# Patient Record
Sex: FEMALE | Race: BLACK OR AFRICAN AMERICAN | NOT HISPANIC OR LATINO | Employment: FULL TIME | URBAN - NONMETROPOLITAN AREA
[De-identification: names, ages, dates, MRNs, and addresses within clinical notes are randomized per-mention and may not be internally consistent; named-entity substitution may affect disease eponyms.]

---

## 2017-07-26 ENCOUNTER — LAB (OUTPATIENT)
Dept: ONCOLOGY | Facility: CLINIC | Age: 37
End: 2017-07-26

## 2017-07-26 ENCOUNTER — OFFICE VISIT (OUTPATIENT)
Dept: ONCOLOGY | Facility: CLINIC | Age: 37
End: 2017-07-26

## 2017-07-26 VITALS
RESPIRATION RATE: 16 BRPM | SYSTOLIC BLOOD PRESSURE: 110 MMHG | HEART RATE: 92 BPM | DIASTOLIC BLOOD PRESSURE: 68 MMHG | HEIGHT: 66 IN | TEMPERATURE: 99.1 F | BODY MASS INDEX: 28.21 KG/M2 | OXYGEN SATURATION: 98 % | WEIGHT: 175.5 LBS

## 2017-07-26 DIAGNOSIS — C92.10 CHRONIC MYELOID LEUKEMIA (HCC): Primary | ICD-10-CM

## 2017-07-26 DIAGNOSIS — D50.9 IRON DEFICIENCY ANEMIA, UNSPECIFIED: Primary | ICD-10-CM

## 2017-07-26 DIAGNOSIS — C92.10 CML (CHRONIC MYELOID LEUKEMIA) (HCC): Primary | ICD-10-CM

## 2017-07-26 LAB
ALBUMIN SERPL-MCNC: 4.2 G/DL (ref 3.5–5)
ALBUMIN/GLOB SERPL: 1.4 G/DL
ALP SERPL-CCNC: 71 U/L (ref 38–126)
ALT SERPL W P-5'-P-CCNC: 27 U/L (ref 9–52)
ANION GAP SERPL CALCULATED.3IONS-SCNC: 8 MMOL/L
AST SERPL-CCNC: 32 U/L (ref 5–40)
AUTO MIXED CELLS #: 0.4 10*3/MM3 (ref 0.1–1.5)
AUTO MIXED CELLS %: 7.4 % (ref 0.2–15.1)
BILIRUB SERPL-MCNC: 0.4 MG/DL (ref 0.2–1.3)
BUN BLD-MCNC: 18 MG/DL (ref 7–26)
BUN/CREAT SERPL: 20 (ref 7–25)
CALCIUM SPEC-SCNC: 9.2 MG/DL (ref 8.4–10.2)
CHLORIDE SERPL-SCNC: 109 MMOL/L (ref 98–107)
CO2 SERPL-SCNC: 22 MMOL/L (ref 22–30)
CREAT BLD-MCNC: 0.9 MG/DL (ref 0.7–1.4)
ERYTHROCYTE [DISTWIDTH] IN BLOOD BY AUTOMATED COUNT: 15.6 % (ref 11.5–14.5)
GFR SERPL CREATININE-BSD FRML MDRD: 85 ML/MIN/1.73
GLOBULIN UR ELPH-MCNC: 3 GM/DL
GLUCOSE BLD-MCNC: 123 MG/DL (ref 75–110)
HCT VFR BLD AUTO: 32.3 % (ref 37–47)
HGB BLD-MCNC: 10.5 G/DL (ref 12–16)
LYMPHOCYTES # BLD AUTO: 2.2 10*3/MM3 (ref 0.8–7)
LYMPHOCYTES NFR BLD AUTO: 41 % (ref 10–58.5)
MCH RBC QN AUTO: 32.3 PG (ref 27–31)
MCHC RBC AUTO-ENTMCNC: 32.5 G/DL (ref 33–37)
MCV RBC AUTO: 99.5 FL (ref 81–99)
NEUTROPHILS # BLD AUTO: 2.7 10*3/MM3 (ref 2–7.8)
NEUTROPHILS NFR BLD AUTO: 51.6 % (ref 37–92)
PLATELET # BLD AUTO: 305 10*3/MM3 (ref 130–400)
PMV BLD AUTO: 7.8 FL (ref 6–12)
POTASSIUM BLD-SCNC: 3.2 MMOL/L (ref 3.6–5)
PROT SERPL-MCNC: 7.2 G/DL (ref 6.3–8.2)
RBC # BLD AUTO: 3.25 10*6/MM3 (ref 4.2–5.4)
SODIUM BLD-SCNC: 139 MMOL/L (ref 137–145)
WBC NRBC COR # BLD: 5.3 10*3/MM3 (ref 4.8–10.8)

## 2017-07-26 PROCEDURE — 80053 COMPREHEN METABOLIC PANEL: CPT | Performed by: INTERNAL MEDICINE

## 2017-07-26 PROCEDURE — 36415 COLL VENOUS BLD VENIPUNCTURE: CPT | Performed by: INTERNAL MEDICINE

## 2017-07-26 PROCEDURE — 99214 OFFICE O/P EST MOD 30 MIN: CPT | Performed by: INTERNAL MEDICINE

## 2017-07-26 PROCEDURE — 85025 COMPLETE CBC W/AUTO DIFF WBC: CPT | Performed by: INTERNAL MEDICINE

## 2017-07-26 RX ORDER — AZITHROMYCIN 250 MG/1
TABLET, FILM COATED ORAL
Refills: 0 | COMMUNITY
Start: 2017-06-27 | End: 2018-06-11

## 2017-07-26 RX ORDER — BROMPHENIRAMINE MALEATE, PSEUDOEPHEDRINE HYDROCHLORIDE, AND DEXTROMETHORPHAN HYDROBROMIDE 2; 30; 10 MG/5ML; MG/5ML; MG/5ML
SYRUP ORAL
Refills: 0 | COMMUNITY
Start: 2017-06-27 | End: 2018-06-11

## 2017-07-26 NOTE — PROGRESS NOTES
[facility]  HEMATOLOGY & ONCOLOGY        Subjective     VISIT DIAGNOSIS: No diagnosis found.    REASON FOR VISIT:   No chief complaint on file.       HEMATOLOGY / ONCOLOGY HISTORY:   Oncology/Hematology History    Ms Becker is a pleasant 35 year old female with diagnosis of chronic myelogenous leukemia diagnosed over 12 years ago. She has  been on Gleevec on and off. She was started back on 02/04/10.  Ms Becker is a pleasant  female with chronic myelogenous leukemia. She initially had been placed on imatinib, and  she failed highdose  therapy. She took this infrequently, however. Patient was placed on Sprycel. Had better compliance to this, but her  bcr/abl transcript shaila. 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 This is  a mutation that is reported to confer resistance to bcr/abl 1 tyrosine kinase inhibitors. Patient was informed of the news.  INTERVAL HISTORY  Winsome is a very pleasant 35 year old female patient with chronic myelogenous leukemia who presents today in followup.  She is currently  taking Sprycel and states she been compliant with it since her last prescription. She states she has had some problems in the past with  pharmacy but overall she has been fairly compliant with it over the last month or two. She last had a BCR/ABL transcript on 04/13/2016  that found the transcript detected at 21.3649% on the international scale. This was down from 30.94 in March and 40.52 back in  September of 14. She is also following with Dr. Benigno mcclure at [place]        CML (chronic myeloid leukemia)    7/26/2017 Initial Diagnosis     CML (chronic myeloid leukemia)        [No treatment plan]  Cancer Staging Information:  No matching staging information was found for the patient.      INTERVAL HISTORY  Patient ID: Winsome Becker is a 36 y.o. year old female         Review of Systems         Medications:    Current  Outpatient Prescriptions   Medication Sig Dispense Refill   • azithromycin (ZITHROMAX) 250 MG tablet TAKE 2 TABLETS BY MOUTH TODAY, THEN TAKE 1 TABLET DAILY FOR 4 DAYS  0   • brompheniramine-pseudoephedrine-DM 30-2-10 MG/5ML syrup TAKE 2 TEASPOONSFUL BY MOUTH EVERY 6 HOURS AS NEEDED  0   • cyclobenzaprine (FLEXERIL) 10 MG tablet Take 1 tablet by mouth 3 (Three) Times a Day As Needed for muscle spasms. 20 tablet 0   • dasatinib (SPRYCEL) 100 MG chemo tablet Take 1 tablet by mouth Daily. 30 tablet 0   • hydrochlorothiazide (HYDRODIURIL) 25 MG tablet TAKE 1 TABLET BY MOUTH DAILY FOR BLOOD PRESSURE  5   • naproxen (NAPROSYN) 500 MG tablet Take 1 tablet by mouth 2 (Two) Times a Day With Meals. 20 tablet 0   • promethazine-dextromethorphan (PROMETHAZINE-DM) 6.25-15 MG/5ML syrup TAKE 1 TEASPOON BY MOUTH EVERY 6 HOURS AS NEEDED FOR COUGH  0   • sulfamethoxazole-trimethoprim (BACTRIM DS,SEPTRA DS) 800-160 MG per tablet TAKE 1 TABLET BY MOUTH TWICE A DAY  0     No current facility-administered medications for this visit.        ALLERGIES:    Allergies   Allergen Reactions   • Latex    • Penicillins        Objective      @VITALS    Current Status 7/26/2017   ECOG score 0       General Appearance: Patient is awake, alert, oriented and in no acute distress. Patient is welldeveloped, wellnourished, and appears stated age.  HEENT: Normocephalic. Sclerae clear, conjunctiva pink, extraocular movements intact, pupils, round, reactive to light and  accommodation. Mouth and throat are clear with moist oral mucosa.  NECK: Supple, no jugular venous distention, thyroid not enlarged.  LYMPH: No cervical, supraclavicular, axillary, or inguinal lymphadenopathy.  CHEST: Equal bilateral expansion, AP  diameter normal, resonant percussion note  LUNGS: Good air movement, no rales, rhonchi, rubs or wheezes with auscultation  CARDIO: Regular sinus rhythm, no murmurs, gallops or rubs.  ABDOMEN: Nondistended, soft, No tenderness, no guarding, no  rebound, No hepatosplenomegaly. No abdominal masses. Bowel sounds positive. No hernia  GENITALIA: Not examined.  BREASTS: Not examined.  MUSKEL: No joint swelling, decreased motion, or inflammation  EXTREMS: No edema, clubbing, cyanosis, No varicose veins.  NEURO: Grossly nonfocal, Gait is coordinated and smooth, Cognition is preserved.  SKIN: No rashes, no ecchymoses, no petechia.  PSYCH: Oriented to time, place and person. Memory is preserved. Mood and affect appear normal      RECENT LABS:  Orders Only on 07/26/2017   Component Date Value Ref Range Status   • WBC 07/26/2017 5.30  4.80 - 10.80 10*3/mm3 Final   • RBC 07/26/2017 3.25* 4.20 - 5.40 10*6/mm3 Final   • Hemoglobin 07/26/2017 10.5* 12.0 - 16.0 g/dL Final   • Hematocrit 07/26/2017 32.3* 37.0 - 47.0 % Final   • MCV 07/26/2017 99.5* 81.0 - 99.0 fL Final   • MCH 07/26/2017 32.3* 27.0 - 31.0 pg Final   • MCHC 07/26/2017 32.5* 33.0 - 37.0 g/dL Final   • RDW 07/26/2017 15.6* 11.5 - 14.5 % Final   • MPV 07/26/2017 7.8  6.0 - 12.0 fL Final   • Platelets 07/26/2017 305  130 - 400 10*3/mm3 Final   • Neutrophil % 07/26/2017 51.6  37.0 - 92.0 % Final   • Lymphocyte % 07/26/2017 41.0  10.0 - 58.5 % Final   • Auto Mixed Cells % 07/26/2017 7.4  0.2 - 15.1 % Final   • Neutrophils, Absolute 07/26/2017 2.70  2.00 - 7.80 10*3/mm3 Final   • Lymphocytes, Absolute 07/26/2017 2.20  0.80 - 7.00 10*3/mm3 Final   • Auto Mixed Cells # 07/26/2017 0.40  0.10 - 1.50 10*3/mm3 Final       RADIOLOGY:  No results found.         Assessment/Plan        CML last 17 years on Spyricel 100mg / day.  Last BCR able transcripts in June 2016 was 23% she has never been in cytogenetic or molecular remission.  CBC shows a normal white count except for low hemoglobin of 10.5 platelets are normal.  I will repeat her BCR able transcripts today.  No adenopathy and no splenomegaly.              Jeromy Brian MD    7/26/2017    4:06 PM

## 2017-10-23 DIAGNOSIS — C82.10 FOLLICULAR LYMPHOMA GRADE II, UNSPECIFIED BODY REGION (HCC): Primary | ICD-10-CM

## 2017-10-26 ENCOUNTER — LAB (OUTPATIENT)
Dept: LAB | Facility: HOSPITAL | Age: 37
End: 2017-10-26

## 2017-10-26 ENCOUNTER — OFFICE VISIT (OUTPATIENT)
Dept: ONCOLOGY | Facility: CLINIC | Age: 37
End: 2017-10-26

## 2017-10-26 VITALS
HEART RATE: 92 BPM | HEIGHT: 66 IN | WEIGHT: 175.1 LBS | SYSTOLIC BLOOD PRESSURE: 144 MMHG | BODY MASS INDEX: 28.14 KG/M2 | OXYGEN SATURATION: 93 % | TEMPERATURE: 98.1 F | DIASTOLIC BLOOD PRESSURE: 88 MMHG | RESPIRATION RATE: 16 BRPM

## 2017-10-26 DIAGNOSIS — C92.10 CML (CHRONIC MYELOID LEUKEMIA) (HCC): Primary | ICD-10-CM

## 2017-10-26 DIAGNOSIS — C82.10 FOLLICULAR LYMPHOMA GRADE II, UNSPECIFIED BODY REGION (HCC): ICD-10-CM

## 2017-10-26 LAB
ALBUMIN SERPL-MCNC: 4 G/DL (ref 3.5–5)
ALBUMIN/GLOB SERPL: 1.3 G/DL (ref 1.1–2.5)
ALP SERPL-CCNC: 72 U/L (ref 24–120)
ALT SERPL W P-5'-P-CCNC: 38 U/L (ref 0–54)
ANION GAP SERPL CALCULATED.3IONS-SCNC: 9 MMOL/L (ref 4–13)
AST SERPL-CCNC: 39 U/L (ref 7–45)
AUTO MIXED CELLS #: 0.4 10*3/MM3 (ref 0.1–2.6)
AUTO MIXED CELLS %: 9.9 % (ref 0.1–24)
BILIRUB SERPL-MCNC: 0.1 MG/DL (ref 0.1–1)
BUN BLD-MCNC: 19 MG/DL (ref 5–21)
BUN/CREAT SERPL: 21.1
CALCIUM SPEC-SCNC: 9.1 MG/DL (ref 8.4–10.4)
CHLORIDE SERPL-SCNC: 107 MMOL/L (ref 98–110)
CO2 SERPL-SCNC: 24 MMOL/L (ref 24–31)
CREAT BLD-MCNC: 0.9 MG/DL (ref 0.5–1.4)
ERYTHROCYTE [DISTWIDTH] IN BLOOD BY AUTOMATED COUNT: 16.7 % (ref 12–15)
GFR SERPL CREATININE-BSD FRML MDRD: 84 ML/MIN/1.73
GLOBULIN UR ELPH-MCNC: 3 GM/DL
GLUCOSE BLD-MCNC: 96 MG/DL (ref 70–100)
HCT VFR BLD AUTO: 31.9 % (ref 37–47)
HGB BLD-MCNC: 10.6 G/DL (ref 12–16)
HOLD SPECIMEN: NORMAL
LYMPHOCYTES # BLD AUTO: 1.8 10*3/MM3 (ref 0.8–7)
LYMPHOCYTES NFR BLD AUTO: 49.5 % (ref 15–45)
MCH RBC QN AUTO: 30.7 PG (ref 28–32)
MCHC RBC AUTO-ENTMCNC: 33.2 G/DL (ref 33–36)
MCV RBC AUTO: 92.5 FL (ref 82–98)
NEUTROPHILS # BLD AUTO: 1.5 10*3/MM3 (ref 1.5–8.3)
NEUTROPHILS NFR BLD AUTO: 40.6 % (ref 39–78)
PLATELET # BLD AUTO: 226 10*3/MM3 (ref 130–400)
PMV BLD AUTO: 8 FL (ref 6–12)
POTASSIUM BLD-SCNC: 3.4 MMOL/L (ref 3.5–5.3)
PROT SERPL-MCNC: 7 G/DL (ref 6.3–8.7)
RBC # BLD AUTO: 3.45 10*6/MM3 (ref 4.2–5.4)
SODIUM BLD-SCNC: 140 MMOL/L (ref 135–145)
WBC NRBC COR # BLD: 3.7 10*3/MM3 (ref 4.8–10.8)

## 2017-10-26 PROCEDURE — 85025 COMPLETE CBC W/AUTO DIFF WBC: CPT

## 2017-10-26 PROCEDURE — 36415 COLL VENOUS BLD VENIPUNCTURE: CPT

## 2017-10-26 PROCEDURE — 99214 OFFICE O/P EST MOD 30 MIN: CPT | Performed by: INTERNAL MEDICINE

## 2017-10-26 PROCEDURE — 80053 COMPREHEN METABOLIC PANEL: CPT

## 2017-10-26 NOTE — PROGRESS NOTES
[facility]  HEMATOLOGY & ONCOLOGY        Subjective     VISIT DIAGNOSIS: No diagnosis found.    REASON FOR VISIT:   No chief complaint on file.       HEMATOLOGY / ONCOLOGY HISTORY:   Oncology/Hematology History    Ms Becker is a pleasant 35 year old female with diagnosis of chronic myelogenous leukemia diagnosed over 12 years ago. She has  been on Gleevec on and off. She was started back on 02/04/10.  Ms Becker is a pleasant  female with chronic myelogenous leukemia. She initially had been placed on imatinib, and  she failed highdose  therapy. She took this infrequently, however. Patient was placed on Sprycel. Had better compliance to this, but her  bcr/abl transcript shaila. 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 This is  a mutation that is reported to confer resistance to bcr/abl 1 tyrosine kinase inhibitors. Patient was informed of the news.  INTERVAL HISTORY  Winsome is a very pleasant 35 year old female patient with chronic myelogenous leukemia who presents today in followup.  She is currently  taking Sprycel and states she been compliant with it since her last prescription. She states she has had some problems in the past with  pharmacy but overall she has been fairly compliant with it over the last month or two. She last had a BCR/ABL transcript on 04/13/2016  that found the transcript detected at 21.3649% on the international scale. This was down from 30.94 in March and 40.52 back in  September of 14. She is also following with Dr. Benigno mcclure at [place]        CML (chronic myeloid leukemia)    7/26/2017 Initial Diagnosis     CML (chronic myeloid leukemia)        [No treatment plan]  Cancer Staging Information:  No matching staging information was found for the patient.      INTERVAL HISTORY  Patient ID: Winsome Becker is a 37 y.o. year old female         Review of Systems   Constitutional: Negative.     HENT: Negative.    Eyes: Negative.    Respiratory: Negative.    Cardiovascular: Negative.    Gastrointestinal: Negative.    Endocrine: Negative.    Genitourinary: Negative.    Musculoskeletal: Negative.    Skin: Negative.    Allergic/Immunologic: Negative.    Neurological: Negative.    Hematological: Negative.    Psychiatric/Behavioral: Negative.             Medications:    Current Outpatient Prescriptions   Medication Sig Dispense Refill   • azithromycin (ZITHROMAX) 250 MG tablet TAKE 2 TABLETS BY MOUTH TODAY, THEN TAKE 1 TABLET DAILY FOR 4 DAYS  0   • brompheniramine-pseudoephedrine-DM 30-2-10 MG/5ML syrup TAKE 2 TEASPOONSFUL BY MOUTH EVERY 6 HOURS AS NEEDED  0   • cyclobenzaprine (FLEXERIL) 10 MG tablet Take 1 tablet by mouth 3 (Three) Times a Day As Needed for muscle spasms. 20 tablet 0   • dasatinib (SPRYCEL) 100 MG chemo tablet Take 1 tablet by mouth Daily. 30 tablet 2   • hydrochlorothiazide (HYDRODIURIL) 25 MG tablet TAKE 1 TABLET BY MOUTH DAILY FOR BLOOD PRESSURE  5   • naproxen (NAPROSYN) 500 MG tablet Take 1 tablet by mouth 2 (Two) Times a Day With Meals. 20 tablet 0   • promethazine-dextromethorphan (PROMETHAZINE-DM) 6.25-15 MG/5ML syrup TAKE 1 TEASPOON BY MOUTH EVERY 6 HOURS AS NEEDED FOR COUGH  0   • sulfamethoxazole-trimethoprim (BACTRIM DS,SEPTRA DS) 800-160 MG per tablet TAKE 1 TABLET BY MOUTH TWICE A DAY  0     No current facility-administered medications for this visit.        ALLERGIES:    Allergies   Allergen Reactions   • Latex    • Penicillins        Objective      @VITALS    Current Status 7/26/2017   ECOG score 0       General Appearance: Patient is awake, alert, oriented and in no acute distress. Patient is welldeveloped, wellnourished, and appears stated age.  HEENT: Normocephalic. Sclerae clear, conjunctiva pink, extraocular movements intact, pupils, round, reactive to light and  accommodation. Mouth and throat are clear with moist oral mucosa.  NECK: Supple, no jugular venous  distention, thyroid not enlarged.  LYMPH: No cervical, supraclavicular, axillary, or inguinal lymphadenopathy.  CHEST: Equal bilateral expansion, AP  diameter normal, resonant percussion note  LUNGS: Good air movement, no rales, rhonchi, rubs or wheezes with auscultation  CARDIO: Regular sinus rhythm, no murmurs, gallops or rubs.  ABDOMEN: Nondistended, soft, No tenderness, no guarding, no rebound, No hepatosplenomegaly. No abdominal masses. Bowel sounds positive. No hernia  GENITALIA: Not examined.  BREASTS: Not examined.  MUSKEL: No joint swelling, decreased motion, or inflammation  EXTREMS: No edema, clubbing, cyanosis, No varicose veins.  NEURO: Grossly nonfocal, Gait is coordinated and smooth, Cognition is preserved.  SKIN: No rashes, no ecchymoses, no petechia.  PSYCH: Oriented to time, place and person. Memory is preserved. Mood and affect appear normal      RECENT LABS:  Lab on 10/26/2017   Component Date Value Ref Range Status   • WBC 10/26/2017 3.70* 4.80 - 10.80 10*3/mm3 Final   • RBC 10/26/2017 3.45* 4.20 - 5.40 10*6/mm3 Final   • Hemoglobin 10/26/2017 10.6* 12.0 - 16.0 g/dL Final   • Hematocrit 10/26/2017 31.9* 37.0 - 47.0 % Final   • MCV 10/26/2017 92.5  82.0 - 98.0 fL Final   • MCH 10/26/2017 30.7  28.0 - 32.0 pg Final   • MCHC 10/26/2017 33.2  33.0 - 36.0 g/dL Final   • RDW 10/26/2017 16.7* 12.0 - 15.0 % Final   • MPV 10/26/2017 8.0  6.0 - 12.0 fL Final   • Platelets 10/26/2017 226  130 - 400 10*3/mm3 Final   • Neutrophil % 10/26/2017 40.6  39.0 - 78.0 % Final   • Lymphocyte % 10/26/2017 49.5* 15.0 - 45.0 % Final   • Auto Mixed Cells % 10/26/2017 9.9  0.1 - 24.0 % Final   • Neutrophils, Absolute 10/26/2017 1.50  1.50 - 8.30 10*3/mm3 Final   • Lymphocytes, Absolute 10/26/2017 1.80  0.80 - 7.00 10*3/mm3 Final   • Auto Mixed Cells # 10/26/2017 0.40  0.10 - 2.60 10*3/mm3 Final       RADIOLOGY:  No results found.         Assessment/Plan        CML last 17 years on Spyricel 100mg / day.  Last BCR able  transcripts in July 2017 was 9.5% she has never been in cytogenetic or molecular remission.  CBC shows a normal white count except for low hemoglobin of 10.5 platelets are normal.  I will repeat her BCR able transcripts today.  No adenopathy and no splenomegaly.  Cont Sprycel.              Jeromy Brian MD    10/26/2017    1:07 PM

## 2018-01-19 DIAGNOSIS — C92.10 CHRONIC MYELOID LEUKEMIA (HCC): Primary | ICD-10-CM

## 2018-01-25 ENCOUNTER — APPOINTMENT (OUTPATIENT)
Dept: LAB | Facility: HOSPITAL | Age: 38
End: 2018-01-25

## 2018-06-05 DIAGNOSIS — C92.10 CHRONIC MYELOID LEUKEMIA (HCC): Primary | ICD-10-CM

## 2018-06-11 ENCOUNTER — OFFICE VISIT (OUTPATIENT)
Dept: ONCOLOGY | Facility: CLINIC | Age: 38
End: 2018-06-11

## 2018-06-11 ENCOUNTER — APPOINTMENT (OUTPATIENT)
Dept: LAB | Facility: HOSPITAL | Age: 38
End: 2018-06-11

## 2018-06-11 VITALS
BODY MASS INDEX: 25.91 KG/M2 | DIASTOLIC BLOOD PRESSURE: 84 MMHG | RESPIRATION RATE: 16 BRPM | OXYGEN SATURATION: 98 % | TEMPERATURE: 98.1 F | WEIGHT: 161.2 LBS | HEIGHT: 66 IN | SYSTOLIC BLOOD PRESSURE: 142 MMHG | HEART RATE: 88 BPM

## 2018-06-11 DIAGNOSIS — C92.10 CML (CHRONIC MYELOID LEUKEMIA) (HCC): Primary | ICD-10-CM

## 2018-06-11 DIAGNOSIS — C92.10 CHRONIC MYELOID LEUKEMIA (HCC): Primary | ICD-10-CM

## 2018-06-11 LAB
ALBUMIN SERPL-MCNC: 4.2 G/DL (ref 3.5–5)
ALBUMIN/GLOB SERPL: 1.3 G/DL (ref 1.1–2.5)
ALP SERPL-CCNC: 59 U/L (ref 24–120)
ALT SERPL W P-5'-P-CCNC: 30 U/L (ref 0–54)
ANION GAP SERPL CALCULATED.3IONS-SCNC: 9 MMOL/L (ref 4–13)
AST SERPL-CCNC: 46 U/L (ref 7–45)
BASOPHILS # BLD AUTO: 0.04 10*3/MM3 (ref 0–0.2)
BASOPHILS NFR BLD AUTO: 0.9 % (ref 0–2)
BILIRUB SERPL-MCNC: 0.3 MG/DL (ref 0.1–1)
BUN BLD-MCNC: 15 MG/DL (ref 5–21)
BUN/CREAT SERPL: 17 (ref 7–25)
CALCIUM SPEC-SCNC: 9.3 MG/DL (ref 8.4–10.4)
CHLORIDE SERPL-SCNC: 104 MMOL/L (ref 98–110)
CO2 SERPL-SCNC: 27 MMOL/L (ref 24–31)
CREAT BLD-MCNC: 0.88 MG/DL (ref 0.5–1.4)
DEPRECATED RDW RBC AUTO: 60.3 FL (ref 40–54)
EOSINOPHIL # BLD AUTO: 0.08 10*3/MM3 (ref 0–0.7)
EOSINOPHIL NFR BLD AUTO: 1.8 % (ref 0–4)
ERYTHROCYTE [DISTWIDTH] IN BLOOD BY AUTOMATED COUNT: 19.3 % (ref 12–15)
GFR SERPL CREATININE-BSD FRML MDRD: 87 ML/MIN/1.73
GLOBULIN UR ELPH-MCNC: 3.2 GM/DL
GLUCOSE BLD-MCNC: 83 MG/DL (ref 70–100)
HCT VFR BLD AUTO: 34.1 % (ref 37–47)
HGB BLD-MCNC: 11.3 G/DL (ref 12–16)
HOLD SPECIMEN: NORMAL
HOLD SPECIMEN: NORMAL
IMM GRANULOCYTES # BLD: 0.01 10*3/MM3 (ref 0–0.03)
IMM GRANULOCYTES NFR BLD: 0.2 % (ref 0–5)
LYMPHOCYTES # BLD AUTO: 2.39 10*3/MM3 (ref 0.72–4.86)
LYMPHOCYTES NFR BLD AUTO: 52.3 % (ref 15–45)
MCH RBC QN AUTO: 28.5 PG (ref 28–32)
MCHC RBC AUTO-ENTMCNC: 33.1 G/DL (ref 33–36)
MCV RBC AUTO: 85.9 FL (ref 82–98)
MONOCYTES # BLD AUTO: 0.44 10*3/MM3 (ref 0.19–1.3)
MONOCYTES NFR BLD AUTO: 9.6 % (ref 4–12)
NEUTROPHILS # BLD AUTO: 1.61 10*3/MM3 (ref 1.87–8.4)
NEUTROPHILS NFR BLD AUTO: 35.2 % (ref 39–78)
NRBC BLD MANUAL-RTO: 0 /100 WBC (ref 0–0)
PLATELET # BLD AUTO: 307 10*3/MM3 (ref 130–400)
PMV BLD AUTO: 9.2 FL (ref 6–12)
POTASSIUM BLD-SCNC: 3.2 MMOL/L (ref 3.5–5.3)
PROT SERPL-MCNC: 7.4 G/DL (ref 6.3–8.7)
RBC # BLD AUTO: 3.97 10*6/MM3 (ref 4.2–5.4)
SODIUM BLD-SCNC: 140 MMOL/L (ref 135–145)
WBC NRBC COR # BLD: 4.57 10*3/MM3 (ref 4.8–10.8)

## 2018-06-11 PROCEDURE — 36415 COLL VENOUS BLD VENIPUNCTURE: CPT

## 2018-06-11 PROCEDURE — 80053 COMPREHEN METABOLIC PANEL: CPT | Performed by: INTERNAL MEDICINE

## 2018-06-11 PROCEDURE — 99214 OFFICE O/P EST MOD 30 MIN: CPT | Performed by: INTERNAL MEDICINE

## 2018-06-11 PROCEDURE — 85025 COMPLETE CBC W/AUTO DIFF WBC: CPT | Performed by: INTERNAL MEDICINE

## 2018-06-11 RX ORDER — HYDROCHLOROTHIAZIDE 12.5 MG/1
12.5 TABLET ORAL DAILY
Refills: 5 | COMMUNITY
Start: 2018-05-05 | End: 2019-01-17

## 2018-06-11 NOTE — PROGRESS NOTES
[facility]  HEMATOLOGY & ONCOLOGY        Subjective     VISIT DIAGNOSIS: No diagnosis found.    REASON FOR VISIT:   No chief complaint on file.       HEMATOLOGY / ONCOLOGY HISTORY:   Oncology/Hematology History    Ms Becker is a pleasant 35 year old female with diagnosis of chronic myelogenous leukemia diagnosed over 12 years ago. She has  been on Gleevec on and off. She was started back on 02/04/10.  Ms Becker is a pleasant  female with chronic myelogenous leukemia. She initially had been placed on imatinib, and  she failed highdose  therapy. She took this infrequently, however. Patient was placed on Sprycel. Had better compliance to this, but her  bcr/abl transcript shaila. 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 This is  a mutation that is reported to confer resistance to bcr/abl 1 tyrosine kinase inhibitors. Patient was informed of the news.  INTERVAL HISTORY  Winsome is a very pleasant 35 year old female patient with chronic myelogenous leukemia who presents today in followup.  She is currently  taking Sprycel and states she been compliant with it since her last prescription. She states she has had some problems in the past with  pharmacy but overall she has been fairly compliant with it over the last month or two. She last had a BCR/ABL transcript on 04/13/2016  that found the transcript detected at 21.3649% on the international scale. This was down from 30.94 in March and 40.52 back in  September of 14. She is also following with Dr. Benigno mcclure at [place]        CML (chronic myeloid leukemia)    7/26/2017 Initial Diagnosis     CML (chronic myeloid leukemia)        [No treatment plan]  Cancer Staging Information:  No matching staging information was found for the patient.      INTERVAL HISTORY  Patient ID: Winsome Becker is a 37 y.o. year old female         Review of Systems   Constitutional: Negative.     HENT: Negative.    Eyes: Negative.    Respiratory: Negative.    Cardiovascular: Negative.    Gastrointestinal: Negative.    Endocrine: Negative.    Genitourinary: Negative.    Musculoskeletal: Negative.    Skin: Negative.    Allergic/Immunologic: Negative.    Neurological: Negative.    Hematological: Negative.    Psychiatric/Behavioral: Negative.             Medications:    Current Outpatient Prescriptions   Medication Sig Dispense Refill   • cyclobenzaprine (FLEXERIL) 10 MG tablet Take 1 tablet by mouth 3 (Three) Times a Day As Needed for muscle spasms. 20 tablet 0   • dasatinib (SPRYCEL) 100 MG chemo tablet Take 1 tablet by mouth Daily. 30 tablet 5   • hydrochlorothiazide (HYDRODIURIL) 12.5 MG tablet Take 12.5 mg by mouth Daily. for blood pressure.  5   • naproxen (NAPROSYN) 500 MG tablet Take 1 tablet by mouth 2 (Two) Times a Day With Meals. 20 tablet 0   • sulfamethoxazole-trimethoprim (BACTRIM DS,SEPTRA DS) 800-160 MG per tablet TAKE 1 TABLET BY MOUTH TWICE A DAY  0     No current facility-administered medications for this visit.        ALLERGIES:    Allergies   Allergen Reactions   • Latex    • Penicillins        Objective      @VITALS    Current Status 6/11/2018   ECOG score 0       General Appearance: Patient is awake, alert, oriented and in no acute distress. Patient is welldeveloped, wellnourished, and appears stated age.  HEENT: Normocephalic. Sclerae clear, conjunctiva pink, extraocular movements intact, pupils, round, reactive to light and  accommodation. Mouth and throat are clear with moist oral mucosa.  NECK: Supple, no jugular venous distention, thyroid not enlarged.  LYMPH: No cervical, supraclavicular, axillary, or inguinal lymphadenopathy.  CHEST: Equal bilateral expansion, AP  diameter normal, resonant percussion note  LUNGS: Good air movement, no rales, rhonchi, rubs or wheezes with auscultation  CARDIO: Regular sinus rhythm, no murmurs, gallops or rubs.  ABDOMEN: Nondistended, soft, No  tenderness, no guarding, no rebound, No hepatosplenomegaly. No abdominal masses. Bowel sounds positive. No hernia  GENITALIA: Not examined.  BREASTS: Not examined.  MUSKEL: No joint swelling, decreased motion, or inflammation  EXTREMS: No edema, clubbing, cyanosis, No varicose veins.  NEURO: Grossly nonfocal, Gait is coordinated and smooth, Cognition is preserved.  SKIN: No rashes, no ecchymoses, no petechia.  PSYCH: Oriented to time, place and person. Memory is preserved. Mood and affect appear normal      RECENT LABS:  Orders Only on 06/05/2018   Component Date Value Ref Range Status   • WBC 06/11/2018 4.57* 4.80 - 10.80 10*3/mm3 Final   • RBC 06/11/2018 3.97* 4.20 - 5.40 10*6/mm3 Final   • Hemoglobin 06/11/2018 11.3* 12.0 - 16.0 g/dL Final   • Hematocrit 06/11/2018 34.1* 37.0 - 47.0 % Final   • MCV 06/11/2018 85.9  82.0 - 98.0 fL Final   • MCH 06/11/2018 28.5  28.0 - 32.0 pg Final   • MCHC 06/11/2018 33.1  33.0 - 36.0 g/dL Final   • RDW 06/11/2018 19.3* 12.0 - 15.0 % Final   • RDW-SD 06/11/2018 60.3* 40.0 - 54.0 fl Final   • MPV 06/11/2018 9.2  6.0 - 12.0 fL Final   • Platelets 06/11/2018 307  130 - 400 10*3/mm3 Final   • Neutrophil % 06/11/2018 35.2* 39.0 - 78.0 % Final   • Lymphocyte % 06/11/2018 52.3* 15.0 - 45.0 % Final   • Monocyte % 06/11/2018 9.6  4.0 - 12.0 % Final   • Eosinophil % 06/11/2018 1.8  0.0 - 4.0 % Final   • Basophil % 06/11/2018 0.9  0.0 - 2.0 % Final   • Immature Grans % 06/11/2018 0.2  0.0 - 5.0 % Final   • Neutrophils, Absolute 06/11/2018 1.61* 1.87 - 8.40 10*3/mm3 Final   • Lymphocytes, Absolute 06/11/2018 2.39  0.72 - 4.86 10*3/mm3 Final   • Monocytes, Absolute 06/11/2018 0.44  0.19 - 1.30 10*3/mm3 Final   • Eosinophils, Absolute 06/11/2018 0.08  0.00 - 0.70 10*3/mm3 Final   • Basophils, Absolute 06/11/2018 0.04  0.00 - 0.20 10*3/mm3 Final   • Immature Grans, Absolute 06/11/2018 0.01  0.00 - 0.03 10*3/mm3 Final   • nRBC 06/11/2018 0.0  0.0 - 0.0 /100 WBC Final       RADIOLOGY:  No results  found.         Assessment/Plan        CML last 17 years on Spyricel 100mg / day.  Last BCR able transcripts in July 2017 was 9.5% she has never been in cytogenetic or molecular remission.  CBC shows a normal white count except for low hemoglobin of 11.2 platelets are normal.  I will repeat her BCR able transcripts today.  No adenopathy and no splenomegaly.  Cont Sprycel 100mg daily.              Jeromy Brian MD    6/11/2018    11:13 AM

## 2018-07-02 ENCOUNTER — TELEPHONE (OUTPATIENT)
Dept: ONCOLOGY | Facility: CLINIC | Age: 38
End: 2018-07-02

## 2018-07-02 DIAGNOSIS — C92.10 CHRONIC MYELOID LEUKEMIA (HCC): Primary | ICD-10-CM

## 2018-07-02 NOTE — TELEPHONE ENCOUNTER
Received call from patient, she called with question, is there something that she can take OTC for nausea if she forgets to take her Sprycel in the am. She says that she becomes very nauseated if she forgets to take the pill in the am.   She was informed that Dr Brian was out of the office until tomorrow but she could try sipping Ginger Ale or weak tea to settle her stomach and when she comes in for her deven apt tomorrow 7/3/18 she have Dr Brian write for something like Zofran or Compazine for her nausea. She v/u of our conversation.

## 2018-07-03 ENCOUNTER — LAB (OUTPATIENT)
Dept: LAB | Facility: HOSPITAL | Age: 38
End: 2018-07-03

## 2018-07-03 ENCOUNTER — OFFICE VISIT (OUTPATIENT)
Dept: ONCOLOGY | Facility: CLINIC | Age: 38
End: 2018-07-03

## 2018-07-03 VITALS
BODY MASS INDEX: 25.94 KG/M2 | OXYGEN SATURATION: 97 % | HEART RATE: 86 BPM | SYSTOLIC BLOOD PRESSURE: 120 MMHG | TEMPERATURE: 98.5 F | RESPIRATION RATE: 18 BRPM | WEIGHT: 161.4 LBS | HEIGHT: 66 IN | DIASTOLIC BLOOD PRESSURE: 68 MMHG

## 2018-07-03 DIAGNOSIS — C92.10 CHRONIC MYELOID LEUKEMIA (HCC): ICD-10-CM

## 2018-07-03 DIAGNOSIS — C92.10 CML (CHRONIC MYELOID LEUKEMIA) (HCC): Primary | ICD-10-CM

## 2018-07-03 LAB
ALBUMIN SERPL-MCNC: 4.2 G/DL (ref 3.5–5)
ALBUMIN/GLOB SERPL: 1.4 G/DL (ref 1.1–2.5)
ALP SERPL-CCNC: 57 U/L (ref 24–120)
ALT SERPL W P-5'-P-CCNC: 27 U/L (ref 0–54)
ANION GAP SERPL CALCULATED.3IONS-SCNC: 13 MMOL/L (ref 4–13)
AST SERPL-CCNC: 34 U/L (ref 7–45)
BASOPHILS # BLD AUTO: 0.04 10*3/MM3 (ref 0–0.2)
BASOPHILS NFR BLD AUTO: 0.8 % (ref 0–2)
BILIRUB SERPL-MCNC: 0.4 MG/DL (ref 0.1–1)
BUN BLD-MCNC: 15 MG/DL (ref 5–21)
BUN/CREAT SERPL: 17.4 (ref 7–25)
CALCIUM SPEC-SCNC: 9.7 MG/DL (ref 8.4–10.4)
CHLORIDE SERPL-SCNC: 102 MMOL/L (ref 98–110)
CO2 SERPL-SCNC: 24 MMOL/L (ref 24–31)
CREAT BLD-MCNC: 0.86 MG/DL (ref 0.5–1.4)
DEPRECATED RDW RBC AUTO: 55.3 FL (ref 40–54)
EOSINOPHIL # BLD AUTO: 0.15 10*3/MM3 (ref 0–0.7)
EOSINOPHIL NFR BLD AUTO: 3 % (ref 0–4)
ERYTHROCYTE [DISTWIDTH] IN BLOOD BY AUTOMATED COUNT: 18 % (ref 12–15)
GFR SERPL CREATININE-BSD FRML MDRD: 89 ML/MIN/1.73
GLOBULIN UR ELPH-MCNC: 3.1 GM/DL
GLUCOSE BLD-MCNC: 93 MG/DL (ref 70–100)
HCT VFR BLD AUTO: 33.2 % (ref 37–47)
HGB BLD-MCNC: 11 G/DL (ref 12–16)
HOLD SPECIMEN: NORMAL
HOLD SPECIMEN: NORMAL
IMM GRANULOCYTES # BLD: 0.01 10*3/MM3 (ref 0–0.03)
IMM GRANULOCYTES NFR BLD: 0.2 % (ref 0–5)
LYMPHOCYTES # BLD AUTO: 2.24 10*3/MM3 (ref 0.72–4.86)
LYMPHOCYTES NFR BLD AUTO: 44.4 % (ref 15–45)
MCH RBC QN AUTO: 27.9 PG (ref 28–32)
MCHC RBC AUTO-ENTMCNC: 33.1 G/DL (ref 33–36)
MCV RBC AUTO: 84.3 FL (ref 82–98)
MONOCYTES # BLD AUTO: 0.43 10*3/MM3 (ref 0.19–1.3)
MONOCYTES NFR BLD AUTO: 8.5 % (ref 4–12)
NEUTROPHILS # BLD AUTO: 2.17 10*3/MM3 (ref 1.87–8.4)
NEUTROPHILS NFR BLD AUTO: 43.1 % (ref 39–78)
NRBC BLD MANUAL-RTO: 0 /100 WBC (ref 0–0)
PLATELET # BLD AUTO: 346 10*3/MM3 (ref 130–400)
PMV BLD AUTO: 8.8 FL (ref 6–12)
POTASSIUM BLD-SCNC: 3.2 MMOL/L (ref 3.5–5.3)
PROT SERPL-MCNC: 7.3 G/DL (ref 6.3–8.7)
RBC # BLD AUTO: 3.94 10*6/MM3 (ref 4.2–5.4)
SODIUM BLD-SCNC: 139 MMOL/L (ref 135–145)
WBC NRBC COR # BLD: 5.04 10*3/MM3 (ref 4.8–10.8)

## 2018-07-03 PROCEDURE — 85025 COMPLETE CBC W/AUTO DIFF WBC: CPT

## 2018-07-03 PROCEDURE — 36415 COLL VENOUS BLD VENIPUNCTURE: CPT

## 2018-07-03 PROCEDURE — 80053 COMPREHEN METABOLIC PANEL: CPT

## 2018-07-03 PROCEDURE — 99214 OFFICE O/P EST MOD 30 MIN: CPT | Performed by: INTERNAL MEDICINE

## 2018-07-03 RX ORDER — PROMETHAZINE HYDROCHLORIDE 12.5 MG/1
25 TABLET ORAL EVERY 6 HOURS PRN
Qty: 60 TABLET | Refills: 2 | Status: SHIPPED | OUTPATIENT
Start: 2018-07-03 | End: 2019-01-17

## 2018-07-03 NOTE — PROGRESS NOTES
[facility]  HEMATOLOGY & ONCOLOGY        Subjective     VISIT DIAGNOSIS:   Encounter Diagnosis   Name Primary?   • CML (chronic myeloid leukemia) (CMS/HCC) Yes       REASON FOR VISIT:     Chief Complaint   Patient presents with   • Follow-up     CML: She is here for f/u visit today. She requesting something for nausea today        HEMATOLOGY / ONCOLOGY HISTORY:   Oncology/Hematology History    Ms Becker is a pleasant 35 year old female with diagnosis of chronic myelogenous leukemia diagnosed over 12 years ago. She has  been on Gleevec on and off. She was started back on 02/04/10.  Ms Becker is a pleasant  female with chronic myelogenous leukemia. She initially had been placed on imatinib, and  she failed highdose  therapy. She took this infrequently, however. Patient was placed on Sprycel. Had better compliance to this, but her  bcr/abl transcript shaila. 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 This is  a mutation that is reported to confer resistance to bcr/abl 1 tyrosine kinase inhibitors. Patient was informed of the news.  INTERVAL HISTORY  Winsome is a very pleasant 35 year old female patient with chronic myelogenous leukemia who presents today in followup.  She is currently  taking Sprycel and states she been compliant with it since her last prescription. She states she has had some problems in the past with  pharmacy but overall she has been fairly compliant with it over the last month or two. She last had a BCR/ABL transcript on 04/13/2016  that found the transcript detected at 21.3649% on the international scale. This was down from 30.94 in March and 40.52 back in  September of 14. She is also following with Dr. Benigno mcclure at [place]        CML (chronic myeloid leukemia) (CMS/HCC)    7/26/2017 Initial Diagnosis     CML (chronic myeloid leukemia)        [No treatment plan]  Cancer Staging Information:  No  matching staging information was found for the patient.      INTERVAL HISTORY  Patient ID: Winsome Becker is a 37 y.o. year old female         Review of Systems   Constitutional: Negative.    HENT: Negative.    Eyes: Negative.    Respiratory: Negative.    Cardiovascular: Negative.    Gastrointestinal: Negative.    Endocrine: Negative.    Genitourinary: Negative.    Musculoskeletal: Negative.    Skin: Negative.    Allergic/Immunologic: Negative.    Neurological: Negative.    Hematological: Negative.    Psychiatric/Behavioral: Negative.             Medications:    Current Outpatient Prescriptions   Medication Sig Dispense Refill   • dasatinib (SPRYCEL) 100 MG chemo tablet Take 1 tablet by mouth Daily. 30 tablet 5   • hydrochlorothiazide (HYDRODIURIL) 12.5 MG tablet Take 12.5 mg by mouth Daily. for blood pressure.  5   • cyclobenzaprine (FLEXERIL) 10 MG tablet Take 1 tablet by mouth 3 (Three) Times a Day As Needed for muscle spasms. 20 tablet 0   • naproxen (NAPROSYN) 500 MG tablet Take 1 tablet by mouth 2 (Two) Times a Day With Meals. 20 tablet 0   • sulfamethoxazole-trimethoprim (BACTRIM DS,SEPTRA DS) 800-160 MG per tablet TAKE 1 TABLET BY MOUTH TWICE A DAY  0     No current facility-administered medications for this visit.        ALLERGIES:    Allergies   Allergen Reactions   • Latex    • Penicillins        Objective      @VITALS    Current Status 7/3/2018   ECOG score 0       General Appearance: Patient is awake, alert, oriented and in no acute distress. Patient is welldeveloped, wellnourished, and appears stated age.  HEENT: Normocephalic. Sclerae clear, conjunctiva pink, extraocular movements intact, pupils, round, reactive to light and  accommodation. Mouth and throat are clear with moist oral mucosa.  NECK: Supple, no jugular venous distention, thyroid not enlarged.  LYMPH: No cervical, supraclavicular, axillary, or inguinal lymphadenopathy.  CHEST: Equal bilateral expansion, AP  diameter normal, resonant  percussion note  LUNGS: Good air movement, no rales, rhonchi, rubs or wheezes with auscultation  CARDIO: Regular sinus rhythm, no murmurs, gallops or rubs.  ABDOMEN: Nondistended, soft, No tenderness, no guarding, no rebound, No hepatosplenomegaly. No abdominal masses. Bowel sounds positive. No hernia  GENITALIA: Not examined.  BREASTS: Not examined.  MUSKEL: No joint swelling, decreased motion, or inflammation  EXTREMS: No edema, clubbing, cyanosis, No varicose veins.  NEURO: Grossly nonfocal, Gait is coordinated and smooth, Cognition is preserved.  SKIN: No rashes, no ecchymoses, no petechia.  PSYCH: Oriented to time, place and person. Memory is preserved. Mood and affect appear normal      RECENT LABS:  Lab on 07/03/2018   Component Date Value Ref Range Status   • Glucose 07/03/2018 93  70 - 100 mg/dL Final   • BUN 07/03/2018 15  5 - 21 mg/dL Final   • Creatinine 07/03/2018 0.86  0.50 - 1.40 mg/dL Final   • Sodium 07/03/2018 139  135 - 145 mmol/L Final   • Potassium 07/03/2018 3.2* 3.5 - 5.3 mmol/L Final   • Chloride 07/03/2018 102  98 - 110 mmol/L Final   • CO2 07/03/2018 24.0  24.0 - 31.0 mmol/L Final   • Calcium 07/03/2018 9.7  8.4 - 10.4 mg/dL Final   • Total Protein 07/03/2018 7.3  6.3 - 8.7 g/dL Final   • Albumin 07/03/2018 4.20  3.50 - 5.00 g/dL Final   • ALT (SGPT) 07/03/2018 27  0 - 54 U/L Final   • AST (SGOT) 07/03/2018 34  7 - 45 U/L Final   • Alkaline Phosphatase 07/03/2018 57  24 - 120 U/L Final   • Total Bilirubin 07/03/2018 0.4  0.1 - 1.0 mg/dL Final   • eGFR   Amer 07/03/2018 89  >60 mL/min/1.73 Final   • Globulin 07/03/2018 3.1  gm/dL Final   • A/G Ratio 07/03/2018 1.4  1.1 - 2.5 g/dL Final   • BUN/Creatinine Ratio 07/03/2018 17.4  7.0 - 25.0 Final   • Anion Gap 07/03/2018 13.0  4.0 - 13.0 mmol/L Final   • WBC 07/03/2018 5.04  4.80 - 10.80 10*3/mm3 Final   • RBC 07/03/2018 3.94* 4.20 - 5.40 10*6/mm3 Final   • Hemoglobin 07/03/2018 11.0* 12.0 - 16.0 g/dL Final   • Hematocrit 07/03/2018  33.2* 37.0 - 47.0 % Final   • MCV 07/03/2018 84.3  82.0 - 98.0 fL Final   • MCH 07/03/2018 27.9* 28.0 - 32.0 pg Final   • MCHC 07/03/2018 33.1  33.0 - 36.0 g/dL Final   • RDW 07/03/2018 18.0* 12.0 - 15.0 % Final   • RDW-SD 07/03/2018 55.3* 40.0 - 54.0 fl Final   • MPV 07/03/2018 8.8  6.0 - 12.0 fL Final   • Platelets 07/03/2018 346  130 - 400 10*3/mm3 Final   • Neutrophil % 07/03/2018 43.1  39.0 - 78.0 % Final   • Lymphocyte % 07/03/2018 44.4  15.0 - 45.0 % Final   • Monocyte % 07/03/2018 8.5  4.0 - 12.0 % Final   • Eosinophil % 07/03/2018 3.0  0.0 - 4.0 % Final   • Basophil % 07/03/2018 0.8  0.0 - 2.0 % Final   • Immature Grans % 07/03/2018 0.2  0.0 - 5.0 % Final   • Neutrophils, Absolute 07/03/2018 2.17  1.87 - 8.40 10*3/mm3 Final   • Lymphocytes, Absolute 07/03/2018 2.24  0.72 - 4.86 10*3/mm3 Final   • Monocytes, Absolute 07/03/2018 0.43  0.19 - 1.30 10*3/mm3 Final   • Eosinophils, Absolute 07/03/2018 0.15  0.00 - 0.70 10*3/mm3 Final   • Basophils, Absolute 07/03/2018 0.04  0.00 - 0.20 10*3/mm3 Final   • Immature Grans, Absolute 07/03/2018 0.01  0.00 - 0.03 10*3/mm3 Final   • nRBC 07/03/2018 0.0  0.0 - 0.0 /100 WBC Final       RADIOLOGY:  No results found.         Assessment/Plan        CML last 17 years on Spyricel 100mg / day.  Last BCR able transcripts in July 2017 was 9.5% she has never been in cytogenetic or molecular remission.  CBC shows a normal white count except for low hemoglobin of 11.2 platelets are normal.  I will repeat her BCR able transcripts today.  No adenopathy and no splenomegaly.  Cont Sprycel 100mg daily with Phenergan, since at times she feels nauseated.   Most recent BCR-ABL Genotrace curved down but still not normalized. CBC stable. Cont Spyricel 100mg daily with meals + Phenergan.              Jeromy Brian MD    7/3/2018    10:20 AM

## 2018-08-28 ENCOUNTER — TRANSCRIBE ORDERS (OUTPATIENT)
Dept: ADMINISTRATIVE | Facility: HOSPITAL | Age: 38
End: 2018-08-28

## 2018-08-28 ENCOUNTER — HOSPITAL ENCOUNTER (OUTPATIENT)
Dept: GENERAL RADIOLOGY | Facility: HOSPITAL | Age: 38
Discharge: HOME | End: 2018-08-28

## 2018-08-28 DIAGNOSIS — Z00.00 ANNUAL PHYSICAL EXAM: Primary | ICD-10-CM

## 2018-08-28 PROCEDURE — 71046 X-RAY EXAM CHEST 2 VIEWS: CPT

## 2018-10-15 ENCOUNTER — APPOINTMENT (OUTPATIENT)
Dept: LAB | Facility: HOSPITAL | Age: 38
End: 2018-10-15

## 2019-01-07 RX ORDER — DASATINIB 100 MG/1
TABLET ORAL
Qty: 30 TABLET | Refills: 1 | Status: SHIPPED | OUTPATIENT
Start: 2019-01-07 | End: 2019-01-17 | Stop reason: SDUPTHER

## 2019-01-17 ENCOUNTER — APPOINTMENT (OUTPATIENT)
Dept: LAB | Facility: HOSPITAL | Age: 39
End: 2019-01-17

## 2019-01-17 ENCOUNTER — OFFICE VISIT (OUTPATIENT)
Dept: ONCOLOGY | Facility: CLINIC | Age: 39
End: 2019-01-17

## 2019-01-17 VITALS
RESPIRATION RATE: 18 BRPM | BODY MASS INDEX: 27.19 KG/M2 | SYSTOLIC BLOOD PRESSURE: 124 MMHG | TEMPERATURE: 97.7 F | WEIGHT: 169.2 LBS | HEIGHT: 66 IN | HEART RATE: 88 BPM | DIASTOLIC BLOOD PRESSURE: 80 MMHG | OXYGEN SATURATION: 92 %

## 2019-01-17 DIAGNOSIS — C92.10 CML (CHRONIC MYELOID LEUKEMIA) (HCC): Primary | ICD-10-CM

## 2019-01-17 DIAGNOSIS — C92.10 CHRONIC MYELOID LEUKEMIA (HCC): Primary | ICD-10-CM

## 2019-01-17 LAB
ALBUMIN SERPL-MCNC: 4.3 G/DL (ref 3.5–5)
ALBUMIN/GLOB SERPL: 1.4 G/DL (ref 1.1–2.5)
ALP SERPL-CCNC: 66 U/L (ref 24–120)
ALT SERPL W P-5'-P-CCNC: 25 U/L (ref 0–54)
ANION GAP SERPL CALCULATED.3IONS-SCNC: 10 MMOL/L (ref 4–13)
AST SERPL-CCNC: 34 U/L (ref 7–45)
BASOPHILS # BLD AUTO: 0.14 10*3/MM3 (ref 0–0.2)
BASOPHILS NFR BLD AUTO: 1.9 % (ref 0–2)
BILIRUB SERPL-MCNC: 0.3 MG/DL (ref 0.1–1)
BUN BLD-MCNC: 18 MG/DL (ref 5–21)
BUN/CREAT SERPL: 20.5 (ref 7–25)
CALCIUM SPEC-SCNC: 9.5 MG/DL (ref 8.4–10.4)
CHLORIDE SERPL-SCNC: 106 MMOL/L (ref 98–110)
CO2 SERPL-SCNC: 24 MMOL/L (ref 24–31)
CREAT BLD-MCNC: 0.88 MG/DL (ref 0.5–1.4)
DEPRECATED RDW RBC AUTO: 54.4 FL (ref 40–54)
EOSINOPHIL # BLD AUTO: 0.05 10*3/MM3 (ref 0–0.7)
EOSINOPHIL NFR BLD AUTO: 0.7 % (ref 0–4)
ERYTHROCYTE [DISTWIDTH] IN BLOOD BY AUTOMATED COUNT: 16.9 % (ref 12–15)
GFR SERPL CREATININE-BSD FRML MDRD: 86 ML/MIN/1.73
GLOBULIN UR ELPH-MCNC: 3 GM/DL
GLUCOSE BLD-MCNC: 119 MG/DL (ref 70–100)
HCT VFR BLD AUTO: 36.1 % (ref 37–47)
HGB BLD-MCNC: 11.6 G/DL (ref 12–16)
HOLD SPECIMEN: NORMAL
HOLD SPECIMEN: NORMAL
IMM GRANULOCYTES # BLD AUTO: 0.04 10*3/MM3 (ref 0–0.03)
IMM GRANULOCYTES NFR BLD AUTO: 0.5 % (ref 0–5)
LYMPHOCYTES # BLD AUTO: 3.34 10*3/MM3 (ref 0.72–4.86)
LYMPHOCYTES NFR BLD AUTO: 45.2 % (ref 15–45)
MCH RBC QN AUTO: 28.5 PG (ref 28–32)
MCHC RBC AUTO-ENTMCNC: 32.1 G/DL (ref 33–36)
MCV RBC AUTO: 88.7 FL (ref 82–98)
MONOCYTES # BLD AUTO: 0.44 10*3/MM3 (ref 0.19–1.3)
MONOCYTES NFR BLD AUTO: 6 % (ref 4–12)
NEUTROPHILS # BLD AUTO: 3.38 10*3/MM3 (ref 1.87–8.4)
NEUTROPHILS NFR BLD AUTO: 45.7 % (ref 39–78)
NRBC BLD AUTO-RTO: 0 /100 WBC (ref 0–0)
PLATELET # BLD AUTO: 461 10*3/MM3 (ref 130–400)
PMV BLD AUTO: 9.9 FL (ref 6–12)
POTASSIUM BLD-SCNC: 3.7 MMOL/L (ref 3.5–5.3)
PROT SERPL-MCNC: 7.3 G/DL (ref 6.3–8.7)
RBC # BLD AUTO: 4.07 10*6/MM3 (ref 4.2–5.4)
SODIUM BLD-SCNC: 140 MMOL/L (ref 135–145)
WBC NRBC COR # BLD: 7.39 10*3/MM3 (ref 4.8–10.8)

## 2019-01-17 PROCEDURE — 80053 COMPREHEN METABOLIC PANEL: CPT | Performed by: INTERNAL MEDICINE

## 2019-01-17 PROCEDURE — 36415 COLL VENOUS BLD VENIPUNCTURE: CPT

## 2019-01-17 PROCEDURE — 85025 COMPLETE CBC W/AUTO DIFF WBC: CPT | Performed by: INTERNAL MEDICINE

## 2019-01-17 PROCEDURE — 99214 OFFICE O/P EST MOD 30 MIN: CPT | Performed by: INTERNAL MEDICINE

## 2019-01-17 NOTE — PROGRESS NOTES
"      PROBLEM LIST:  1.  CML, diagnosed February 2010.  A. Treated initially with Gleevec, stopped for intolerance  B.  Now on Sprycel with stable disease.    Diagnosis of chronic myelogenous leukemia diagnosed over 12 years ago. She has  been on Gleevec on and off. She was started back on 02/04/10.  Ms Becker is a pleasant  female with chronic myelogenous leukemia. She initially had been placed on imatinib, and  she failed highdose  therapy. She took this infrequently, however. Patient was placed on Sprycel. Had better compliance to this, but her  bcr/abl transcript shaila. 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 This is  a mutation that is reported to confer resistance to bcr/abl 1 tyrosine kinase inhibitors. Patient was informed of the news.   She has also seen Dr. Pelaez at [place]          HISTORY OF PRESENT ILLNESS:   Chief complaint: Here about leukemia management  Ms. Becker hasn't noted any new side effects from the Sprycel and she hasn't noted any new symptoms such as bruising, bleeding, thrombotic events, fevers, sweats, or weight loss.  She does note that she was off her Sprycel for several days because of difficulty obtaining the drug while she was working out of town.    Past Medical History, Past Surgical History, Social History, Family History have been reviewed and are without significant changes except as mentioned.    Review of Systems   A comprehensive 14 point review of systems was performed and was negative except as mentioned.    Medications:  The current medication list was reviewed in the EMR    ALLERGIES:  Allergies not on file           /80   Pulse 88   Temp 97.7 °F (36.5 °C) (Tympanic)   Resp 18   Ht 167.4 cm (65.9\")   Wt 76.7 kg (169 lb 3.2 oz)   SpO2 92%   BMI 27.39 kg/m²      Performance Status: ECOG 0    General: well appearing, in no acute distress  HEENT: sclera anicteric, oropharynx " clear  Lymphatics: no cervical, supraclavicular, or axillary adenopathy  Cardiovascular: regular rate and rhythm, no murmurs  Lungs: clear to auscultation bilaterally  Abdomen: soft, nontender, nondistended.  No palpable organomegaly, in particular no splenomegaly   Extremeties: no lower extremity edema  Skin: no rashes, lesions, bruising, or petechiae    RECENT LABS:   WBC   Date Value Ref Range Status   01/17/2019 7.39 4.80 - 10.80 10*3/mm3 Final     Hemoglobin   Date Value Ref Range Status   01/17/2019 11.6 (L) 12.0 - 16.0 g/dL Final     Hematocrit   Date Value Ref Range Status   01/17/2019 36.1 (L) 37.0 - 47.0 % Final     MCV   Date Value Ref Range Status   01/17/2019 88.7 82.0 - 98.0 fL Final     RDW   Date Value Ref Range Status   01/17/2019 16.9 (H) 12.0 - 15.0 % Final     MPV   Date Value Ref Range Status   01/17/2019 9.9 6.0 - 12.0 fL Final     Platelets   Date Value Ref Range Status   01/17/2019 461 (H) 130 - 400 10*3/mm3 Final     Immature Grans %   Date Value Ref Range Status   01/17/2019 0.5 0.0 - 5.0 % Final     Neutrophils, Absolute   Date Value Ref Range Status   01/17/2019 3.38 1.87 - 8.40 10*3/mm3 Final     Lymphocytes, Absolute   Date Value Ref Range Status   01/17/2019 3.34 0.72 - 4.86 10*3/mm3 Final     Monocytes, Absolute   Date Value Ref Range Status   01/17/2019 0.44 0.19 - 1.30 10*3/mm3 Final     Eosinophils, Absolute   Date Value Ref Range Status   01/17/2019 0.05 0.00 - 0.70 10*3/mm3 Final     Basophils, Absolute   Date Value Ref Range Status   01/17/2019 0.14 0.00 - 0.20 10*3/mm3 Final     Immature Grans, Absolute   Date Value Ref Range Status   01/17/2019 0.04 (H) 0.00 - 0.03 10*3/mm3 Final     nRBC   Date Value Ref Range Status   01/17/2019 0.0 0.0 - 0.0 /100 WBC Final       Glucose   Date Value Ref Range Status   01/17/2019 119 (H) 70 - 100 mg/dL Final     Sodium   Date Value Ref Range Status   01/17/2019 140 135 - 145 mmol/L Final     Potassium   Date Value Ref Range Status    01/17/2019 3.7 3.5 - 5.3 mmol/L Final     CO2   Date Value Ref Range Status   01/17/2019 24.0 24.0 - 31.0 mmol/L Final     Chloride   Date Value Ref Range Status   01/17/2019 106 98 - 110 mmol/L Final     Anion Gap   Date Value Ref Range Status   01/17/2019 10.0 4.0 - 13.0 mmol/L Final     Creatinine   Date Value Ref Range Status   01/17/2019 0.88 0.50 - 1.40 mg/dL Final     BUN   Date Value Ref Range Status   01/17/2019 18 5 - 21 mg/dL Final     BUN/Creatinine Ratio   Date Value Ref Range Status   01/17/2019 20.5 7.0 - 25.0 Final     Calcium   Date Value Ref Range Status   01/17/2019 9.5 8.4 - 10.4 mg/dL Final     Alkaline Phosphatase   Date Value Ref Range Status   01/17/2019 66 24 - 120 U/L Final     Total Protein   Date Value Ref Range Status   01/17/2019 7.3 6.3 - 8.7 g/dL Final     ALT (SGPT)   Date Value Ref Range Status   01/17/2019 25 0 - 54 U/L Final     AST (SGOT)   Date Value Ref Range Status   01/17/2019 34 7 - 45 U/L Final     Total Bilirubin   Date Value Ref Range Status   01/17/2019 0.3 0.1 - 1.0 mg/dL Final     Albumin   Date Value Ref Range Status   01/17/2019 4.30 3.50 - 5.00 g/dL Final     Globulin   Date Value Ref Range Status   01/17/2019 3.0 gm/dL Final       No results found for: LDH, URICACID    No results found for: MSPIKE, KAPPALAMB, IGLFLC, FREEKAPPAL              Impression: 1.  CML.  I did note that her platelet count had increased on labs today.  During discussion of this was when she reported that she had in fact missed her medicine for several days.    Plan: 1.  I suggested repeating her labs in about 6 weeks.  If her platelets continue to rise, she'll need to be investigated thoroughly for development of resistance.  If her platelets return to normal then I think she can continue on the Sprycel 100 mg daily and return in 3 months with BCR-ABL testing.  I did not order it today since she didn't offer medicines for several days and I don't think results would be  interpretable.                   Visit time was 28 minutes, 16 minutes spent in counseling including review of her labs and the difficulties with compliance and plans for evaluation in light of the missed doses.      Monroe Montes De Oca MD  [facility] Hematology and Oncology    01/17/19           CC:

## 2019-04-11 NOTE — TELEPHONE ENCOUNTER
Call from Winsome stating that she can not get her medicine because of it being prescribed under Dr. Montes De Oca.  Notified Dr. Zheng and new script sent to [facility].

## 2019-04-15 DIAGNOSIS — C92.10 CML (CHRONIC MYELOID LEUKEMIA) (HCC): Primary | ICD-10-CM

## 2019-05-13 ENCOUNTER — OFFICE VISIT (OUTPATIENT)
Dept: ONCOLOGY | Facility: CLINIC | Age: 39
End: 2019-05-13

## 2019-05-13 ENCOUNTER — LAB (OUTPATIENT)
Dept: LAB | Facility: HOSPITAL | Age: 39
End: 2019-05-13

## 2019-05-13 VITALS
BODY MASS INDEX: 27.5 KG/M2 | RESPIRATION RATE: 16 BRPM | OXYGEN SATURATION: 95 % | WEIGHT: 171.1 LBS | SYSTOLIC BLOOD PRESSURE: 168 MMHG | HEIGHT: 66 IN | DIASTOLIC BLOOD PRESSURE: 100 MMHG | TEMPERATURE: 98.7 F | HEART RATE: 92 BPM

## 2019-05-13 DIAGNOSIS — N63.10 LARGE MASS OF RIGHT BREAST: Primary | ICD-10-CM

## 2019-05-13 DIAGNOSIS — C92.10 CML (CHRONIC MYELOID LEUKEMIA) (HCC): Primary | ICD-10-CM

## 2019-05-13 LAB
ALBUMIN SERPL-MCNC: 4.3 G/DL (ref 3.5–5)
ALBUMIN/GLOB SERPL: 1.4 G/DL (ref 1.1–2.5)
ALP SERPL-CCNC: 83 U/L (ref 24–120)
ALT SERPL W P-5'-P-CCNC: 19 U/L (ref 0–54)
ANION GAP SERPL CALCULATED.3IONS-SCNC: 9 MMOL/L (ref 4–13)
ANISOCYTOSIS BLD QL: ABNORMAL
AST SERPL-CCNC: 68 U/L (ref 7–45)
BASOPHILS # BLD MANUAL: 1.25 10*3/MM3 (ref 0–0.2)
BASOPHILS NFR BLD AUTO: 6.1 % (ref 0–2)
BILIRUB SERPL-MCNC: 0.2 MG/DL (ref 0.1–1)
BUN BLD-MCNC: 22 MG/DL (ref 5–21)
BUN/CREAT SERPL: 20.6 (ref 7–25)
CALCIUM SPEC-SCNC: 9.1 MG/DL (ref 8.4–10.4)
CHLORIDE SERPL-SCNC: 105 MMOL/L (ref 98–110)
CO2 SERPL-SCNC: 25 MMOL/L (ref 24–31)
CREAT BLD-MCNC: 1.07 MG/DL (ref 0.5–1.4)
DEPRECATED RDW RBC AUTO: 51.3 FL (ref 40–54)
EOSINOPHIL # BLD MANUAL: 0.2 10*3/MM3 (ref 0–0.7)
EOSINOPHIL NFR BLD MANUAL: 1 % (ref 0–4)
ERYTHROCYTE [DISTWIDTH] IN BLOOD BY AUTOMATED COUNT: 16.7 % (ref 12–15)
GFR SERPL CREATININE-BSD FRML MDRD: 69 ML/MIN/1.73
GLOBULIN UR ELPH-MCNC: 3.1 GM/DL
GLUCOSE BLD-MCNC: 85 MG/DL (ref 70–100)
HCT VFR BLD AUTO: 35.5 % (ref 37–47)
HGB BLD-MCNC: 11.7 G/DL (ref 12–16)
HOLD SPECIMEN: NORMAL
LYMPHOCYTES # BLD MANUAL: 6.2 10*3/MM3 (ref 0.72–4.86)
LYMPHOCYTES NFR BLD MANUAL: 3 % (ref 4–12)
LYMPHOCYTES NFR BLD MANUAL: 30.3 % (ref 15–45)
MCH RBC QN AUTO: 28.3 PG (ref 28–32)
MCHC RBC AUTO-ENTMCNC: 33 G/DL (ref 33–36)
MCV RBC AUTO: 85.7 FL (ref 82–98)
METAMYELOCYTES NFR BLD MANUAL: 4 % (ref 0–0)
MONOCYTES # BLD AUTO: 0.61 10*3/MM3 (ref 0.19–1.3)
MYELOCYTES NFR BLD MANUAL: 4 % (ref 0–0)
NEUTROPHILS # BLD AUTO: 8.88 10*3/MM3 (ref 1.87–8.4)
NEUTROPHILS NFR BLD MANUAL: 42.4 % (ref 39–78)
NEUTS BAND NFR BLD MANUAL: 1 % (ref 0–10)
NRBC SPEC MANUAL: 1 /100 WBC (ref 0–0.2)
PLATELET # BLD AUTO: 654 10*3/MM3 (ref 130–400)
PMV BLD AUTO: 9.5 FL (ref 6–12)
POIKILOCYTOSIS BLD QL SMEAR: ABNORMAL
POTASSIUM BLD-SCNC: 4 MMOL/L (ref 3.5–5.3)
PROT SERPL-MCNC: 7.4 G/DL (ref 6.3–8.7)
RBC # BLD AUTO: 4.14 10*6/MM3 (ref 4.2–5.4)
SMALL PLATELETS BLD QL SMEAR: ABNORMAL
SODIUM BLD-SCNC: 139 MMOL/L (ref 135–145)
VARIANT LYMPHS NFR BLD MANUAL: 8.1 % (ref 0–5)
WBC MORPH BLD: NORMAL
WBC NRBC COR # BLD: 20.45 10*3/MM3 (ref 4.8–10.8)

## 2019-05-13 PROCEDURE — 36415 COLL VENOUS BLD VENIPUNCTURE: CPT

## 2019-05-13 PROCEDURE — 85025 COMPLETE CBC W/AUTO DIFF WBC: CPT

## 2019-05-13 PROCEDURE — 99215 OFFICE O/P EST HI 40 MIN: CPT | Performed by: INTERNAL MEDICINE

## 2019-05-13 PROCEDURE — 80053 COMPREHEN METABOLIC PANEL: CPT

## 2019-05-13 PROCEDURE — 85007 BL SMEAR W/DIFF WBC COUNT: CPT

## 2019-05-13 NOTE — PROGRESS NOTES
[facility]  HEMATOLOGY & ONCOLOGY    Cancer Staging Information:  Cancer Staging  No matching staging information was found for the patient.      Subjective     VISIT DIAGNOSIS:   Encounter Diagnosis   Name Primary?   • Large mass of right breast Yes       REASON FOR VISIT:     Chief Complaint   Patient presents with   • CML     Here for followup   • Breast Mass     right breast lump would like a mammogram ordered.         HEMATOLOGY / ONCOLOGY HISTORY:   Oncology/Hematology History    Ms Becker is a pleasant 35 year old female with diagnosis of chronic myelogenous leukemia diagnosed over 12 years ago. She has  been on Gleevec on and off. She was started back on 02/04/10.  Ms Becker is a pleasant  female with chronic myelogenous leukemia. She initially had been placed on imatinib, and  she failed highdose  therapy. She took this infrequently, however. Patient was placed on Sprycel. Had better compliance to this, but her  bcr/abl transcript shaila. 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 This is  a mutation that is reported to confer resistance to bcr/abl 1 tyrosine kinase inhibitors. Patient was informed of the news.  INTERVAL HISTORY  Winsome is a very pleasant 35 year old female patient with chronic myelogenous leukemia who presents today in followup.  She is currently  taking Sprycel and states she been compliant with it since her last prescription. She states she has had some problems in the past with  pharmacy but overall she has been fairly compliant with it over the last month or two. She last had a BCR/ABL transcript on 04/13/2016  that found the transcript detected at 21.3649% on the international scale. This was down from 30.94 in March and 40.52 back in  September of 14. She is also following with Dr. Benigno mcclure at [place]        CML (chronic myeloid leukemia) (CMS/Formerly [facility])    7/26/2017 Initial Diagnosis      CML (chronic myeloid leukemia)                INTERVAL HISTORY  Patient ID: Winsome Becker is a 38 y.o. year old female  With cml presenting today with concern about a palpable right breast mass.    -nipple lesion since 2 weeks. Painful. Breast lump noted this morning. Denies trauma to her breast. No redness or hotness. Denies fever or chills. Denies LAD.  -denies night sweats, sob, cp, n/v, unintentional weight loss, focal weakness. Rest of ros unremarkable.    Past Medical History:   Past Medical History:   Diagnosis Date   • CML (chronic myelocytic leukemia) (CMS/HCC)      Past Surgical History:   Past Surgical History:   Procedure Laterality Date   • TUBAL ABDOMINAL LIGATION       Social History:   Social History     Socioeconomic History   • Marital status:      Spouse name: Not on file   • Number of children: Not on file   • Years of education: Not on file   • Highest education level: Not on file   Tobacco Use   • Smoking status: Heavy Tobacco Smoker   Substance and Sexual Activity   • Alcohol use: Yes   • Drug use: No     Family History:   Family History   Problem Relation Age of Onset   • Breast cancer Neg Hx        Review of Systems   Constitutional: Negative.    HENT: Negative.    Eyes: Negative.    Respiratory: Negative.    Cardiovascular: Negative.    Gastrointestinal: Negative.    Endocrine: Negative.    Genitourinary: Negative.    Musculoskeletal: Negative.    Skin:        Right nipple rash   Neurological: Negative.    Hematological: Negative.    Psychiatric/Behavioral: Negative.         Performance Status:  Asymptomatic    Medications:    Current Outpatient Medications   Medication Sig Dispense Refill   • dasatinib (SPRYCEL) 100 MG chemo tablet Take 1 tablet by mouth Daily. 30 tablet 6     No current facility-administered medications for this visit.        ALLERGIES:    Allergies   Allergen Reactions   • Latex    • Penicillins        Objective      Vitals:    05/13/19 1339   BP: 168/100  "  Pulse: 92   Resp: 16   Temp: 98.7 °F (37.1 °C)   TempSrc: Tympanic   SpO2: 95%   Weight: 77.6 kg (171 lb 1.6 oz)   Height: 167.6 cm (66\")   PainSc: 0-No pain         Current Status 5/13/2019   ECOG score 0         Physical Exam  General Appearance: Patient is awake, alert, oriented and in no acute distress. Patient is welldeveloped, wellnourished, and appears stated age.  HEENT: Normocephalic. Sclerae clear, conjunctiva pink, extraocular movements intact, pupils, round, reactive to light and  accommodation. Mouth and throat are clear with moist oral mucosa.  NECK: Supple, no jugular venous distention, thyroid not enlarged.  LYMPH: No cervical, supraclavicular, axillary, or inguinal lymphadenopathy.  CHEST: Equal bilateral expansion, AP  diameter normal, resonant percussion note  LUNGS: Good air movement, no rales, rhonchi, rubs or wheezes with auscultation  CARDIO: Regular sinus rhythm, no murmurs, gallops or rubs.  ABDOMEN: Nondistended, soft, No tenderness, no guarding, no rebound, No hepatosplenomegaly. No abdominal masses. Bowel sounds positive. No hernia  GENITALIA: Not examined.  BREASTS: palpable mass medial right breast. Pimple like lesion right breast nipple. Bilateral dense breast.  MUSKEL: No joint swelling, decreased motion, or inflammation  EXTREMS: No edema, clubbing, cyanosis, No varicose veins.  NEURO: Grossly nonfocal, Gait is coordinated and smooth, Cognition is preserved.  SKIN: No rashes, no ecchymoses, no petechia.  PSYCH: Oriented to time, place and person. Memory is preserved. Mood and affect appear normal  RECENT LABS:  Lab on 05/13/2019   Component Date Value Ref Range Status   • Glucose 05/13/2019 85  70 - 100 mg/dL Final   • BUN 05/13/2019 22* 5 - 21 mg/dL Final   • Creatinine 05/13/2019 1.07  0.50 - 1.40 mg/dL Final   • Sodium 05/13/2019 139  135 - 145 mmol/L Final   • Potassium 05/13/2019 4.0  3.5 - 5.3 mmol/L Final   • Chloride 05/13/2019 105  98 - 110 mmol/L Final   • CO2 05/13/2019 " 25.0  24.0 - 31.0 mmol/L Final   • Calcium 05/13/2019 9.1  8.4 - 10.4 mg/dL Final   • Total Protein 05/13/2019 7.4  6.3 - 8.7 g/dL Final   • Albumin 05/13/2019 4.30  3.50 - 5.00 g/dL Final   • ALT (SGPT) 05/13/2019 19  0 - 54 U/L Final   • AST (SGOT) 05/13/2019 68* 7 - 45 U/L Final   • Alkaline Phosphatase 05/13/2019 83  24 - 120 U/L Final   • Total Bilirubin 05/13/2019 0.2  0.1 - 1.0 mg/dL Final   • eGFR   Amer 05/13/2019 69  >60 mL/min/1.73 Final   • Globulin 05/13/2019 3.1  gm/dL Final   • A/G Ratio 05/13/2019 1.4  1.1 - 2.5 g/dL Final   • BUN/Creatinine Ratio 05/13/2019 20.6  7.0 - 25.0 Final   • Anion Gap 05/13/2019 9.0  4.0 - 13.0 mmol/L Final   • WBC 05/13/2019 20.45* 4.80 - 10.80 10*3/mm3 Final   • RBC 05/13/2019 4.14* 4.20 - 5.40 10*6/mm3 Final   • Hemoglobin 05/13/2019 11.7* 12.0 - 16.0 g/dL Final   • Hematocrit 05/13/2019 35.5* 37.0 - 47.0 % Final   • MCV 05/13/2019 85.7  82.0 - 98.0 fL Final   • MCH 05/13/2019 28.3  28.0 - 32.0 pg Final   • MCHC 05/13/2019 33.0  33.0 - 36.0 g/dL Final   • RDW 05/13/2019 16.7* 12.0 - 15.0 % Final   • RDW-SD 05/13/2019 51.3  40.0 - 54.0 fl Final   • MPV 05/13/2019 9.5  6.0 - 12.0 fL Final   • Platelets 05/13/2019 654* 130 - 400 10*3/mm3 Final   • Extra Tube 05/13/2019 Hold for add-ons.   Final    Auto resulted.   • Neutrophil % 05/13/2019 42.4  39.0 - 78.0 % Final   • Lymphocyte % 05/13/2019 30.3  15.0 - 45.0 % Final   • Monocyte % 05/13/2019 3.0* 4.0 - 12.0 % Final   • Eosinophil % 05/13/2019 1.0  0.0 - 4.0 % Final   • Basophil % 05/13/2019 6.1* 0.0 - 2.0 % Final   • Bands %  05/13/2019 1.0  0.0 - 10.0 % Final   • Metamyelocyte % 05/13/2019 4.0* 0.0 - 0.0 % Final   • Myelocyte % 05/13/2019 4.0* 0.0 - 0.0 % Final   • Atypical Lymphocyte % 05/13/2019 8.1* 0.0 - 5.0 % Final   • Neutrophils Absolute 05/13/2019 8.88* 1.87 - 8.40 10*3/mm3 Final   • Lymphocytes Absolute 05/13/2019 6.20* 0.72 - 4.86 10*3/mm3 Final   • Monocytes Absolute 05/13/2019 0.61  0.19 - 1.30  10*3/mm3 Final   • Eosinophils Absolute 05/13/2019 0.20  0.00 - 0.70 10*3/mm3 Final   • Basophils Absolute 05/13/2019 1.25* 0.00 - 0.20 10*3/mm3 Final   • nRBC 05/13/2019 1.0* 0.0 - 0.2 /100 WBC Final   • Anisocytosis 05/13/2019 Slight/1+  None Seen Final   • Poikilocytes 05/13/2019 Slight/1+  None Seen Final   • WBC Morphology 05/13/2019 Normal  Normal Final   • Platelet Estimate 05/13/2019 Increased  Normal Final       RADIOLOGY:  Us Breast Right Limited    Result Date: 5/14/2019  Narrative: EXAMINATION: Limited right breast ultrasound 05/14/2019  HISTORY: Palpable right breast mass medial breast  FINDINGS: Diagnostic mammography demonstrates a dominant mass lesion involving the medial right breast as well as a slightly smaller lesion in the lateral right breast. Ultrasound was recommended for further characterization.  The palpable abnormalities in the medial right breast represents 2 adjacent large cysts including a 4.0 x 2.1 x 3.5 cm cyst and a 2.2 x 2.6 x 1.4 cm cyst. Both of these fit all the criteria for simple cyst and would not warrant any additional evaluation.  Within the lateral right breast there is also a cyst measuring 3.1 x 1.4 x 2.8 cm in size which is bilobed in appearance. No solid nodules are present.      Impression: 1.. Fibrocystic changes within the medial and lateral right breast including 2 adjacent large cyst corresponding to the palpable abnormalities and mammographic abnormality. These lesions fit all the criteria for simple cyst by ultrasound criteria and would not warrant any additional follow-up. The patient may return to the screening pool with normal interval screening mammography in one year's time unless otherwise earlier clinically indicated. 2. ACR BI-RADS 2 benign findings negative. This report was finalized on 05/14/2019 12:54 by Dr. Leonel Pacheco MD.    Mammo Diagnostic Digital Tomosynthesis Bilateral With Cad    Result Date: 5/16/2019  Narrative: EXAMINATION: Diagnostic  bilateral mammogram 05/14/2019  HISTORY: Palpable right breast mass.  FINDINGS: The patient complains of a palpable breast mass within the right breast at the 3:00 position. Digital mammography is performed of both breasts in the CC and MLO projections. A true lateral view of the right breast with Tomosynthesis as well as a spot compression cc view over the palpable abnormality are also obtained. The breast tissue is heterogeneously dense somewhat lowering the sensitivity of mammography consistent with a type C parenchymal pattern. Computer-aided detection was also utilized for assessment of the mammogram.  In the area of palpable abnormality in the right breast there is an approximately 4 cm partially obscured mass. I suspect this represents 2 adjacent lesions given their morphology in the CC projection. Also noted is an approximately 3 cm mass within the upper outer quadrant of the right breast. Subsequent ultrasound confirms these all to represent cysts. These fit all the criteria for simple cyst and would not warrant any additional follow-up. The left breast is unremarkable.      Impression: 1.. Mass lesions within both the medial and lateral right breast including a mass which corresponds to the palpable abnormality. These were subsequently confirmed to represent benign cysts by ultrasound. No additional follow-up is necessary. 2. ACR BI-RADS 2 benign findings negative. This report was finalized on 05/16/2019 07:50 by Dr. Leonel Pacheco MD.           Assessment/Plan  Winsome Becker is a 38 y.o. year old female with CML on dasatinib presenting today concerned about a palpable right breast mass.    Patient Active Problem List   Diagnosis   • CML (chronic myeloid leukemia) (CMS/HCC)          1.palpable medial right breast mass: ordered diagnostic mammo and US    2.CML: on dasatinib. Pt reports being compliant on medication. Wbc 20.45 increased from 7.39. Will address this next visit given this breast mass  that need attension.  -Hg 11.1, will check iron profile, ferritin, b12, folate and go from there  -Plt 654, reactive vs CML. Workup in progress          Diallo Zheng MD    5/13/2019    3:52 PM

## 2019-05-14 ENCOUNTER — HOSPITAL ENCOUNTER (OUTPATIENT)
Dept: MAMMOGRAPHY | Facility: HOSPITAL | Age: 39
Discharge: HOME | End: 2019-05-14
Admitting: INTERNAL MEDICINE

## 2019-05-14 ENCOUNTER — HOSPITAL ENCOUNTER (OUTPATIENT)
Dept: ULTRASOUND IMAGING | Facility: HOSPITAL | Age: 39
Discharge: HOME | End: 2019-05-14

## 2019-05-14 DIAGNOSIS — N63.10 LARGE MASS OF RIGHT BREAST: ICD-10-CM

## 2019-05-14 PROCEDURE — 76642 ULTRASOUND BREAST LIMITED: CPT

## 2019-05-14 PROCEDURE — G0279 TOMOSYNTHESIS, MAMMO: HCPCS

## 2019-05-14 PROCEDURE — 77066 DX MAMMO INCL CAD BI: CPT

## 2019-05-23 ENCOUNTER — APPOINTMENT (OUTPATIENT)
Dept: MAMMOGRAPHY | Facility: HOSPITAL | Age: 39
End: 2019-05-23

## 2019-05-23 ENCOUNTER — APPOINTMENT (OUTPATIENT)
Dept: LAB | Facility: HOSPITAL | Age: 39
End: 2019-05-23

## 2019-05-24 ENCOUNTER — APPOINTMENT (OUTPATIENT)
Dept: LAB | Facility: HOSPITAL | Age: 39
End: 2019-05-24

## 2019-06-09 ENCOUNTER — APPOINTMENT (OUTPATIENT)
Dept: GENERAL RADIOLOGY | Facility: HOSPITAL | Age: 39
End: 2019-06-09

## 2019-06-09 ENCOUNTER — HOSPITAL ENCOUNTER (EMERGENCY)
Facility: HOSPITAL | Age: 39
Discharge: HOME | End: 2019-06-09
Admitting: EMERGENCY MEDICINE

## 2019-06-09 VITALS
RESPIRATION RATE: 16 BRPM | DIASTOLIC BLOOD PRESSURE: 99 MMHG | SYSTOLIC BLOOD PRESSURE: 152 MMHG | BODY MASS INDEX: 27 KG/M2 | TEMPERATURE: 97.6 F | HEART RATE: 76 BPM | WEIGHT: 168 LBS | HEIGHT: 66 IN | OXYGEN SATURATION: 100 %

## 2019-06-09 DIAGNOSIS — J30.2 SEASONAL ALLERGIC RHINITIS, UNSPECIFIED TRIGGER: Primary | ICD-10-CM

## 2019-06-09 DIAGNOSIS — M54.50 LUMBAR BACK PAIN: ICD-10-CM

## 2019-06-09 DIAGNOSIS — J20.9 ACUTE BRONCHITIS, UNSPECIFIED ORGANISM: ICD-10-CM

## 2019-06-09 PROCEDURE — 72110 X-RAY EXAM L-2 SPINE 4/>VWS: CPT

## 2019-06-09 PROCEDURE — 99283 EMERGENCY DEPT VISIT LOW MDM: CPT

## 2019-06-09 PROCEDURE — 71046 X-RAY EXAM CHEST 2 VIEWS: CPT

## 2019-06-09 RX ORDER — METHYLPREDNISOLONE 4 MG/1
TABLET ORAL
Qty: 1 EACH | Refills: 0 | Status: SHIPPED | OUTPATIENT
Start: 2019-06-09 | End: 2019-11-20

## 2019-06-09 RX ORDER — KETOROLAC TROMETHAMINE 30 MG/ML
60 INJECTION, SOLUTION INTRAMUSCULAR; INTRAVENOUS ONCE
Status: DISCONTINUED | OUTPATIENT
Start: 2019-06-09 | End: 2019-06-09 | Stop reason: HOSPADM

## 2019-06-09 RX ORDER — DEXAMETHASONE SODIUM PHOSPHATE 4 MG/ML
4 INJECTION, SOLUTION INTRA-ARTICULAR; INTRALESIONAL; INTRAMUSCULAR; INTRAVENOUS; SOFT TISSUE ONCE
Status: DISCONTINUED | OUTPATIENT
Start: 2019-06-09 | End: 2019-06-09 | Stop reason: HOSPADM

## 2019-06-09 NOTE — DISCHARGE INSTRUCTIONS
Rest, take medication as directed.  OTC zyrtec/flonase for sinus symptoms.  Follow up with PCP for recheck.  Return with new/worsening symptoms.         Acute Back Pain, Adult  Acute back pain is sudden and usually short-lived. It is often caused by an injury to the muscles and tissues in the back. The injury may result from:  · A muscle or ligament getting overstretched or torn (strained). Ligaments are tissues that connect bones to each other. Lifting something improperly can cause a back strain.  · Wear and tear (degeneration) of the spinal disks. Spinal disks are circular tissue that provides cushioning between the bones of the spine (vertebrae).  · Twisting motions, such as while playing sports or doing yard work.  · A hit to the back.  · Arthritis.    You may have a physical exam, lab tests, and imaging tests to find the cause of your pain. Acute back pain usually goes away with rest and home care.  Follow these instructions at home:  Managing pain, stiffness, and swelling  · Take over-the-counter and prescription medicines only as told by your health care provider.  · Your health care provider may recommend applying ice during the first 24-48 hours after your pain starts. To do this:  ? Put ice in a plastic bag.  ? Place a towel between your skin and the bag.  ? Leave the ice on for 20 minutes, 2-3 times a day.  · If directed, apply heat to the affected area as often as told by your health care provider. Use the heat source that your health care provider recommends, such as a moist heat pack or a heating pad.  ? Place a towel between your skin and the heat source.  ? Leave the heat on for 20-30 minutes.  ? Remove the heat if your skin turns bright red. This is especially important if you are unable to feel pain, heat, or cold. You have a greater risk of getting burned.  Activity  · Do not stay in bed. Staying in bed for more than 1-2 days can delay your recovery.  · Sit up and stand up straight. Avoid leaning  "forward when you sit, or hunching over when you stand.  ? If you work at a desk, sit close to it so you do not need to lean over. Keep your chin tucked in. Keep your neck drawn back, and keep your elbows bent at a right angle. Your arms should look like the letter \"L.\"  ? Sit high and close to the steering wheel when you drive. Add lower back (lumbar) support to your car seat, if needed.  · Take short walks on even surfaces as soon as you are able. Try to increase the length of time you walk each day.  · Do not sit, drive, or  one place for more than 30 minutes at a time. Sitting or standing for long periods of time can put stress on your back.  · Do not drive or use heavy machinery while taking prescription pain medicine.  · Use proper lifting techniques. When you bend and lift, use positions that put less stress on your back:  ? Bend your knees.  ? Keep the load close to your body.  ? Avoid twisting.  · Exercise regularly as told by your health care provider. Exercising helps your back heal faster and helps prevent back injuries by keeping muscles strong and flexible.  · Work with a physical therapist to make a safe exercise program, as recommended by your health care provider. Do any exercises as told by your physical therapist.  Lifestyle  · Maintain a healthy weight. Extra weight puts stress on your back and makes it difficult to have good posture.  · Avoid activities or situations that make you feel anxious or stressed. Stress and anxiety increase muscle tension and can make back pain worse. Learn ways to manage anxiety and stress, such as through exercise.  General instructions  · Sleep on a firm mattress in a comfortable position. Try lying on your side with your knees slightly bent. If you lie on your back, put a pillow under your knees.  · Follow your treatment plan as told by your health care provider. This may include:  ? Cognitive or behavioral therapy.  ? Acupuncture or massage " therapy.  ? Meditation or yoga.  Contact a health care provider if:  · You have pain that is not relieved with rest or medicine.  · You have increasing pain going down into your legs or buttocks.  · Your pain does not improve after 2 weeks.  · You have pain at night.  · You lose weight without trying.  · You have a fever or chills.  Get help right away if:  · You develop new bowel or bladder control problems.  · You have unusual weakness or numbness in your arms or legs.  · You develop nausea or vomiting.  · You develop abdominal pain.  · You feel faint.  Summary  · Acute back pain is sudden and usually short-lived.  · Use proper lifting techniques. When you bend and lift, use positions that put less stress on your back.  · Take over-the-counter and prescription medicines and apply heat or ice as directed by your health care provider.  This information is not intended to replace advice given to you by your health care provider. Make sure you discuss any questions you have with your health care provider.  Document Released: 12/18/2006 Document Revised: 08/01/2018 Document Reviewed: 08/01/2018  [employer] Interactive Patient Education © 2019 [employer]      Acute Bronchitis, Adult  Acute bronchitis is sudden (acute) swelling of the air tubes (bronchi) in the lungs. Acute bronchitis causes these tubes to fill with mucus, which can make it hard to breathe. It can also cause coughing or wheezing.  In adults, acute bronchitis usually goes away within 2 weeks. A cough caused by bronchitis may last up to 3 weeks. Smoking, allergies, and asthma can make the condition worse. Repeated episodes of bronchitis may cause further lung problems, such as chronic obstructive pulmonary disease (COPD).  What are the causes?  This condition can be caused by germs and by substances that irritate the lungs, including:  · Cold and flu viruses. This condition is most often caused by the same virus that causes a  cold.  · Bacteria.  · Exposure to tobacco smoke, dust, fumes, and air pollution.    What increases the risk?  This condition is more likely to develop in people who:  · Have close contact with someone with acute bronchitis.  · Are exposed to lung irritants, such as tobacco smoke, dust, fumes, and vapors.  · Have a weak immune system.  · Have a respiratory condition such as asthma.    What are the signs or symptoms?  Symptoms of this condition include:  · A cough.  · Coughing up clear, yellow, or green mucus.  · Wheezing.  · Chest congestion.  · Shortness of breath.  · A fever.  · Body aches.  · Chills.  · A sore throat.    How is this diagnosed?  This condition is usually diagnosed with a physical exam. During the exam, your health care provider may order tests, such as chest X-rays, to rule out other conditions. He or she may also:  · Test a sample of your mucus for bacterial infection.  · Check the level of oxygen in your blood. This is done to check for pneumonia.  · Do a chest X-ray or lung function testing to rule out pneumonia and other conditions.  · Perform blood tests.    Your health care provider will also ask about your symptoms and medical history.  How is this treated?  Most cases of acute bronchitis clear up over time without treatment. Your health care provider may recommend:  · Drinking more fluids. Drinking more makes your mucus thinner, which may make it easier to breathe.  · Taking a medicine for a fever or cough.  · Taking an antibiotic medicine.  · Using an inhaler to help improve shortness of breath and to control a cough.  · Using a cool mist vaporizer or humidifier to make it easier to breathe.    Follow these instructions at home:  Medicines  · Take over-the-counter and prescription medicines only as told by your health care provider.  · If you were prescribed an antibiotic, take it as told by your health care provider. Do not stop taking the antibiotic even if you start to feel  better.  General instructions  · Get plenty of rest.  · Drink enough fluids to keep your urine pale yellow.  · Avoid smoking and secondhand smoke. Exposure to cigarette smoke or irritating chemicals will make bronchitis worse. If you smoke and you need help quitting, ask your health care provider. Quitting smoking will help your lungs heal faster.  · Use an inhaler, cool mist vaporizer, or humidifier as told by your health care provider.  · Keep all follow-up visits as told by your health care provider. This is important.  How is this prevented?  To lower your risk of getting this condition again:  · Wash your hands often with soap and water. If soap and water are not available, use hand .  · Avoid contact with people who have cold symptoms.  · Try not to touch your hands to your mouth, nose, or eyes.  · Make sure to get the flu shot every year.    Contact a health care provider if:  · Your symptoms do not improve in 2 weeks of treatment.  Get help right away if:  · You cough up blood.  · You have chest pain.  · You have severe shortness of breath.  · You become dehydrated.  · You faint or keep feeling like you are going to faint.  · You keep vomiting.  · You have a severe headache.  · Your fever or chills gets worse.  This information is not intended to replace advice given to you by your health care provider. Make sure you discuss any questions you have with your health care provider.  Document Released: 01/25/2006 Document Revised: 08/01/2018 Document Reviewed: 06/07/2017  [employer] Interactive Patient Education © 2019 [employer]

## 2019-06-09 NOTE — ED PROVIDER NOTES
Subjective   Pt is a pleasant 38 y.o. Female who presents with low back pain and cough/congestion.  She says her back has been bothering her for about 1 week, along with congestion.  She says it was worse at first, improved but is starting to return.  She feels like when she coughs or takes a deep breath it makes her back hurt worse.  She has associated sinus congestion and postnasal drainage.  She denies fever, chills, n/v/d, CP, palpitations or SOA with exertion.  She denies s/s associated with her low back pain including bowel/bladder dysfunction or saddle paresthesias. Denies  symptoms.  Denies back injury.   PMH significant for CML for which she is on daily PO medication- last oncology appt ~3 weeks ago went well.             Review of Systems   Constitutional: Negative for chills, fatigue, fever and unexpected weight change.   HENT: Positive for congestion, postnasal drip, rhinorrhea and sinus pressure. Negative for ear discharge, ear pain, sinus pain, sore throat and trouble swallowing.    Eyes: Negative for discharge, redness and itching.   Respiratory: Positive for cough. Negative for choking, chest tightness, shortness of breath, wheezing and stridor.    Cardiovascular: Negative for chest pain, palpitations and leg swelling.   Gastrointestinal: Negative for abdominal pain, diarrhea, nausea and vomiting.   Genitourinary: Negative for decreased urine volume, difficulty urinating, dysuria and flank pain.   Musculoskeletal: Positive for back pain. Negative for arthralgias, gait problem, joint swelling, myalgias, neck pain and neck stiffness.   Skin: Negative for color change, pallor and rash.   Neurological: Negative for dizziness, syncope, weakness, light-headedness and headaches.   Hematological: Negative for adenopathy. Does not bruise/bleed easily.        + for CML per HPI       Past Medical History:   Diagnosis Date   • CML (chronic myelocytic leukemia) (CMS/HCC)        Allergies   Allergen Reactions  "  • Latex    • Penicillins        Past Surgical History:   Procedure Laterality Date   • TUBAL ABDOMINAL LIGATION         Family History   Problem Relation Age of Onset   • Breast cancer Neg Hx        Social History     Socioeconomic History   • Marital status:      Spouse name: Not on file   • Number of children: Not on file   • Years of education: Not on file   • Highest education level: Not on file   Tobacco Use   • Smoking status: Heavy Tobacco Smoker   Substance and Sexual Activity   • Alcohol use: Yes   • Drug use: No       /99 (BP Location: Right arm, Patient Position: Lying)   Pulse 76   Temp 97.6 °F (36.4 °C) (Oral)   Resp 16   Ht 167.6 cm (66\")   Wt 76.2 kg (168 lb)   LMP 06/01/2019 (Approximate)   SpO2 100%   BMI 27.12 kg/m²       Objective   Physical Exam   Constitutional: She is oriented to person, place, and time. She appears well-developed and well-nourished. No distress.   HENT:   Head: Normocephalic and atraumatic.   Mouth/Throat: Oropharynx is clear and moist.   Serous middle ear effusion, R>L, wo significant erythema or bulging. Sinus congestion with thick rhinorrhea.  Clear postnasal drainage   Eyes: Conjunctivae and EOM are normal. Pupils are equal, round, and reactive to light. Right eye exhibits no discharge. Left eye exhibits no discharge.   Neck: Normal range of motion. Neck supple. No tracheal deviation present.   Cardiovascular: Normal rate, regular rhythm, normal heart sounds and intact distal pulses.   No murmur heard.  Pulmonary/Chest: Effort normal. No stridor. No respiratory distress. She has no wheezes. She has rhonchi in the left upper field. She has no rales. She exhibits no tenderness.   Abdominal: Soft. Bowel sounds are normal.   Musculoskeletal: Normal range of motion. She exhibits no edema or deformity.        Thoracic back: She exhibits spasm (right lower thoracic). She exhibits normal range of motion.        Lumbar back: She exhibits tenderness and spasm. " She exhibits normal range of motion, no swelling, no edema, no deformity and normal pulse.   Lymphadenopathy:     She has no cervical adenopathy.   Neurological: She is alert and oriented to person, place, and time. No sensory deficit.   CN normal as tested   Skin: Skin is warm and dry. Capillary refill takes less than 2 seconds. No rash noted. She is not diaphoretic. No pallor.   Psychiatric: She has a normal mood and affect. Her behavior is normal.   Nursing note and vitals reviewed.      Procedures           ED Course  ED Course as of Jun 09 1037   Sun Jun 09, 2019   0844 Pt stable.  Reviewed xrays with Dr. Hamilton- compared to previous in 2018.  No acute findings.  Vital signs stable- afebrile, HR/O2 wnl.  BP elevated this morning- will f/u with PCP for recheck of this. DC with f/u instructions.  Discussed s/s of worsening condition- will return as needed with any acute changes.  [DC]      ED Course User Index  [DC] Castleman, Danna D, PA                  MDM  Number of Diagnoses or Management Options     Amount and/or Complexity of Data Reviewed  Tests in the radiology section of CPT®: reviewed and ordered  Review and summarize past medical records: yes  Independent visualization of images, tracings, or specimens: yes          Final diagnoses:   Seasonal allergic rhinitis, unspecified trigger   Acute bronchitis, unspecified organism   Lumbar back pain            Castleman, Danna D, PA  06/09/19 1037

## 2019-08-28 ENCOUNTER — OFFICE VISIT (OUTPATIENT)
Dept: URGENT CARE | Age: 39
End: 2019-08-28
Payer: MEDICAID

## 2019-08-28 VITALS
BODY MASS INDEX: 26.36 KG/M2 | SYSTOLIC BLOOD PRESSURE: 147 MMHG | WEIGHT: 164 LBS | OXYGEN SATURATION: 97 % | HEART RATE: 96 BPM | RESPIRATION RATE: 18 BRPM | HEIGHT: 66 IN | DIASTOLIC BLOOD PRESSURE: 94 MMHG | TEMPERATURE: 99.7 F

## 2019-08-28 DIAGNOSIS — J06.9 VIRAL URI WITH COUGH: Primary | ICD-10-CM

## 2019-08-28 PROCEDURE — G8427 DOCREV CUR MEDS BY ELIG CLIN: HCPCS | Performed by: NURSE PRACTITIONER

## 2019-08-28 PROCEDURE — G8419 CALC BMI OUT NRM PARAM NOF/U: HCPCS | Performed by: NURSE PRACTITIONER

## 2019-08-28 PROCEDURE — 4004F PT TOBACCO SCREEN RCVD TLK: CPT | Performed by: NURSE PRACTITIONER

## 2019-08-28 PROCEDURE — 99202 OFFICE O/P NEW SF 15 MIN: CPT | Performed by: NURSE PRACTITIONER

## 2019-08-28 RX ORDER — BENZONATATE 100 MG/1
100 CAPSULE ORAL 3 TIMES DAILY PRN
Qty: 21 CAPSULE | Refills: 0 | Status: SHIPPED | OUTPATIENT
Start: 2019-08-28 | End: 2019-09-04

## 2019-08-28 RX ORDER — DEXTROMETHORPHAN HYDROBROMIDE AND PROMETHAZINE HYDROCHLORIDE 15; 6.25 MG/5ML; MG/5ML
5 SYRUP ORAL NIGHTLY PRN
Qty: 120 ML | Refills: 0 | Status: SHIPPED | OUTPATIENT
Start: 2019-08-28 | End: 2019-09-03

## 2019-08-28 ASSESSMENT — ENCOUNTER SYMPTOMS
SORE THROAT: 0
COUGH: 1
RHINORRHEA: 0

## 2019-08-28 NOTE — PROGRESS NOTES
[facility]  [address]  [address] [place] [address]  Dept: [phone]  Loc: [phone]    Yo Mackenzie is a 34 y.o. female who presents today for her medical conditions/complaintsas noted below. Yo Mackenzie is c/o of Congestion; Cough; Wheezing; and Chills        HPI:     Cough   This is a new problem. The current episode started yesterday. The problem has been unchanged. The problem occurs constantly. The cough is productive of sputum. Associated symptoms include chills, a fever (99,7 here. Unchecked at home), myalgias and nasal congestion. Pertinent negatives include no rhinorrhea or sore throat. Exacerbated by: movement. She has tried OTC cough suppressant for the symptoms. The treatment provided no relief. Has CML     Dr. Jayden Britton is her oncologist at [facility].   Past Medical History:   Diagnosis Date    CML (chronic myelocytic leukemia) ([facility] [address]      Past Surgical History:   Procedure Laterality Date    TUBAL LIGATION         No family history on file. Social History     Tobacco Use    Smoking status: Current Every Day Smoker    Smokeless tobacco: Never Used   Substance Use Topics    Alcohol use: Not on file      Current Outpatient Medications   Medication Sig Dispense Refill    dasatinib (SPRYCEL) 100 MG chemo tablet Take 100 mg by mouth      promethazine-dextromethorphan (PROMETHAZINE-DM) 6.25-15 MG/5ML syrup Take 5 mLs by mouth nightly as needed for Cough 120 mL 0    benzonatate (TESSALON) 100 MG capsule Take 1 capsule by mouth 3 times daily as needed for Cough 21 capsule 0     No current facility-administered medications for this visit.       Allergies   Allergen Reactions    Latex     Penicillins        Health Maintenance   Topic Date Due    HIV screen  09/14/1993    DTaP/Tdap/Td vaccine (1 - Tdap) 09/14/1997    Cervical cancer screen  09/14/1999    Lipid screen  09/14/2018    Diabetes screen  09/14/2018    Flu vaccine (1) 09/01/2019    Pneumococcal 0-64 years Vaccine  Aged Out       Subjective:     Review of Systems   Constitutional: Positive for chills and fever (99,7 here. Unchecked at home). HENT: Negative for congestion, rhinorrhea and sore throat. Respiratory: Positive for cough. Musculoskeletal: Positive for myalgias. All other systems reviewed and are negative.      :Objective      Physical Exam   Constitutional: She is oriented to person, place, and time. She appears well-developed and well-nourished. No distress. HENT:   Head: Normocephalic and atraumatic. Right Ear: Hearing, tympanic membrane, external ear and ear canal normal.   Left Ear: Hearing, tympanic membrane, external ear and ear canal normal.   Nose: Nose normal.   Mouth/Throat: Uvula is midline, oropharynx is clear and moist and mucous membranes are normal.   Eyes: Pupils are equal, round, and reactive to light. Neck: Normal range of motion. Cardiovascular: Normal rate, regular rhythm and normal heart sounds. No murmur heard. Pulmonary/Chest: Effort normal and breath sounds normal. No respiratory distress. She has no decreased breath sounds. She has no wheezes. She has no rhonchi. She has no rales. Neurological: She is alert and oriented to person, place, and time. Skin: Skin is warm and dry. No rash noted. She is not diaphoretic. Psychiatric: She has a normal mood and affect. Her behavior is normal.   Nursing note and vitals reviewed. BP (!) 147/94   Pulse 96   Temp 99.7 °F (37.6 °C)   Resp 18   Ht 5' 6\" (1.676 m)   Wt 164 lb (74.4 kg)   SpO2 97%   BMI 26.47 kg/m²     :Assessment       Diagnosis Orders   1. Viral URI with cough  promethazine-dextromethorphan (PROMETHAZINE-DM) 6.25-15 MG/5ML syrup    benzonatate (TESSALON) 100 MG capsule       :Plan   1. Rest and increase fluid intake. 2. Take tessalon as needed for coughing. Take Promethazine DM as needed at night for cough-this medication can make you drowsy.   3. Monitor

## 2019-08-28 NOTE — LETTER
[facility]  [address] [place] [id number]  Phone: [address] APRN        August 28, 2019     Patient: Lebron Callas   YOB: 1980   Date of Visit: 8/28/2019       To Whom it May Concern:    Lakeisha Ortega was seen in my clinic on 8/28/2019. She may return to work on 08/29/2019. If you have any questions or concerns, please don't hesitate to call.     Sincerely,         Grover Lopez, APRN

## 2019-09-03 ENCOUNTER — OFFICE VISIT (OUTPATIENT)
Dept: URGENT CARE | Age: 39
End: 2019-09-03
Payer: MEDICAID

## 2019-09-03 VITALS
TEMPERATURE: 98.4 F | HEART RATE: 89 BPM | SYSTOLIC BLOOD PRESSURE: 147 MMHG | OXYGEN SATURATION: 97 % | RESPIRATION RATE: 18 BRPM | HEIGHT: 66 IN | BODY MASS INDEX: 26.26 KG/M2 | WEIGHT: 163.4 LBS | DIASTOLIC BLOOD PRESSURE: 98 MMHG

## 2019-09-03 DIAGNOSIS — R05.9 COUGH: Primary | ICD-10-CM

## 2019-09-03 DIAGNOSIS — R06.2 WHEEZING: ICD-10-CM

## 2019-09-03 PROCEDURE — 99213 OFFICE O/P EST LOW 20 MIN: CPT | Performed by: NURSE PRACTITIONER

## 2019-09-03 RX ORDER — METHYLPREDNISOLONE 4 MG/1
TABLET ORAL
Qty: 1 KIT | Refills: 0 | Status: SHIPPED | OUTPATIENT
Start: 2019-09-03 | End: 2019-09-09

## 2019-09-03 RX ORDER — DEXTROMETHORPHAN HYDROBROMIDE AND PROMETHAZINE HYDROCHLORIDE 15; 6.25 MG/5ML; MG/5ML
SYRUP ORAL
Qty: 120 ML | Refills: 0 | Status: SHIPPED | OUTPATIENT
Start: 2019-09-03 | End: 2019-09-10

## 2019-09-03 SDOH — HEALTH STABILITY: MENTAL HEALTH: HOW OFTEN DO YOU HAVE A DRINK CONTAINING ALCOHOL?: NEVER

## 2019-09-03 ASSESSMENT — ENCOUNTER SYMPTOMS
SINUS PAIN: 0
SHORTNESS OF BREATH: 0
EYES NEGATIVE: 1
SORE THROAT: 0
COUGH: 1
SINUS PRESSURE: 0
ABDOMINAL PAIN: 0
WHEEZING: 1
ALLERGIC/IMMUNOLOGIC NEGATIVE: 1

## 2019-09-03 NOTE — PROGRESS NOTES
Dispense:  120 mL     Refill:  0        Patient Instructions     Plenty of fluids  Rest  OTC Tylenol or Motrin as needed  May use Phenergan DM at night, may use during the day but this can cause drowsiness  Follow-up with PCP or return to  as needed   Patient Education        Cough: Care Instructions  Your Care Instructions    A cough is your body's response to something that bothers your throat or airways. Many things can cause a cough. You might cough because of a cold or the flu, bronchitis, or asthma. Smoking, postnasal drip, allergies, and stomach acid that backs up into your throat also can cause coughs. A cough is a symptom, not a disease. Most coughs stop when the cause, such as a cold, goes away. You can take a few steps at home to cough less and feel better. Follow-up care is a key part of your treatment and safety. Be sure to make and go to all appointments, and call your doctor if you are having problems. It's also a good idea to know your test results and keep a list of the medicines you take. How can you care for yourself at home? · Drink lots of water and other fluids. This helps thin the mucus and soothes a dry or sore throat. Honey or lemon juice in hot water or tea may ease a dry cough. · Take cough medicine as directed by your doctor. · Prop up your head on pillows to help you breathe and ease a dry cough. · Try cough drops to soothe a dry or sore throat. Cough drops don't stop a cough. Medicine-flavored cough drops are no better than candy-flavored drops or hard candy. · Do not smoke. Avoid secondhand smoke. If you need help quitting, talk to your doctor about stop-smoking programs and medicines. These can increase your chances of quitting for good. When should you call for help? Call 911 anytime you think you may need emergency care.  For example, call if:    · You have severe trouble breathing.    Call your doctor now or seek immediate medical care if:    · You cough up blood.     · You have new or worse trouble breathing.     · You have a new or higher fever.     · You have a new rash.    Watch closely for changes in your health, and be sure to contact your doctor if:    · You cough more deeply or more often, especially if you notice more mucus or a change in the color of your mucus.     · You have new symptoms, such as a sore throat, an earache, or sinus pain.     · You do not get better as expected. Where can you learn more? Go to https://Kay[email or web address] org and sign in to your [employer] account. Enter D279 in the [employer] box to learn more about \"Cough: Care Instructions. \"     If you do not have an account, please click on the \"Sign Up Now\" link. Current as of: September 5, 2018  Content Version: 12.1  © 8542-6165 [employer]. Care instructions adapted under license by [facility] ([facility]). If you have questions about a medical condition or this instruction, always ask your healthcare professional. [place] [address] any warranty or liability for your use of this information. Patient Education        Wheezing or Bronchoconstriction: Care Instructions  Your Care Instructions  Wheezing is a whistling noise made during breathing. It occurs when the small airways, or bronchial tubes, that lead to your lungs swell or contract (spasm) and become narrow. This narrowing is called bronchoconstriction. When your airways constrict, it is hard for air to pass through and this makes it hard for you to breathe. Wheezing and bronchoconstriction can be caused by many problems, including:  · An infection such as the flu or a cold. · Allergies such as hay fever. · Diseases such as asthma or chronic obstructive pulmonary disease. · Smoking. Treatment for your wheezing depends on what is causing the problem. Your wheezing may get better without treatment. But you may need to pay attention to things that cause your wheezing and avoid them.  Or

## 2019-09-03 NOTE — PATIENT INSTRUCTIONS
higher fever.     · You have a new rash.    Watch closely for changes in your health, and be sure to contact your doctor if:    · You cough more deeply or more often, especially if you notice more mucus or a change in the color of your mucus.     · You have new symptoms, such as a sore throat, an earache, or sinus pain.     · You do not get better as expected. Where can you learn more? Go to https://TravelTipz.rupeDOCUSYS.Med ePad. org and sign in to your [employer] account. Enter D279 in the [employer] box to learn more about \"Cough: Care Instructions. \"     If you do not have an account, please click on the \"Sign Up Now\" link. Current as of: September 5, 2018  Content Version: 12.1  © 7131-1601 [employer]OutMap. Care instructions adapted under license by [facility] ([facility]). If you have questions about a medical condition or this instruction, always ask your healthcare professional. [place] [address] any warranty or liability for your use of this information. Patient Education        Wheezing or Bronchoconstriction: Care Instructions  Your Care Instructions  Wheezing is a whistling noise made during breathing. It occurs when the small airways, or bronchial tubes, that lead to your lungs swell or contract (spasm) and become narrow. This narrowing is called bronchoconstriction. When your airways constrict, it is hard for air to pass through and this makes it hard for you to breathe. Wheezing and bronchoconstriction can be caused by many problems, including:  · An infection such as the flu or a cold. · Allergies such as hay fever. · Diseases such as asthma or chronic obstructive pulmonary disease. · Smoking. Treatment for your wheezing depends on what is causing the problem. Your wheezing may get better without treatment. But you may need to pay attention to things that cause your wheezing and avoid them.  Or you may need medicine to help treat the wheezing and to reduce the

## 2019-11-05 ENCOUNTER — OFFICE VISIT (OUTPATIENT)
Dept: URGENT CARE | Age: 39
End: 2019-11-05
Payer: MEDICAID

## 2019-11-05 VITALS
HEIGHT: 66 IN | SYSTOLIC BLOOD PRESSURE: 180 MMHG | DIASTOLIC BLOOD PRESSURE: 100 MMHG | OXYGEN SATURATION: 99 % | BODY MASS INDEX: 26.03 KG/M2 | WEIGHT: 162 LBS | RESPIRATION RATE: 18 BRPM | HEART RATE: 98 BPM | TEMPERATURE: 98.5 F

## 2019-11-05 DIAGNOSIS — R05.8 NOCTURNAL COUGH: ICD-10-CM

## 2019-11-05 DIAGNOSIS — R09.82 POST-NASAL DRIP: ICD-10-CM

## 2019-11-05 DIAGNOSIS — J40 BRONCHITIS: ICD-10-CM

## 2019-11-05 DIAGNOSIS — J02.9 SORE THROAT: Primary | ICD-10-CM

## 2019-11-05 DIAGNOSIS — I10 HYPERTENSION, UNSPECIFIED TYPE: ICD-10-CM

## 2019-11-05 LAB — S PYO AG THROAT QL: NORMAL

## 2019-11-05 PROCEDURE — 99213 OFFICE O/P EST LOW 20 MIN: CPT | Performed by: NURSE PRACTITIONER

## 2019-11-05 PROCEDURE — 87880 STREP A ASSAY W/OPTIC: CPT | Performed by: NURSE PRACTITIONER

## 2019-11-05 RX ORDER — CETIRIZINE HYDROCHLORIDE 10 MG/1
10 TABLET ORAL DAILY
Qty: 30 TABLET | Refills: 0 | Status: SHIPPED | OUTPATIENT
Start: 2019-11-05 | End: 2019-12-05

## 2019-11-05 RX ORDER — DEXTROMETHORPHAN HYDROBROMIDE AND PROMETHAZINE HYDROCHLORIDE 15; 6.25 MG/5ML; MG/5ML
SYRUP ORAL
Qty: 120 ML | Refills: 0 | Status: SHIPPED | OUTPATIENT
Start: 2019-11-05 | End: 2019-11-12

## 2019-11-05 RX ORDER — AZITHROMYCIN 250 MG/1
250 TABLET, FILM COATED ORAL SEE ADMIN INSTRUCTIONS
Qty: 6 TABLET | Refills: 0 | Status: SHIPPED | OUTPATIENT
Start: 2019-11-05 | End: 2019-11-10

## 2019-11-05 ASSESSMENT — ENCOUNTER SYMPTOMS
WHEEZING: 0
NAUSEA: 0
SINUS PAIN: 0
ABDOMINAL PAIN: 0
SORE THROAT: 1
COUGH: 1
HEARTBURN: 0
RHINORRHEA: 0
VOMITING: 1
ALLERGIC/IMMUNOLOGIC NEGATIVE: 1
SWOLLEN GLANDS: 0
EYES NEGATIVE: 1
SHORTNESS OF BREATH: 0
SINUS PRESSURE: 0
VISUAL CHANGE: 0
HEMOPTYSIS: 0

## 2019-11-15 ENCOUNTER — OFFICE VISIT (OUTPATIENT)
Dept: URGENT CARE | Age: 39
End: 2019-11-15
Payer: MEDICAID

## 2019-11-15 ENCOUNTER — HOSPITAL ENCOUNTER (EMERGENCY)
Facility: HOSPITAL | Age: 39
Discharge: HOME | End: 2019-11-15
Admitting: EMERGENCY MEDICINE

## 2019-11-15 VITALS
OXYGEN SATURATION: 100 % | DIASTOLIC BLOOD PRESSURE: 98 MMHG | WEIGHT: 168 LBS | RESPIRATION RATE: 16 BRPM | SYSTOLIC BLOOD PRESSURE: 176 MMHG | BODY MASS INDEX: 27 KG/M2 | TEMPERATURE: 98.3 F | HEIGHT: 66 IN | HEART RATE: 94 BPM

## 2019-11-15 VITALS
TEMPERATURE: 99.3 F | OXYGEN SATURATION: 98 % | DIASTOLIC BLOOD PRESSURE: 104 MMHG | HEIGHT: 66 IN | HEART RATE: 96 BPM | WEIGHT: 167 LBS | RESPIRATION RATE: 18 BRPM | BODY MASS INDEX: 26.84 KG/M2 | SYSTOLIC BLOOD PRESSURE: 172 MMHG

## 2019-11-15 DIAGNOSIS — R05.9 COUGH: ICD-10-CM

## 2019-11-15 DIAGNOSIS — B96.89 ACUTE BACTERIAL SINUSITIS: Primary | ICD-10-CM

## 2019-11-15 DIAGNOSIS — I10 HYPERTENSION, UNSPECIFIED TYPE: Primary | ICD-10-CM

## 2019-11-15 DIAGNOSIS — R03.0 ELEVATED BLOOD PRESSURE READING: ICD-10-CM

## 2019-11-15 DIAGNOSIS — J01.90 ACUTE BACTERIAL SINUSITIS: Primary | ICD-10-CM

## 2019-11-15 PROCEDURE — 99213 OFFICE O/P EST LOW 20 MIN: CPT | Performed by: NURSE PRACTITIONER

## 2019-11-15 PROCEDURE — G8484 FLU IMMUNIZE NO ADMIN: HCPCS | Performed by: NURSE PRACTITIONER

## 2019-11-15 PROCEDURE — 4004F PT TOBACCO SCREEN RCVD TLK: CPT | Performed by: NURSE PRACTITIONER

## 2019-11-15 PROCEDURE — G8427 DOCREV CUR MEDS BY ELIG CLIN: HCPCS | Performed by: NURSE PRACTITIONER

## 2019-11-15 PROCEDURE — 99283 EMERGENCY DEPT VISIT LOW MDM: CPT

## 2019-11-15 PROCEDURE — G8419 CALC BMI OUT NRM PARAM NOF/U: HCPCS | Performed by: NURSE PRACTITIONER

## 2019-11-15 RX ORDER — DEXTROMETHORPHAN HYDROBROMIDE AND PROMETHAZINE HYDROCHLORIDE 15; 6.25 MG/5ML; MG/5ML
5 SOLUTION ORAL 4 TIMES DAILY PRN
COMMUNITY
End: 2019-12-02

## 2019-11-15 RX ORDER — AZITHROMYCIN 250 MG/1
250 TABLET, FILM COATED ORAL SEE ADMIN INSTRUCTIONS
Qty: 6 TABLET | Refills: 0 | Status: SHIPPED | OUTPATIENT
Start: 2019-11-15 | End: 2019-11-20

## 2019-11-15 RX ORDER — AZITHROMYCIN 250 MG/1
250 TABLET, FILM COATED ORAL
COMMUNITY
Start: 2019-11-15 | End: 2019-11-20

## 2019-11-15 RX ORDER — CETIRIZINE HYDROCHLORIDE 10 MG/1
10 TABLET ORAL
COMMUNITY
Start: 2019-11-05 | End: 2019-12-02

## 2019-11-15 ASSESSMENT — ENCOUNTER SYMPTOMS
WHEEZING: 0
EYE DISCHARGE: 0
VOMITING: 0
RHINORRHEA: 1
COUGH: 1
EYE REDNESS: 0
DIARRHEA: 0
SINUS PRESSURE: 1

## 2019-11-16 RX ORDER — DEXTROMETHORPHAN HYDROBROMIDE AND PROMETHAZINE HYDROCHLORIDE 15; 6.25 MG/5ML; MG/5ML
5 SYRUP ORAL EVERY 12 HOURS PRN
Qty: 100 ML | Refills: 0 | Status: SHIPPED | OUTPATIENT
Start: 2019-11-16

## 2019-11-19 DIAGNOSIS — C92.10 CHRONIC MYELOID LEUKEMIA (HCC): Primary | ICD-10-CM

## 2019-11-19 DIAGNOSIS — N63.10 LARGE MASS OF RIGHT BREAST: ICD-10-CM

## 2019-11-20 ENCOUNTER — OFFICE VISIT (OUTPATIENT)
Dept: ONCOLOGY | Facility: CLINIC | Age: 39
End: 2019-11-20

## 2019-11-20 ENCOUNTER — LAB (OUTPATIENT)
Dept: LAB | Facility: HOSPITAL | Age: 39
End: 2019-11-20

## 2019-11-20 VITALS
OXYGEN SATURATION: 99 % | WEIGHT: 167 LBS | BODY MASS INDEX: 26.84 KG/M2 | TEMPERATURE: 99.4 F | HEIGHT: 66 IN | RESPIRATION RATE: 16 BRPM | SYSTOLIC BLOOD PRESSURE: 124 MMHG | HEART RATE: 88 BPM | DIASTOLIC BLOOD PRESSURE: 88 MMHG

## 2019-11-20 DIAGNOSIS — C92.10 CML (CHRONIC MYELOCYTIC LEUKEMIA) (HCC): Primary | ICD-10-CM

## 2019-11-20 DIAGNOSIS — D50.9 IRON DEFICIENCY ANEMIA, UNSPECIFIED IRON DEFICIENCY ANEMIA TYPE: Primary | ICD-10-CM

## 2019-11-20 DIAGNOSIS — C92.10 CHRONIC MYELOID LEUKEMIA (HCC): Primary | ICD-10-CM

## 2019-11-20 DIAGNOSIS — D64.9 ANEMIA, UNSPECIFIED TYPE: ICD-10-CM

## 2019-11-20 DIAGNOSIS — C92.10 CML (CHRONIC MYELOCYTIC LEUKEMIA) (HCC): ICD-10-CM

## 2019-11-20 LAB
ALBUMIN SERPL-MCNC: 4.2 G/DL (ref 3.5–5.2)
ALBUMIN/GLOB SERPL: 1.6 G/DL
ALP SERPL-CCNC: 79 U/L (ref 39–117)
ALT SERPL W P-5'-P-CCNC: 14 U/L (ref 1–33)
ANION GAP SERPL CALCULATED.3IONS-SCNC: 11 MMOL/L (ref 5–15)
AST SERPL-CCNC: 21 U/L (ref 1–32)
BASOPHILS # BLD AUTO: 0.06 10*3/MM3 (ref 0–0.2)
BASOPHILS NFR BLD AUTO: 1.4 % (ref 0–1.5)
BILIRUB SERPL-MCNC: 0.2 MG/DL (ref 0.2–1.2)
BUN BLD-MCNC: 16 MG/DL (ref 6–20)
BUN/CREAT SERPL: 18.6 (ref 7–25)
CALCIUM SPEC-SCNC: 8.9 MG/DL (ref 8.6–10.5)
CHLORIDE SERPL-SCNC: 107 MMOL/L (ref 98–107)
CO2 SERPL-SCNC: 24 MMOL/L (ref 22–29)
CREAT BLD-MCNC: 0.86 MG/DL (ref 0.57–1)
DEPRECATED RDW RBC AUTO: 68.4 FL (ref 37–54)
EOSINOPHIL # BLD AUTO: 0.06 10*3/MM3 (ref 0–0.4)
EOSINOPHIL NFR BLD AUTO: 1.4 % (ref 0.3–6.2)
ERYTHROCYTE [DISTWIDTH] IN BLOOD BY AUTOMATED COUNT: 22.8 % (ref 12.3–15.4)
FERRITIN SERPL-MCNC: 9.95 NG/ML (ref 13–150)
GFR SERPL CREATININE-BSD FRML MDRD: 88 ML/MIN/1.73
GLOBULIN UR ELPH-MCNC: 2.6 GM/DL
GLUCOSE BLD-MCNC: 78 MG/DL (ref 65–99)
HCT VFR BLD AUTO: 29.5 % (ref 34–46.6)
HGB BLD-MCNC: 9.3 G/DL (ref 12–15.9)
HOLD SPECIMEN: NORMAL
IMM GRANULOCYTES # BLD AUTO: 0.01 10*3/MM3 (ref 0–0.05)
IMM GRANULOCYTES NFR BLD AUTO: 0.2 % (ref 0–0.5)
IRON 24H UR-MRATE: 36 MCG/DL (ref 37–145)
IRON SATN MFR SERPL: 7 % (ref 20–50)
LYMPHOCYTES # BLD AUTO: 2.21 10*3/MM3 (ref 0.7–3.1)
LYMPHOCYTES NFR BLD AUTO: 52.9 % (ref 19.6–45.3)
MCH RBC QN AUTO: 27 PG (ref 26.6–33)
MCHC RBC AUTO-ENTMCNC: 31.5 G/DL (ref 31.5–35.7)
MCV RBC AUTO: 85.5 FL (ref 79–97)
MONOCYTES # BLD AUTO: 0.27 10*3/MM3 (ref 0.1–0.9)
MONOCYTES NFR BLD AUTO: 6.5 % (ref 5–12)
NEUTROPHILS # BLD AUTO: 1.57 10*3/MM3 (ref 1.7–7)
NEUTROPHILS NFR BLD AUTO: 37.6 % (ref 42.7–76)
NRBC BLD AUTO-RTO: 0 /100 WBC (ref 0–0.2)
PLATELET # BLD AUTO: 332 10*3/MM3 (ref 140–450)
PMV BLD AUTO: 8.9 FL (ref 6–12)
POTASSIUM BLD-SCNC: 4 MMOL/L (ref 3.5–5.2)
PROT SERPL-MCNC: 6.8 G/DL (ref 6–8.5)
RBC # BLD AUTO: 3.45 10*6/MM3 (ref 3.77–5.28)
SODIUM BLD-SCNC: 142 MMOL/L (ref 136–145)
TIBC SERPL-MCNC: 504 MCG/DL (ref 298–536)
TRANSFERRIN SERPL-MCNC: 338 MG/DL (ref 200–360)
WBC NRBC COR # BLD: 4.18 10*3/MM3 (ref 3.4–10.8)

## 2019-11-20 PROCEDURE — 82607 VITAMIN B-12: CPT

## 2019-11-20 PROCEDURE — 82746 ASSAY OF FOLIC ACID SERUM: CPT

## 2019-11-20 PROCEDURE — 80053 COMPREHEN METABOLIC PANEL: CPT | Performed by: INTERNAL MEDICINE

## 2019-11-20 PROCEDURE — 99215 OFFICE O/P EST HI 40 MIN: CPT | Performed by: INTERNAL MEDICINE

## 2019-11-20 PROCEDURE — 83540 ASSAY OF IRON: CPT | Performed by: INTERNAL MEDICINE

## 2019-11-20 PROCEDURE — 85025 COMPLETE CBC W/AUTO DIFF WBC: CPT | Performed by: INTERNAL MEDICINE

## 2019-11-20 PROCEDURE — 82728 ASSAY OF FERRITIN: CPT | Performed by: INTERNAL MEDICINE

## 2019-11-20 PROCEDURE — 84466 ASSAY OF TRANSFERRIN: CPT | Performed by: INTERNAL MEDICINE

## 2019-11-20 PROCEDURE — 36415 COLL VENOUS BLD VENIPUNCTURE: CPT | Performed by: INTERNAL MEDICINE

## 2019-11-20 NOTE — PROGRESS NOTES
[facility]  HEMATOLOGY & ONCOLOGY    Cancer Staging Information:  Cancer Staging  No matching staging information was found for the patient.      Subjective     VISIT DIAGNOSIS:   No diagnosis found.    REASON FOR VISIT:     Chief Complaint   Patient presents with   • CML     Here for followup   • Bronchitis        HEMATOLOGY / ONCOLOGY HISTORY:   Oncology/Hematology History    Ms Becker is a pleasant 35 year old female with diagnosis of chronic myelogenous leukemia diagnosed over 12 years ago. She has  been on Gleevec on and off. She was started back on 02/04/10.  Ms Becker is a pleasant  female with chronic myelogenous leukemia. She initially had been placed on imatinib, and  she failed highdose  therapy. She took this infrequently, however. Patient was placed on Sprycel. Had better compliance to this, but her  bcr/abl transcript shaila. 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 This is  a mutation that is reported to confer resistance to bcr/abl 1 tyrosine kinase inhibitors. Patient was informed of the news.  INTERVAL HISTORY  Winsome is a very pleasant 35 year old female patient with chronic myelogenous leukemia who presents today in followup.  She is currently  taking Sprycel and states she been compliant with it since her last prescription. She states she has had some problems in the past with  pharmacy but overall she has been fairly compliant with it over the last month or two. She last had a BCR/ABL transcript on 04/13/2016  that found the transcript detected at 21.3649% on the international scale. This was down from 30.94 in March and 40.52 back in  September of 14. She is also following with Dr. Benigno mcclure at [facility]        CML (chronic myeloid leukemia) (CMS/[facility])    7/26/2017 Initial Diagnosis     CML (chronic myeloid leukemia)                INTERVAL HISTORY  Patient ID: Winsome Becker is a 39 y.o.  year old female  With cml presenting today with concern about a palpable right breast mass.  -11/20/19: she has been having cough and noticed increase in blood pr with taking cough medication. She will f/u with pcp.  -denies night sweats, fever, sob, cp, n/v, unintentional weight loss, focal weakness. Rest of ros unremarkable. PE non focal.    Past Medical History:   Past Medical History:   Diagnosis Date   • CML (chronic myelocytic leukemia) (CMS/HCC)      Past Surgical History:   Past Surgical History:   Procedure Laterality Date   • TUBAL ABDOMINAL LIGATION       Social History:   Social History     Socioeconomic History   • Marital status:      Spouse name: Not on file   • Number of children: Not on file   • Years of education: Not on file   • Highest education level: Not on file   Tobacco Use   • Smoking status: Heavy Tobacco Smoker   Substance and Sexual Activity   • Alcohol use: Yes   • Drug use: No     Family History:   Family History   Problem Relation Age of Onset   • Breast cancer Neg Hx        Review of Systems   Constitutional: Negative.    HENT: Negative.    Eyes: Negative.    Respiratory: Negative.    Cardiovascular: Negative.    Gastrointestinal: Negative.    Endocrine: Negative.    Genitourinary: Negative.    Musculoskeletal: Negative.    Skin:        Right nipple rash   Neurological: Negative.    Hematological: Negative.    Psychiatric/Behavioral: Negative.         Performance Status:  Asymptomatic    Medications:    Current Outpatient Medications   Medication Sig Dispense Refill   • cetirizine (zyrTEC) 10 MG tablet Take 10 mg by mouth.     • dasatinib (SPRYCEL) 100 MG chemo tablet Take 1 tablet by mouth Daily. 30 tablet 6   • promethazine-dextromethorphan (PROMETHAZINE-DM) 6.25-15 MG/5ML solution Take 5 mL by mouth 4 (Four) Times a Day As Needed for Cough.       No current facility-administered medications for this visit.        ALLERGIES:    Allergies   Allergen Reactions   • Latex Rash  "  • Penicillins Rash       Objective      Vitals:    11/20/19 1341   BP: 124/88   Pulse: 88   Resp: 16   Temp: 99.4 °F (37.4 °C)   TempSrc: Temporal   SpO2: 99%   Weight: 75.8 kg (167 lb)   Height: 167.6 cm (66\")   PainSc: 0-No pain         Current Status 11/20/2019   ECOG score 0         Physical Exam  General Appearance: Patient is awake, alert, oriented and in no acute distress. Patient is welldeveloped, wellnourished, and appears stated age.  HEENT: Normocephalic. Sclerae clear, conjunctiva pink, extraocular movements intact, pupils, round, reactive to light and  accommodation. Mouth and throat are clear with moist oral mucosa.  NECK: Supple, no jugular venous distention, thyroid not enlarged.  LYMPH: No cervical, supraclavicular, axillary, or inguinal lymphadenopathy.  CHEST: Equal bilateral expansion, AP  diameter normal, resonant percussion note  LUNGS: Good air movement, no rales, rhonchi, rubs or wheezes with auscultation  CARDIO: Regular sinus rhythm, no murmurs, gallops or rubs.  ABDOMEN: Nondistended, soft, No tenderness, no guarding, no rebound, No hepatosplenomegaly. No abdominal masses. Bowel sounds positive. No hernia  GENITALIA: Not examined.  BREASTS: palpable mass medial right breast. Pimple like lesion right breast nipple. Bilateral dense breast.  MUSKEL: No joint swelling, decreased motion, or inflammation  EXTREMS: No edema, clubbing, cyanosis, No varicose veins.  NEURO: Grossly nonfocal, Gait is coordinated and smooth, Cognition is preserved.  SKIN: No rashes, no ecchymoses, no petechia.  PSYCH: Oriented to time, place and person. Memory is preserved. Mood and affect appear normal  RECENT LABS:  Orders Only on 11/19/2019   Component Date Value Ref Range Status   • WBC 11/20/2019 4.18  3.40 - 10.80 10*3/mm3 Final   • RBC 11/20/2019 3.45* 3.77 - 5.28 10*6/mm3 Final   • Hemoglobin 11/20/2019 9.3* 12.0 - 15.9 g/dL Final   • Hematocrit 11/20/2019 29.5* 34.0 - 46.6 % Final   • MCV 11/20/2019 85.5  " 79.0 - 97.0 fL Final   • MCH 11/20/2019 27.0  26.6 - 33.0 pg Final   • MCHC 11/20/2019 31.5  31.5 - 35.7 g/dL Final   • RDW 11/20/2019 22.8* 12.3 - 15.4 % Final   • RDW-SD 11/20/2019 68.4* 37.0 - 54.0 fl Final   • MPV 11/20/2019 8.9  6.0 - 12.0 fL Final   • Platelets 11/20/2019 332  140 - 450 10*3/mm3 Final   • Neutrophil % 11/20/2019 37.6* 42.7 - 76.0 % Final   • Lymphocyte % 11/20/2019 52.9* 19.6 - 45.3 % Final   • Monocyte % 11/20/2019 6.5  5.0 - 12.0 % Final   • Eosinophil % 11/20/2019 1.4  0.3 - 6.2 % Final   • Basophil % 11/20/2019 1.4  0.0 - 1.5 % Final   • Immature Grans % 11/20/2019 0.2  0.0 - 0.5 % Final   • Neutrophils, Absolute 11/20/2019 1.57* 1.70 - 7.00 10*3/mm3 Final   • Lymphocytes, Absolute 11/20/2019 2.21  0.70 - 3.10 10*3/mm3 Final   • Monocytes, Absolute 11/20/2019 0.27  0.10 - 0.90 10*3/mm3 Final   • Eosinophils, Absolute 11/20/2019 0.06  0.00 - 0.40 10*3/mm3 Final   • Basophils, Absolute 11/20/2019 0.06  0.00 - 0.20 10*3/mm3 Final   • Immature Grans, Absolute 11/20/2019 0.01  0.00 - 0.05 10*3/mm3 Final   • nRBC 11/20/2019 0.0  0.0 - 0.2 /100 WBC Final       RADIOLOGY:  No results found.         Assessment/Plan  Winsome Becker is a 39 y.o. year old female with CML on dasatinib presenting today concerned about a palpable right breast mass.    Patient Active Problem List   Diagnosis   • CML (chronic myeloid leukemia) (CMS/HCC)          1.palpable medial right breast mass: ordered diagnostic mammo and US 5/14/19 which shows Fibrocystic changes within the medial and lateral right breast  including 2 adjacent large cyst corresponding to the palpable  abnormalities and mammographic abnormality. These lesions fit all the  criteria for simple cyst by ultrasound criteria and would not warrant  any additional follow-up. The patient may return to the screening pool  with normal interval screening mammography in one year's time unless  otherwise earlier clinically indicated.    2.CML: on dasatinib. Pt  reports being compliant on medication.  -labs reviewed with pt wbc 4.18, Hg 9.3, plt 332, cr 0.86, bili 0.2  3. Anemia: Hg 9.3 down from 11.1, will check iron profile, ferritin, b12, folate and go from there.              Diallo Zheng MD    11/20/2019    2:06 PM

## 2019-11-21 ENCOUNTER — TELEPHONE (OUTPATIENT)
Dept: ONCOLOGY | Facility: CLINIC | Age: 39
End: 2019-11-21

## 2019-11-21 LAB
FOLATE SERPL-MCNC: 4.11 NG/ML (ref 4.78–24.2)
VIT B12 BLD-MCNC: 578 PG/ML (ref 211–946)

## 2019-11-21 RX ORDER — FOLIC ACID 1 MG/1
1 TABLET ORAL DAILY
Qty: 30 TABLET | Refills: 5 | Status: SHIPPED | OUTPATIENT
Start: 2019-11-21 | End: 2020-09-01 | Stop reason: ALTCHOICE

## 2019-11-21 RX ORDER — FERROUS SULFATE 325(65) MG
325 TABLET ORAL 2 TIMES DAILY WITH MEALS
Qty: 60 TABLET | Refills: 5 | Status: SHIPPED | OUTPATIENT
Start: 2019-11-21 | End: 2020-09-01 | Stop reason: ALTCHOICE

## 2019-11-21 NOTE — TELEPHONE ENCOUNTER
----- Message from Diallo Zheng MD sent at 11/20/2019  3:30 PM CST -----  Please call the patient regarding her abnormal result.adviced oral iron if not taking, otherwise schedule for injectafer. Also GI for scope. tnx.

## 2019-11-21 NOTE — TELEPHONE ENCOUNTER
Notified Winsome that her iron level is low.  Winsome stated that she is not taking an iron supplement but she has is the past.  Verbal order from Dr. Zheng to have Winsome take ferrous sulfate 325 mg bid #60 and 5 refills.

## 2019-11-25 ENCOUNTER — TELEPHONE (OUTPATIENT)
Dept: ONCOLOGY | Facility: CLINIC | Age: 39
End: 2019-11-25

## 2019-12-02 ENCOUNTER — OFFICE VISIT (OUTPATIENT)
Dept: GASTROENTEROLOGY | Facility: CLINIC | Age: 39
End: 2019-12-02

## 2019-12-02 VITALS
WEIGHT: 166 LBS | HEART RATE: 94 BPM | SYSTOLIC BLOOD PRESSURE: 140 MMHG | OXYGEN SATURATION: 100 % | HEIGHT: 66 IN | BODY MASS INDEX: 26.68 KG/M2 | DIASTOLIC BLOOD PRESSURE: 80 MMHG

## 2019-12-02 DIAGNOSIS — F17.200 TOBACCO DEPENDENCE: ICD-10-CM

## 2019-12-02 DIAGNOSIS — D50.9 IRON DEFICIENCY ANEMIA, UNSPECIFIED IRON DEFICIENCY ANEMIA TYPE: Primary | ICD-10-CM

## 2019-12-02 DIAGNOSIS — C92.10 CML (CHRONIC MYELOCYTIC LEUKEMIA) (HCC): ICD-10-CM

## 2019-12-02 PROCEDURE — 99204 OFFICE O/P NEW MOD 45 MIN: CPT | Performed by: NURSE PRACTITIONER

## 2019-12-02 RX ORDER — SODIUM, POTASSIUM,MAG SULFATES 17.5-3.13G
SOLUTION, RECONSTITUTED, ORAL ORAL
Qty: 2 BOTTLE | Refills: 0 | Status: ON HOLD | OUTPATIENT
Start: 2019-12-02 | End: 2019-12-04

## 2019-12-02 NOTE — PROGRESS NOTES
Winsome Becker  1980 12/2/2019  Chief Complaint   Patient presents with   • GI Problem     New patient ref by Dr. Zheng for anemia     Subjective   HPI  Winsome Becker is a 39 y.o. female who presents with a complaint of anemia.   Most recent labs on 11/20/2019 reveal a Hgb 9.3, Hct 29.5 (normocytic in nature).  Ferritin was low at 9.95, Serum iron 36 & Iron Sat was 7%.  She denies any obvious blood per rectum.  No BRRB or Melena.  Her bowels are moving normally.  No abdominal pain.  No n/v.  She has a history of CML and this is followed by Dr Zheng.    No family history of GI Malignancies reported.    Past Medical History:   Diagnosis Date   • CML (chronic myelocytic leukemia) (CMS/HCC)      Past Surgical History:   Procedure Laterality Date   • TUBAL ABDOMINAL LIGATION         Outpatient Medications Marked as Taking for the 12/2/19 encounter (Office Visit) with Amira Anaya APRN   Medication Sig Dispense Refill   • dasatinib (SPRYCEL) 100 MG chemo tablet Take 1 tablet by mouth Daily. 30 tablet 6   • ferrous sulfate 325 (65 FE) MG tablet Take 1 tablet by mouth 2 (Two) Times a Day With Meals. 60 tablet 5   • folic acid (FOLVITE) 1 MG tablet Take 1 tablet by mouth Daily. 30 tablet 5     Allergies   Allergen Reactions   • Latex Rash   • Penicillins Rash     Social History     Socioeconomic History   • Marital status:      Spouse name: Not on file   • Number of children: Not on file   • Years of education: Not on file   • Highest education level: Not on file   Tobacco Use   • Smoking status: Heavy Tobacco Smoker     Packs/day: 0.75     Types: Cigarettes   • Smokeless tobacco: Never Used   Substance and Sexual Activity   • Alcohol use: Yes     Comment: Occasional   • Drug use: No     Family History   Problem Relation Age of Onset   • Breast cancer Neg Hx    • Colon cancer Neg Hx    • Colon polyps Neg Hx      Health Maintenance   Topic Date Due   • ANNUAL PHYSICAL  09/14/1981   •  "PNEUMOCOCCAL VACCINE (19-64 MEDIUM RISK) (1 of 1 - PPSV23) 09/14/1997   • TDAP/TD VACCINES (1 - Tdap) 09/14/1997   • PAP SMEAR  06/21/2017   • INFLUENZA VACCINE  08/01/2019     Review of Systems   Constitutional: Negative for activity change, appetite change, chills, diaphoresis, fatigue, fever and unexpected weight change.   HENT: Negative for ear pain, hearing loss, mouth sores, sore throat, trouble swallowing and voice change.    Eyes: Negative.    Respiratory: Negative for cough, choking, shortness of breath and wheezing.    Cardiovascular: Negative for chest pain and palpitations.   Gastrointestinal: Negative for abdominal pain, blood in stool, constipation, diarrhea, nausea and vomiting.   Endocrine: Negative for cold intolerance and heat intolerance.   Genitourinary: Negative for decreased urine volume, dysuria, frequency, hematuria and urgency.   Musculoskeletal: Negative for back pain, gait problem and myalgias.   Skin: Negative for color change, pallor and rash.   Allergic/Immunologic: Negative for food allergies and immunocompromised state.   Neurological: Negative for dizziness, tremors, seizures, syncope, weakness, light-headedness, numbness and headaches.   Hematological: Negative for adenopathy. Does not bruise/bleed easily.   Psychiatric/Behavioral: Negative for agitation and confusion. The patient is not nervous/anxious.    All other systems reviewed and are negative.    Objective   Vitals:    12/02/19 1409   BP: 140/80   Pulse: 94   SpO2: 100%   Weight: 75.3 kg (166 lb)   Height: 167.6 cm (66\")     Body mass index is 26.79 kg/m².  Physical Exam   Constitutional: She is oriented to person, place, and time. She appears well-developed and well-nourished.   HENT:   Head: Normocephalic and atraumatic.   Eyes: Pupils are equal, round, and reactive to light.   Neck: Normal range of motion. Neck supple. No tracheal deviation present.   Cardiovascular: Normal rate, regular rhythm and normal heart sounds. " Exam reveals no gallop and no friction rub.   No murmur heard.  Pulmonary/Chest: Effort normal and breath sounds normal. No respiratory distress. She has no wheezes. She has no rales. She exhibits no tenderness.   Abdominal: Soft. Bowel sounds are normal. She exhibits no distension. There is no hepatosplenomegaly. There is no tenderness. There is no rigidity, no rebound and no guarding.   Musculoskeletal: Normal range of motion. She exhibits no edema, tenderness or deformity.   Neurological: She is alert and oriented to person, place, and time. She has normal reflexes.   Skin: Skin is warm and dry. No rash noted. No pallor.   Psychiatric: She has a normal mood and affect. Her behavior is normal. Judgment and thought content normal.     Assessment/Plan   Winsome was seen today for gi problem.    Diagnoses and all orders for this visit:    Iron deficiency anemia, unspecified iron deficiency anemia type  -     Occult Blood X 3, Stool - Stool, Per Rectum  -     Case Request; Standing  -     Implement Anesthesia Orders Day of Procedure; Standing  -     Obtain Informed Consent; Standing  -     Verify Bowel Prep Was Successful; Standing  -     Case Request  -     sodium-potassium-magnesium sulfates (SUPREP BOWEL PREP KIT) 17.5-3.13-1.6 GM/177ML solution oral solution; Take as directed per office    Tobacco dependence    CML (chronic myelocytic leukemia) (CMS/[facility])      ESOPHAGOGASTRODUODENOSCOPY WITH ANESTHESIA (N/A), COLONOSCOPY WITH ANESTHESIA (N/A)  EMR Dragon/transcription disclaimer: Much of this encounter note is electronic transcription/translation of spoken language to printed text. The electronic translation of spoken language may be erroneous, or at times, nonsensical words or phrases may be inadvertently transcribed. Although I have reviewed the note for such errors, some may still exist.  Body mass index is 26.79 kg/m².  No Follow-up on file.    Patient's Body mass index is 26.79 kg/m². BMI is within normal  parameters. No follow-up required..    Await results of OB stools as well as endoscopy/colonoscopy evaluation.      All risks, benefits, alternatives, and indications of colonoscopy and/or Endoscopy procedure have been discussed with the patient. Risks to include perforation of the colon requiring possible surgery or colostomy, risk of bleeding from biopsies or removal of colon tissue, possibility of missing a colon polyp or cancer, or adverse drug reaction.  Benefits to include the diagnosis and management of disease of the colon and rectum. Alternatives to include barium enema, radiographic evaluation, lab testing or no intervention. Pt verbalizes understanding and agrees.     Amira Anaya, APRN  12/2/2019  2:27 PM      Obesity, Adult  Obesity is the condition of having too much total body fat. Being overweight or obese means that your weight is greater than what is considered healthy for your body size. Obesity is determined by a measurement called BMI. BMI is an estimate of body fat and is calculated from height and weight. For adults, a BMI of 30 or higher is considered obese.  Obesity can eventually lead to other health concerns and major illnesses, including:  · Stroke.  · Coronary artery disease (CAD).  · Type 2 diabetes.  · Some types of cancer, including cancers of the colon, breast, uterus, and gallbladder.  · Osteoarthritis.  · High blood pressure (hypertension).  · High cholesterol.  · Sleep apnea.  · Gallbladder stones.  · Infertility problems.  What are the causes?  The main cause of obesity is taking in (consuming) more calories than your body uses for energy. Other factors that contribute to this condition may include:  · Being born with genes that make you more likely to become obese.  · Having a medical condition that causes obesity. These conditions include:  ¨ Hypothyroidism.  ¨ Polycystic ovarian syndrome (PCOS).  ¨ Binge-eating disorder.  ¨ Cushing syndrome.  · Taking certain medicines,  such as steroids, antidepressants, and seizure medicines.  · Not being physically active (sedentary lifestyle).  · Living where there are limited places to exercise safely or buy healthy foods.  · Not getting enough sleep.  What increases the risk?  The following factors may increase your risk of this condition:  · Having a family history of obesity.  · Being a woman of -American descent.  · Being a man of  descent.  What are the signs or symptoms?  Having excessive body fat is the main symptom of this condition.  How is this diagnosed?  This condition may be diagnosed based on:  · Your symptoms.  · Your medical history.  · A physical exam. Your health care provider may measure:  ¨ Your BMI. If you are an adult with a BMI between 25 and less than 30, you are considered overweight. If you are an adult with a BMI of 30 or higher, you are considered obese.  ¨ The distances around your hips and your waist (circumferences). These may be compared to each other to help diagnose your condition.  ¨ Your skinfold thickness. Your health care provider may gently pinch a fold of your skin and measure it.  How is this treated?  Treatment for this condition often includes changing your lifestyle. Treatment may include some or all of the following:  · Dietary changes. Work with your health care provider and a dietitian to set a weight-loss goal that is healthy and reasonable for you. Dietary changes may include eating:  ¨ Smaller portions. A portion size is the amount of a particular food that is healthy for you to eat at one time. This varies from person to person.  ¨ Low-calorie or low-fat options.  ¨ More whole grains, fruits, and vegetables.  · Regular physical activity. This may include aerobic activity (cardio) and strength training.  · Medicine to help you lose weight. Your health care provider may prescribe medicine if you are unable to lose 1 pound a week after 6 weeks of eating more healthily and doing more  physical activity.  · Surgery. Surgical options may include gastric banding and gastric bypass. Surgery may be done if:  ¨ Other treatments have not helped to improve your condition.  ¨ You have a BMI of 40 or higher.  ¨ You have life-threatening health problems related to obesity.  Follow these instructions at home:     Eating and drinking     · Follow recommendations from your health care provider about what you eat and drink. Your health care provider may advise you to:  ¨ Limit fast foods, sweets, and processed snack foods.  ¨ Choose low-fat options, such as low-fat milk instead of whole milk.  ¨ Eat 5 or more servings of fruits or vegetables every day.  ¨ Eat at home more often. This gives you more control over what you eat.  ¨ Choose healthy foods when you eat out.  ¨ Learn what a healthy portion size is.  ¨ Keep low-fat snacks on hand.  ¨ Avoid sugary drinks, such as soda, fruit juice, iced tea sweetened with sugar, and flavored milk.  ¨ Eat a healthy breakfast.  · Drink enough water to keep your urine clear or pale yellow.  · Do not go without eating for long periods of time (do not fast) or follow a fad diet. Fasting and fad diets can be unhealthy and even dangerous.  Physical Activity   · Exercise regularly, as told by your health care provider. Ask your health care provider what types of exercise are safe for you and how often you should exercise.  · Warm up and stretch before being active.  · Cool down and stretch after being active.  · Rest between periods of activity.  Lifestyle   · Limit the time that you spend in front of your TV, computer, or video game system.  · Find ways to reward yourself that do not involve food.  · Limit alcohol intake to no more than 1 drink a day for nonpregnant women and 2 drinks a day for men. One drink equals 12 oz of beer, 5 oz of wine, or 1½ oz of hard liquor.  General instructions   · Keep a weight loss journal to keep track of the food you eat and how much you  exercise you get.  · Take over-the-counter and prescription medicines only as told by your health care provider.  · Take vitamins and supplements only as told by your health care provider.  · Consider joining a support group. Your health care provider may be able to recommend a support group.  · Keep all follow-up visits as told by your health care provider. This is important.  Contact a health care provider if:  · You are unable to meet your weight loss goal after 6 weeks of dietary and lifestyle changes.  This information is not intended to replace advice given to you by your health care provider. Make sure you discuss any questions you have with your health care provider.  Document Released: 01/25/2006 Document Revised: 05/22/2017 Document Reviewed: 10/05/2016  [employer] Interactive Patient Education © 2017 [employer]      If you smoke or use tobacco, 4 minutes reading provided  Steps to Quit Smoking  Smoking tobacco can be harmful to your health and can affect almost every organ in your body. Smoking puts you, and those around you, at risk for developing many serious chronic diseases. Quitting smoking is difficult, but it is one of the best things that you can do for your health. It is never too late to quit.  What are the benefits of quitting smoking?  When you quit smoking, you lower your risk of developing serious diseases and conditions, such as:  · Lung cancer or lung disease, such as COPD.  · Heart disease.  · Stroke.  · Heart attack.  · Infertility.  · Osteoporosis and bone fractures.  Additionally, symptoms such as coughing, wheezing, and shortness of breath may get better when you quit. You may also find that you get sick less often because your body is stronger at fighting off colds and infections. If you are pregnant, quitting smoking can help to reduce your chances of having a baby of low birth weight.  How do I get ready to quit?  When you decide to quit smoking, create a plan to make sure that  you are successful. Before you quit:  · Pick a date to quit. Set a date within the next two weeks to give you time to prepare.  · Write down the reasons why you are quitting. Keep this list in places where you will see it often, such as on your bathroom mirror or in your car or wallet.  · Identify the people, places, things, and activities that make you want to smoke (triggers) and avoid them. Make sure to take these actions:  ¨ Throw away all cigarettes at home, at work, and in your car.  ¨ Throw away smoking accessories, such as ashtrays and lighters.  ¨ Clean your car and make sure to empty the ashtray.  ¨ Clean your home, including curtains and carpets.  · Tell your family, friends, and coworkers that you are quitting. Support from your loved ones can make quitting easier.  · Talk with your health care provider about your options for quitting smoking.  · Find out what treatment options are covered by your health insurance.  What strategies can I use to quit smoking?  Talk with your healthcare provider about different strategies to quit smoking. Some strategies include:  · Quitting smoking altogether instead of gradually lessening how much you smoke over a period of time. Research shows that quitting “cold turkey” is more successful than gradually quitting.  · Attending in-person counseling to help you build problem-solving skills. You are more likely to have success in quitting if you attend several counseling sessions. Even short sessions of 10 minutes can be effective.  · Finding resources and support systems that can help you to quit smoking and remain smoke-free after you quit. These resources are most helpful when you use them often. They can include:  ¨ Online chats with a counselor.  ¨ Telephone quitlines.  ¨ Printed self-help materials.  ¨ Support groups or group counseling.  ¨ Text messaging programs.  ¨ Mobile phone applications.  · Taking medicines to help you quit smoking. (If you are pregnant or  breastfeeding, talk with your health care provider first.) Some medicines contain nicotine and some do not. Both types of medicines help with cravings, but the medicines that include nicotine help to relieve withdrawal symptoms. Your health care provider may recommend:  ¨ Nicotine patches, gum, or lozenges.  ¨ Nicotine inhalers or sprays.  ¨ Non-nicotine medicine that is taken by mouth.  Talk with your health care provider about combining strategies, such as taking medicines while you are also receiving in-person counseling. Using these two strategies together makes you more likely to succeed in quitting than if you used either strategy on its own.  If you are pregnant or breastfeeding, talk with your health care provider about finding counseling or other support strategies to quit smoking. Do not take medicine to help you quit smoking unless told to do so by your health care provider.  What things can I do to make it easier to quit?  Quitting smoking might feel overwhelming at first, but there is a lot that you can do to make it easier. Take these important actions:  · Reach out to your family and friends and ask that they support and encourage you during this time. Call telephone quitlines, reach out to support groups, or work with a counselor for support.  · Ask people who smoke to avoid smoking around you.  · Avoid places that trigger you to smoke, such as bars, parties, or smoke-break areas at work.  · Spend time around people who do not smoke.  · Lessen stress in your life, because stress can be a smoking trigger for some people. To lessen stress, try:  ¨ Exercising regularly.  ¨ Deep-breathing exercises.  ¨ Yoga.  ¨ Meditating.  ¨ Performing a body scan. This involves closing your eyes, scanning your body from head to toe, and noticing which parts of your body are particularly tense. Purposefully relax the muscles in those areas.  · Download or purchase mobile phone or tablet apps (applications) that can help  you stick to your quit plan by providing reminders, tips, and encouragement. There are many free apps, such as QuitGuide from the CDC (Centers for Disease Control and Prevention). You can find other support for quitting smoking (smoking cessation) through smokefree.gov and other websites.  How will I feel when I quit smoking?  Within the first 24 hours of quitting smoking, you may start to feel some withdrawal symptoms. These symptoms are usually most noticeable 2-3 days after quitting, but they usually do not last beyond 2-3 weeks. Changes or symptoms that you might experience include:  · Mood swings.  · Restlessness, anxiety, or irritation.  · Difficulty concentrating.  · Dizziness.  · Strong cravings for sugary foods in addition to nicotine.  · Mild weight gain.  · Constipation.  · Nausea.  · Coughing or a sore throat.  · Changes in how your medicines work in your body.  · A depressed mood.  · Difficulty sleeping (insomnia).  After the first 2-3 weeks of quitting, you may start to notice more positive results, such as:  · Improved sense of smell and taste.  · Decreased coughing and sore throat.  · Slower heart rate.  · Lower blood pressure.  · Clearer skin.  · The ability to breathe more easily.  · Fewer sick days.  Quitting smoking is very challenging for most people. Do not get discouraged if you are not successful the first time. Some people need to make many attempts to quit before they achieve long-term success. Do your best to stick to your quit plan, and talk with your health care provider if you have any questions or concerns.  This information is not intended to replace advice given to you by your health care provider. Make sure you discuss any questions you have with your health care provider.  Document Released: 12/12/2002 Document Revised: 08/15/2017 Document Reviewed: 05/03/2016  [employer] Interactive Patient Education © 2017 [employer]

## 2019-12-02 NOTE — H&P (VIEW-ONLY)
Winsome Becker  1980 12/2/2019  Chief Complaint   Patient presents with   • GI Problem     New patient ref by Dr. Zheng for anemia     Subjective   HPI  Winsome Becker is a 39 y.o. female who presents with a complaint of anemia.   Most recent labs on 11/20/2019 reveal a Hgb 9.3, Hct 29.5 (normocytic in nature).  Ferritin was low at 9.95, Serum iron 36 & Iron Sat was 7%.  She denies any obvious blood per rectum.  No BRRB or Melena.  Her bowels are moving normally.  No abdominal pain.  No n/v.  She has a history of CML and this is followed by Dr Zheng.    No family history of GI Malignancies reported.    Past Medical History:   Diagnosis Date   • CML (chronic myelocytic leukemia) (CMS/HCC)      Past Surgical History:   Procedure Laterality Date   • TUBAL ABDOMINAL LIGATION         Outpatient Medications Marked as Taking for the 12/2/19 encounter (Office Visit) with Amira Anaya APRN   Medication Sig Dispense Refill   • dasatinib (SPRYCEL) 100 MG chemo tablet Take 1 tablet by mouth Daily. 30 tablet 6   • ferrous sulfate 325 (65 FE) MG tablet Take 1 tablet by mouth 2 (Two) Times a Day With Meals. 60 tablet 5   • folic acid (FOLVITE) 1 MG tablet Take 1 tablet by mouth Daily. 30 tablet 5     Allergies   Allergen Reactions   • Latex Rash   • Penicillins Rash     Social History     Socioeconomic History   • Marital status:      Spouse name: Not on file   • Number of children: Not on file   • Years of education: Not on file   • Highest education level: Not on file   Tobacco Use   • Smoking status: Heavy Tobacco Smoker     Packs/day: 0.75     Types: Cigarettes   • Smokeless tobacco: Never Used   Substance and Sexual Activity   • Alcohol use: Yes     Comment: Occasional   • Drug use: No     Family History   Problem Relation Age of Onset   • Breast cancer Neg Hx    • Colon cancer Neg Hx    • Colon polyps Neg Hx      Health Maintenance   Topic Date Due   • ANNUAL PHYSICAL  09/14/1981   •  "PNEUMOCOCCAL VACCINE (19-64 MEDIUM RISK) (1 of 1 - PPSV23) 09/14/1997   • TDAP/TD VACCINES (1 - Tdap) 09/14/1997   • PAP SMEAR  06/21/2017   • INFLUENZA VACCINE  08/01/2019     Review of Systems   Constitutional: Negative for activity change, appetite change, chills, diaphoresis, fatigue, fever and unexpected weight change.   HENT: Negative for ear pain, hearing loss, mouth sores, sore throat, trouble swallowing and voice change.    Eyes: Negative.    Respiratory: Negative for cough, choking, shortness of breath and wheezing.    Cardiovascular: Negative for chest pain and palpitations.   Gastrointestinal: Negative for abdominal pain, blood in stool, constipation, diarrhea, nausea and vomiting.   Endocrine: Negative for cold intolerance and heat intolerance.   Genitourinary: Negative for decreased urine volume, dysuria, frequency, hematuria and urgency.   Musculoskeletal: Negative for back pain, gait problem and myalgias.   Skin: Negative for color change, pallor and rash.   Allergic/Immunologic: Negative for food allergies and immunocompromised state.   Neurological: Negative for dizziness, tremors, seizures, syncope, weakness, light-headedness, numbness and headaches.   Hematological: Negative for adenopathy. Does not bruise/bleed easily.   Psychiatric/Behavioral: Negative for agitation and confusion. The patient is not nervous/anxious.    All other systems reviewed and are negative.    Objective   Vitals:    12/02/19 1409   BP: 140/80   Pulse: 94   SpO2: 100%   Weight: 75.3 kg (166 lb)   Height: 167.6 cm (66\")     Body mass index is 26.79 kg/m².  Physical Exam   Constitutional: She is oriented to person, place, and time. She appears well-developed and well-nourished.   HENT:   Head: Normocephalic and atraumatic.   Eyes: Pupils are equal, round, and reactive to light.   Neck: Normal range of motion. Neck supple. No tracheal deviation present.   Cardiovascular: Normal rate, regular rhythm and normal heart sounds. " Exam reveals no gallop and no friction rub.   No murmur heard.  Pulmonary/Chest: Effort normal and breath sounds normal. No respiratory distress. She has no wheezes. She has no rales. She exhibits no tenderness.   Abdominal: Soft. Bowel sounds are normal. She exhibits no distension. There is no hepatosplenomegaly. There is no tenderness. There is no rigidity, no rebound and no guarding.   Musculoskeletal: Normal range of motion. She exhibits no edema, tenderness or deformity.   Neurological: She is alert and oriented to person, place, and time. She has normal reflexes.   Skin: Skin is warm and dry. No rash noted. No pallor.   Psychiatric: She has a normal mood and affect. Her behavior is normal. Judgment and thought content normal.     Assessment/Plan   Winsome was seen today for gi problem.    Diagnoses and all orders for this visit:    Iron deficiency anemia, unspecified iron deficiency anemia type  -     Occult Blood X 3, Stool - Stool, Per Rectum  -     Case Request; Standing  -     Implement Anesthesia Orders Day of Procedure; Standing  -     Obtain Informed Consent; Standing  -     Verify Bowel Prep Was Successful; Standing  -     Case Request  -     sodium-potassium-magnesium sulfates (SUPREP BOWEL PREP KIT) 17.5-3.13-1.6 GM/177ML solution oral solution; Take as directed per office    Tobacco dependence    CML (chronic myelocytic leukemia) (CMS/[facility])      ESOPHAGOGASTRODUODENOSCOPY WITH ANESTHESIA (N/A), COLONOSCOPY WITH ANESTHESIA (N/A)  EMR Dragon/transcription disclaimer: Much of this encounter note is electronic transcription/translation of spoken language to printed text. The electronic translation of spoken language may be erroneous, or at times, nonsensical words or phrases may be inadvertently transcribed. Although I have reviewed the note for such errors, some may still exist.  Body mass index is 26.79 kg/m².  No Follow-up on file.    Patient's Body mass index is 26.79 kg/m². BMI is within normal  parameters. No follow-up required..    Await results of OB stools as well as endoscopy/colonoscopy evaluation.      All risks, benefits, alternatives, and indications of colonoscopy and/or Endoscopy procedure have been discussed with the patient. Risks to include perforation of the colon requiring possible surgery or colostomy, risk of bleeding from biopsies or removal of colon tissue, possibility of missing a colon polyp or cancer, or adverse drug reaction.  Benefits to include the diagnosis and management of disease of the colon and rectum. Alternatives to include barium enema, radiographic evaluation, lab testing or no intervention. Pt verbalizes understanding and agrees.     Amira Anaya, APRN  12/2/2019  2:27 PM      Obesity, Adult  Obesity is the condition of having too much total body fat. Being overweight or obese means that your weight is greater than what is considered healthy for your body size. Obesity is determined by a measurement called BMI. BMI is an estimate of body fat and is calculated from height and weight. For adults, a BMI of 30 or higher is considered obese.  Obesity can eventually lead to other health concerns and major illnesses, including:  · Stroke.  · Coronary artery disease (CAD).  · Type 2 diabetes.  · Some types of cancer, including cancers of the colon, breast, uterus, and gallbladder.  · Osteoarthritis.  · High blood pressure (hypertension).  · High cholesterol.  · Sleep apnea.  · Gallbladder stones.  · Infertility problems.  What are the causes?  The main cause of obesity is taking in (consuming) more calories than your body uses for energy. Other factors that contribute to this condition may include:  · Being born with genes that make you more likely to become obese.  · Having a medical condition that causes obesity. These conditions include:  ¨ Hypothyroidism.  ¨ Polycystic ovarian syndrome (PCOS).  ¨ Binge-eating disorder.  ¨ Cushing syndrome.  · Taking certain medicines,  such as steroids, antidepressants, and seizure medicines.  · Not being physically active (sedentary lifestyle).  · Living where there are limited places to exercise safely or buy healthy foods.  · Not getting enough sleep.  What increases the risk?  The following factors may increase your risk of this condition:  · Having a family history of obesity.  · Being a woman of -American descent.  · Being a man of  descent.  What are the signs or symptoms?  Having excessive body fat is the main symptom of this condition.  How is this diagnosed?  This condition may be diagnosed based on:  · Your symptoms.  · Your medical history.  · A physical exam. Your health care provider may measure:  ¨ Your BMI. If you are an adult with a BMI between 25 and less than 30, you are considered overweight. If you are an adult with a BMI of 30 or higher, you are considered obese.  ¨ The distances around your hips and your waist (circumferences). These may be compared to each other to help diagnose your condition.  ¨ Your skinfold thickness. Your health care provider may gently pinch a fold of your skin and measure it.  How is this treated?  Treatment for this condition often includes changing your lifestyle. Treatment may include some or all of the following:  · Dietary changes. Work with your health care provider and a dietitian to set a weight-loss goal that is healthy and reasonable for you. Dietary changes may include eating:  ¨ Smaller portions. A portion size is the amount of a particular food that is healthy for you to eat at one time. This varies from person to person.  ¨ Low-calorie or low-fat options.  ¨ More whole grains, fruits, and vegetables.  · Regular physical activity. This may include aerobic activity (cardio) and strength training.  · Medicine to help you lose weight. Your health care provider may prescribe medicine if you are unable to lose 1 pound a week after 6 weeks of eating more healthily and doing more  physical activity.  · Surgery. Surgical options may include gastric banding and gastric bypass. Surgery may be done if:  ¨ Other treatments have not helped to improve your condition.  ¨ You have a BMI of 40 or higher.  ¨ You have life-threatening health problems related to obesity.  Follow these instructions at home:     Eating and drinking     · Follow recommendations from your health care provider about what you eat and drink. Your health care provider may advise you to:  ¨ Limit fast foods, sweets, and processed snack foods.  ¨ Choose low-fat options, such as low-fat milk instead of whole milk.  ¨ Eat 5 or more servings of fruits or vegetables every day.  ¨ Eat at home more often. This gives you more control over what you eat.  ¨ Choose healthy foods when you eat out.  ¨ Learn what a healthy portion size is.  ¨ Keep low-fat snacks on hand.  ¨ Avoid sugary drinks, such as soda, fruit juice, iced tea sweetened with sugar, and flavored milk.  ¨ Eat a healthy breakfast.  · Drink enough water to keep your urine clear or pale yellow.  · Do not go without eating for long periods of time (do not fast) or follow a fad diet. Fasting and fad diets can be unhealthy and even dangerous.  Physical Activity   · Exercise regularly, as told by your health care provider. Ask your health care provider what types of exercise are safe for you and how often you should exercise.  · Warm up and stretch before being active.  · Cool down and stretch after being active.  · Rest between periods of activity.  Lifestyle   · Limit the time that you spend in front of your TV, computer, or video game system.  · Find ways to reward yourself that do not involve food.  · Limit alcohol intake to no more than 1 drink a day for nonpregnant women and 2 drinks a day for men. One drink equals 12 oz of beer, 5 oz of wine, or 1½ oz of hard liquor.  General instructions   · Keep a weight loss journal to keep track of the food you eat and how much you  exercise you get.  · Take over-the-counter and prescription medicines only as told by your health care provider.  · Take vitamins and supplements only as told by your health care provider.  · Consider joining a support group. Your health care provider may be able to recommend a support group.  · Keep all follow-up visits as told by your health care provider. This is important.  Contact a health care provider if:  · You are unable to meet your weight loss goal after 6 weeks of dietary and lifestyle changes.  This information is not intended to replace advice given to you by your health care provider. Make sure you discuss any questions you have with your health care provider.  Document Released: 01/25/2006 Document Revised: 05/22/2017 Document Reviewed: 10/05/2016  [employer] Interactive Patient Education © 2017 [employer]      If you smoke or use tobacco, 4 minutes reading provided  Steps to Quit Smoking  Smoking tobacco can be harmful to your health and can affect almost every organ in your body. Smoking puts you, and those around you, at risk for developing many serious chronic diseases. Quitting smoking is difficult, but it is one of the best things that you can do for your health. It is never too late to quit.  What are the benefits of quitting smoking?  When you quit smoking, you lower your risk of developing serious diseases and conditions, such as:  · Lung cancer or lung disease, such as COPD.  · Heart disease.  · Stroke.  · Heart attack.  · Infertility.  · Osteoporosis and bone fractures.  Additionally, symptoms such as coughing, wheezing, and shortness of breath may get better when you quit. You may also find that you get sick less often because your body is stronger at fighting off colds and infections. If you are pregnant, quitting smoking can help to reduce your chances of having a baby of low birth weight.  How do I get ready to quit?  When you decide to quit smoking, create a plan to make sure that  you are successful. Before you quit:  · Pick a date to quit. Set a date within the next two weeks to give you time to prepare.  · Write down the reasons why you are quitting. Keep this list in places where you will see it often, such as on your bathroom mirror or in your car or wallet.  · Identify the people, places, things, and activities that make you want to smoke (triggers) and avoid them. Make sure to take these actions:  ¨ Throw away all cigarettes at home, at work, and in your car.  ¨ Throw away smoking accessories, such as ashtrays and lighters.  ¨ Clean your car and make sure to empty the ashtray.  ¨ Clean your home, including curtains and carpets.  · Tell your family, friends, and coworkers that you are quitting. Support from your loved ones can make quitting easier.  · Talk with your health care provider about your options for quitting smoking.  · Find out what treatment options are covered by your health insurance.  What strategies can I use to quit smoking?  Talk with your healthcare provider about different strategies to quit smoking. Some strategies include:  · Quitting smoking altogether instead of gradually lessening how much you smoke over a period of time. Research shows that quitting “cold turkey” is more successful than gradually quitting.  · Attending in-person counseling to help you build problem-solving skills. You are more likely to have success in quitting if you attend several counseling sessions. Even short sessions of 10 minutes can be effective.  · Finding resources and support systems that can help you to quit smoking and remain smoke-free after you quit. These resources are most helpful when you use them often. They can include:  ¨ Online chats with a counselor.  ¨ Telephone quitlines.  ¨ Printed self-help materials.  ¨ Support groups or group counseling.  ¨ Text messaging programs.  ¨ Mobile phone applications.  · Taking medicines to help you quit smoking. (If you are pregnant or  breastfeeding, talk with your health care provider first.) Some medicines contain nicotine and some do not. Both types of medicines help with cravings, but the medicines that include nicotine help to relieve withdrawal symptoms. Your health care provider may recommend:  ¨ Nicotine patches, gum, or lozenges.  ¨ Nicotine inhalers or sprays.  ¨ Non-nicotine medicine that is taken by mouth.  Talk with your health care provider about combining strategies, such as taking medicines while you are also receiving in-person counseling. Using these two strategies together makes you more likely to succeed in quitting than if you used either strategy on its own.  If you are pregnant or breastfeeding, talk with your health care provider about finding counseling or other support strategies to quit smoking. Do not take medicine to help you quit smoking unless told to do so by your health care provider.  What things can I do to make it easier to quit?  Quitting smoking might feel overwhelming at first, but there is a lot that you can do to make it easier. Take these important actions:  · Reach out to your family and friends and ask that they support and encourage you during this time. Call telephone quitlines, reach out to support groups, or work with a counselor for support.  · Ask people who smoke to avoid smoking around you.  · Avoid places that trigger you to smoke, such as bars, parties, or smoke-break areas at work.  · Spend time around people who do not smoke.  · Lessen stress in your life, because stress can be a smoking trigger for some people. To lessen stress, try:  ¨ Exercising regularly.  ¨ Deep-breathing exercises.  ¨ Yoga.  ¨ Meditating.  ¨ Performing a body scan. This involves closing your eyes, scanning your body from head to toe, and noticing which parts of your body are particularly tense. Purposefully relax the muscles in those areas.  · Download or purchase mobile phone or tablet apps (applications) that can help  you stick to your quit plan by providing reminders, tips, and encouragement. There are many free apps, such as QuitGuide from the CDC (Centers for Disease Control and Prevention). You can find other support for quitting smoking (smoking cessation) through smokefree.gov and other websites.  How will I feel when I quit smoking?  Within the first 24 hours of quitting smoking, you may start to feel some withdrawal symptoms. These symptoms are usually most noticeable 2–3 days after quitting, but they usually do not last beyond 2–3 weeks. Changes or symptoms that you might experience include:  · Mood swings.  · Restlessness, anxiety, or irritation.  · Difficulty concentrating.  · Dizziness.  · Strong cravings for sugary foods in addition to nicotine.  · Mild weight gain.  · Constipation.  · Nausea.  · Coughing or a sore throat.  · Changes in how your medicines work in your body.  · A depressed mood.  · Difficulty sleeping (insomnia).  After the first 2–3 weeks of quitting, you may start to notice more positive results, such as:  · Improved sense of smell and taste.  · Decreased coughing and sore throat.  · Slower heart rate.  · Lower blood pressure.  · Clearer skin.  · The ability to breathe more easily.  · Fewer sick days.  Quitting smoking is very challenging for most people. Do not get discouraged if you are not successful the first time. Some people need to make many attempts to quit before they achieve long-term success. Do your best to stick to your quit plan, and talk with your health care provider if you have any questions or concerns.  This information is not intended to replace advice given to you by your health care provider. Make sure you discuss any questions you have with your health care provider.  Document Released: 12/12/2002 Document Revised: 08/15/2017 Document Reviewed: 05/03/2016  [employer] Interactive Patient Education © 2017 [employer]

## 2019-12-04 ENCOUNTER — ANESTHESIA EVENT (OUTPATIENT)
Dept: GASTROENTEROLOGY | Facility: HOSPITAL | Age: 39
End: 2019-12-04

## 2019-12-04 ENCOUNTER — ANESTHESIA (OUTPATIENT)
Dept: GASTROENTEROLOGY | Facility: HOSPITAL | Age: 39
End: 2019-12-04

## 2019-12-04 ENCOUNTER — HOSPITAL ENCOUNTER (OUTPATIENT)
Facility: HOSPITAL | Age: 39
Setting detail: HOSPITAL OUTPATIENT SURGERY
Discharge: HOME | End: 2019-12-04
Attending: INTERNAL MEDICINE | Admitting: INTERNAL MEDICINE

## 2019-12-04 VITALS
RESPIRATION RATE: 15 BRPM | WEIGHT: 167 LBS | HEIGHT: 66 IN | TEMPERATURE: 97.9 F | HEART RATE: 86 BPM | OXYGEN SATURATION: 100 % | DIASTOLIC BLOOD PRESSURE: 76 MMHG | SYSTOLIC BLOOD PRESSURE: 136 MMHG | BODY MASS INDEX: 26.84 KG/M2

## 2019-12-04 DIAGNOSIS — D50.9 IRON DEFICIENCY ANEMIA, UNSPECIFIED IRON DEFICIENCY ANEMIA TYPE: ICD-10-CM

## 2019-12-04 LAB — B-HCG UR QL: NEGATIVE

## 2019-12-04 PROCEDURE — 45385 COLONOSCOPY W/LESION REMOVAL: CPT | Performed by: INTERNAL MEDICINE

## 2019-12-04 PROCEDURE — 25010000002 PROPOFOL 10 MG/ML EMULSION: Performed by: NURSE ANESTHETIST, CERTIFIED REGISTERED

## 2019-12-04 PROCEDURE — 43239 EGD BIOPSY SINGLE/MULTIPLE: CPT | Performed by: INTERNAL MEDICINE

## 2019-12-04 PROCEDURE — 81025 URINE PREGNANCY TEST: CPT | Performed by: ANESTHESIOLOGY

## 2019-12-04 PROCEDURE — 88305 TISSUE EXAM BY PATHOLOGIST: CPT | Performed by: INTERNAL MEDICINE

## 2019-12-04 PROCEDURE — 88342 IMHCHEM/IMCYTCHM 1ST ANTB: CPT | Performed by: INTERNAL MEDICINE

## 2019-12-04 RX ORDER — PROPOFOL 10 MG/ML
VIAL (ML) INTRAVENOUS AS NEEDED
Status: DISCONTINUED | OUTPATIENT
Start: 2019-12-04 | End: 2019-12-04 | Stop reason: SURG

## 2019-12-04 RX ORDER — SODIUM CHLORIDE 9 MG/ML
500 INJECTION, SOLUTION INTRAVENOUS CONTINUOUS PRN
Status: DISCONTINUED | OUTPATIENT
Start: 2019-12-04 | End: 2019-12-04 | Stop reason: HOSPADM

## 2019-12-04 RX ORDER — SODIUM CHLORIDE 0.9 % (FLUSH) 0.9 %
10 SYRINGE (ML) INJECTION AS NEEDED
Status: DISCONTINUED | OUTPATIENT
Start: 2019-12-04 | End: 2019-12-04 | Stop reason: HOSPADM

## 2019-12-04 RX ADMIN — LIDOCAINE HYDROCHLORIDE 100 MG: 20 INJECTION, SOLUTION INTRAVENOUS at 08:10

## 2019-12-04 RX ADMIN — PROPOFOL 600 MG: 10 INJECTION, EMULSION INTRAVENOUS at 08:10

## 2019-12-04 RX ADMIN — LIDOCAINE HYDROCHLORIDE 100 MG: 20 INJECTION, SOLUTION INTRAVENOUS at 08:23

## 2019-12-04 RX ADMIN — SODIUM CHLORIDE 500 ML: 9 INJECTION, SOLUTION INTRAVENOUS at 07:15

## 2019-12-04 NOTE — ANESTHESIA POSTPROCEDURE EVALUATION
Patient: Winsome Becker    Procedure Summary     Date:  12/04/19 Room / Location:  [facility] ENDOSCOPY 5 / [facility] END[facility]    Anesthesia Start:  0808 Anesthesia Stop:  0847    Procedures:       ESOPHAGOGASTRODUODENOSCOPY WITH ANESTHESIA (N/A )      COLONOSCOPY WITH ANESTHESIA (N/A ) Diagnosis:       Iron deficiency anemia, unspecified iron deficiency anemia type      (Iron deficiency anemia, unspecified iron deficiency anemia type [D50.9])    Surgeon:  Issa De Jesus MD Provider:  Aura Cabrera CRNA    Anesthesia Type:  MAC ASA Status:  2          Anesthesia Type: MAC  Last vitals  BP   136/76 (12/04/19 0900)   Temp   97.9 °F (36.6 °C) (12/04/19 0654)   Pulse   86 (12/04/19 0900)   Resp   15 (12/04/19 0900)     SpO2   100 % (12/04/19 0900)     Post Anesthesia Care and Evaluation    Patient location during evaluation: PHASE II  Patient participation: complete - patient participated  Level of consciousness: awake and alert  Pain score: 0  Pain management: adequate  Airway patency: patent  Anesthetic complications: No anesthetic complications  PONV Status: none  Cardiovascular status: acceptable  Respiratory status: acceptable  Hydration status: acceptable    Comments: Pt states she has had bronchitis and has been taking cough medicine. Patient's VSS. No coughing and states she feels fine. Pt was educated on smoking cessation.  No anesthesia care post op

## 2019-12-04 NOTE — ANESTHESIA PREPROCEDURE EVALUATION
Anesthesia Evaluation     Patient summary reviewed   no history of anesthetic complications:  NPO Solid Status: > 8 hours  NPO Liquid Status: > 8 hours           Airway   Mallampati: I  TM distance: <3 FB  Neck ROM: full  No difficulty expected  Dental - normal exam     Pulmonary - normal exam   (+) a smoker Current Abstained day of surgery, asthma,  Cardiovascular - normal exam  Exercise tolerance: excellent (>7 METS)    (+) dysrhythmias PVC,       Neuro/Psych- negative ROS  GI/Hepatic/Renal/Endo - negative ROS     Musculoskeletal (-) negative ROS    Abdominal  - normal exam   Substance History - negative use     OB/GYN negative ob/gyn ROS         Other   blood dyscrasia,   history of cancer active                    Anesthesia Plan    ASA 2     MAC     intravenous induction     Anesthetic plan, all risks, benefits, and alternatives have been provided, discussed and informed consent has been obtained with: patient.

## 2019-12-06 LAB
CYTO UR: NORMAL
LAB AP CASE REPORT: NORMAL
PATH REPORT.FINAL DX SPEC: NORMAL
PATH REPORT.GROSS SPEC: NORMAL

## 2019-12-10 ENCOUNTER — TELEPHONE (OUTPATIENT)
Dept: GASTROENTEROLOGY | Facility: CLINIC | Age: 39
End: 2019-12-10

## 2019-12-10 RX ORDER — ESOMEPRAZOLE MAGNESIUM 40 MG/1
40 CAPSULE, DELAYED RELEASE ORAL
Qty: 14 CAPSULE | Refills: 0 | Status: SHIPPED | OUTPATIENT
Start: 2019-12-10 | End: 2020-09-01 | Stop reason: ALTCHOICE

## 2019-12-10 RX ORDER — METRONIDAZOLE 250 MG/1
250 TABLET ORAL 4 TIMES DAILY
Qty: 56 TABLET | Refills: 0 | Status: SHIPPED | OUTPATIENT
Start: 2019-12-10 | End: 2020-09-01 | Stop reason: ALTCHOICE

## 2019-12-10 RX ORDER — TETRACYCLINE HYDROCHLORIDE 500 MG/1
500 CAPSULE ORAL 4 TIMES DAILY
Qty: 56 CAPSULE | Refills: 0 | Status: SHIPPED | OUTPATIENT
Start: 2019-12-10 | End: 2020-09-01 | Stop reason: ALTCHOICE

## 2019-12-10 NOTE — TELEPHONE ENCOUNTER
Tell her she was positive for H. pylori.  Have her stop taking her iron during that time she is taking the H. pylori medications.  She will need a stool antigen to follow.

## 2019-12-11 NOTE — TELEPHONE ENCOUNTER
Patient notified to stop Iron while on antibiotics and that I will notify her in 2 months when time for recheck.

## 2019-12-26 LAB — REF LAB TEST METHOD: NORMAL

## 2019-12-31 ENCOUNTER — TELEPHONE (OUTPATIENT)
Dept: ONCOLOGY | Facility: CLINIC | Age: 39
End: 2019-12-31

## 2019-12-31 NOTE — TELEPHONE ENCOUNTER
CALLED PATIENT TO LET HER KNOW THAT WE RECEIVED HER TEST RESULTS FROM HER FLOW AND DR SANDOVAL WAS WANTING HER TO COME IN TO DISCUSS HER RESULTS. SHE VOICED UNDERSTANDING I MADE HER AN APPOINTMENT FOR 1/9/20 TO SEE HER.       ----- Message from Diallo Zheng MD sent at 12/30/2019  1:32 PM CST -----  Please call the patient regarding her abnormal result.please schedule to see me next week to discuss. tnx

## 2020-01-08 DIAGNOSIS — C92.10 CML (CHRONIC MYELOCYTIC LEUKEMIA) (HCC): ICD-10-CM

## 2020-01-08 DIAGNOSIS — D50.9 IRON DEFICIENCY ANEMIA, UNSPECIFIED IRON DEFICIENCY ANEMIA TYPE: Primary | ICD-10-CM

## 2020-01-09 ENCOUNTER — OFFICE VISIT (OUTPATIENT)
Dept: ONCOLOGY | Facility: CLINIC | Age: 40
End: 2020-01-09

## 2020-01-09 ENCOUNTER — APPOINTMENT (OUTPATIENT)
Dept: LAB | Facility: HOSPITAL | Age: 40
End: 2020-01-09

## 2020-01-09 VITALS
DIASTOLIC BLOOD PRESSURE: 85 MMHG | HEART RATE: 85 BPM | BODY MASS INDEX: 25.99 KG/M2 | WEIGHT: 161.7 LBS | TEMPERATURE: 98.7 F | SYSTOLIC BLOOD PRESSURE: 150 MMHG | RESPIRATION RATE: 16 BRPM | OXYGEN SATURATION: 99 % | HEIGHT: 66 IN

## 2020-01-09 DIAGNOSIS — C92.10 CML (CHRONIC MYELOCYTIC LEUKEMIA) (HCC): ICD-10-CM

## 2020-01-09 DIAGNOSIS — D50.9 IRON DEFICIENCY ANEMIA, UNSPECIFIED IRON DEFICIENCY ANEMIA TYPE: Primary | ICD-10-CM

## 2020-01-09 LAB
ALBUMIN SERPL-MCNC: 4.2 G/DL (ref 3.5–5.2)
ALBUMIN/GLOB SERPL: 1.4 G/DL
ALP SERPL-CCNC: 64 U/L (ref 39–117)
ALT SERPL W P-5'-P-CCNC: 16 U/L (ref 1–33)
ANION GAP SERPL CALCULATED.3IONS-SCNC: 12 MMOL/L (ref 5–15)
AST SERPL-CCNC: 23 U/L (ref 1–32)
BASOPHILS # BLD AUTO: 0.06 10*3/MM3 (ref 0–0.2)
BASOPHILS NFR BLD AUTO: 1 % (ref 0–1.5)
BILIRUB SERPL-MCNC: 0.3 MG/DL (ref 0.2–1.2)
BUN BLD-MCNC: 22 MG/DL (ref 6–20)
BUN/CREAT SERPL: 21.8 (ref 7–25)
CALCIUM SPEC-SCNC: 9.4 MG/DL (ref 8.6–10.5)
CHLORIDE SERPL-SCNC: 104 MMOL/L (ref 98–107)
CO2 SERPL-SCNC: 24 MMOL/L (ref 22–29)
CREAT BLD-MCNC: 1.01 MG/DL (ref 0.57–1)
DEPRECATED RDW RBC AUTO: 65.5 FL (ref 37–54)
EOSINOPHIL # BLD AUTO: 0.05 10*3/MM3 (ref 0–0.4)
EOSINOPHIL NFR BLD AUTO: 0.9 % (ref 0.3–6.2)
ERYTHROCYTE [DISTWIDTH] IN BLOOD BY AUTOMATED COUNT: 19.9 % (ref 12.3–15.4)
FERRITIN SERPL-MCNC: 20.3 NG/ML (ref 13–150)
GFR SERPL CREATININE-BSD FRML MDRD: 73 ML/MIN/1.73
GLOBULIN UR ELPH-MCNC: 2.9 GM/DL
GLUCOSE BLD-MCNC: 127 MG/DL (ref 65–99)
HCT VFR BLD AUTO: 36.9 % (ref 34–46.6)
HGB BLD-MCNC: 12.2 G/DL (ref 12–15.9)
HOLD SPECIMEN: NORMAL
HOLD SPECIMEN: NORMAL
IMM GRANULOCYTES # BLD AUTO: 0.01 10*3/MM3 (ref 0–0.05)
IMM GRANULOCYTES NFR BLD AUTO: 0.2 % (ref 0–0.5)
IRON 24H UR-MRATE: 175 MCG/DL (ref 37–145)
IRON SATN MFR SERPL: 35 % (ref 20–50)
LYMPHOCYTES # BLD AUTO: 2.73 10*3/MM3 (ref 0.7–3.1)
LYMPHOCYTES NFR BLD AUTO: 47.5 % (ref 19.6–45.3)
MCH RBC QN AUTO: 29.8 PG (ref 26.6–33)
MCHC RBC AUTO-ENTMCNC: 33.1 G/DL (ref 31.5–35.7)
MCV RBC AUTO: 90.2 FL (ref 79–97)
MONOCYTES # BLD AUTO: 0.73 10*3/MM3 (ref 0.1–0.9)
MONOCYTES NFR BLD AUTO: 12.7 % (ref 5–12)
NEUTROPHILS # BLD AUTO: 2.17 10*3/MM3 (ref 1.7–7)
NEUTROPHILS NFR BLD AUTO: 37.7 % (ref 42.7–76)
NRBC BLD AUTO-RTO: 0 /100 WBC (ref 0–0.2)
PLATELET # BLD AUTO: 259 10*3/MM3 (ref 140–450)
PMV BLD AUTO: 9.1 FL (ref 6–12)
POTASSIUM BLD-SCNC: 2.9 MMOL/L (ref 3.5–5.2)
PROT SERPL-MCNC: 7.1 G/DL (ref 6–8.5)
RBC # BLD AUTO: 4.09 10*6/MM3 (ref 3.77–5.28)
SODIUM BLD-SCNC: 140 MMOL/L (ref 136–145)
TIBC SERPL-MCNC: 495 MCG/DL (ref 298–536)
TRANSFERRIN SERPL-MCNC: 332 MG/DL (ref 200–360)
WBC NRBC COR # BLD: 5.75 10*3/MM3 (ref 3.4–10.8)

## 2020-01-09 PROCEDURE — 84466 ASSAY OF TRANSFERRIN: CPT | Performed by: INTERNAL MEDICINE

## 2020-01-09 PROCEDURE — 83540 ASSAY OF IRON: CPT | Performed by: INTERNAL MEDICINE

## 2020-01-09 PROCEDURE — 82728 ASSAY OF FERRITIN: CPT | Performed by: INTERNAL MEDICINE

## 2020-01-09 PROCEDURE — 36415 COLL VENOUS BLD VENIPUNCTURE: CPT

## 2020-01-09 PROCEDURE — 80053 COMPREHEN METABOLIC PANEL: CPT | Performed by: INTERNAL MEDICINE

## 2020-01-09 PROCEDURE — 85025 COMPLETE CBC W/AUTO DIFF WBC: CPT | Performed by: INTERNAL MEDICINE

## 2020-01-09 PROCEDURE — 99215 OFFICE O/P EST HI 40 MIN: CPT | Performed by: INTERNAL MEDICINE

## 2020-01-09 NOTE — PROGRESS NOTES
[facility]  HEMATOLOGY & ONCOLOGY    Cancer Staging Information:  Cancer Staging  No matching staging information was found for the patient.      Subjective     VISIT DIAGNOSIS:   Encounter Diagnoses   Name Primary?   • Iron deficiency anemia, unspecified iron deficiency anemia type Yes   • CML (chronic myelocytic leukemia) (CMS/HCC)        REASON FOR VISIT:     Chief Complaint   Patient presents with   • CML     HERE FOR FOLLOW UP, NO COMPLAINTS        HEMATOLOGY / ONCOLOGY HISTORY:   Oncology/Hematology History    Ms Becker is a pleasant 35 year old female with diagnosis of chronic myelogenous leukemia diagnosed over 12 years ago. She has  been on Gleevec on and off. She was started back on 02/04/10.  Ms Becker is a pleasant  female with chronic myelogenous leukemia. She initially had been placed on imatinib, and  she failed highdose  therapy. She took this infrequently, however. Patient was placed on Sprycel. Had better compliance to this, but her  bcr/abl transcript shaila. 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 This is  a mutation that is reported to confer resistance to bcr/abl 1 tyrosine kinase inhibitors. Patient was informed of the news.  INTERVAL HISTORY  Winsome is a very pleasant 35 year old female patient with chronic myelogenous leukemia who presents today in followup.  She is currently  taking Sprycel and states she been compliant with it since her last prescription. She states she has had some problems in the past with  pharmacy but overall she has been fairly compliant with it over the last month or two. She last had a BCR/ABL transcript on 04/13/2016  that found the transcript detected at 21.3649% on the international scale. This was down from 30.94 in March and 40.52 back in  September of 14. She is also following with Dr. Benigno mcclure at [place]        CML (chronic myelocytic leukemia) (CMS/HCC)     7/26/2017 Initial Diagnosis     CML (chronic myeloid leukemia)             INTERVAL HISTORY  Patient ID: Winsome Becker is a 39 y.o. year old female  With cml presenting today with concern about a palpable right breast mass.  -11/20/19: she has been having cough and noticed increase in blood pr with taking cough medication. She will f/u with pcp.    1/0/2020: presents for f/u today. I reviewed her QT PCR which is concerning for TCR. Has scope and was dz with Lencho. Start taking antibiotics today. She was told by GI nurse to stop folate and iron, reason unknown however I d not see any drug interaction or CI.  -denies night sweats, fever, sob, cp, n/v, unintentional weight loss, focal weakness. Rest of ros unremarkable. PE non focal.    Past Medical History:   Past Medical History:   Diagnosis Date   • Bronchitis, chronic (CMS/HCC)    • CML (chronic myelocytic leukemia) (CMS/HCC)      Past Surgical History:   Past Surgical History:   Procedure Laterality Date   • COLONOSCOPY N/A 12/4/2019    Procedure: COLONOSCOPY WITH ANESTHESIA;  Surgeon: Issa De Jesus MD;  Location: [facility] ENDOSCOPY;  Service: Gastroenterology   • ENDOSCOPY N/A 12/4/2019    Procedure: ESOPHAGOGASTRODUODENOSCOPY WITH ANESTHESIA;  Surgeon: Issa De Jesus MD;  Location: [facility] ENDOSCOPY;  Service: Gastroenterology   • TUBAL ABDOMINAL LIGATION       Social History:   Social History     Socioeconomic History   • Marital status:      Spouse name: Not on file   • Number of children: Not on file   • Years of education: Not on file   • Highest education level: Not on file   Tobacco Use   • Smoking status: Heavy Tobacco Smoker     Packs/day: 0.75     Types: Cigarettes   • Smokeless tobacco: Never Used   Substance and Sexual Activity   • Alcohol use: Yes     Comment: Occasional   • Drug use: No   • Sexual activity: Defer     Family History:   Family History   Problem Relation Age of Onset   • Breast cancer Neg Hx    • Colon cancer Neg Hx   "  • Colon polyps Neg Hx        Review of Systems   Constitutional: Negative.    HENT: Negative.    Eyes: Negative.    Respiratory: Negative.    Cardiovascular: Negative.    Gastrointestinal: Negative.    Endocrine: Negative.    Genitourinary: Negative.    Musculoskeletal: Negative.    Skin:        Right nipple rash   Neurological: Negative.    Hematological: Negative.    Psychiatric/Behavioral: Negative.         Performance Status:  Asymptomatic    Medications:    Current Outpatient Medications   Medication Sig Dispense Refill   • dasatinib (SPRYCEL) 100 MG chemo tablet Take 1 tablet by mouth Daily. 30 tablet 6   • metroNIDAZOLE (FLAGYL) 250 MG tablet Take 1 tablet by mouth 4 (Four) Times a Day. 56 tablet 0   • tetracycline (ACHROMYCIN,SUMYCIN) 500 MG capsule Take 1 capsule by mouth 4 (Four) Times a Day. 56 capsule 0   • Bismuth Subsalicylate 525 MG/15ML suspension Take 15 mL by mouth 4 (Four) Times a Day. 840 mL 0   • esomeprazole (nexIUM) 40 MG capsule Take 1 capsule by mouth Every Morning Before Breakfast. 14 capsule 0   • ferrous sulfate 325 (65 FE) MG tablet Take 1 tablet by mouth 2 (Two) Times a Day With Meals. 60 tablet 5   • folic acid (FOLVITE) 1 MG tablet Take 1 tablet by mouth Daily. 30 tablet 5     No current facility-administered medications for this visit.        ALLERGIES:    Allergies   Allergen Reactions   • Latex Rash   • Penicillins Rash       Objective      Vitals:    01/09/20 0841   BP: 150/85   Pulse: 85   Resp: 16   Temp: 98.7 °F (37.1 °C)   SpO2: 99%   Weight: 73.3 kg (161 lb 11.2 oz)   Height: 167.6 cm (66\")   PainSc: 0-No pain         Current Status 1/9/2020   ECOG score 0         Physical Exam  General Appearance: Patient is awake, alert, oriented and in no acute distress. Patient is welldeveloped, wellnourished, and appears stated age.  HEENT: Normocephalic. Sclerae clear, conjunctiva pink, extraocular movements intact, pupils, round, reactive to light and  accommodation. Mouth and throat " are clear with moist oral mucosa.  NECK: Supple, no jugular venous distention, thyroid not enlarged.  LYMPH: No cervical, supraclavicular, axillary, or inguinal lymphadenopathy.  CHEST: Equal bilateral expansion, AP  diameter normal, resonant percussion note  LUNGS: Good air movement, no rales, rhonchi, rubs or wheezes with auscultation  CARDIO: Regular sinus rhythm, no murmurs, gallops or rubs.  ABDOMEN: Nondistended, soft, No tenderness, no guarding, no rebound, No hepatosplenomegaly. No abdominal masses. Bowel sounds positive. No hernia  GENITALIA: Not examined.  BREASTS: palpable mass medial right breast. Pimple like lesion right breast nipple. Bilateral dense breast.  MUSKEL: No joint swelling, decreased motion, or inflammation  EXTREMS: No edema, clubbing, cyanosis, No varicose veins.  NEURO: Grossly nonfocal, Gait is coordinated and smooth, Cognition is preserved.  SKIN: No rashes, no ecchymoses, no petechia.  PSYCH: Oriented to time, place and person. Memory is preserved. Mood and affect appear normal  RECENT LABS:  Office Visit on 01/09/2020   Component Date Value Ref Range Status   • Iron 01/09/2020 175* 37 - 145 mcg/dL Final   • Iron Saturation 01/09/2020 35  20 - 50 % Final   • Transferrin 01/09/2020 332  200 - 360 mg/dL Final   • TIBC 01/09/2020 495  298 - 536 mcg/dL Final   • Ferritin 01/09/2020 20.30  13.00 - 150.00 ng/mL Final   Appointment on 01/09/2020   Component Date Value Ref Range Status   • Extra Tube 01/09/2020 Hold for add-ons.   Final    Auto resulted.   • Extra Tube 01/09/2020 Hold for add-ons.   Final    Auto resulted.   Orders Only on 01/08/2020   Component Date Value Ref Range Status   • Glucose 01/09/2020 127* 65 - 99 mg/dL Final   • BUN 01/09/2020 22* 6 - 20 mg/dL Final   • Creatinine 01/09/2020 1.01* 0.57 - 1.00 mg/dL Final   • Sodium 01/09/2020 140  136 - 145 mmol/L Final   • Potassium 01/09/2020 2.9* 3.5 - 5.2 mmol/L Final   • Chloride 01/09/2020 104  98 - 107 mmol/L Final   • CO2  01/09/2020 24.0  22.0 - 29.0 mmol/L Final   • Calcium 01/09/2020 9.4  8.6 - 10.5 mg/dL Final   • Total Protein 01/09/2020 7.1  6.0 - 8.5 g/dL Final   • Albumin 01/09/2020 4.20  3.50 - 5.20 g/dL Final   • ALT (SGPT) 01/09/2020 16  1 - 33 U/L Final   • AST (SGOT) 01/09/2020 23  1 - 32 U/L Final   • Alkaline Phosphatase 01/09/2020 64  39 - 117 U/L Final   • Total Bilirubin 01/09/2020 0.3  0.2 - 1.2 mg/dL Final   • eGFR   Amer 01/09/2020 73  >60 mL/min/1.73 Final   • Globulin 01/09/2020 2.9  gm/dL Final   • A/G Ratio 01/09/2020 1.4  g/dL Final   • BUN/Creatinine Ratio 01/09/2020 21.8  7.0 - 25.0 Final   • Anion Gap 01/09/2020 12.0  5.0 - 15.0 mmol/L Final   • WBC 01/09/2020 5.75  3.40 - 10.80 10*3/mm3 Final   • RBC 01/09/2020 4.09  3.77 - 5.28 10*6/mm3 Final   • Hemoglobin 01/09/2020 12.2  12.0 - 15.9 g/dL Final   • Hematocrit 01/09/2020 36.9  34.0 - 46.6 % Final   • MCV 01/09/2020 90.2  79.0 - 97.0 fL Final   • MCH 01/09/2020 29.8  26.6 - 33.0 pg Final   • MCHC 01/09/2020 33.1  31.5 - 35.7 g/dL Final   • RDW 01/09/2020 19.9* 12.3 - 15.4 % Final   • RDW-SD 01/09/2020 65.5* 37.0 - 54.0 fl Final   • MPV 01/09/2020 9.1  6.0 - 12.0 fL Final   • Platelets 01/09/2020 259  140 - 450 10*3/mm3 Final   • Neutrophil % 01/09/2020 37.7* 42.7 - 76.0 % Final   • Lymphocyte % 01/09/2020 47.5* 19.6 - 45.3 % Final   • Monocyte % 01/09/2020 12.7* 5.0 - 12.0 % Final   • Eosinophil % 01/09/2020 0.9  0.3 - 6.2 % Final   • Basophil % 01/09/2020 1.0  0.0 - 1.5 % Final   • Immature Grans % 01/09/2020 0.2  0.0 - 0.5 % Final   • Neutrophils, Absolute 01/09/2020 2.17  1.70 - 7.00 10*3/mm3 Final   • Lymphocytes, Absolute 01/09/2020 2.73  0.70 - 3.10 10*3/mm3 Final   • Monocytes, Absolute 01/09/2020 0.73  0.10 - 0.90 10*3/mm3 Final   • Eosinophils, Absolute 01/09/2020 0.05  0.00 - 0.40 10*3/mm3 Final   • Basophils, Absolute 01/09/2020 0.06  0.00 - 0.20 10*3/mm3 Final   • Immature Grans, Absolute 01/09/2020 0.01  0.00 - 0.05 10*3/mm3 Final   •  nRBC 01/09/2020 0.0  0.0 - 0.2 /100 WBC Final       RADIOLOGY:  No results found.         Assessment/Plan  Winsome Becker is a 39 y.o. year old female with CML on dasatinib presenting today concerned about a palpable right breast mass.    Patient Active Problem List   Diagnosis   • CML (chronic myelocytic leukemia) (CMS/HCC)   • Iron deficiency anemia   • Tobacco dependence          1.palpable medial right breast mass: ordered diagnostic mammo and US 5/14/19 which shows Fibrocystic changes within the medial and lateral right breast  including 2 adjacent large cyst corresponding to the palpable  abnormalities and mammographic abnormality. These lesions fit all the criteria for simple cyst by ultrasound criteria and would not warrant any additional follow-up. The patient may return to the screening pool with normal interval screening mammography in one year's time unless otherwise earlier clinically indicated.    2.CML: on dasatinib. Pt reports being compliant on medication.  -labs reviewed with pt wbc 4.18, Hg 9.3, plt 332, cr 0.86, bili 0.2  3. Anemia: Hg 9.3 down from 11.1, will check iron profile, ferritin, b12, folate and go from there.  bcl-abl flow showed positive dz with T cell gene rearrangement  -will obtain bone marrow bx to further eval  -maria esther dasatinib    3. ANemia: improved with oral iron. Complaining of upset stomach. I advised to take with food.  S/p scope with dz of H-pylori. Will be starting antibiotics today  -advised to get the biopsy after her tx  -ok to resume iron and folate. I don't see any CI with her antibiotics            Diallo Zheng MD    1/9/2020    4:10 PM

## 2020-01-13 ENCOUNTER — APPOINTMENT (OUTPATIENT)
Dept: LAB | Facility: HOSPITAL | Age: 40
End: 2020-01-13

## 2020-01-14 ENCOUNTER — APPOINTMENT (OUTPATIENT)
Dept: LAB | Facility: HOSPITAL | Age: 40
End: 2020-01-14

## 2020-01-14 LAB
COLLECT DATE SP2 STL: NORMAL
COLLECT DATE SP3 STL: NORMAL
COLLECT DATE STL: NORMAL
HEMOCCULT STL QL: NEGATIVE
Lab: 406
Lab: 515
Lab: 645

## 2020-01-14 PROCEDURE — 82270 OCCULT BLOOD FECES: CPT | Performed by: NURSE PRACTITIONER

## 2020-01-21 ENCOUNTER — HOSPITAL ENCOUNTER (OUTPATIENT)
Dept: CT IMAGING | Facility: HOSPITAL | Age: 40
End: 2020-01-21

## 2020-01-31 ENCOUNTER — HOSPITAL ENCOUNTER (OUTPATIENT)
Dept: CT IMAGING | Facility: HOSPITAL | Age: 40
Discharge: HOME | End: 2020-01-31
Admitting: RADIOLOGY

## 2020-01-31 VITALS
WEIGHT: 163 LBS | BODY MASS INDEX: 26.2 KG/M2 | HEIGHT: 66 IN | TEMPERATURE: 97.6 F | HEART RATE: 73 BPM | DIASTOLIC BLOOD PRESSURE: 91 MMHG | SYSTOLIC BLOOD PRESSURE: 151 MMHG | OXYGEN SATURATION: 100 % | RESPIRATION RATE: 18 BRPM

## 2020-01-31 DIAGNOSIS — C92.10 CML (CHRONIC MYELOCYTIC LEUKEMIA) (HCC): ICD-10-CM

## 2020-01-31 LAB
BASOPHILS # BLD AUTO: 0.03 10*3/MM3 (ref 0–0.2)
BASOPHILS NFR BLD AUTO: 0.7 % (ref 0–1.5)
DEPRECATED RDW RBC AUTO: 65.5 FL (ref 37–54)
EOSINOPHIL # BLD AUTO: 0.06 10*3/MM3 (ref 0–0.4)
EOSINOPHIL NFR BLD AUTO: 1.4 % (ref 0.3–6.2)
ERYTHROCYTE [DISTWIDTH] IN BLOOD BY AUTOMATED COUNT: 19.8 % (ref 12.3–15.4)
HCT VFR BLD AUTO: 37.5 % (ref 34–46.6)
HGB BLD-MCNC: 12.4 G/DL (ref 12–15.9)
IMM GRANULOCYTES # BLD AUTO: 0 10*3/MM3 (ref 0–0.05)
IMM GRANULOCYTES NFR BLD AUTO: 0 % (ref 0–0.5)
LYMPHOCYTES # BLD AUTO: 1.45 10*3/MM3 (ref 0.7–3.1)
LYMPHOCYTES NFR BLD AUTO: 34 % (ref 19.6–45.3)
MCH RBC QN AUTO: 30 PG (ref 26.6–33)
MCHC RBC AUTO-ENTMCNC: 33.1 G/DL (ref 31.5–35.7)
MCV RBC AUTO: 90.6 FL (ref 79–97)
MONOCYTES # BLD AUTO: 0.51 10*3/MM3 (ref 0.1–0.9)
MONOCYTES NFR BLD AUTO: 11.9 % (ref 5–12)
NEUTROPHILS # BLD AUTO: 2.22 10*3/MM3 (ref 1.7–7)
NEUTROPHILS NFR BLD AUTO: 52 % (ref 42.7–76)
NRBC BLD AUTO-RTO: 0 /100 WBC (ref 0–0.2)
PLATELET # BLD AUTO: 352 10*3/MM3 (ref 140–450)
PMV BLD AUTO: 8.8 FL (ref 6–12)
RBC # BLD AUTO: 4.14 10*6/MM3 (ref 3.77–5.28)
WBC NRBC COR # BLD: 4.27 10*3/MM3 (ref 3.4–10.8)

## 2020-01-31 PROCEDURE — 77012 CT SCAN FOR NEEDLE BIOPSY: CPT

## 2020-01-31 PROCEDURE — 25010000003 LIDOCAINE 1 % SOLUTION: Performed by: RADIOLOGY

## 2020-01-31 PROCEDURE — 85025 COMPLETE CBC W/AUTO DIFF WBC: CPT | Performed by: RADIOLOGY

## 2020-01-31 PROCEDURE — 36415 COLL VENOUS BLD VENIPUNCTURE: CPT

## 2020-01-31 PROCEDURE — 88311 DECALCIFY TISSUE: CPT | Performed by: INTERNAL MEDICINE

## 2020-01-31 PROCEDURE — 25010000002 FENTANYL CITRATE (PF) 100 MCG/2ML SOLUTION: Performed by: RADIOLOGY

## 2020-01-31 PROCEDURE — 88305 TISSUE EXAM BY PATHOLOGIST: CPT | Performed by: INTERNAL MEDICINE

## 2020-01-31 PROCEDURE — 25010000002 MIDAZOLAM PER 1 MG: Performed by: RADIOLOGY

## 2020-01-31 PROCEDURE — 88313 SPECIAL STAINS GROUP 2: CPT | Performed by: INTERNAL MEDICINE

## 2020-01-31 RX ORDER — FENTANYL CITRATE 50 UG/ML
INJECTION, SOLUTION INTRAMUSCULAR; INTRAVENOUS
Status: COMPLETED | OUTPATIENT
Start: 2020-01-31 | End: 2020-01-31

## 2020-01-31 RX ORDER — LIDOCAINE HYDROCHLORIDE 10 MG/ML
INJECTION, SOLUTION INFILTRATION; PERINEURAL
Status: COMPLETED | OUTPATIENT
Start: 2020-01-31 | End: 2020-01-31

## 2020-01-31 RX ORDER — SODIUM CHLORIDE 0.9 % (FLUSH) 0.9 %
10 SYRINGE (ML) INJECTION AS NEEDED
Status: DISCONTINUED | OUTPATIENT
Start: 2020-01-31 | End: 2020-02-01 | Stop reason: HOSPADM

## 2020-01-31 RX ORDER — MIDAZOLAM HYDROCHLORIDE 1 MG/ML
INJECTION INTRAMUSCULAR; INTRAVENOUS
Status: COMPLETED | OUTPATIENT
Start: 2020-01-31 | End: 2020-01-31

## 2020-01-31 RX ORDER — SODIUM CHLORIDE 0.9 % (FLUSH) 0.9 %
3 SYRINGE (ML) INJECTION EVERY 12 HOURS SCHEDULED
Status: DISCONTINUED | OUTPATIENT
Start: 2020-01-31 | End: 2020-02-01 | Stop reason: HOSPADM

## 2020-01-31 RX ADMIN — FENTANYL CITRATE 25 MCG: 50 INJECTION, SOLUTION INTRAMUSCULAR; INTRAVENOUS at 11:13

## 2020-01-31 RX ADMIN — LIDOCAINE HYDROCHLORIDE 10 ML: 10 INJECTION, SOLUTION INFILTRATION; PERINEURAL at 11:12

## 2020-01-31 RX ADMIN — MIDAZOLAM 1 MG: 1 INJECTION INTRAMUSCULAR; INTRAVENOUS at 11:11

## 2020-01-31 RX ADMIN — FENTANYL CITRATE 25 MCG: 50 INJECTION, SOLUTION INTRAMUSCULAR; INTRAVENOUS at 11:11

## 2020-02-04 LAB — REF LAB TEST METHOD: NORMAL

## 2020-03-16 DIAGNOSIS — K29.70 HELICOBACTER PYLORI GASTRITIS: Primary | ICD-10-CM

## 2020-03-16 DIAGNOSIS — B96.81 HELICOBACTER PYLORI GASTRITIS: Primary | ICD-10-CM

## 2020-04-02 ENCOUNTER — TELEPHONE (OUTPATIENT)
Dept: ONCOLOGY | Facility: CLINIC | Age: 40
End: 2020-04-02

## 2020-04-02 DIAGNOSIS — D50.9 IRON DEFICIENCY ANEMIA, UNSPECIFIED IRON DEFICIENCY ANEMIA TYPE: Primary | ICD-10-CM

## 2020-04-02 NOTE — TELEPHONE ENCOUNTER
Patient says she received a letter saying that she needed to repeat some kind of stool sample test.  Does she need to pick a kit up at the office for this?    [phone]

## 2020-04-14 ENCOUNTER — APPOINTMENT (OUTPATIENT)
Dept: LAB | Facility: HOSPITAL | Age: 40
End: 2020-04-14

## 2020-04-14 LAB
COLLECT DATE SP2 STL: NORMAL
COLLECT DATE SP3 STL: NORMAL
COLLECT DATE STL: NORMAL
HEMOCCULT STL QL: NEGATIVE
Lab: 1922
Lab: 600
Lab: 916

## 2020-04-14 PROCEDURE — 82270 OCCULT BLOOD FECES: CPT | Performed by: INTERNAL MEDICINE

## 2020-07-12 NOTE — NURSING NOTE
Patient Education   Patient Education     Strep Throat  Strep throat is a throat infection caused by a bacteria called group A Streptococcus bacteria (group A strep). The bacteria live in the nose and throat. Strep throat is contagious and spreads easily from person to person through airborne droplets when an infected person coughs, sneezes, or talks. Good hand washing is important to help prevent the spread of this illness.  Children diagnosed with strep throat should not attend school or  until they have been taking antibiotics and had no fever for 24 hours.  Strep throat mainly affects school-aged children between 5 and 15 years of age, but can affect adults too. When it isn't treated, it can lead to serious problems including rheumatic fever (an inflammation of the joints and heart) and kidney damage.    How is strep throat spread?  Strep throat can be easily spread from an infected person's saliva by:  · Drinking and eating after them  · Sharing a straw, cup, toothbrushes, and eating utensils  When to go to the emergency room (ER)  Call 911 if your child has trouble breathing or swallowing. Call your healthcare provider about other symptoms of strep throat, such as:  · Throat pain, especially when swallowing  · Red, swollen tonsils  · Swollen lymph glands  · Stomachache; sometimes, vomiting in younger children  · Pus in the back of the throat  What to expect in the ER  · Your child will be examined and the healthcare provider will ask about his or her health history.  · The child's tonsils will be examined. A sample of fluid may be taken from the back of the throat using a soft swab. The sample can be checked right away for the bacteria that cause strep throat. Another sample may also be sent to a lab for testing.  · An antibiotic is usually prescribed to kill the bacteria. Be sure your child takes all the medicine, even if he or she starts to feel better. Antibiotics will not help a viral throat  Pt states as had bronchitis for the past 2 weeks   infection.  · If swallowing is very painful, painkilling medicine may also be prescribed.  When to call your healthcare provider  Call your healthcare provider if your otherwise healthy child has finished the treatment for strep throat and has:  · Joint pain or swelling  · Shortness of breath  · Signs of dehydration (no tears when crying and not urinating for more than 8 hours)  · Ear pain or pressure  · Headaches  · Rash  · Fever (see Fever and children, below)  Fever and children  Always use a digital thermometer to check your child’s temperature. Never use a mercury thermometer.  For infants and toddlers, be sure to use a rectal thermometer correctly. A rectal thermometer may accidentally poke a hole in (perforate) the rectum. It may also pass on germs from the stool. Always follow the product maker’s directions for proper use. If you don’t feel comfortable taking a rectal temperature, use another method. When you talk to your child’s healthcare provider, tell him or her which method you used to take your child’s temperature.  Here are guidelines for fever temperature. Ear temperatures aren’t accurate before 6 months of age. Don’t take an oral temperature until your child is at least 4 years old.  Infant under 3 months old:  · Ask your child’s healthcare provider how you should take the temperature.  · Rectal or forehead (temporal artery) temperature of 100.4°F (38°C) or higher, or as directed by the provider  · Armpit temperature of 99°F (37.2°C) or higher, or as directed by the provider  Child age 3 to 36 months:  · Rectal, forehead (temporal artery), or ear temperature of 102°F (38.9°C) or higher, or as directed by the provider  · Armpit temperature of 101°F (38.3°C) or higher, or as directed by the provider  Child of any age:  · Repeated temperature of 104°F (40°C) or higher, or as directed by the provider  · Fever that lasts more than 24 hours in a child under 2 years old. Or a fever that lasts for 3 days in  a child 2 years or older.   Easing strep throat symptoms  These tips can help ease your child's symptoms:  · Offer easy-to-swallow foods, such as soup, applesauce, popsicles, cold drinks, milk shakes, and yogurt.  · Provide a soft diet and avoid spicy or acidic foods.  · Use a cool-mist humidifier in the child's bedroom.  · Gargle with saltwater (for older children and adults only). Mix 1/4 teaspoon salt in 1 cup (8 oz) of warm water.   Date Last Reviewed: 1/1/2017  © 1075-7000 [employer]. [address] [place] [address], [place] [address]. All rights reserved. This information is not intended as a substitute for professional medical care. Always follow your healthcare professional's instructions.         Coronavirus Disease 2019 (COVID-19): Caring for Yourself or Others  If you or a household member have symptoms of COVID-19, follow the guidelines below for preventing spread of the virus, and managing symptoms.  If you think you have COVID-19 symptoms  · Stay home. Call your healthcare provider and tell them you have symptoms of COVID-19. Do this before going to any hospital or clinic. Follow your provider's instructions. You may be advised to isolate yourself at home. This is called self-isolation.  · Don’t panic. Keep in mind that other illnesses can cause similar symptoms.  · Stay away from work, school, and public places. Limit physical contact with family members. Limit visitors. Don't kiss anyone or share eating or drinking utensils. Clean surfaces you touch with disinfectant. This is to help prevent the virus from spreading.  · If you need to cough or sneeze, do it into a tissue. Then throw the tissue into the trash. If you don't have tissues, cough or sneeze into the bend of your elbow.  · Don’t share food or personal items with people in your household. This includes items like eating and drinking utensils, towels, and bedding.  · Wear a cloth face mask around other people. During a public health  emergency, medical face masks may be reserved for healthcare workers. You may need to make a cloth face mask of your own. You can do this using a bandana, T-shirt, or other cloth. The [facility] has instructions on how to make a mask. Wear the mask so that it covers both your nose and mouth.  · If you need to go to a hospital or clinic, expect that the healthcare staff will wear protective equipment such as masks, gowns, gloves, and eye protection. You may be put in a separate room. This is to prevent the possible virus from spreading.  · Tell the healthcare staff about recent travel. This includes local travel on public transport. Staff may need to find other people you have been in contact with.  · Follow all instructions the healthcare staff give you.    If you have been diagnosed with COVID-19  · Stay home and start self-isolation. Don’t leave your home unless you need to get medical care. Don't go to work, school, or public areas. Don't use public transportation or taxis.  · Follow all instructions from your healthcare provider. Call your healthcare provider’s office before going. They can prepare and give you instructions. This will help prevent the virus from spreading.  · If you need to go to a hospital or clinic, expect that the healthcare staff will wear protective equipment such as masks, gowns, gloves, and eye protection. You may be put in a separate room. This is to prevent the possible virus from spreading.  · Wear a face mask. This is to protect other people from your germs. If you are not able to wear a mask, your caregivers should. During a public health emergency, medical face masks may be reserved for healthcare workers. You may need to make a cloth face mask of your own. You can do this using a bandana, T-shirt, or other cloth. The [facility] has instructions on how to make a mask. Wear the mask so that it covers both your nose and mouth.  · Stay away from other people in your home.  · Have no contact with  pets and animals.  · Don’t share food or personal items with people in your household. This includes items like eating and drinking utensils, towels, and bedding.  · If you need to cough or sneeze, do it into a tissue. Then throw the tissue into the trash. If you don't have tissues, cough or sneeze into the bend of your elbow.  · Wash your hands often.    Self-care at home   There is currently no medicine approved to prevent or treat the virus. Some experimental and other medicines are being tested against COVID-19. Other medicines used to treat other conditions are being looked at for COVID-19, but they are not currently approved to treat it.  Current treatment is mainly aimed at helping your body while it fights the virus. This is known as supportive care. Take care of yourself at home by:  · Getting rest. This helps your body fight the illness.  · Staying hydrated. Drinking liquids is the best way to prevent dehydration.. Try to drink 6 to 8 glasses of liquids every day, or as advised by your provider. Also check with your provider about which fluids are best for you. Don't drink fluids that contain caffeine or alcohol.  · Taking over-the-counter (OTC) pain medicine. These are used to help ease pain and reduce fever. Follow your healthcare provider's instructions for which OTC medicine to use.  If you've been in the hospital for suspected or confirmed COVID-19 and now are home, follow all of your healthcare team's instructions. This will include when it's OK to stop self-isolation. You may also get instructions on position changes to help your breathing, such as lying on your belly (prone positioning).  If you've had confirmed COVID-19, your healthcare team may ask you to consider donating your plasma. This is called COVID-19 convalescent plasma donation. Plasma from people fully recovered from COVID-19 may contain antibodies to help fight COVID-19 in people who are currently seriously ill with the disease. It's  not fully known if the donated plasma will work well as a treatment, but the FDA is looking at it and has asked the [employer] [employer] to help with plasma donation and collection.  Caring for a sick person   · Follow all instructions from healthcare staff.  · Wash your hands often.  · Wear protective clothing as advised.  · Make sure the sick person wears a mask. If they can't wear a mask, don't stay in the same room with the person. If you must be in the same room, wear a face mask. When wearing a mask, make sure that it covers both the nose and mouth.  · Keep track of the sick person’s symptoms.  · Clean home surfaces often with disinfectant. This includes phones, kitchen counters, fridge door handle, bathroom surfaces, and others.  · Don’t let anyone share household items with the sick person. This includes eating and drinking tools, towels, sheets, or blankets.  · Clean fabrics and laundry thoroughly.  · Keep other people and pets away from the sick person.    When you can stop self-isolation  When you are sick with COVID-19, you should stay away from other people. This is called self-isolation.  If you are normally healthy, you can stop self-isolation when all 3 of these are true:  1. You have had no fever for at least 72 hours. This means no fever without medicine that reduces fever, such as acetaminophen, for at least 72 hours.  2. Your symptoms are better, such as cough or trouble breathing.  3. It has been at least 10 days since your first symptoms started.  Talk with your healthcare provider before you leave home. Tell him or her if the 3 things above are true for you. He or she may tell you it’s OK to leave home. In some cases, your state or local area may have specific advice. Your healthcare provider will tell you more.   If you have a weak immune system and COVID-19, your instructions on when to stop isolation will be somewhat different. Some conditions and treatments can cause a weak immune system.  These include cancer treatment, bone marrow or organ transplants, and conditions such as HIV or other immune system disorders. Follow your healthcare provider's instructions on how to isolate and when it's OK to stop. You likely will be told to stay in home isolation until all 3 of these are true:  1. You have no fever without fever-reducing medicines.  2. Your breathing symptoms such as cough and shortness of breath have improved.  3. You have 2 negative COVID-19 nose-throat swabs that were collected at least 24 hours apart. If no tests are available, your healthcare provider will likely tell you to follow the isolation instructions for normally healthy people. Follow your provider's instructions on isolation and when it's OK to stop.  When to call your healthcare provider  Call your healthcare provider right away if a sick person has any of these:  · Trouble breathing  · Pain or pressure in chest  If a sick person has any of these, call 911:  · Trouble breathing that gets worse  · Pain or pressure in chest that gets worse  · Blue tint to lips or face  · Fast or irregular heartbeat  · Confusion or trouble waking  · Fainting or loss of consciousness  · Coughing up blood  Going home from the hospital  If you were diagnosed with COVID-19 and were recently discharged from the hospital:  · Follow the instructions above for self-care and isolation.  · Follow the hospital healthcare team’s specific instructions.  · Ask questions if anything is unclear to you. Write down answers so you remember them.  Date last modified: 5/8/2020  Di last reviewed this educational content on 4/1/2020    © 1756-3725 [employer], [address] [place] [address], [place] [address]. All rights reserved. This information is not intended as a substitute for professional medical care. Always follow your healthcare professional's instructions. This information has been modified by your health care provider with permission from the publisher.

## 2020-07-24 ENCOUNTER — TELEPHONE (OUTPATIENT)
Dept: GASTROENTEROLOGY | Facility: CLINIC | Age: 40
End: 2020-07-24

## 2020-07-24 NOTE — TELEPHONE ENCOUNTER
Pt called in stating she was not sure where she was supposed to go for her labs and stood in the parking lot waiting to find out. She was upset that I was unable to answer by phone quicker to assist her and she left because she did not want to come up to our office for assistance. I explained that I was away at lunch and she never left a voicemail therefore I was unable to assist her any quicker. She has decided she does not want to do the lab work today and did not know when she would be willing to come back. I encouraged her to call before she walked here that way we could assist her before she left her home. I also explained that there are hospital workers at all entrances that are more than happy to help her find the lab next time.

## 2020-09-01 ENCOUNTER — LAB (OUTPATIENT)
Dept: LAB | Facility: HOSPITAL | Age: 40
End: 2020-09-01

## 2020-09-01 ENCOUNTER — OFFICE VISIT (OUTPATIENT)
Dept: ONCOLOGY | Facility: CLINIC | Age: 40
End: 2020-09-01

## 2020-09-01 VITALS
DIASTOLIC BLOOD PRESSURE: 98 MMHG | HEART RATE: 86 BPM | OXYGEN SATURATION: 99 % | SYSTOLIC BLOOD PRESSURE: 158 MMHG | HEIGHT: 66 IN | RESPIRATION RATE: 16 BRPM | TEMPERATURE: 98.9 F | WEIGHT: 164.7 LBS | BODY MASS INDEX: 26.47 KG/M2

## 2020-09-01 DIAGNOSIS — E53.8 VITAMIN B 12 DEFICIENCY: ICD-10-CM

## 2020-09-01 DIAGNOSIS — C92.10 CML (CHRONIC MYELOCYTIC LEUKEMIA) (HCC): Primary | ICD-10-CM

## 2020-09-01 LAB
ALBUMIN SERPL-MCNC: 4 G/DL (ref 3.5–5.2)
ALBUMIN/GLOB SERPL: 1.5 G/DL
ALP SERPL-CCNC: 85 U/L (ref 39–117)
ALT SERPL W P-5'-P-CCNC: 16 U/L (ref 1–33)
ANION GAP SERPL CALCULATED.3IONS-SCNC: 10 MMOL/L (ref 5–15)
AST SERPL-CCNC: 26 U/L (ref 1–32)
BILIRUB SERPL-MCNC: 0.2 MG/DL (ref 0–1.2)
BUN SERPL-MCNC: 16 MG/DL (ref 6–20)
BUN/CREAT SERPL: 18.2 (ref 7–25)
CALCIUM SPEC-SCNC: 8.7 MG/DL (ref 8.6–10.5)
CHLORIDE SERPL-SCNC: 107 MMOL/L (ref 98–107)
CO2 SERPL-SCNC: 25 MMOL/L (ref 22–29)
CREAT SERPL-MCNC: 0.88 MG/DL (ref 0.57–1)
DEPRECATED RDW RBC AUTO: 61.8 FL (ref 37–54)
EOSINOPHIL # BLD MANUAL: 0.13 10*3/MM3 (ref 0–0.4)
EOSINOPHIL NFR BLD MANUAL: 2.1 % (ref 0.3–6.2)
ERYTHROCYTE [DISTWIDTH] IN BLOOD BY AUTOMATED COUNT: 18 % (ref 12.3–15.4)
GFR SERPL CREATININE-BSD FRML MDRD: 86 ML/MIN/1.73
GIANT PLATELETS: ABNORMAL
GLOBULIN UR ELPH-MCNC: 2.7 GM/DL
GLUCOSE SERPL-MCNC: 96 MG/DL (ref 65–99)
HCT VFR BLD AUTO: 32.1 % (ref 34–46.6)
HGB BLD-MCNC: 10.8 G/DL (ref 12–15.9)
HOLD SPECIMEN: NORMAL
LYMPHOCYTES # BLD MANUAL: 3.55 10*3/MM3 (ref 0.7–3.1)
LYMPHOCYTES NFR BLD MANUAL: 5.3 % (ref 5–12)
LYMPHOCYTES NFR BLD MANUAL: 57.4 % (ref 19.6–45.3)
MCH RBC QN AUTO: 31.6 PG (ref 26.6–33)
MCHC RBC AUTO-ENTMCNC: 33.6 G/DL (ref 31.5–35.7)
MCV RBC AUTO: 93.9 FL (ref 79–97)
MONOCYTES # BLD AUTO: 0.33 10*3/MM3 (ref 0.1–0.9)
NEUTROPHILS # BLD AUTO: 2.17 10*3/MM3 (ref 1.7–7)
NEUTROPHILS NFR BLD MANUAL: 35.1 % (ref 42.7–76)
PLATELET # BLD AUTO: 208 10*3/MM3 (ref 140–450)
PMV BLD AUTO: 9.2 FL (ref 6–12)
POLYCHROMASIA BLD QL SMEAR: ABNORMAL
POTASSIUM SERPL-SCNC: 3.4 MMOL/L (ref 3.5–5.2)
PROT SERPL-MCNC: 6.7 G/DL (ref 6–8.5)
RBC # BLD AUTO: 3.42 10*6/MM3 (ref 3.77–5.28)
SODIUM SERPL-SCNC: 142 MMOL/L (ref 136–145)
WBC # BLD AUTO: 6.19 10*3/MM3 (ref 3.4–10.8)
WBC MORPH BLD: NORMAL

## 2020-09-01 PROCEDURE — 87338 HPYLORI STOOL AG IA: CPT | Performed by: INTERNAL MEDICINE

## 2020-09-01 PROCEDURE — 36415 COLL VENOUS BLD VENIPUNCTURE: CPT

## 2020-09-01 PROCEDURE — 84466 ASSAY OF TRANSFERRIN: CPT

## 2020-09-01 PROCEDURE — 82728 ASSAY OF FERRITIN: CPT

## 2020-09-01 PROCEDURE — 83540 ASSAY OF IRON: CPT

## 2020-09-01 PROCEDURE — 85025 COMPLETE CBC W/AUTO DIFF WBC: CPT

## 2020-09-01 PROCEDURE — 80053 COMPREHEN METABOLIC PANEL: CPT

## 2020-09-01 PROCEDURE — 85007 BL SMEAR W/DIFF WBC COUNT: CPT

## 2020-09-01 PROCEDURE — 82746 ASSAY OF FOLIC ACID SERUM: CPT

## 2020-09-01 PROCEDURE — 99215 OFFICE O/P EST HI 40 MIN: CPT | Performed by: INTERNAL MEDICINE

## 2020-09-01 PROCEDURE — 82607 VITAMIN B-12: CPT

## 2020-09-02 ENCOUNTER — TELEPHONE (OUTPATIENT)
Dept: ONCOLOGY | Facility: CLINIC | Age: 40
End: 2020-09-02

## 2020-09-02 DIAGNOSIS — E87.6 HYPOKALEMIA: ICD-10-CM

## 2020-09-02 DIAGNOSIS — D50.9 IRON DEFICIENCY ANEMIA, UNSPECIFIED IRON DEFICIENCY ANEMIA TYPE: Primary | ICD-10-CM

## 2020-09-02 LAB
FERRITIN SERPL-MCNC: 15.78 NG/ML (ref 13–150)
FOLATE SERPL-MCNC: 7.21 NG/ML (ref 4.78–24.2)
IRON 24H UR-MRATE: 24 MCG/DL (ref 37–145)
IRON SATN MFR SERPL: 5 % (ref 20–50)
TIBC SERPL-MCNC: 487 MCG/DL (ref 298–536)
TRANSFERRIN SERPL-MCNC: 327 MG/DL (ref 200–360)
VIT B12 BLD-MCNC: 421 PG/ML (ref 211–946)

## 2020-09-02 RX ORDER — POTASSIUM CHLORIDE 20 MEQ/1
20 TABLET, EXTENDED RELEASE ORAL 3 TIMES DAILY
Qty: 9 TABLET | Refills: 0 | Status: SHIPPED | OUTPATIENT
Start: 2020-09-02 | End: 2020-09-05

## 2020-09-02 RX ORDER — FERROUS SULFATE 325(65) MG
325 TABLET ORAL
Qty: 60 TABLET | Refills: 2 | Status: SHIPPED | OUTPATIENT
Start: 2020-09-02 | End: 2020-09-30

## 2020-09-02 NOTE — TELEPHONE ENCOUNTER
----- Message from Ethan Dow MD sent at 9/1/2020  4:14 PM CDT -----  Labs, 9/1/2020– hemoglobin 10.8, MCV 93.9, potassium 3.4.  Please: 1.add iron, iron saturation, ferritin, B12, folate to today's lab work; 2.call in K-Dur 20 mEq p.o. 3 times daily x3 days; 3.recheck CBC with differential, and potassium in 1 week.  Thank you

## 2020-09-02 NOTE — TELEPHONE ENCOUNTER
Called and spoke with patient regarding her Critical Low Iron Sat, Dr Dow recommends oral iron tablets if she can tolerate or 2 txt's of Injectafer.   Patient prefers to take the oral iron tablets Ferrous Sulfate, states she has taken these in the past and is aware of possible side effects such as black/dark stools, GI upset, abdominal cramps, nausea. She was encouraged to call if she develops any problems or has questions or concerns.

## 2020-09-02 NOTE — TELEPHONE ENCOUNTER
----- Message from Ethan Dow MD sent at 9/2/2020  8:45 AM CDT -----  Labs 9/1/2020– ferritin 15.7, iron 24, iron saturation 5% (each markedly depressed).  Please asked patient if she tolerates oral iron.  If she does: Ferrous sulfate 325 p.o. 3 times daily dispense 90×2 refills.  If she does not: Schedule Injectafer 750 mg IV weekly x2 at [facility].  At the pre-office labs in 8 weeks: CBC with differential, iron, iron saturation and ferritin.  Thank you

## 2020-09-02 NOTE — TELEPHONE ENCOUNTER
Called patient and reviewed labs.  Instructed patient that her K+ is 3.4 and that Dr Dow wants her to take K+ 20meq TID for 3 days and have her labs rechecked in 1 week.  Also instructed that her Hgb was low 10.8 and Dr Dow added an iron profile to her lab work from yesterday.  Lab appointment set up for 09/09 at 0830.  Patient v/u and agreeable to plan.

## 2020-09-03 LAB — H PYLORI AG STL QL IA: POSITIVE

## 2020-09-09 ENCOUNTER — HOSPITAL ENCOUNTER (OUTPATIENT)
Dept: MAMMOGRAPHY | Facility: HOSPITAL | Age: 40
Discharge: HOME | End: 2020-09-09
Admitting: INTERNAL MEDICINE

## 2020-09-09 ENCOUNTER — LAB (OUTPATIENT)
Dept: LAB | Facility: HOSPITAL | Age: 40
End: 2020-09-09

## 2020-09-09 ENCOUNTER — TELEPHONE (OUTPATIENT)
Dept: ONCOLOGY | Facility: CLINIC | Age: 40
End: 2020-09-09

## 2020-09-09 DIAGNOSIS — E87.6 HYPOKALEMIA: ICD-10-CM

## 2020-09-09 DIAGNOSIS — C92.10 CML (CHRONIC MYELOCYTIC LEUKEMIA) (HCC): ICD-10-CM

## 2020-09-09 DIAGNOSIS — Z12.31 ENCOUNTER FOR SCREENING MAMMOGRAM FOR BREAST CANCER: ICD-10-CM

## 2020-09-09 DIAGNOSIS — D50.9 IRON DEFICIENCY ANEMIA, UNSPECIFIED IRON DEFICIENCY ANEMIA TYPE: ICD-10-CM

## 2020-09-09 DIAGNOSIS — C92.10 CML (CHRONIC MYELOCYTIC LEUKEMIA) (HCC): Primary | ICD-10-CM

## 2020-09-09 LAB
ALBUMIN SERPL-MCNC: 4 G/DL (ref 3.5–5.2)
ALBUMIN/GLOB SERPL: 1.6 G/DL
ALP SERPL-CCNC: 68 U/L (ref 39–117)
ALT SERPL W P-5'-P-CCNC: 17 U/L (ref 1–33)
ANION GAP SERPL CALCULATED.3IONS-SCNC: 9 MMOL/L (ref 5–15)
AST SERPL-CCNC: 27 U/L (ref 1–32)
BASOPHILS # BLD AUTO: 0.02 10*3/MM3 (ref 0–0.2)
BASOPHILS NFR BLD AUTO: 0.4 % (ref 0–1.5)
BILIRUB SERPL-MCNC: 0.2 MG/DL (ref 0–1.2)
BUN SERPL-MCNC: 12 MG/DL (ref 6–20)
BUN/CREAT SERPL: 13.6 (ref 7–25)
CALCIUM SPEC-SCNC: 9 MG/DL (ref 8.6–10.5)
CHLORIDE SERPL-SCNC: 108 MMOL/L (ref 98–107)
CO2 SERPL-SCNC: 23 MMOL/L (ref 22–29)
CREAT SERPL-MCNC: 0.88 MG/DL (ref 0.57–1)
DEPRECATED RDW RBC AUTO: 61.1 FL (ref 37–54)
EOSINOPHIL # BLD AUTO: 0.1 10*3/MM3 (ref 0–0.4)
EOSINOPHIL NFR BLD AUTO: 2.1 % (ref 0.3–6.2)
ERYTHROCYTE [DISTWIDTH] IN BLOOD BY AUTOMATED COUNT: 17.9 % (ref 12.3–15.4)
GFR SERPL CREATININE-BSD FRML MDRD: 86 ML/MIN/1.73
GLOBULIN UR ELPH-MCNC: 2.5 GM/DL
GLUCOSE SERPL-MCNC: 92 MG/DL (ref 65–99)
HCT VFR BLD AUTO: 29.1 % (ref 34–46.6)
HGB BLD-MCNC: 9.5 G/DL (ref 12–15.9)
IMM GRANULOCYTES # BLD AUTO: 0.01 10*3/MM3 (ref 0–0.05)
IMM GRANULOCYTES NFR BLD AUTO: 0.2 % (ref 0–0.5)
LYMPHOCYTES # BLD AUTO: 2.38 10*3/MM3 (ref 0.7–3.1)
LYMPHOCYTES NFR BLD AUTO: 51 % (ref 19.6–45.3)
MCH RBC QN AUTO: 30.6 PG (ref 26.6–33)
MCHC RBC AUTO-ENTMCNC: 32.6 G/DL (ref 31.5–35.7)
MCV RBC AUTO: 93.9 FL (ref 79–97)
MONOCYTES # BLD AUTO: 0.47 10*3/MM3 (ref 0.1–0.9)
MONOCYTES NFR BLD AUTO: 10.1 % (ref 5–12)
NEUTROPHILS NFR BLD AUTO: 1.69 10*3/MM3 (ref 1.7–7)
NEUTROPHILS NFR BLD AUTO: 36.2 % (ref 42.7–76)
NRBC BLD AUTO-RTO: 0 /100 WBC (ref 0–0.2)
PLATELET # BLD AUTO: 231 10*3/MM3 (ref 140–450)
PMV BLD AUTO: 9.3 FL (ref 6–12)
POTASSIUM SERPL-SCNC: 4 MMOL/L (ref 3.5–5.2)
PROT SERPL-MCNC: 6.5 G/DL (ref 6–8.5)
RBC # BLD AUTO: 3.1 10*6/MM3 (ref 3.77–5.28)
REF LAB TEST METHOD: NORMAL
SODIUM SERPL-SCNC: 140 MMOL/L (ref 136–145)
WBC # BLD AUTO: 4.67 10*3/MM3 (ref 3.4–10.8)

## 2020-09-09 PROCEDURE — 36415 COLL VENOUS BLD VENIPUNCTURE: CPT

## 2020-09-09 PROCEDURE — 77067 SCR MAMMO BI INCL CAD: CPT

## 2020-09-09 PROCEDURE — 77063 BREAST TOMOSYNTHESIS BI: CPT

## 2020-09-09 PROCEDURE — 85025 COMPLETE CBC W/AUTO DIFF WBC: CPT

## 2020-09-09 PROCEDURE — 80053 COMPREHEN METABOLIC PANEL: CPT

## 2020-09-09 RX ORDER — FAMOTIDINE 10 MG/ML
20 INJECTION, SOLUTION INTRAVENOUS AS NEEDED
Status: CANCELLED | OUTPATIENT
Start: 2020-09-23

## 2020-09-09 RX ORDER — FAMOTIDINE 10 MG/ML
20 INJECTION, SOLUTION INTRAVENOUS AS NEEDED
Status: CANCELLED | OUTPATIENT
Start: 2020-09-16

## 2020-09-09 RX ORDER — DIPHENHYDRAMINE HYDROCHLORIDE 50 MG/ML
50 INJECTION INTRAMUSCULAR; INTRAVENOUS AS NEEDED
Status: CANCELLED | OUTPATIENT
Start: 2020-09-16

## 2020-09-09 RX ORDER — DIPHENHYDRAMINE HYDROCHLORIDE 50 MG/ML
50 INJECTION INTRAMUSCULAR; INTRAVENOUS AS NEEDED
Status: CANCELLED | OUTPATIENT
Start: 2020-09-23

## 2020-09-09 RX ORDER — SODIUM CHLORIDE 9 MG/ML
250 INJECTION, SOLUTION INTRAVENOUS ONCE
Status: CANCELLED | OUTPATIENT
Start: 2020-09-23

## 2020-09-09 RX ORDER — SODIUM CHLORIDE 9 MG/ML
250 INJECTION, SOLUTION INTRAVENOUS ONCE
Status: CANCELLED | OUTPATIENT
Start: 2020-09-16

## 2020-09-09 NOTE — TELEPHONE ENCOUNTER
----- Message from Ethan Dow MD sent at 9/9/2020 12:44 PM CDT -----  Abnormal mammogram, 9/9/2020- calcification posterior superior left breast.  Please: Schedule at [facility] ASAP-spot compression magnification views as well as exaggerated cc view with possible magnification and compare to the study.    Labs, 9/9/2020-hemoglobin 9.5, MCV 93.9 (gradually worsening).  Please: 1.schedule Injectafer 750 mg IV x2 at [facility]; 2.appoint to GI Re: Recurrent iron deficiency anemia.  Will need endoscopic reassessment.  Thank you

## 2020-09-09 NOTE — TELEPHONE ENCOUNTER
Spoke with patient and let her know the results of the mammogram and the labs. She is agreeable to a referral to GI,  iron infusions, and spot compressions. Iron infusion scheduled for 09/16/2020 at 12 09/23/2020 at 12.

## 2020-09-10 NOTE — TELEPHONE ENCOUNTER
Please do not forget to make her an appointment to see Dr. Chand ([place]) Re: Persistent CML.  Thank you

## 2020-09-14 ENCOUNTER — APPOINTMENT (OUTPATIENT)
Dept: MAMMOGRAPHY | Facility: HOSPITAL | Age: 40
End: 2020-09-14

## 2020-09-14 ENCOUNTER — TELEPHONE (OUTPATIENT)
Dept: ONCOLOGY | Facility: CLINIC | Age: 40
End: 2020-09-14

## 2020-09-14 NOTE — TELEPHONE ENCOUNTER
Attempted to return pt call. No answer and she does not have vm on her phone. Her iron infusion appt has been moved to 10am on 9/16/20.

## 2020-09-14 NOTE — TELEPHONE ENCOUNTER
Caller: Winsome     Relationship to patient: Self    Best call back number: [phone]    Type of visit: Infusion    Requested date: 09/16 @ 10am    If rescheduling, when is the original appointment: 09/16 @ 12:15pm

## 2020-09-15 ENCOUNTER — APPOINTMENT (OUTPATIENT)
Dept: MAMMOGRAPHY | Facility: HOSPITAL | Age: 40
End: 2020-09-15

## 2020-09-16 ENCOUNTER — TELEPHONE (OUTPATIENT)
Dept: ONCOLOGY | Facility: CLINIC | Age: 40
End: 2020-09-16

## 2020-09-16 ENCOUNTER — INFUSION (OUTPATIENT)
Dept: ONCOLOGY | Facility: HOSPITAL | Age: 40
End: 2020-09-16

## 2020-09-16 VITALS
HEIGHT: 66 IN | BODY MASS INDEX: 25.23 KG/M2 | RESPIRATION RATE: 16 BRPM | DIASTOLIC BLOOD PRESSURE: 108 MMHG | TEMPERATURE: 97.7 F | HEART RATE: 75 BPM | WEIGHT: 157 LBS | SYSTOLIC BLOOD PRESSURE: 168 MMHG | OXYGEN SATURATION: 100 %

## 2020-09-16 DIAGNOSIS — D50.9 IRON DEFICIENCY ANEMIA, UNSPECIFIED IRON DEFICIENCY ANEMIA TYPE: Primary | ICD-10-CM

## 2020-09-16 PROCEDURE — 25010000002 FERRIC CARBOXYMALTOSE 750 MG/15ML SOLUTION 15 ML VIAL: Performed by: INTERNAL MEDICINE

## 2020-09-16 PROCEDURE — 96365 THER/PROPH/DIAG IV INF INIT: CPT

## 2020-09-16 RX ORDER — FAMOTIDINE 10 MG/ML
20 INJECTION, SOLUTION INTRAVENOUS AS NEEDED
Status: DISCONTINUED | OUTPATIENT
Start: 2020-09-16 | End: 2020-09-16 | Stop reason: HOSPADM

## 2020-09-16 RX ORDER — SODIUM CHLORIDE 9 MG/ML
250 INJECTION, SOLUTION INTRAVENOUS ONCE
Status: COMPLETED | OUTPATIENT
Start: 2020-09-16 | End: 2020-09-16

## 2020-09-16 RX ORDER — DIPHENHYDRAMINE HYDROCHLORIDE 50 MG/ML
50 INJECTION INTRAMUSCULAR; INTRAVENOUS AS NEEDED
Status: DISCONTINUED | OUTPATIENT
Start: 2020-09-16 | End: 2020-09-16 | Stop reason: HOSPADM

## 2020-09-16 RX ADMIN — SODIUM CHLORIDE 250 ML: 9 INJECTION, SOLUTION INTRAVENOUS at 13:35

## 2020-09-16 RX ADMIN — FERRIC CARBOXYMALTOSE INJECTION 750 MG: 50 INJECTION, SOLUTION INTRAVENOUS at 13:35

## 2020-09-16 NOTE — TELEPHONE ENCOUNTER
HYPERTENSION:  Received call from Olivia, [facility]. She calls to report patient Winsome Becker has completed her txt of Injectafer her post txt vitals   B/P: 172/120  Patient has no c/o, and does not take any b/p medications.     Discussed with Dr Dow, he was very concerned, this is a critical result. He requested patient be monitored for another 30 minutes recheck her b/p reading and if not a significant drop she will need to go to the ER Dept for evaluation of her hypertension episode to be addressed now and then f/u with her PCP.   Relayed this information to Olivia, she v/u and had retaken patient b/p again before I could return her call back with instructions.   B/P: 174/108

## 2020-09-16 NOTE — TELEPHONE ENCOUNTER
Received call back from Olivia, [facility]. She calls to report patient Winsome Becker is ready for d/c from the Center.   She explained to the patient Dr Dow instruction to have patient go to the ER Dept for evaluation of her b/p but she refuses states she has gone to the ER dept in the past and nothing was ever done to help her and she was d/c home. She reports to Olivia she has had a cough and is currently taking cough medication which has caused her b/p to be elevated in the past and she is sure this is what the problem is. Olivia discussed with her the possible problems that can occur with elevated b/p such as a stroke and patient informs her she is well aware of this. Olivia was able deven her an apt with a PCP Dr Keira BARROSO for Monday 9/21/20 @ 9 am. Patient was encouraged to stop the cough medication until this matter can be addressed.   Most recent b/ps  172/100  168/108  Patient to be d/c home

## 2020-09-16 NOTE — PROGRESS NOTES
Notified Kia WILKINS with Dr Dow's office of patient's elevated blood pressure post iron infusion.   Received phone call from Kia WILKINS that Dr Dow wants patient to sit here 30 minutes longer to see if blood pressure comes down and if it does not then he would like for her to go to the ER. Per Kia he does not want to give any hypertensive medications. Spoke with patient who said she does not want to go to the ER but would stay 30 minutes. Patient did say she has been taking cough medicine. Discussed with her about a primary care physician which she does not have and if she checks her blood pressure at home and she said no. Asked if she would like for me to set her up an appointment and she said yes, agreed to see Dr Crockett. Appointment set up for Monday 9/21/20 at 9:00 am with his nurse practitioner to address hypertension. Patient aware of appointment date and time.   Notified Kia WILKINS of patient's last blood pressure and patient taking cough medicine and apt set up with Dr Crockett's office to address blood pressure and patient refusing to go to ER.

## 2020-09-17 ENCOUNTER — HOSPITAL ENCOUNTER (OUTPATIENT)
Dept: MAMMOGRAPHY | Facility: HOSPITAL | Age: 40
Discharge: HOME | End: 2020-09-17
Admitting: INTERNAL MEDICINE

## 2020-09-17 DIAGNOSIS — R92.8 ABNORMAL MAMMOGRAM: ICD-10-CM

## 2020-09-17 PROCEDURE — 77065 DX MAMMO INCL CAD UNI: CPT

## 2020-09-17 PROCEDURE — G0279 TOMOSYNTHESIS, MAMMO: HCPCS

## 2020-09-18 ENCOUNTER — TELEPHONE (OUTPATIENT)
Dept: ONCOLOGY | Facility: CLINIC | Age: 40
End: 2020-09-18

## 2020-09-18 DIAGNOSIS — C92.10 CML (CHRONIC MYELOCYTIC LEUKEMIA) (HCC): Primary | ICD-10-CM

## 2020-09-18 NOTE — TELEPHONE ENCOUNTER
Spoke with patient and she is agreeable to referral. She may want to change surgeons, but she will call if she decides to make a change.

## 2020-09-18 NOTE — TELEPHONE ENCOUNTER
----- Message from Ethan Dow MD sent at 9/17/2020  9:28 AM CDT -----  Abnormal mammogram. pls refer to dr wells et all re:  Possible biopsy? tx u

## 2020-09-21 ENCOUNTER — OFFICE VISIT (OUTPATIENT)
Dept: INTERNAL MEDICINE | Facility: CLINIC | Age: 40
End: 2020-09-21

## 2020-09-21 VITALS
HEIGHT: 66 IN | DIASTOLIC BLOOD PRESSURE: 110 MMHG | SYSTOLIC BLOOD PRESSURE: 170 MMHG | BODY MASS INDEX: 25.39 KG/M2 | RESPIRATION RATE: 16 BRPM | HEART RATE: 93 BPM | OXYGEN SATURATION: 99 % | TEMPERATURE: 98.3 F | WEIGHT: 158 LBS

## 2020-09-21 DIAGNOSIS — D50.9 IRON DEFICIENCY ANEMIA, UNSPECIFIED IRON DEFICIENCY ANEMIA TYPE: ICD-10-CM

## 2020-09-21 DIAGNOSIS — C92.10 CML (CHRONIC MYELOCYTIC LEUKEMIA) (HCC): ICD-10-CM

## 2020-09-21 DIAGNOSIS — Z00.00 PREVENTATIVE HEALTH CARE: ICD-10-CM

## 2020-09-21 DIAGNOSIS — F17.210 CIGARETTE SMOKER: ICD-10-CM

## 2020-09-21 DIAGNOSIS — I10 ESSENTIAL HYPERTENSION: Primary | ICD-10-CM

## 2020-09-21 PROCEDURE — 99214 OFFICE O/P EST MOD 30 MIN: CPT | Performed by: NURSE PRACTITIONER

## 2020-09-21 RX ORDER — AMLODIPINE BESYLATE 5 MG/1
5 TABLET ORAL DAILY
Qty: 30 TABLET | Refills: 3 | Status: SHIPPED | OUTPATIENT
Start: 2020-09-21 | End: 2020-12-15

## 2020-09-21 NOTE — PATIENT INSTRUCTIONS
Steps to Quit Smoking  Smoking tobacco is the leading cause of preventable death. It can affect almost every organ in the body. Smoking puts you and people around you at risk for many serious, long-lasting (chronic) diseases. Quitting smoking can be hard, but it is one of the best things that you can do for your health. It is never too late to quit.  How do I get ready to quit?  When you decide to quit smoking, make a plan to help you succeed. Before you quit:  · Pick a date to quit. Set a date within the next 2 weeks to give you time to prepare.  · Write down the reasons why you are quitting. Keep this list in places where you will see it often.  · Tell your family, friends, and co-workers that you are quitting. Their support is important.  · Talk with your doctor about the choices that may help you quit.  · Find out if your health insurance will pay for these treatments.  · Know the people, places, things, and activities that make you want to smoke (triggers). Avoid them.  What first steps can I take to quit smoking?  · Throw away all cigarettes at home, at work, and in your car.  · Throw away the things that you use when you smoke, such as ashtrays and lighters.  · Clean your car. Make sure to empty the ashtray.  · Clean your home, including curtains and carpets.  What can I do to help me quit smoking?  Talk with your doctor about taking medicines and seeing a counselor at the same time. You are more likely to succeed when you do both.  · If you are pregnant or breastfeeding, talk with your doctor about counseling or other ways to quit smoking. Do not take medicine to help you quit smoking unless your doctor tells you to do so.  To quit smoking:  Quit right away  · Quit smoking totally, instead of slowly cutting back on how much you smoke over a period of time.  · Go to counseling. You are more likely to quit if you go to counseling sessions regularly.  Take medicine  You may take medicines to help you quit. Some  medicines need a prescription, and some you can buy over-the-counter. Some medicines may contain a drug called nicotine to replace the nicotine in cigarettes. Medicines may:  · Help you to stop having the desire to smoke (cravings).  · Help to stop the problems that come when you stop smoking (withdrawal symptoms).  Your doctor may ask you to use:  · Nicotine patches, gum, or lozenges.  · Nicotine inhalers or sprays.  · Non-nicotine medicine that is taken by mouth.  Find resources  Find resources and other ways to help you quit smoking and remain smoke-free after you quit. These resources are most helpful when you use them often. They include:  · Online chats with a counselor.  · Phone quitlines.  · Printed self-help materials.  · Support groups or group counseling.  · Text messaging programs.  · Mobile phone apps. Use apps on your mobile phone or tablet that can help you stick to your quit plan. There are many free apps for mobile phones and tablets as well as websites. Examples include Quit Guide from the CDC and smokefree.gov    What things can I do to make it easier to quit?    · Talk to your family and friends. Ask them to support and encourage you.  · Call a phone quitline (8-085-QUITNOW), reach out to support groups, or work with a counselor.  · Ask people who smoke to not smoke around you.  · Avoid places that make you want to smoke, such as:  ? Bars.  ? Parties.  ? Smoke-break areas at work.  · Spend time with people who do not smoke.  · Lower the stress in your life. Stress can make you want to smoke. Try these things to help your stress:  ? Getting regular exercise.  ? Doing deep-breathing exercises.  ? Doing yoga.  ? Meditating.  ? Doing a body scan. To do this, close your eyes, focus on one area of your body at a time from head to toe. Notice which parts of your body are tense. Try to relax the muscles in those areas.  How will I feel when I quit smoking?  Day 1 to 3 weeks  Within the first 24 hours,  you may start to have some problems that come from quitting tobacco. These problems are very bad 2-3 days after you quit, but they do not often last for more than 2-3 weeks. You may get these symptoms:  · Mood swings.  · Feeling restless, nervous, angry, or annoyed.  · Trouble concentrating.  · Dizziness.  · Strong desire for high-sugar foods and nicotine.  · Weight gain.  · Trouble pooping (constipation).  · Feeling like you may vomit (nausea).  · Coughing or a sore throat.  · Changes in how the medicines that you take for other issues work in your body.  · Depression.  · Trouble sleeping (insomnia).  Week 3 and afterward  After the first 2-3 weeks of quitting, you may start to notice more positive results, such as:  · Better sense of smell and taste.  · Less coughing and sore throat.  · Slower heart rate.  · Lower blood pressure.  · Clearer skin.  · Better breathing.  · Fewer sick days.  Quitting smoking can be hard. Do not give up if you fail the first time. Some people need to try a few times before they succeed. Do your best to stick to your quit plan, and talk with your doctor if you have any questions or concerns.  Summary  · Smoking tobacco is the leading cause of preventable death. Quitting smoking can be hard, but it is one of the best things that you can do for your health.  · When you decide to quit smoking, make a plan to help you succeed.  · Quit smoking right away, not slowly over a period of time.  · When you start quitting, seek help from your doctor, family, or friends.  This information is not intended to replace advice given to you by your health care provider. Make sure you discuss any questions you have with your health care provider.  Document Released: 10/14/2010 Document Revised: 03/06/2020 Document Reviewed: 03/07/2020  [employer] Patient Education © 2020 [employer]    Hypertension, Adult  Hypertension is another name for high blood pressure. High blood pressure forces your heart to work  harder to pump blood. This can cause problems over time.  There are two numbers in a blood pressure reading. There is a top number (systolic) over a bottom number (diastolic). It is best to have a blood pressure that is below 120/80. Healthy choices can help lower your blood pressure, or you may need medicine to help lower it.  What are the causes?  The cause of this condition is not known. Some conditions may be related to high blood pressure.  What increases the risk?  · Smoking.  · Having type 2 diabetes mellitus, high cholesterol, or both.  · Not getting enough exercise or physical activity.  · Being overweight.  · Having too much fat, sugar, calories, or salt (sodium) in your diet.  · Drinking too much alcohol.  · Having long-term (chronic) kidney disease.  · Having a family history of high blood pressure.  · Age. Risk increases with age.  · Race. You may be at higher risk if you are .  · Gender. Men are at higher risk than women before age 45. After age 65, women are at higher risk than men.  · Having obstructive sleep apnea.  · Stress.  What are the signs or symptoms?  · High blood pressure may not cause symptoms. Very high blood pressure (hypertensive crisis) may cause:  ? Headache.  ? Feelings of worry or nervousness (anxiety).  ? Shortness of breath.  ? Nosebleed.  ? A feeling of being sick to your stomach (nausea).  ? Throwing up (vomiting).  ? Changes in how you see.  ? Very bad chest pain.  ? Seizures.  How is this treated?  · This condition is treated by making healthy lifestyle changes, such as:  ? Eating healthy foods.  ? Exercising more.  ? Drinking less alcohol.  · Your health care provider may prescribe medicine if lifestyle changes are not enough to get your blood pressure under control, and if:  ? Your top number is above 130.  ? Your bottom number is above 80.  · Your personal target blood pressure may vary.  Follow these instructions at home:  Eating and drinking    · If told,  follow the DASH eating plan. To follow this plan:  ? Fill one half of your plate at each meal with fruits and vegetables.  ? Fill one fourth of your plate at each meal with whole grains. Whole grains include whole-wheat pasta, brown rice, and whole-grain bread.  ? Eat or drink low-fat dairy products, such as skim milk or low-fat yogurt.  ? Fill one fourth of your plate at each meal with low-fat (lean) proteins. Low-fat proteins include fish, chicken without skin, eggs, beans, and tofu.  ? Avoid fatty meat, cured and processed meat, or chicken with skin.  ? Avoid pre-made or processed food.  · Eat less than 1,500 mg of salt each day.  · Do not drink alcohol if:  ? Your doctor tells you not to drink.  ? You are pregnant, may be pregnant, or are planning to become pregnant.  · If you drink alcohol:  ? Limit how much you use to:  § 0-1 drink a day for women.  § 0-2 drinks a day for men.  ? Be aware of how much alcohol is in your drink. In the [place] one drink equals one 12 oz bottle of beer (355 mL), one 5 oz glass of wine (148 mL), or one 1½ oz glass of hard liquor (44 mL).  Lifestyle    · Work with your doctor to stay at a healthy weight or to lose weight. Ask your doctor what the best weight is for you.  · Get at least 30 minutes of exercise most days of the week. This may include walking, swimming, or biking.  · Get at least 30 minutes of exercise that strengthens your muscles (resistance exercise) at least 3 days a week. This may include lifting weights or doing Pilates.  · Do not use any products that contain nicotine or tobacco, such as cigarettes, e-cigarettes, and chewing tobacco. If you need help quitting, ask your doctor.  · Check your blood pressure at home as told by your doctor.  · Keep all follow-up visits as told by your doctor. This is important.  Medicines  · Take over-the-counter and prescription medicines only as told by your doctor. Follow directions carefully.  · Do not skip doses of blood pressure  medicine. The medicine does not work as well if you skip doses. Skipping doses also puts you at risk for problems.  · Ask your doctor about side effects or reactions to medicines that you should watch for.  Contact a doctor if you:  · Think you are having a reaction to the medicine you are taking.  · Have headaches that keep coming back (recurring).  · Feel dizzy.  · Have swelling in your ankles.  · Have trouble with your vision.  Get help right away if you:  · Get a very bad headache.  · Start to feel mixed up (confused).  · Feel weak or numb.  · Feel faint.  · Have very bad pain in your:  ? Chest.  ? Belly (abdomen).  · Throw up more than once.  · Have trouble breathing.  Summary  · Hypertension is another name for high blood pressure.  · High blood pressure forces your heart to work harder to pump blood.  · For most people, a normal blood pressure is less than 120/80.  · Making healthy choices can help lower blood pressure. If your blood pressure does not get lower with healthy choices, you may need to take medicine.  This information is not intended to replace advice given to you by your health care provider. Make sure you discuss any questions you have with your health care provider.  Document Released: 06/05/2009 Document Revised: 08/28/2019 Document Reviewed: 08/28/2019  [employer] Patient Education © 2020 [employer]

## 2020-09-21 NOTE — PROGRESS NOTES
"CC: establish care, essential hypertension, smoking cessation    History:  Winsome Becker is a 40 y.o. female who presents today for follow-up for evaluation of the above:  Patient presents today to establish care for hypertension.   She reports she has not taken medication for blood pressure in the past.   She is currently seeing Oncology for CML.  She is a current smoker with plans to quit and is cutting back.  BMI is 25      ROS:  Review of Systems   Constitutional: Negative for fatigue and unexpected weight change.   HENT: Negative.    Eyes: Negative.    Respiratory: Negative.    Cardiovascular: Negative.    Gastrointestinal: Negative for abdominal pain, constipation and diarrhea.   Endocrine: Negative.    Genitourinary: Negative for difficulty urinating, dyspareunia, genital sores, menstrual problem, pelvic pain, vaginal bleeding, vaginal discharge and vaginal pain.   Musculoskeletal: Negative.    Skin: Negative.    Neurological: Negative.    Psychiatric/Behavioral: Negative.        Ms. Becker  reports that she has been smoking cigarettes. She has a 5.00 pack-year smoking history. She has never used smokeless tobacco. She reports current alcohol use. She reports that she does not use drugs.      Current Outpatient Medications:   •  dasatinib (SPRYCEL) 100 MG chemo tablet, Take 1 tablet by mouth Daily., Disp: 30 tablet, Rfl: 6  •  amLODIPine (NORVASC) 5 MG tablet, Take 1 tablet by mouth Daily., Disp: 30 tablet, Rfl: 3  •  ferrous sulfate 325 (65 FE) MG tablet, Take 1 tablet by mouth Daily With Breakfast., Disp: 60 tablet, Rfl: 2      OBJECTIVE:  BP (!) 170/110 (BP Location: Left arm, Patient Position: Sitting, Cuff Size: Adult)   Pulse 93   Temp 98.3 °F (36.8 °C) (Temporal)   Resp 16   Ht 167.6 cm (65.98\")   Wt 71.7 kg (158 lb)   LMP 08/23/2020   SpO2 99%   BMI 25.52 kg/m²    Physical Exam  Constitutional:       General: She is not in acute distress.  Pulmonary:      Effort: No respiratory distress. "   Abdominal:      General: There is no distension.   Skin:     General: Skin is warm and dry.   Neurological:      Mental Status: She is oriented to person, place, and time.   Psychiatric:         Mood and Affect: Mood normal.         Assessment/Plan    Winsome was seen today for establish care and hypertension.    Diagnoses and all orders for this visit:    Essential hypertension  -     amLODIPine (NORVASC) 5 MG tablet; Take 1 tablet by mouth Daily.  Not well controlled, BP goal for age is <140/90 per JNC 8 guidelines and initiate therapy with amlodipine and monitor.     Preventative health care  -     Hepatitis C antibody; Future    CML (chronic myelocytic leukemia) (CMS/HCC)  Continues to follow with Dr. Dow.     Iron deficiency anemia, unspecified iron deficiency anemia type  Stable. Monitoring though hematology    Cigarette smoker  Patient was counseled on and understood the many dangers of continuing to use tobacco. She is attempting to quit without the assistance of medications at this time. I reminded the patient that she may contact our office anytime for help with quitting including pharmacologic & nonpharmacologic options or any additional resources.         An After Visit Summary was printed and given to the patient at discharge.  Return in about 3 months (around 12/21/2020) for Annual physical. Sooner if problems arise.          Aida Coffey APRN. 9/21/2020   Electronically Signed

## 2020-09-23 ENCOUNTER — INFUSION (OUTPATIENT)
Dept: ONCOLOGY | Facility: HOSPITAL | Age: 40
End: 2020-09-23

## 2020-09-23 VITALS
SYSTOLIC BLOOD PRESSURE: 164 MMHG | HEART RATE: 88 BPM | HEIGHT: 66 IN | BODY MASS INDEX: 25.39 KG/M2 | WEIGHT: 158 LBS | DIASTOLIC BLOOD PRESSURE: 96 MMHG | TEMPERATURE: 99.4 F | OXYGEN SATURATION: 100 %

## 2020-09-23 DIAGNOSIS — D50.9 IRON DEFICIENCY ANEMIA, UNSPECIFIED IRON DEFICIENCY ANEMIA TYPE: Primary | ICD-10-CM

## 2020-09-23 PROCEDURE — 96365 THER/PROPH/DIAG IV INF INIT: CPT

## 2020-09-23 PROCEDURE — 25010000002 FERRIC CARBOXYMALTOSE 750 MG/15ML SOLUTION 15 ML VIAL: Performed by: INTERNAL MEDICINE

## 2020-09-23 RX ORDER — SODIUM CHLORIDE 9 MG/ML
250 INJECTION, SOLUTION INTRAVENOUS ONCE
Status: COMPLETED | OUTPATIENT
Start: 2020-09-23 | End: 2020-09-23

## 2020-09-23 RX ORDER — FAMOTIDINE 10 MG/ML
20 INJECTION, SOLUTION INTRAVENOUS AS NEEDED
Status: DISCONTINUED | OUTPATIENT
Start: 2020-09-23 | End: 2020-09-23 | Stop reason: HOSPADM

## 2020-09-23 RX ORDER — DIPHENHYDRAMINE HYDROCHLORIDE 50 MG/ML
50 INJECTION INTRAMUSCULAR; INTRAVENOUS AS NEEDED
Status: DISCONTINUED | OUTPATIENT
Start: 2020-09-23 | End: 2020-09-23 | Stop reason: HOSPADM

## 2020-09-23 RX ADMIN — SODIUM CHLORIDE 250 ML: 9 INJECTION, SOLUTION INTRAVENOUS at 14:07

## 2020-09-23 RX ADMIN — FERRIC CARBOXYMALTOSE INJECTION 750 MG: 50 INJECTION, SOLUTION INTRAVENOUS at 14:07

## 2020-09-24 ENCOUNTER — TELEPHONE (OUTPATIENT)
Dept: ONCOLOGY | Facility: CLINIC | Age: 40
End: 2020-09-24

## 2020-09-24 NOTE — TELEPHONE ENCOUNTER
Caller: VIRGINIA ANTUNEZ    Relationship to patient: SELF    Best call back number: [phone]    PT CALLED STATING DR. ADASM'S OFFICE HAS SET UP AN APPT FOR A BIOPSY SOMETIME IN OCTOBER, BUT SHE DID NOT CHAN DOWN THE DATE. CALLED TO SEE WHEN APPT IS. DO NOT SEE OCT BIOPSY APPT IN CHARTS

## 2020-09-30 ENCOUNTER — OFFICE VISIT (OUTPATIENT)
Dept: GASTROENTEROLOGY | Facility: CLINIC | Age: 40
End: 2020-09-30

## 2020-09-30 VITALS
TEMPERATURE: 98.6 F | WEIGHT: 154 LBS | HEART RATE: 91 BPM | BODY MASS INDEX: 24.75 KG/M2 | OXYGEN SATURATION: 99 % | HEIGHT: 66 IN

## 2020-09-30 DIAGNOSIS — D50.9 IRON DEFICIENCY ANEMIA, UNSPECIFIED IRON DEFICIENCY ANEMIA TYPE: Primary | ICD-10-CM

## 2020-09-30 DIAGNOSIS — F17.200 TOBACCO DEPENDENCE: ICD-10-CM

## 2020-09-30 DIAGNOSIS — C92.10 CML (CHRONIC MYELOCYTIC LEUKEMIA) (HCC): ICD-10-CM

## 2020-09-30 PROCEDURE — 99214 OFFICE O/P EST MOD 30 MIN: CPT | Performed by: CLINICAL NURSE SPECIALIST

## 2020-09-30 NOTE — PROGRESS NOTES
Winsome Becker  1980 9/30/2020  Chief Complaint   Patient presents with   • GI Problem     Anemia     Subjective   HPI  Winsome Becker is a 40 y.o. female who presents with a complaint of iron def anemia ongoing persistent for years. She has had labs (9/9/20) showing her H/H 9.5/29.1 9/1/20 it was 10.8/32.1. iron profile 9/2/20 iron 24, Iron sat 5% ferritin 15.78. She has no associated symptoms. No visible bleeding. No melena or BRBPR specifically. She has no abdominal pain. No wt loss. No fever. No GI related issues. She has had workup for this to include Endo/colon 12/2019 as noted below.   Stools for occult blood negative x3 on 4/14/20. She is on iron infusions.    Past Medical History:   Diagnosis Date   • Bronchitis, chronic (CMS/HCC)    • CML (chronic myelocytic leukemia) (CMS/HCC)      Past Surgical History:   Procedure Laterality Date   • COLONOSCOPY N/A 12/4/2019    Tubular adenoma at 40 cm, Diverticulosis repeat exam in 5 years   • DENTAL PROCEDURE      emergency surgery for tooth extraction   • ENDOSCOPY N/A 12/4/2019    Enlarged gastric folds showing marked chronic active gastritis + for H Pylori treated   • TUBAL ABDOMINAL LIGATION  2000       Outpatient Medications Marked as Taking for the 9/30/20 encounter (Office Visit) with Claudia Bernard APRN   Medication Sig Dispense Refill   • amLODIPine (NORVASC) 5 MG tablet Take 1 tablet by mouth Daily. 30 tablet 3   • dasatinib (SPRYCEL) 100 MG chemo tablet Take 1 tablet by mouth Daily. 30 tablet 6     Allergies   Allergen Reactions   • Latex Rash   • Penicillins Rash     Social History     Socioeconomic History   • Marital status: Single     Spouse name: Not on file   • Number of children: Not on file   • Years of education: Not on file   • Highest education level: Not on file   Tobacco Use   • Smoking status: Heavy Tobacco Smoker     Packs/day: 0.50     Years: 10.00     Pack years: 5.00     Types: Cigarettes   • Smokeless tobacco: Never  Used   Substance and Sexual Activity   • Alcohol use: Yes     Comment: Occasional   • Drug use: No   • Sexual activity: Yes     Partners: Male     Birth control/protection: Condom     Family History   Problem Relation Age of Onset   • Breast cancer Neg Hx    • Colon cancer Neg Hx    • Colon polyps Neg Hx      Health Maintenance   Topic Date Due   • ANNUAL PHYSICAL  09/14/1981   • Pneumococcal Vaccine 0-64 (1 of 1 - PPSV23) 09/14/1984   • TDAP/TD VACCINES (1 - Tdap) 09/14/1997   • HEPATITIS C SCREENING  06/21/2017   • INFLUENZA VACCINE  09/21/2021 (Originally 8/1/2020)   • PAP SMEAR  09/01/2022   • COLONOSCOPY  12/04/2024     Review of Systems   Constitutional: Negative for activity change, appetite change, chills, diaphoresis, fatigue, fever and unexpected weight change.   HENT: Negative for ear pain, hearing loss, mouth sores, sore throat, trouble swallowing and voice change.    Eyes: Negative.    Respiratory: Negative for cough, choking, shortness of breath and wheezing.    Cardiovascular: Negative for chest pain and palpitations.   Gastrointestinal: Negative for abdominal pain, blood in stool, constipation, diarrhea, nausea and vomiting.   Endocrine: Negative for cold intolerance and heat intolerance.   Genitourinary: Negative for decreased urine volume, dysuria, frequency, hematuria and urgency.   Musculoskeletal: Negative for back pain, gait problem and myalgias.   Skin: Negative for color change, pallor and rash.   Allergic/Immunologic: Negative for food allergies and immunocompromised state.   Neurological: Negative for dizziness, tremors, seizures, syncope, weakness, light-headedness, numbness and headaches.   Hematological: Negative for adenopathy. Does not bruise/bleed easily.   Psychiatric/Behavioral: Negative for agitation and confusion. The patient is not nervous/anxious.    All other systems reviewed and are negative.    Objective   Vitals:    09/30/20 1025   Pulse: 91   Temp: 98.6 °F (37 °C)   SpO2:  "99%   Weight: 69.9 kg (154 lb)   Height: 167.6 cm (66\")     Body mass index is 24.86 kg/m².  Physical Exam  Constitutional:       Appearance: She is well-developed.   HENT:      Head: Normocephalic and atraumatic.   Eyes:      Pupils: Pupils are equal, round, and reactive to light.   Neck:      Musculoskeletal: Normal range of motion and neck supple.      Trachea: No tracheal deviation.   Cardiovascular:      Rate and Rhythm: Normal rate and regular rhythm.      Heart sounds: Normal heart sounds. No murmur. No friction rub. No gallop.    Pulmonary:      Effort: Pulmonary effort is normal. No respiratory distress.      Breath sounds: Normal breath sounds. No wheezing or rales.   Chest:      Chest wall: No tenderness.   Abdominal:      General: Bowel sounds are normal. There is no distension.      Palpations: Abdomen is soft. Abdomen is not rigid.      Tenderness: There is no abdominal tenderness. There is no guarding or rebound.   Musculoskeletal: Normal range of motion.         General: No tenderness or deformity.   Skin:     General: Skin is warm and dry.      Coloration: Skin is not pale.      Findings: No rash.   Neurological:      Mental Status: She is alert and oriented to person, place, and time.      Deep Tendon Reflexes: Reflexes are normal and symmetric.   Psychiatric:         Behavior: Behavior normal.         Thought Content: Thought content normal.         Judgment: Judgment normal.       Assessment/Plan   Winsome was seen today for gi problem.    Diagnoses and all orders for this visit:    Iron deficiency anemia, unspecified iron deficiency anemia type  -     CT Abdomen Pelvis With Contrast Enterography    Tobacco dependence    CML (chronic myelocytic leukemia) (CMS/HCC)    She has had recent Endoscopy and colonoscopy for her iron def anemia will get CT enterography to complete workup. Continue to follow labs with Dr Dow.     Part of this note may be an electronic transcription/translation of " spoken language to printed text using the Dragon Dictation System.  Body mass index is 24.86 kg/m².  Return in about 5 weeks (around 11/4/2020).    Patient's Body mass index is 24.86 kg/m². BMI is within normal parameters. No follow-up required..      All risks, benefits, alternatives, and indications of colonoscopy and/or Endoscopy procedure have been discussed with the patient. Risks to include perforation of the colon requiring possible surgery or colostomy, risk of bleeding from biopsies or removal of colon tissue, possibility of missing a colon polyp or cancer, or adverse drug reaction.  Benefits to include the diagnosis and management of disease of the colon and rectum. Alternatives to include barium enema, radiographic evaluation, lab testing or no intervention. Pt verbalizes understanding and agrees.     Claudia Bernard, APRN  9/30/2020  10:42 CDT      Obesity, Adult  Obesity is the condition of having too much total body fat. Being overweight or obese means that your weight is greater than what is considered healthy for your body size. Obesity is determined by a measurement called BMI. BMI is an estimate of body fat and is calculated from height and weight. For adults, a BMI of 30 or higher is considered obese.  Obesity can eventually lead to other health concerns and major illnesses, including:  · Stroke.  · Coronary artery disease (CAD).  · Type 2 diabetes.  · Some types of cancer, including cancers of the colon, breast, uterus, and gallbladder.  · Osteoarthritis.  · High blood pressure (hypertension).  · High cholesterol.  · Sleep apnea.  · Gallbladder stones.  · Infertility problems.  What are the causes?  The main cause of obesity is taking in (consuming) more calories than your body uses for energy. Other factors that contribute to this condition may include:  · Being born with genes that make you more likely to become obese.  · Having a medical condition that causes obesity. These conditions  include:  ¨ Hypothyroidism.  ¨ Polycystic ovarian syndrome (PCOS).  ¨ Binge-eating disorder.  ¨ Cushing syndrome.  · Taking certain medicines, such as steroids, antidepressants, and seizure medicines.  · Not being physically active (sedentary lifestyle).  · Living where there are limited places to exercise safely or buy healthy foods.  · Not getting enough sleep.  What increases the risk?  The following factors may increase your risk of this condition:  · Having a family history of obesity.  · Being a woman of -American descent.  · Being a man of  descent.  What are the signs or symptoms?  Having excessive body fat is the main symptom of this condition.  How is this diagnosed?  This condition may be diagnosed based on:  · Your symptoms.  · Your medical history.  · A physical exam. Your health care provider may measure:  ¨ Your BMI. If you are an adult with a BMI between 25 and less than 30, you are considered overweight. If you are an adult with a BMI of 30 or higher, you are considered obese.  ¨ The distances around your hips and your waist (circumferences). These may be compared to each other to help diagnose your condition.  ¨ Your skinfold thickness. Your health care provider may gently pinch a fold of your skin and measure it.  How is this treated?  Treatment for this condition often includes changing your lifestyle. Treatment may include some or all of the following:  · Dietary changes. Work with your health care provider and a dietitian to set a weight-loss goal that is healthy and reasonable for you. Dietary changes may include eating:  ¨ Smaller portions. A portion size is the amount of a particular food that is healthy for you to eat at one time. This varies from person to person.  ¨ Low-calorie or low-fat options.  ¨ More whole grains, fruits, and vegetables.  · Regular physical activity. This may include aerobic activity (cardio) and strength training.  · Medicine to help you lose weight.  Your health care provider may prescribe medicine if you are unable to lose 1 pound a week after 6 weeks of eating more healthily and doing more physical activity.  · Surgery. Surgical options may include gastric banding and gastric bypass. Surgery may be done if:  ¨ Other treatments have not helped to improve your condition.  ¨ You have a BMI of 40 or higher.  ¨ You have life-threatening health problems related to obesity.  Follow these instructions at home:     Eating and drinking     · Follow recommendations from your health care provider about what you eat and drink. Your health care provider may advise you to:  ¨ Limit fast foods, sweets, and processed snack foods.  ¨ Choose low-fat options, such as low-fat milk instead of whole milk.  ¨ Eat 5 or more servings of fruits or vegetables every day.  ¨ Eat at home more often. This gives you more control over what you eat.  ¨ Choose healthy foods when you eat out.  ¨ Learn what a healthy portion size is.  ¨ Keep low-fat snacks on hand.  ¨ Avoid sugary drinks, such as soda, fruit juice, iced tea sweetened with sugar, and flavored milk.  ¨ Eat a healthy breakfast.  · Drink enough water to keep your urine clear or pale yellow.  · Do not go without eating for long periods of time (do not fast) or follow a fad diet. Fasting and fad diets can be unhealthy and even dangerous.  Physical Activity   · Exercise regularly, as told by your health care provider. Ask your health care provider what types of exercise are safe for you and how often you should exercise.  · Warm up and stretch before being active.  · Cool down and stretch after being active.  · Rest between periods of activity.  Lifestyle   · Limit the time that you spend in front of your TV, computer, or video game system.  · Find ways to reward yourself that do not involve food.  · Limit alcohol intake to no more than 1 drink a day for nonpregnant women and 2 drinks a day for men. One drink equals 12 oz of beer, 5 oz  of wine, or 1½ oz of hard liquor.  General instructions   · Keep a weight loss journal to keep track of the food you eat and how much you exercise you get.  · Take over-the-counter and prescription medicines only as told by your health care provider.  · Take vitamins and supplements only as told by your health care provider.  · Consider joining a support group. Your health care provider may be able to recommend a support group.  · Keep all follow-up visits as told by your health care provider. This is important.  Contact a health care provider if:  · You are unable to meet your weight loss goal after 6 weeks of dietary and lifestyle changes.  This information is not intended to replace advice given to you by your health care provider. Make sure you discuss any questions you have with your health care provider.  Document Released: 01/25/2006 Document Revised: 05/22/2017 Document Reviewed: 10/05/2016  [employer] Interactive Patient Education © 2017 [employer]      If you smoke or use tobacco, 4 minutes reading provided  Steps to Quit Smoking  Smoking tobacco can be harmful to your health and can affect almost every organ in your body. Smoking puts you, and those around you, at risk for developing many serious chronic diseases. Quitting smoking is difficult, but it is one of the best things that you can do for your health. It is never too late to quit.  What are the benefits of quitting smoking?  When you quit smoking, you lower your risk of developing serious diseases and conditions, such as:  · Lung cancer or lung disease, such as COPD.  · Heart disease.  · Stroke.  · Heart attack.  · Infertility.  · Osteoporosis and bone fractures.  Additionally, symptoms such as coughing, wheezing, and shortness of breath may get better when you quit. You may also find that you get sick less often because your body is stronger at fighting off colds and infections. If you are pregnant, quitting smoking can help to reduce your  chances of having a baby of low birth weight.  How do I get ready to quit?  When you decide to quit smoking, create a plan to make sure that you are successful. Before you quit:  · Pick a date to quit. Set a date within the next two weeks to give you time to prepare.  · Write down the reasons why you are quitting. Keep this list in places where you will see it often, such as on your bathroom mirror or in your car or wallet.  · Identify the people, places, things, and activities that make you want to smoke (triggers) and avoid them. Make sure to take these actions:  ¨ Throw away all cigarettes at home, at work, and in your car.  ¨ Throw away smoking accessories, such as ashtrays and lighters.  ¨ Clean your car and make sure to empty the ashtray.  ¨ Clean your home, including curtains and carpets.  · Tell your family, friends, and coworkers that you are quitting. Support from your loved ones can make quitting easier.  · Talk with your health care provider about your options for quitting smoking.  · Find out what treatment options are covered by your health insurance.  What strategies can I use to quit smoking?  Talk with your healthcare provider about different strategies to quit smoking. Some strategies include:  · Quitting smoking altogether instead of gradually lessening how much you smoke over a period of time. Research shows that quitting “cold turkey” is more successful than gradually quitting.  · Attending in-person counseling to help you build problem-solving skills. You are more likely to have success in quitting if you attend several counseling sessions. Even short sessions of 10 minutes can be effective.  · Finding resources and support systems that can help you to quit smoking and remain smoke-free after you quit. These resources are most helpful when you use them often. They can include:  ¨ Online chats with a counselor.  ¨ Telephone quitlines.  ¨ Printed self-help materials.  ¨ Support groups or group  counseling.  ¨ Text messaging programs.  ¨ Mobile phone applications.  · Taking medicines to help you quit smoking. (If you are pregnant or breastfeeding, talk with your health care provider first.) Some medicines contain nicotine and some do not. Both types of medicines help with cravings, but the medicines that include nicotine help to relieve withdrawal symptoms. Your health care provider may recommend:  ¨ Nicotine patches, gum, or lozenges.  ¨ Nicotine inhalers or sprays.  ¨ Non-nicotine medicine that is taken by mouth.  Talk with your health care provider about combining strategies, such as taking medicines while you are also receiving in-person counseling. Using these two strategies together makes you more likely to succeed in quitting than if you used either strategy on its own.  If you are pregnant or breastfeeding, talk with your health care provider about finding counseling or other support strategies to quit smoking. Do not take medicine to help you quit smoking unless told to do so by your health care provider.  What things can I do to make it easier to quit?  Quitting smoking might feel overwhelming at first, but there is a lot that you can do to make it easier. Take these important actions:  · Reach out to your family and friends and ask that they support and encourage you during this time. Call telephone quitlines, reach out to support groups, or work with a counselor for support.  · Ask people who smoke to avoid smoking around you.  · Avoid places that trigger you to smoke, such as bars, parties, or smoke-break areas at work.  · Spend time around people who do not smoke.  · Lessen stress in your life, because stress can be a smoking trigger for some people. To lessen stress, try:  ¨ Exercising regularly.  ¨ Deep-breathing exercises.  ¨ Yoga.  ¨ Meditating.  ¨ Performing a body scan. This involves closing your eyes, scanning your body from head to toe, and noticing which parts of your body are  particularly tense. Purposefully relax the muscles in those areas.  · Download or purchase mobile phone or tablet apps (applications) that can help you stick to your quit plan by providing reminders, tips, and encouragement. There are many free apps, such as QuitGuide from the CDC (Centers for Disease Control and Prevention). You can find other support for quitting smoking (smoking cessation) through smokefree.gov and other websites.  How will I feel when I quit smoking?  Within the first 24 hours of quitting smoking, you may start to feel some withdrawal symptoms. These symptoms are usually most noticeable 2-3 days after quitting, but they usually do not last beyond 2-3 weeks. Changes or symptoms that you might experience include:  · Mood swings.  · Restlessness, anxiety, or irritation.  · Difficulty concentrating.  · Dizziness.  · Strong cravings for sugary foods in addition to nicotine.  · Mild weight gain.  · Constipation.  · Nausea.  · Coughing or a sore throat.  · Changes in how your medicines work in your body.  · A depressed mood.  · Difficulty sleeping (insomnia).  After the first 2-3 weeks of quitting, you may start to notice more positive results, such as:  · Improved sense of smell and taste.  · Decreased coughing and sore throat.  · Slower heart rate.  · Lower blood pressure.  · Clearer skin.  · The ability to breathe more easily.  · Fewer sick days.  Quitting smoking is very challenging for most people. Do not get discouraged if you are not successful the first time. Some people need to make many attempts to quit before they achieve long-term success. Do your best to stick to your quit plan, and talk with your health care provider if you have any questions or concerns.  This information is not intended to replace advice given to you by your health care provider. Make sure you discuss any questions you have with your health care provider.  Document Released: 12/12/2002 Document Revised: 08/15/2017  Document Reviewed: 05/03/2016  [employer] Interactive Patient Education © 2017 [employer]

## 2020-10-01 ENCOUNTER — TRANSCRIBE ORDERS (OUTPATIENT)
Dept: ADMINISTRATIVE | Facility: HOSPITAL | Age: 40
End: 2020-10-01

## 2020-10-01 DIAGNOSIS — N63.10 BREAST MASS, RIGHT: Primary | ICD-10-CM

## 2020-10-01 DIAGNOSIS — R92.8 ABNORMAL MAMMOGRAM: ICD-10-CM

## 2020-10-05 ENCOUNTER — HOSPITAL ENCOUNTER (OUTPATIENT)
Dept: ULTRASOUND IMAGING | Facility: HOSPITAL | Age: 40
Discharge: HOME | End: 2020-10-05
Admitting: STUDENT IN AN ORGANIZED HEALTH CARE EDUCATION/TRAINING PROGRAM

## 2020-10-05 DIAGNOSIS — N63.10 BREAST MASS, RIGHT: ICD-10-CM

## 2020-10-05 PROCEDURE — 76642 ULTRASOUND BREAST LIMITED: CPT

## 2020-10-06 ENCOUNTER — HOSPITAL ENCOUNTER (OUTPATIENT)
Dept: MAMMOGRAPHY | Facility: HOSPITAL | Age: 40
Discharge: HOME | End: 2020-10-06

## 2020-10-06 ENCOUNTER — APPOINTMENT (OUTPATIENT)
Dept: ULTRASOUND IMAGING | Facility: HOSPITAL | Age: 40
End: 2020-10-06

## 2020-10-06 DIAGNOSIS — R92.8 ABNORMAL MAMMOGRAM: ICD-10-CM

## 2020-10-06 PROCEDURE — 88305 TISSUE EXAM BY PATHOLOGIST: CPT | Performed by: STUDENT IN AN ORGANIZED HEALTH CARE EDUCATION/TRAINING PROGRAM

## 2020-10-06 PROCEDURE — A4648 IMPLANTABLE TISSUE MARKER: HCPCS

## 2020-10-06 RX ORDER — LIDOCAINE HYDROCHLORIDE AND EPINEPHRINE 10; 10 MG/ML; UG/ML
10 INJECTION, SOLUTION INFILTRATION; PERINEURAL ONCE
Status: DISCONTINUED | OUTPATIENT
Start: 2020-10-06 | End: 2021-03-25 | Stop reason: HOSPADM

## 2020-10-06 RX ORDER — LIDOCAINE HYDROCHLORIDE 10 MG/ML
10 INJECTION, SOLUTION INFILTRATION; PERINEURAL ONCE
Status: DISCONTINUED | OUTPATIENT
Start: 2020-10-06 | End: 2021-03-25 | Stop reason: HOSPADM

## 2020-10-12 ENCOUNTER — APPOINTMENT (OUTPATIENT)
Dept: CT IMAGING | Facility: HOSPITAL | Age: 40
End: 2020-10-12

## 2020-10-12 LAB
CYTO UR: NORMAL
LAB AP CASE REPORT: NORMAL
LAB AP CLINICAL INFORMATION: NORMAL
PATH REPORT.FINAL DX SPEC: NORMAL
PATH REPORT.GROSS SPEC: NORMAL

## 2020-10-15 ENCOUNTER — TELEPHONE (OUTPATIENT)
Dept: ONCOLOGY | Facility: CLINIC | Age: 40
End: 2020-10-15

## 2020-10-15 NOTE — TELEPHONE ENCOUNTER
DR. CHATTERJEE'S OFFICE CALLED AND STATED THAT THEY CALLED PT AND SPOKE WITH HER ABT HER APPT. PT CONFIRMED HER APPT BUT DID NOT SHOW UP. THE OFFICE HAS TRIED TO REACH HER MULTIPLE TIMES SINCE WITH NO RESPONSE. THANK YOU.

## 2020-10-19 ENCOUNTER — TRANSCRIBE ORDERS (OUTPATIENT)
Dept: ADMINISTRATIVE | Facility: HOSPITAL | Age: 40
End: 2020-10-19

## 2020-10-19 DIAGNOSIS — N60.82 OTHER BENIGN MAMMARY DYSPLASIAS OF LEFT BREAST: Primary | ICD-10-CM

## 2020-10-27 DIAGNOSIS — C92.10 CML (CHRONIC MYELOCYTIC LEUKEMIA) (HCC): Primary | ICD-10-CM

## 2020-11-04 ENCOUNTER — TELEPHONE (OUTPATIENT)
Dept: ONCOLOGY | Facility: CLINIC | Age: 40
End: 2020-11-04

## 2020-11-04 NOTE — TELEPHONE ENCOUNTER
[facility] unable to reach the office.    Pt called to cancel today's appt with Dr Conklin.    She is working out of town. She will give the office a call when she returns.    Please cancel today's appt.    Best call back # [phone]

## 2020-11-09 ENCOUNTER — TELEPHONE (OUTPATIENT)
Dept: ONCOLOGY | Facility: CLINIC | Age: 40
End: 2020-11-09

## 2020-11-09 NOTE — TELEPHONE ENCOUNTER
I called and spoke with the patient. She is out of town for work and will not be able to come home for around 2 weeks. She will be calling just before she comes home to reschedule everything.

## 2020-11-24 LAB
LAB AP CASE REPORT: NORMAL
PATH REPORT.FINAL DX SPEC: NORMAL
PATH REPORT.GROSS SPEC: NORMAL

## 2020-12-15 DIAGNOSIS — I10 ESSENTIAL HYPERTENSION: ICD-10-CM

## 2020-12-15 RX ORDER — AMLODIPINE BESYLATE 5 MG/1
TABLET ORAL
Qty: 90 TABLET | Refills: 1 | Status: SHIPPED | OUTPATIENT
Start: 2020-12-15 | End: 2021-03-15 | Stop reason: SDUPTHER

## 2020-12-16 DIAGNOSIS — A04.8 H. PYLORI INFECTION: Primary | ICD-10-CM

## 2020-12-22 ENCOUNTER — TELEPHONE (OUTPATIENT)
Dept: ONCOLOGY | Facility: CLINIC | Age: 40
End: 2020-12-22

## 2021-01-11 DIAGNOSIS — C92.10 CML (CHRONIC MYELOCYTIC LEUKEMIA) (HCC): Primary | ICD-10-CM

## 2021-03-08 ENCOUNTER — TELEPHONE (OUTPATIENT)
Dept: ONCOLOGY | Facility: CLINIC | Age: 41
End: 2021-03-08

## 2021-03-08 NOTE — TELEPHONE ENCOUNTER
Winsome is returning a call, she's not sure to who. She says she was trying to get an earlier appt time from 9am on 03/15    # [phone]

## 2021-03-09 NOTE — PROGRESS NOTES
[facility] GROUP HEMATOLOGY AND ONCOLOGY  [address]  [place] [address] [phone]    Patient Name: Winsome Becker  Encounter Date: 09/01/2020  YOB: 1980  Patient Number: [id number]      REASON FOR VISIT:   Winsome Becker is a 39 yo female who was previously followed by Dr. Zheng for CML diagnosed sometime 2005.  Previously treated with Gleevec on and off, resumed 2/4/2010.  She is now on Sprycel since sometime 02/2016.      Problem List Items Addressed This Visit        Other    CML (chronic myelocytic leukemia) (CMS/HCC) - Primary        Oncology/Hematology History Overview Note   Ms Becker is a pleasant 35 year old female with diagnosis of chronic myelogenous leukemia diagnosed over 12 years ago. She has  been on Gleevec on and off. She was started back on 02/04/10.  Ms Becker is a pleasant  female with chronic myelogenous leukemia. She initially had been placed on imatinib, and  she failed highdose  therapy. She took this infrequently, however. Patient was placed on Sprycel. Had better compliance to this, but her  bcr/abl transcript shaila. 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 This is  a mutation that is reported to confer resistance to bcr/abl 1 tyrosine kinase inhibitors. Patient was informed of the news.  INTERVAL HISTORY  Winsome is a very pleasant 35 year old female patient with chronic myelogenous leukemia who presents today in followup.  She is currently  taking Sprycel and states she been compliant with it since her last prescription. She states she has had some problems in the past with  pharmacy but overall she has been fairly compliant with it over the last month or two. She last had a BCR/ABL transcript on 04/13/2016  that found the transcript detected at 21.3649% on the international scale. This was down from 30.94 in March  and 40.52 back in  September of 14. She is also following with Dr. Benigno mcclure at [place]     CML (chronic myelocytic leukemia) (CMS/HCC)   7/26/2017 Initial Diagnosis    CML (chronic myeloid leukemia)         PAST MEDICAL HISTORY:  ALLERGIES:  Allergies   Allergen Reactions   • Latex Rash   • Penicillins Rash     CURRENT MEDICATIONS:  Outpatient Encounter Medications as of 3/15/2021   Medication Sig Dispense Refill   • amLODIPine (NORVASC) 5 MG tablet Take 2 tablets by mouth Daily. 90 tablet 1   • dasatinib (SPRYCEL) 100 MG chemo tablet Take 1 tablet by mouth Daily. 30 tablet 6   • [DISCONTINUED] amLODIPine (NORVASC) 5 MG tablet TAKE 1 TABLET BY MOUTH EVERY DAY 90 tablet 1   • [DISCONTINUED] amLODIPine (NORVASC) 5 MG tablet Take 1 tablet by mouth Daily. 90 tablet 1     Facility-Administered Encounter Medications as of 3/15/2021   Medication Dose Route Frequency Provider Last Rate Last Admin   • lidocaine (XYLOCAINE) 1 % injection 10 mL  10 mL Subcutaneous Once Shelbie Doran MD       • lidocaine-EPINEPHrine (XYLOCAINE W/EPI) 1 %-1:530028 injection 10 mL  10 mL Injection Once Shelbie Doran MD         Adult illnesses:   Chronic myeloid leukemia (CML)  Chronic bronchitis  Iron deficiency anemia tobacco dependence    Past surgeries:  Colonoscopy, 12/4/2019. At 40 cm biopsy. Adenomatous polyp, tubular type  Endoscopy, 12/4/2019- small bowel biopsy. Negative.  Gastric antrum, biopsy: chronic gastritis. Positive h.pylori.  Tubular abdominal ligation  Marrow biopsy, 01/31/2020- persistent CML, chronic phase.  Clonal T -cell population identified by PCR.  Flow cytometry negative.      ADULT ILLNESSES:  Patient Active Problem List   Diagnosis Code   • CML (chronic myelocytic leukemia) (CMS/HCC) C92.10   • Iron deficiency anemia D50.9   • Tobacco dependence F17.200   • Essential hypertension I10     SURGERIES:  Past Surgical History:   Procedure Laterality Date   • COLONOSCOPY N/A 12/4/2019    Tubular adenoma  "at 40 cm, Diverticulosis repeat exam in 5 years   • DENTAL PROCEDURE      emergency surgery for tooth extraction   • ENDOSCOPY N/A 12/4/2019    Enlarged gastric folds showing marked chronic active gastritis + for H Pylori treated   • TUBAL ABDOMINAL LIGATION  2000     HEALTH MAINTENANCE ITEMS:  Health Maintenance Due   Topic Date Due   • ANNUAL PHYSICAL  Never done   • Pneumococcal Vaccine 0-64 (1 of 1 - PPSV23) Never done   • TDAP/TD VACCINES (1 - Tdap) Never done   • HEPATITIS C SCREENING  Never done       <no information>  Last Completed Colonoscopy       Status Date      COLONOSCOPY Done 12/4/2019 COLONOSCOPY     Patient has more history with this topic...          There is no immunization history on file for this patient.  Last Completed Mammogram    Patient has no health maintenance due at this time           FAMILY HISTORY:  Family History   Problem Relation Age of Onset   • Breast cancer Neg Hx    • Colon cancer Neg Hx    • Colon polyps Neg Hx      SOCIAL HISTORY:  Social History     Socioeconomic History   • Marital status: Single     Spouse name: Not on file   • Number of children: Not on file   • Years of education: Not on file   • Highest education level: Not on file   Tobacco Use   • Smoking status: Heavy Tobacco Smoker     Packs/day: 0.50     Years: 10.00     Pack years: 5.00     Types: Cigarettes   • Smokeless tobacco: Never Used   Substance and Sexual Activity   • Alcohol use: Yes     Comment: Occasional   • Drug use: No   • Sexual activity: Yes     Partners: Male     Birth control/protection: Condom       REVIEW OF SYSTEMS:  Sprycel tolerance:  \"No new problems.\" Taking daily  Constitutional: Manages her ADLs, chores, errands, driving.  No appetite change, \"pretty good.\"   Energy is fairly good.  She is still working full time as an insulator remover which demands physical strength - up to 12 hours/7 days.  Has gained 7 pounds (in addition to 3 pounds at her prior visit) since her last visit. No " "fever, no chills.  No drenching night sweats.  HENT: No sore throat.  Has no seasonal sinus symptoms.  No rhinorrhea.  No headaches.  Eyes: Wears glasses that help her close and distant vision.  Respiratory:  No SOB with no MARTINI.  No cough. No wheezing. Admits to tobacco use - smokes 1/2 ppd since age 29.    Cardiovascular: No chest pain.  No palpitations.  No orthopnea  Gastrointestinal: No dysphagia.  No nausea.  No vomiting.  No dyspepsia.  No constipation.  No diarrhea.  No melena. No hematochezia.  EGD/c-scope, 12/04/2020 (above)  Endocrine: No hot flashes.  Genitourinary:  No dysuria.  No incontinence.  Musculoskeletal:  No new arthralgias.  No edema  Skin: No rash  Neurological:   No dizziness.  No facial asymmetry. No headaches.  No neuropathy.  Hematological: Negative for adenopathy. Does not bruise easily.  Psychiatric/Behavioral:  No anxiety.  No depression.       VITAL SIGNS: /80   Pulse 80   Temp 98.8 °F (37.1 °C)   Resp 16   Ht 167.6 cm (66\")   Wt 77.7 kg (171 lb 4.8 oz)   SpO2 99%   Breastfeeding No   BMI 27.65 kg/m² Body surface area is 1.87 meters squared.  Pain Score    03/15/21 0939   PainSc: 0-No pain         PHYSICAL EXAMINATION:   General Appearance: Pleasant, cooperative, heavy set, modestly kept female, awake, alert, oriented and in no acute distress. Patient appears stated age.  ECOG 0  HEENT: Normocephalic. Sclerae clear, conjunctiva pink, extraocular movements intact, pupils, round, reactive to light and  accommodation. She is wearing a surgical mask today.  NECK: Supple, no jugular venous distention, thyroid not enlarged.  LYMPH: No cervical, supraclavicular, axillary, or inguinal lymphadenopathy.  LUNGS: Good air movement, no rales, rhonchi, rubs or wheezes with auscultation  CARDIO: Regular sinus rhythm, no murmurs, gallops or rubs.  ABDOMEN: Nondistended, slightly globose, soft, No tenderness, no guarding, no rebound, obvious hepatosplenomegaly. No abdominal masses. Bowel " sounds positive. No hernia  MUSCL: No joint swelling, decreased motion, or inflammation  EXTREMS: No edema, clubbing, cyanosis, No varicose veins.  NEURO: Grossly nonfocal, Gait is coordinated and smooth, Cognition is preserved.  SKIN: No rashes, no ecchymoses, no petechia.  PSYCH: Oriented to time, place and person. Memory is preserved. Mood and affect appear normal      LABS    Lab Results - Last 18 Months   Lab Units 09/09/20  1219 09/01/20  1419 01/31/20  0921 01/09/20  0835 11/20/19  1355   HEMOGLOBIN g/dL 9.5* 10.8* 12.4 12.2 9.3*   HEMATOCRIT % 29.1* 32.1* 37.5 36.9 29.5*   MCV fL 93.9 93.9 90.6 90.2 85.5   WBC 10*3/mm3 4.67 6.19 4.27 5.75 4.18   RDW % 17.9* 18.0* 19.8* 19.9* 22.8*   MPV fL 9.3 9.2 8.8 9.1 8.9   PLATELETS 10*3/mm3 231 208 352 259 332   IMM GRAN % % 0.2  --  0.0 0.2 0.2   NEUTROS ABS 10*3/mm3 1.69* 2.17 2.22 2.17 1.57*   LYMPHS ABS 10*3/mm3 2.38  --  1.45 2.73 2.21   MONOS ABS 10*3/mm3 0.47  --  0.51 0.73 0.27   EOS ABS 10*3/mm3 0.10 0.13 0.06 0.05 0.06   BASOS ABS 10*3/mm3 0.02  --  0.03 0.06 0.06   IMMATURE GRANS (ABS) 10*3/mm3 0.01  --  0.00 0.01 0.01   NRBC /100 WBC 0.0  --  0.0 0.0 0.0   NEUTROPHIL % %  --  35.1*  --   --   --    MONOCYTES % %  --  5.3  --   --   --    GIANT PLT   --  Slight/1+  --   --   --        Lab Results - Last 18 Months   Lab Units 09/09/20  1219 09/01/20  1419 01/09/20  0835 11/20/19  1355   GLUCOSE mg/dL 92 96 127* 78   SODIUM mmol/L 140 142 140 142   POTASSIUM mmol/L 4.0 3.4* 2.9* 4.0   CO2 mmol/L 23.0 25.0 24.0 24.0   CHLORIDE mmol/L 108* 107 104 107   ANION GAP mmol/L 9.0 10.0 12.0 11.0   CREATININE mg/dL 0.88 0.88 1.01* 0.86   BUN mg/dL 12 16 22* 16   BUN / CREAT RATIO  13.6 18.2 21.8 18.6   CALCIUM mg/dL 9.0 8.7 9.4 8.9   ALK PHOS U/L 68 85 64 79   TOTAL PROTEIN g/dL 6.5 6.7 7.1 6.8   ALT (SGPT) U/L 17 16 16 14   AST (SGOT) U/L 27 26 23 21   BILIRUBIN mg/dL 0.2 0.2 0.3 0.2   ALBUMIN g/dL 4.00 4.00 4.20 4.20   GLOBULIN gm/dL 2.5 2.7 2.9 2.6       Lab Results  - Last 18 Months   Lab Units 09/01/20  1419 11/20/19  1434   REFERENCE LAB REPORT  See Attached Report See Attached Report  See Attached Report       Lab Results - Last 18 Months   Lab Units 09/01/20  1419 01/09/20  0835 11/20/19  1355   IRON mcg/dL 24* 175* 36*   TIBC mcg/dL 487 495 504   IRON SATURATION % 5* 35 7*   FERRITIN ng/mL 15.78 20.30 9.95*   FOLATE ng/mL 7.21  --  4.11*     ASSESSMENT:  1.  CML:    -- Chronic phase  -- On dasatinib.  -- Genotrace - IS QRT-PCR: 57.64%, 09/17/2013; 40.52%, 09/20/2014; 30.9%, 03/05/2016; 21.36%, 0/14/2016; 8.52%, 07/28/2017; 8.47%, 06/12/2016; 13.5%, 09/01/2020  -- Marrow biopsy, 01/31/2020- persistent CML, chronic phase.  Clonal T -cell population identified by PCR.  Flow cytometry negative.  -- 09/01/2020-BCR/ABL PCR positive for BCR/ABL 1 rearrangement (13.5% of cells): Consistent with persistence of CML clone.    2.  Normocytic anemia.  Previously on oral iron.  -- Hemoglobin 13.2; .8, 03/15/2021 (prior range: Hemoglobin 9.3-12.4; MCV 85.5-99.5)  -- EGD, 12/4/2019 showed marked gastritis, (+) H-pylori (above).  Has completed antibiotics    3.  History of palpable right breast mass.    --Mammogram and breast ultrasound, 05/14/2019 showed mass lesions within both medial and lateral right breast including a mass corresponding to the palpable abnormality.  Subsequently confirmed to represent benign cyst by ultrasound.  ACR BI-RADS 2 benign findings.  Negative.  --09/17/20205230-wyftvtgiq-uftixuxgatrqu left breast calcifications, biopsy recommended, ACR BI-RADS Category 4, suspicious.  --10/06/2020-stereotactic biopsy left breast calcifications, 3 o'clock position.  Final diagnosis: Fibrocystic changes.  Focal columnar cell hyperplasia without atypia.  Foci of fibroadenomatous change.  Microcalcifications.  No histologic evidence of malignancy.    4.  Gastritis, EGD - 12/04/2020  5.  Colon polyps, c-scope - 12/04/2020  6.  Abnormal peripheral blood flow cytometry,  11/21/2019-relative increase (12.3% of total) T large granular lymphocytes (T-LGL cells) identified.  No immunophenotypic evidence of abnormal myeloid maturation, acute leukemia or clonal B-cell population.  7.   Self-directed.  [facility] office appointments and blood tests.      Plan:  1.   Apprised of (stable) CBC with WBC (5.59 with ANC 1.64, ALC 3.39, otherwise normal differential) normal hemoglobin, slight macrocytosis, normal platelets, negative hepatitis C antibody, normal CMP today.  Other labs pending.  2.   Again review available history of CML (scant inflammation from the original diagnosis) and history of prior medications (previously on Gleevec for unspecified period of time, since been on dasatinib).  Review most recent BCR/ABL PCR from 09/17/2013 through 09/01/2021 (above) indicating persistence of CML clone.  3.   Dasatinib tolerance discussed.  No problems.  4.   Draw pre-office iron, iron saturation, ferritin, BCR/ABL PCR  5.   Apprised of mammogram, 09/17/2020 (above) and stereotactic biopsy of the left breast calcifications, 10/06/2020 (above).  No evidence of malignancy.  6.   Review visit with Dr. Doran, 10/15/2020.  She was seen postop following breast biopsy.  Follow-up in 6 months with mammogram.    7.   Rx:  Dasatinib 100 mg qd # 30 x 6 RF- eRx  8.   Appoint to Dr. Alexandre Chand at [place] or Dr. Roman at [facility] Re: Persistent CML clone.  Transplant candidate?  9.   Return to office in 4 weeks with pre-office CBC and differential, CMP, BCR/ABL PCR.    I spent 45 total minutes, face-to-face, caring for Winsome today.  Greater than 50% of this time involved counseling and/or coordination of care as documented within this note regarding the patient's illness(es), pros and cons of various treatment options, instructions and/or risk reduction.

## 2021-03-15 ENCOUNTER — OFFICE VISIT (OUTPATIENT)
Dept: INTERNAL MEDICINE | Facility: CLINIC | Age: 41
End: 2021-03-15

## 2021-03-15 ENCOUNTER — TELEPHONE (OUTPATIENT)
Dept: INTERNAL MEDICINE | Facility: CLINIC | Age: 41
End: 2021-03-15

## 2021-03-15 ENCOUNTER — OFFICE VISIT (OUTPATIENT)
Dept: ONCOLOGY | Facility: CLINIC | Age: 41
End: 2021-03-15

## 2021-03-15 ENCOUNTER — LAB (OUTPATIENT)
Dept: LAB | Facility: HOSPITAL | Age: 41
End: 2021-03-15

## 2021-03-15 VITALS
HEART RATE: 80 BPM | DIASTOLIC BLOOD PRESSURE: 80 MMHG | OXYGEN SATURATION: 99 % | RESPIRATION RATE: 16 BRPM | HEIGHT: 66 IN | WEIGHT: 171.3 LBS | TEMPERATURE: 98.8 F | BODY MASS INDEX: 27.53 KG/M2 | SYSTOLIC BLOOD PRESSURE: 160 MMHG

## 2021-03-15 VITALS
DIASTOLIC BLOOD PRESSURE: 100 MMHG | SYSTOLIC BLOOD PRESSURE: 148 MMHG | OXYGEN SATURATION: 98 % | RESPIRATION RATE: 16 BRPM | WEIGHT: 170 LBS | BODY MASS INDEX: 27.32 KG/M2 | TEMPERATURE: 99.1 F | HEART RATE: 97 BPM | HEIGHT: 66 IN

## 2021-03-15 DIAGNOSIS — D50.9 IRON DEFICIENCY ANEMIA, UNSPECIFIED IRON DEFICIENCY ANEMIA TYPE: ICD-10-CM

## 2021-03-15 DIAGNOSIS — F17.210 CIGARETTE SMOKER: ICD-10-CM

## 2021-03-15 DIAGNOSIS — C92.10 CML (CHRONIC MYELOCYTIC LEUKEMIA) (HCC): ICD-10-CM

## 2021-03-15 DIAGNOSIS — C92.10 CML (CHRONIC MYELOCYTIC LEUKEMIA) (HCC): Primary | ICD-10-CM

## 2021-03-15 DIAGNOSIS — E66.3 OVERWEIGHT (BMI 25.0-29.9): ICD-10-CM

## 2021-03-15 DIAGNOSIS — Z00.01 ENCOUNTER FOR ANNUAL GENERAL MEDICAL EXAMINATION WITH ABNORMAL FINDINGS IN ADULT: ICD-10-CM

## 2021-03-15 DIAGNOSIS — Z12.4 ENCOUNTER FOR SCREENING FOR MALIGNANT NEOPLASM OF CERVIX: ICD-10-CM

## 2021-03-15 DIAGNOSIS — Z00.00 PREVENTATIVE HEALTH CARE: ICD-10-CM

## 2021-03-15 DIAGNOSIS — I10 ESSENTIAL HYPERTENSION: ICD-10-CM

## 2021-03-15 DIAGNOSIS — Z00.01 ENCOUNTER FOR ANNUAL GENERAL MEDICAL EXAMINATION WITH ABNORMAL FINDINGS IN ADULT: Primary | ICD-10-CM

## 2021-03-15 LAB
ALBUMIN SERPL-MCNC: 4.1 G/DL (ref 3.5–5.2)
ALBUMIN/GLOB SERPL: 1.4 G/DL
ALP SERPL-CCNC: 77 U/L (ref 39–117)
ALT SERPL W P-5'-P-CCNC: 18 U/L (ref 1–33)
ANION GAP SERPL CALCULATED.3IONS-SCNC: 11 MMOL/L (ref 5–15)
AST SERPL-CCNC: 25 U/L (ref 1–32)
BASOPHILS # BLD MANUAL: 0.06 10*3/MM3 (ref 0–0.2)
BASOPHILS NFR BLD AUTO: 1 % (ref 0–1.5)
BILIRUB SERPL-MCNC: 0.2 MG/DL (ref 0–1.2)
BUN SERPL-MCNC: 17 MG/DL (ref 6–20)
BUN/CREAT SERPL: 22.1 (ref 7–25)
CALCIUM SPEC-SCNC: 8.8 MG/DL (ref 8.6–10.5)
CHLORIDE SERPL-SCNC: 108 MMOL/L (ref 98–107)
CHOLEST SERPL-MCNC: 188 MG/DL (ref 0–200)
CO2 SERPL-SCNC: 23 MMOL/L (ref 22–29)
CREAT SERPL-MCNC: 0.77 MG/DL (ref 0.57–1)
DEPRECATED RDW RBC AUTO: 53.7 FL (ref 37–54)
EOSINOPHIL # BLD MANUAL: 0.06 10*3/MM3 (ref 0–0.4)
EOSINOPHIL NFR BLD MANUAL: 1 % (ref 0.3–6.2)
ERYTHROCYTE [DISTWIDTH] IN BLOOD BY AUTOMATED COUNT: 14.3 % (ref 12.3–15.4)
GFR SERPL CREATININE-BSD FRML MDRD: 100 ML/MIN/1.73
GLOBULIN UR ELPH-MCNC: 2.9 GM/DL
GLUCOSE SERPL-MCNC: 104 MG/DL (ref 65–99)
HCT VFR BLD AUTO: 39.6 % (ref 34–46.6)
HCV AB SER DONR QL: NORMAL
HDLC SERPL-MCNC: 71 MG/DL (ref 40–60)
HGB BLD-MCNC: 13.2 G/DL (ref 12–15.9)
LDLC SERPL CALC-MCNC: 97 MG/DL (ref 0–100)
LDLC/HDLC SERPL: 1.32 {RATIO}
LYMPHOCYTES # BLD MANUAL: 3.39 10*3/MM3 (ref 0.7–3.1)
LYMPHOCYTES NFR BLD MANUAL: 60.6 % (ref 19.6–45.3)
LYMPHOCYTES NFR BLD MANUAL: 7.1 % (ref 5–12)
MACROCYTES BLD QL SMEAR: ABNORMAL
MCH RBC QN AUTO: 33.9 PG (ref 26.6–33)
MCHC RBC AUTO-ENTMCNC: 33.3 G/DL (ref 31.5–35.7)
MCV RBC AUTO: 101.8 FL (ref 79–97)
MONOCYTES # BLD AUTO: 0.4 10*3/MM3 (ref 0.1–0.9)
NEUTROPHILS # BLD AUTO: 1.64 10*3/MM3 (ref 1.7–7)
NEUTROPHILS NFR BLD MANUAL: 29.3 % (ref 42.7–76)
PLAT MORPH BLD: NORMAL
PLATELET # BLD AUTO: 361 10*3/MM3 (ref 140–450)
PMV BLD AUTO: 8.6 FL (ref 6–12)
POTASSIUM SERPL-SCNC: 3.6 MMOL/L (ref 3.5–5.2)
PROT SERPL-MCNC: 7 G/DL (ref 6–8.5)
RBC # BLD AUTO: 3.89 10*6/MM3 (ref 3.77–5.28)
SODIUM SERPL-SCNC: 142 MMOL/L (ref 136–145)
TRIGL SERPL-MCNC: 116 MG/DL (ref 0–150)
VARIANT LYMPHS NFR BLD MANUAL: 1 % (ref 0–5)
VLDLC SERPL-MCNC: 20 MG/DL (ref 5–40)
WBC # BLD AUTO: 5.59 10*3/MM3 (ref 3.4–10.8)
WBC MORPH BLD: NORMAL

## 2021-03-15 PROCEDURE — 85025 COMPLETE CBC W/AUTO DIFF WBC: CPT

## 2021-03-15 PROCEDURE — 86803 HEPATITIS C AB TEST: CPT

## 2021-03-15 PROCEDURE — 87512 GARDNER VAG DNA QUANT: CPT | Performed by: NURSE PRACTITIONER

## 2021-03-15 PROCEDURE — 99215 OFFICE O/P EST HI 40 MIN: CPT | Performed by: INTERNAL MEDICINE

## 2021-03-15 PROCEDURE — 87661 TRICHOMONAS VAGINALIS AMPLIF: CPT | Performed by: NURSE PRACTITIONER

## 2021-03-15 PROCEDURE — 87591 N.GONORRHOEAE DNA AMP PROB: CPT | Performed by: NURSE PRACTITIONER

## 2021-03-15 PROCEDURE — 87624 HPV HI-RISK TYP POOLED RSLT: CPT | Performed by: NURSE PRACTITIONER

## 2021-03-15 PROCEDURE — 36415 COLL VENOUS BLD VENIPUNCTURE: CPT

## 2021-03-15 PROCEDURE — 80053 COMPREHEN METABOLIC PANEL: CPT

## 2021-03-15 PROCEDURE — G0123 SCREEN CERV/VAG THIN LAYER: HCPCS | Performed by: NURSE PRACTITIONER

## 2021-03-15 PROCEDURE — 87491 CHLMYD TRACH DNA AMP PROBE: CPT | Performed by: NURSE PRACTITIONER

## 2021-03-15 PROCEDURE — 87481 CANDIDA DNA AMP PROBE: CPT | Performed by: NURSE PRACTITIONER

## 2021-03-15 PROCEDURE — 87798 DETECT AGENT NOS DNA AMP: CPT | Performed by: NURSE PRACTITIONER

## 2021-03-15 PROCEDURE — 80061 LIPID PANEL: CPT

## 2021-03-15 PROCEDURE — 85007 BL SMEAR W/DIFF WBC COUNT: CPT

## 2021-03-15 PROCEDURE — 99214 OFFICE O/P EST MOD 30 MIN: CPT | Performed by: NURSE PRACTITIONER

## 2021-03-15 PROCEDURE — 87529 HSV DNA AMP PROBE: CPT | Performed by: NURSE PRACTITIONER

## 2021-03-15 PROCEDURE — 87563 M. GENITALIUM AMP PROBE: CPT | Performed by: NURSE PRACTITIONER

## 2021-03-15 PROCEDURE — 99396 PREV VISIT EST AGE 40-64: CPT | Performed by: NURSE PRACTITIONER

## 2021-03-15 RX ORDER — AMLODIPINE BESYLATE 5 MG/1
5 TABLET ORAL DAILY
Qty: 90 TABLET | Refills: 1 | Status: SHIPPED | OUTPATIENT
Start: 2021-03-15 | End: 2021-03-15

## 2021-03-15 RX ORDER — AMLODIPINE BESYLATE 5 MG/1
10 TABLET ORAL DAILY
Qty: 90 TABLET | Refills: 1 | Status: SHIPPED | OUTPATIENT
Start: 2021-03-15 | End: 2021-06-08

## 2021-03-15 NOTE — PROGRESS NOTES
Chief Complaint   Patient presents with   • Annual Exam   • Gynecologic Exam       History:  Winsome Becker is a 40 y.o. female who presents today for follow-up for evaluation of the above:    HPI     Patient presents today for annual exam and pap.  Her b/p is elevated today though she reports she did not take her medication today before her appointment.   She continues to see oncology hematology for CML.   No vaginal complaints today. She would like STD screening.   Can not remember if her last pap was abnormal. Possibly abnormal.   Last period was irregular. 3 weeks late. Denies change of pregnancy.   BMI today is 27        ROS:  Review of Systems   Constitutional: Negative for fatigue and unexpected weight change.   HENT: Negative.    Eyes: Negative.    Respiratory: Negative.    Cardiovascular: Negative.    Gastrointestinal: Negative for abdominal pain, constipation and diarrhea.   Endocrine: Negative.    Genitourinary: Negative for difficulty urinating, dyspareunia, genital sores, menstrual problem, pelvic pain, vaginal bleeding, vaginal discharge and vaginal pain.   Musculoskeletal: Negative.    Skin: Negative.    Neurological: Negative.    Psychiatric/Behavioral: Negative.        Ms. Becker  reports that she has been smoking cigarettes. She has a 5.00 pack-year smoking history. She has never used smokeless tobacco. She reports current alcohol use. She reports that she does not use drugs.      Current Outpatient Medications:   •  amLODIPine (NORVASC) 5 MG tablet, Take 2 tablets by mouth Daily., Disp: 90 tablet, Rfl: 1  •  dasatinib (SPRYCEL) 100 MG chemo tablet, Take 1 tablet by mouth Daily., Disp: 30 tablet, Rfl: 6    Current Facility-Administered Medications:   •  lidocaine (XYLOCAINE) 1 % injection 10 mL, 10 mL, Subcutaneous, Once, Shelbie Doran MD  •  lidocaine-EPINEPHrine (XYLOCAINE W/EPI) 1 %-1:167325 injection 10 mL, 10 mL, Injection, Once, Shelbie Doran MD      OBJECTIVE:  /100  "(BP Location: Right arm, Patient Position: Sitting, Cuff Size: Adult)   Pulse 97   Temp 99.1 °F (37.3 °C) (Temporal)   Resp 16   Ht 167.6 cm (66\")   Wt 77.1 kg (170 lb)   SpO2 98%   BMI 27.44 kg/m²    Physical Exam  Vitals reviewed.   Constitutional:       Appearance: She is well-developed.   HENT:      Head: Normocephalic and atraumatic.   Eyes:      Pupils: Pupils are equal, round, and reactive to light.   Cardiovascular:      Rate and Rhythm: Normal rate and regular rhythm.      Heart sounds: Normal heart sounds.   Pulmonary:      Effort: Pulmonary effort is normal.      Breath sounds: Normal breath sounds.   Abdominal:      General: Bowel sounds are normal.      Palpations: Abdomen is soft.   Genitourinary:     Vagina: Vaginal discharge (thick white) present.      Cervix: Discharge (thick white) present. No cervical motion tenderness or friability.      Uterus: Normal.       Adnexa: Right adnexa normal and left adnexa normal.      Rectum: Normal.   Musculoskeletal:         General: Normal range of motion.      Cervical back: Normal range of motion and neck supple.   Skin:     General: Skin is warm and dry.   Neurological:      Mental Status: She is alert and oriented to person, place, and time.         Assessment/Plan    Diagnoses and all orders for this visit:    1. Encounter for annual general medical examination with abnormal findings in adult (Primary)  -     Lipid panel; Future  Immunizations:      - Tetanus: Unknown or >10 years ago. Recommend to have at pharmacy or on injury.      - Influenza: recommended annually      - Pneumovax:once after age 65      - Prevnar: Once after age 65      - Zostavax: Once after age 60  Colonoscopy: Due at 50  Mammogram: Due October  PAP: done today  DEXA: DEXA scan at 65      2. Essential hypertension  -     amLODIPine (NORVASC) 5 MG tablet; Take 2 tablets by mouth Daily.  Dispense: 90 tablet; Refill: 1  Not well controlled and increase to 10mg    3. Encounter for " screening for malignant neoplasm of cervix  -     Liquid-based Pap Smear, Screening; Future  Request full STD screening.     4. CML (chronic myelocytic leukemia) (CMS/HCC)  Continues with oncology.     5. Iron deficiency anemia, unspecified iron deficiency anemia type  Labs for monitoring.     6. Overweight (BMI 25.0-29.9)  Recommended attention to portion control and being careful about the types and timing of meals for the purpose of weight management.    7. Cigarette smoker  Patient was counseled on and understood the many dangers of continuing to use tobacco. Despite this patient states quitting is not an immediate priority at this time. I reminded the patient that if quitting becomes an increased priority to contact us for help with quitting including pharmacologic & nonpharmacologic options or any additional resources.       An After Visit Summary was printed and given to the patient at discharge.  Return in about 6 months (around 9/15/2021) for b/p check. Sooner if problems arise.          Aida Coffey APRN. 3/15/2021   Electronically Signed

## 2021-03-15 NOTE — TELEPHONE ENCOUNTER
PATIENT CALLS       REQUESTING A DRS EXCUSE FOR TODAY SHE STATES SHE FORGOT TO ASK THE DR BEFORE SHE LEFT     GOOD CONTACT NUMBER   [phone] (ANNE)

## 2021-03-16 LAB
C TRACH RRNA CVX QL NAA+PROBE: NOT DETECTED
N GONORRHOEA RRNA SPEC QL NAA+PROBE: NOT DETECTED
TRICHOMONAS VAGINALIS PCR: NOT DETECTED

## 2021-03-17 LAB
HPV I/H RISK 4 DNA CVX QL PROBE+SIG AMP: NOT DETECTED
HSV1 DNA SPEC QL NAA+PROBE: NOT DETECTED
HSV2 DNA SPEC QL NAA+PROBE: NOT DETECTED

## 2021-03-18 DIAGNOSIS — B96.89 BV (BACTERIAL VAGINOSIS): Primary | ICD-10-CM

## 2021-03-18 DIAGNOSIS — N76.0 BV (BACTERIAL VAGINOSIS): Primary | ICD-10-CM

## 2021-03-18 LAB
GEN CATEG CVX/VAG CYTO-IMP: ABNORMAL
LAB AP CASE REPORT: ABNORMAL
LAB AP GYN ADDITIONAL INFORMATION: ABNORMAL
LAB AP GYN OTHER FINDINGS: ABNORMAL
PATH INTERP SPEC-IMP: ABNORMAL
STAT OF ADQ CVX/VAG CYTO-IMP: ABNORMAL

## 2021-03-18 RX ORDER — METRONIDAZOLE 500 MG/1
500 TABLET ORAL 3 TIMES DAILY
Qty: 21 TABLET | Refills: 0 | Status: SHIPPED | OUTPATIENT
Start: 2021-03-18 | End: 2021-03-25

## 2021-03-25 ENCOUNTER — SPECIALTY PHARMACY (OUTPATIENT)
Dept: ONCOLOGY | Facility: HOSPITAL | Age: 41
End: 2021-03-25

## 2021-03-25 DIAGNOSIS — C92.10 CML (CHRONIC MYELOCYTIC LEUKEMIA) (HCC): Primary | ICD-10-CM

## 2021-03-25 NOTE — PROGRESS NOTES
"[facility]    Oral Oncology Initial Consult + Refill      Consult Details  Patient Name (): Winsome Becker  (1980)   Consulting Provider:   Ethan Dow MD ([facility])   Medication and Regimen:  Sprycel 100 mg PO daily     Script Review and Evaluation:  Dosing & Interactions:   Reviewed, appropriate and no significant interaction noted at this time..   Oral Treatment Plan Signed?: Yes   Oral Treatment Plan Released?: Yes, released on 3/25/21.   Specialty Pharmacy Selected:  [facility]   Prior Authorization Status: Approved (previously covered at [facility])   Scheduled Counseling Date: 3/25/21   Predicted Prescription Fill Date: Next available fill date 4/10/21 based on last fill     Intervention(s):                  · Received refill request into inbox from [facility]  · Developed new prescription in treatment plan, received provider signature  · Called patient:  · I spoke with the patient for our initial encounter today. I explained to her the purpose of the OOC. We discussed the medication, Sprycel. She reports she is doing well on it, she is familiar with it and has been on this medication for quite some time, since around 2016 per the physician's note. The patient does not have any clinical questions or needs at this time as she is familiar with the medication and regimen. She did state in her most recent visit with the physician, she was told the medication \"should be doing more for her\" in terms of her blood counts, but she will discuss this with the physician.  · The patient has been filling the prescription at [facility] and [facility] sends the medication via mail/truck to a local [facility] store where a friend picks up the medication for her. Right now the patient lives out of state temporarily for work, so the prescription has been sent to her from a friend (or family member?). The patient would " like to try filling with [facility]. She would like the medication to be mailed to her location from the pharmacy if possible, if not perhaps the prescription could be picked up by an agent of hers. We will work this out 03/26/21 when I call her for more information.  · Financial status: medication fully covered at [facility]  · Order released to [facility]   · Will follow up with patient tomorrow at her request regarding how she will obtain the prescription.       Summary:    Patient has been on Sprycel for some time and is needing a refill. She would like to try [facility]. Delivery of medication to be arranged either by mail or an agent of the patient. Patient is familiar with the medication and regimen and has no other needs at this time.     First fill with [facility] in process.      Manolo Beckett PharmD  03/25/2021 15:33 CDT       Update:  It appears the medication is receiving a refill-too-soon rejection. Next available fill date on insurance is 4/10/21 based on last-fill date at [facility]. Will discuss this with patient tomorrow. No mention of PA on primary rejection note.  Manolo Beckett, [place]  03/25/21  15:50 CDT       Update: I spoke with Winsome HUBER Eugenio 3/30/21 by phone. She would like to fill at [facility] and one of her children will come  the medication for her and arrange deliver to her as she remains out of town and out of state for work reasons. Will contact [facility] to fill on/around 4/10/21 and call patient back to arrange delivery after initial fill.   Manolo Beckett PharmD  03/31/21  15:28 CDT

## 2021-04-15 ENCOUNTER — TELEPHONE (OUTPATIENT)
Dept: ONCOLOGY | Facility: CLINIC | Age: 41
End: 2021-04-15

## 2021-04-15 LAB
REF LAB TEST METHOD: NORMAL
REF LAB TEST METHOD: NORMAL

## 2021-04-15 NOTE — TELEPHONE ENCOUNTER
----- Message from Ethan Dow MD sent at 4/15/2021  1:04 PM CDT -----  CML chr FISH, 03/15/2021- 18% (+) BCR (prior: 13%).  Has she been seen by dr merchant ([place]?). At her last visit we referred her. Pls confirm. If not seen pls appoint to be seen ASAP Re:  Recurrent CML

## 2021-04-30 NOTE — TELEPHONE ENCOUNTER
Patient has no showed to her lab appt. She has cx her follow up with Dr. Dow and will not answer or return phone calls.

## 2021-05-11 ENCOUNTER — SPECIALTY PHARMACY (OUTPATIENT)
Dept: ONCOLOGY | Facility: HOSPITAL | Age: 41
End: 2021-05-11

## 2021-05-24 ENCOUNTER — TELEPHONE (OUTPATIENT)
Dept: ONCOLOGY | Facility: CLINIC | Age: 41
End: 2021-05-24

## 2021-05-24 NOTE — TELEPHONE ENCOUNTER
Karina Yan Luis A, MD  I did speak with Winsome and she has r/s her appt with you to 6/8/21. The nurse at [facility] said that they told her she would not be able to r/s if she missed the 6/3 appt.           Previous Messages       ----- Message -----   From: Ethan Dow MD   Sent: 5/24/2021   9:37 AM CDT   To: Karina Yan   Subject: RE: cancelled appointments                       Has she responded to you?  Please let her know that [facility] will not allow her to make another appointment if she misses this one.  Thank you   ----- Message -----   From: Karina Yan   Sent: 5/24/2021   9:27 AM CDT   To: Ethan Dow MD, Carolin Cordoba, *   Subject: FW: cancelled appointments                       Spoke with [facility] and they have made pt appt for 6/3/21 with Dr Morris Carreno. They stated that she had 2 previous no shows so this is the last appt they would make for her. I will cx the 6/1/21 appt with Dr MARCELO.   ----- Message -----   From: Karina Yan   Sent: 5/24/2021   9:08 AM CDT   To: Johanne Dickey MA, *   Subject: RE: cancelled appointments                       [facility] requested recs on her and I faxed them 5/19/21. I will call and see if she has had an appt.   ----- Message -----   From: Carolin Cordoba   Sent: 5/24/2021   8:22 AM CDT   To: Johanne Zavala MA, *   Subject: FW: cancelled appointments                       Has the patient called back to reschedule or do you know if we received the card back stating the certified letter was delivered?   ----- Message -----   From: Ethan Dow MD   Sent: 5/23/2021   9:36 PM CDT   To: Johanne Dickey MA, *   Subject: cancelled appointments                           Multiple no shows. Risk management issue.  On 04/15/2021 phone call notes state that she had cancelled appointments with us and will not answer calls so we sent her a certified letter.  Has she responded and confirmed that she will be  coming to this appointment on 06/01/2021?  If yes, she needs preoffice labs and was supposed to see dr baltazar at [facility] as a referral for management.  Pls obtain that encounter note before we see her. Summer colvin

## 2021-05-26 ENCOUNTER — TELEPHONE (OUTPATIENT)
Dept: ONCOLOGY | Facility: CLINIC | Age: 41
End: 2021-05-26

## 2021-06-01 ENCOUNTER — APPOINTMENT (OUTPATIENT)
Dept: LAB | Facility: HOSPITAL | Age: 41
End: 2021-06-01
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
## 2021-06-03 ENCOUNTER — TELEPHONE (OUTPATIENT)
Dept: ONCOLOGY | Facility: CLINIC | Age: 41
End: 2021-06-03

## 2021-06-03 DIAGNOSIS — C92.10 CML (CHRONIC MYELOCYTIC LEUKEMIA) (HCC): Primary | ICD-10-CM

## 2021-06-03 NOTE — TELEPHONE ENCOUNTER
Care discussed with Dr. Crareno at [facility] who saw the patient on 06/03/2021 Re: Persistent CML clone.  Recommends repeat baseline CBC with differential, CMP, PCR for BCR/ABL, and TKI mutation study prior to initiation of the bosutinib (Bosulif) 500 mg daily

## 2021-06-08 ENCOUNTER — SPECIALTY PHARMACY (OUTPATIENT)
Dept: ONCOLOGY | Facility: HOSPITAL | Age: 41
End: 2021-06-08

## 2021-06-08 ENCOUNTER — LAB (OUTPATIENT)
Dept: LAB | Facility: HOSPITAL | Age: 41
End: 2021-06-08

## 2021-06-08 ENCOUNTER — OFFICE VISIT (OUTPATIENT)
Dept: ONCOLOGY | Facility: CLINIC | Age: 41
End: 2021-06-08

## 2021-06-08 VITALS
HEART RATE: 82 BPM | RESPIRATION RATE: 16 BRPM | HEIGHT: 66 IN | DIASTOLIC BLOOD PRESSURE: 102 MMHG | WEIGHT: 173 LBS | SYSTOLIC BLOOD PRESSURE: 178 MMHG | TEMPERATURE: 97.4 F | OXYGEN SATURATION: 98 % | BODY MASS INDEX: 27.8 KG/M2

## 2021-06-08 DIAGNOSIS — C92.10 CML (CHRONIC MYELOCYTIC LEUKEMIA) (HCC): ICD-10-CM

## 2021-06-08 DIAGNOSIS — D50.9 IRON DEFICIENCY ANEMIA, UNSPECIFIED IRON DEFICIENCY ANEMIA TYPE: ICD-10-CM

## 2021-06-08 DIAGNOSIS — I10 ESSENTIAL HYPERTENSION: ICD-10-CM

## 2021-06-08 DIAGNOSIS — C92.10 CML (CHRONIC MYELOCYTIC LEUKEMIA) (HCC): Primary | ICD-10-CM

## 2021-06-08 LAB
ALBUMIN SERPL-MCNC: 4.1 G/DL (ref 3.5–5.2)
ALBUMIN/GLOB SERPL: 1.3 G/DL
ALP SERPL-CCNC: 81 U/L (ref 39–117)
ALT SERPL W P-5'-P-CCNC: 16 U/L (ref 1–33)
ANION GAP SERPL CALCULATED.3IONS-SCNC: 10 MMOL/L (ref 5–15)
AST SERPL-CCNC: 23 U/L (ref 1–32)
BASOPHILS # BLD AUTO: 0.03 10*3/MM3 (ref 0–0.2)
BASOPHILS NFR BLD AUTO: 0.7 % (ref 0–1.5)
BILIRUB SERPL-MCNC: 0.2 MG/DL (ref 0–1.2)
BUN SERPL-MCNC: 9 MG/DL (ref 6–20)
BUN/CREAT SERPL: 12.3 (ref 7–25)
CALCIUM SPEC-SCNC: 8.9 MG/DL (ref 8.6–10.5)
CHLORIDE SERPL-SCNC: 107 MMOL/L (ref 98–107)
CO2 SERPL-SCNC: 23 MMOL/L (ref 22–29)
CREAT SERPL-MCNC: 0.73 MG/DL (ref 0.57–1)
DEPRECATED RDW RBC AUTO: 49.8 FL (ref 37–54)
EOSINOPHIL # BLD AUTO: 0.08 10*3/MM3 (ref 0–0.4)
EOSINOPHIL NFR BLD AUTO: 2 % (ref 0.3–6.2)
ERYTHROCYTE [DISTWIDTH] IN BLOOD BY AUTOMATED COUNT: 13.8 % (ref 12.3–15.4)
GFR SERPL CREATININE-BSD FRML MDRD: 106 ML/MIN/1.73
GLOBULIN UR ELPH-MCNC: 3.1 GM/DL
GLUCOSE SERPL-MCNC: 102 MG/DL (ref 65–99)
HCT VFR BLD AUTO: 37.9 % (ref 34–46.6)
HGB BLD-MCNC: 12.7 G/DL (ref 12–15.9)
IMM GRANULOCYTES # BLD AUTO: 0.01 10*3/MM3 (ref 0–0.05)
IMM GRANULOCYTES NFR BLD AUTO: 0.2 % (ref 0–0.5)
LYMPHOCYTES # BLD AUTO: 2.2 10*3/MM3 (ref 0.7–3.1)
LYMPHOCYTES NFR BLD AUTO: 54.2 % (ref 19.6–45.3)
MCH RBC QN AUTO: 32.9 PG (ref 26.6–33)
MCHC RBC AUTO-ENTMCNC: 33.5 G/DL (ref 31.5–35.7)
MCV RBC AUTO: 98.2 FL (ref 79–97)
MONOCYTES # BLD AUTO: 0.37 10*3/MM3 (ref 0.1–0.9)
MONOCYTES NFR BLD AUTO: 9.1 % (ref 5–12)
NEUTROPHILS NFR BLD AUTO: 1.37 10*3/MM3 (ref 1.7–7)
NEUTROPHILS NFR BLD AUTO: 33.8 % (ref 42.7–76)
NRBC BLD AUTO-RTO: 0 /100 WBC (ref 0–0.2)
PLATELET # BLD AUTO: 671 10*3/MM3 (ref 140–450)
PMV BLD AUTO: 8.8 FL (ref 6–12)
POTASSIUM SERPL-SCNC: 3.7 MMOL/L (ref 3.5–5.2)
PROT SERPL-MCNC: 7.2 G/DL (ref 6–8.5)
RBC # BLD AUTO: 3.86 10*6/MM3 (ref 3.77–5.28)
SODIUM SERPL-SCNC: 140 MMOL/L (ref 136–145)
WBC # BLD AUTO: 4.06 10*3/MM3 (ref 3.4–10.8)

## 2021-06-08 PROCEDURE — 80053 COMPREHEN METABOLIC PANEL: CPT

## 2021-06-08 PROCEDURE — 85025 COMPLETE CBC W/AUTO DIFF WBC: CPT

## 2021-06-08 PROCEDURE — 36415 COLL VENOUS BLD VENIPUNCTURE: CPT

## 2021-06-08 PROCEDURE — 84466 ASSAY OF TRANSFERRIN: CPT

## 2021-06-08 PROCEDURE — 83540 ASSAY OF IRON: CPT

## 2021-06-08 PROCEDURE — 99215 OFFICE O/P EST HI 40 MIN: CPT | Performed by: INTERNAL MEDICINE

## 2021-06-08 PROCEDURE — 82728 ASSAY OF FERRITIN: CPT

## 2021-06-08 PROCEDURE — 81170 ABL1 GENE: CPT

## 2021-06-08 RX ORDER — AMLODIPINE BESYLATE 10 MG/1
TABLET ORAL
Qty: 90 TABLET | Refills: 1 | Status: SHIPPED | OUTPATIENT
Start: 2021-06-08 | End: 2021-12-30 | Stop reason: SDUPTHER

## 2021-06-09 ENCOUNTER — LAB (OUTPATIENT)
Dept: LAB | Facility: HOSPITAL | Age: 41
End: 2021-06-09

## 2021-06-09 ENCOUNTER — TELEPHONE (OUTPATIENT)
Dept: ONCOLOGY | Facility: CLINIC | Age: 41
End: 2021-06-09

## 2021-06-09 ENCOUNTER — SPECIALTY PHARMACY (OUTPATIENT)
Dept: ONCOLOGY | Facility: HOSPITAL | Age: 41
End: 2021-06-09

## 2021-06-09 PROCEDURE — 88237 TISSUE CULTURE BONE MARROW: CPT

## 2021-06-09 PROCEDURE — 88275 CYTOGENETICS 100-300: CPT

## 2021-06-09 PROCEDURE — 36415 COLL VENOUS BLD VENIPUNCTURE: CPT

## 2021-06-09 PROCEDURE — 88271 CYTOGENETICS DNA PROBE: CPT

## 2021-06-09 NOTE — TELEPHONE ENCOUNTER
----- Message from Ethan Dow MD sent at 6/8/2021  6:30 PM CDT -----  I asked jennifer to hold off on ordering the Bosulif pending results of TKI mutation study    Hold off 2D echocardiogram pending TKI mutation results    Pls call pt to continue Sprycel for now

## 2021-06-09 NOTE — PROGRESS NOTES
[facility]    Oral Oncology Initial Consult      Consult Details  Patient Name (): Winsome Becker  (1980)   Consulting Provider:   Ethan Dow MD ([facility])   Medication and Regimen:  Current: Sprycel 100 mg PO daily    Possible change to: Bosulif 500 mg PO daily    Hold order pending results of TKI mutation results     Script Review and Evaluation:  Dosing & Interactions:   Reviewed, appropriate and no significant interaction noted at this time..   Oral Treatment Plan Signed?: TREATMENT PLAN NOT CONVERTED - WAITING RESULT   Oral Treatment Plan Released?: No, continue Sprycel   Specialty Pharmacy Selected:  [facility]   Prior Authorization Status: Continue Sprycel pending labs   Scheduled Counseling Date: TBD - patient aware of possible Bosulif   Predicted Prescription Fill Date: TBD     Intervention(s):                  ----- Message -----   From: Althea Ruff RN   Sent: 2021  12:39 PM CDT   To: [facility] Pool   Subject: FW: New specialty med                               ----- Message -----   From: Johanne Zavala MA   Sent: 2021  12:15 PM CDT   To: Althea Ruff, RN   Subject: New specialty med                                 Rx:  Bosulif 500 mg daily # 30 x 2 RF     Plan not yet created. Contacted physician to double check. Patient is to continue Sprycel for now pending labs. Hold order for Bosulif.      Summary:    Hold order for Bosulif. Patient aware of potential change. Spoke with Elizabeth who recommended waiting on pre-cert. Patient would like to fill at [facility]. Continue Sprycel for now.      Manolo Beckett, PharmD  2021 15:59 CDT

## 2021-06-09 NOTE — TELEPHONE ENCOUNTER
I called and spoke with Winsome and made sure that she knew to continue the Spycell until we get the results of her labs.

## 2021-06-10 ENCOUNTER — TELEPHONE (OUTPATIENT)
Dept: ONCOLOGY | Facility: CLINIC | Age: 41
End: 2021-06-10

## 2021-06-10 DIAGNOSIS — C92.10 CML (CHRONIC MYELOCYTIC LEUKEMIA) (HCC): Primary | ICD-10-CM

## 2021-06-10 DIAGNOSIS — D50.9 IRON DEFICIENCY ANEMIA, UNSPECIFIED IRON DEFICIENCY ANEMIA TYPE: ICD-10-CM

## 2021-06-10 LAB
FERRITIN SERPL-MCNC: 44.59 NG/ML (ref 13–150)
IRON 24H UR-MRATE: 116 MCG/DL (ref 37–145)
IRON SATN MFR SERPL: 26 % (ref 20–50)
TIBC SERPL-MCNC: 443 MCG/DL (ref 298–536)
TRANSFERRIN SERPL-MCNC: 297 MG/DL (ref 200–360)

## 2021-06-13 LAB
INTERPRETATION: POSITIVE
LAB DIRECTOR NAME PROVIDER: NORMAL
LABORATORY COMMENT REPORT: NORMAL
REF LAB TEST METHOD: NORMAL
T(ABL1,BCR)B2A2/CONTROL BLD/T: 14.84 %
T(ABL1,BCR)B3A2/CONTROL BLD/T: NORMAL %
T(ABL1,BCR)E1A2/CONTROL BLD/T: NORMAL %

## 2021-06-18 ENCOUNTER — LAB (OUTPATIENT)
Dept: LAB | Facility: HOSPITAL | Age: 41
End: 2021-06-18

## 2021-06-18 DIAGNOSIS — C92.10 CML (CHRONIC MYELOCYTIC LEUKEMIA) (HCC): ICD-10-CM

## 2021-06-18 LAB
BASOPHILS # BLD AUTO: 0.04 10*3/MM3 (ref 0–0.2)
BASOPHILS NFR BLD AUTO: 0.8 % (ref 0–1.5)
DEPRECATED RDW RBC AUTO: 50.4 FL (ref 37–54)
EOSINOPHIL # BLD AUTO: 0.09 10*3/MM3 (ref 0–0.4)
EOSINOPHIL NFR BLD AUTO: 1.8 % (ref 0.3–6.2)
ERYTHROCYTE [DISTWIDTH] IN BLOOD BY AUTOMATED COUNT: 14 % (ref 12.3–15.4)
HCT VFR BLD AUTO: 37.9 % (ref 34–46.6)
HGB BLD-MCNC: 12.6 G/DL (ref 12–15.9)
IMM GRANULOCYTES # BLD AUTO: 0.01 10*3/MM3 (ref 0–0.05)
IMM GRANULOCYTES NFR BLD AUTO: 0.2 % (ref 0–0.5)
LYMPHOCYTES # BLD AUTO: 1.79 10*3/MM3 (ref 0.7–3.1)
LYMPHOCYTES NFR BLD AUTO: 36.2 % (ref 19.6–45.3)
MCH RBC QN AUTO: 32.6 PG (ref 26.6–33)
MCHC RBC AUTO-ENTMCNC: 33.2 G/DL (ref 31.5–35.7)
MCV RBC AUTO: 97.9 FL (ref 79–97)
MONOCYTES # BLD AUTO: 0.54 10*3/MM3 (ref 0.1–0.9)
MONOCYTES NFR BLD AUTO: 10.9 % (ref 5–12)
NEUTROPHILS NFR BLD AUTO: 2.47 10*3/MM3 (ref 1.7–7)
NEUTROPHILS NFR BLD AUTO: 50.1 % (ref 42.7–76)
NRBC BLD AUTO-RTO: 0 /100 WBC (ref 0–0.2)
PLATELET # BLD AUTO: 619 10*3/MM3 (ref 140–450)
PMV BLD AUTO: 9.3 FL (ref 6–12)
RBC # BLD AUTO: 3.87 10*6/MM3 (ref 3.77–5.28)
WBC # BLD AUTO: 4.94 10*3/MM3 (ref 3.4–10.8)

## 2021-06-18 PROCEDURE — 85025 COMPLETE CBC W/AUTO DIFF WBC: CPT

## 2021-06-18 PROCEDURE — 36415 COLL VENOUS BLD VENIPUNCTURE: CPT

## 2021-06-21 LAB
CELLS ANALYZED: 0
CELLS ANALYZED: 100
CELLS COUNTED: 0
CELLS COUNTED: 100
CELLS KARYOTYPED.TOTAL BLD/T: 0
CHROM ANALY RESULT (ISCN): NORMAL
CLINICAL CYTOGENETICIST SPEC: NORMAL
CLINICAL CYTOGENETICIST SPEC: NORMAL
DIAGNOSTIC IMP SPEC-IMP: NORMAL
ISCN BAND LEVEL QL: NORMAL
NARRATIVE DIAGNOSTIC REPORT-IMP: NORMAL
SPECIMEN SOURCE: NORMAL
SPECIMEN SOURCE: NORMAL
T(9;22)(ABL1,BCR) BLD/T FISH: NORMAL

## 2021-06-22 ENCOUNTER — TELEPHONE (OUTPATIENT)
Dept: ONCOLOGY | Facility: CLINIC | Age: 41
End: 2021-06-22

## 2021-06-22 NOTE — TELEPHONE ENCOUNTER
Caller: Winsome Becker    Relationship: Self    Best call back number: [phone]    What is the best time to reach you: ANY, PATIENT HAS PHONE ON HER NOW, SHE LEFT IT IN HER CAR EARLIER WHEN YOU CALLED.    Who are you requesting to speak with: MAN    Do you know the name of the person who called: WINSOME    What was the call regarding: RETURNING YOUR CALL RE: LABS.    *

## 2021-06-22 NOTE — TELEPHONE ENCOUNTER
Attempted to call patient to let her know that Dr. Dow would like for her to have a CBC drawn in one week. No answer and her voicemail has not been set up.

## 2021-06-22 NOTE — TELEPHONE ENCOUNTER
----- Message from Ethan Dow MD sent at 6/19/2021  2:22 PM CDT -----  Pls confirm that TKI mutation analysis/study has been drawn and in process and confirm that she is taking her sprycel (which we will switch to bosulif if the TKI analysis confirms no resistance to TKIs). Repeat CBC w diff 1 week. Tx u

## 2021-06-22 NOTE — TELEPHONE ENCOUNTER
Spoke with patient and she stated that the CBC drawn on Friday was a repeat CBC. She does not want to come in if not needed. She is out of town for work. I also let her know that the BCR abl Kinase is still pending. She should stay on Sprycel until we have the results back. Winsome verbalized understanding.

## 2021-06-24 LAB
AMINO ACID CHANGE: NORMAL
BCR/ABL1 KINASE DOMAIN MUT ANL BLD/T: NORMAL
DNA CHANGE: NORMAL
LAB DIRECTOR NAME PROVIDER: NORMAL
Lab: NORMAL
NARRATIVE DIAGNOSTIC REPORT-IMP: NORMAL
REF LAB TEST METHOD: NORMAL

## 2021-06-28 ENCOUNTER — TELEPHONE (OUTPATIENT)
Dept: ONCOLOGY | Facility: CLINIC | Age: 41
End: 2021-06-28

## 2021-06-28 NOTE — TELEPHONE ENCOUNTER
Discussed results of the BCR/ABL 1 PCR quant, kinase domain mutation, and CML FISH profile from 6/8/2021 with Dr. Carreno (BMT at [facility]) with seen the patient previously.  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  Dr. Carreno wants to review the mutation and see if bosutinib is effective before getting back to us.  If TKI options have been exhausted, will consider transplant.

## 2021-07-08 ENCOUNTER — SPECIALTY PHARMACY (OUTPATIENT)
Dept: ONCOLOGY | Facility: HOSPITAL | Age: 41
End: 2021-07-08

## 2021-07-08 DIAGNOSIS — C92.10 CML (CHRONIC MYELOCYTIC LEUKEMIA) (HCC): Primary | ICD-10-CM

## 2021-07-08 RX ORDER — ONDANSETRON HYDROCHLORIDE 8 MG/1
8 TABLET, FILM COATED ORAL 3 TIMES DAILY PRN
Qty: 30 TABLET | Refills: 5 | Status: SHIPPED | OUTPATIENT
Start: 2021-07-08 | End: 2021-11-16 | Stop reason: SDUPTHER

## 2021-07-08 NOTE — PROGRESS NOTES
[facility]    Oral Oncology Initial Consult      Consult Details  Patient Name (): Winsome Becker  (1980)   Consulting Provider:   Ethan Dow MD ([facility])   Medication and Regimen:  bosutinib [Bosulif] 500 mg po daily     Script Review and Evaluation:  Dosing & Interactions:   Reviewed, appropriate and no significant interaction noted at this time..   Oral Treatment Plan Signed?: Yes   Oral Treatment Plan Released?: Yes, released on 21.   Specialty Pharmacy Selected:  [facility]   Prior Authorization Status: Pending   Scheduled Counseling Date: TBD   Predicted Prescription Fill Date: TBD     Intervention(s):                  Notified by Dr. Dow that patient's BCR/ABL1 labs were reviewed by Dr. Carreno at [facility] and indicate resistance to dasatinib (patient's current chemotherapy regimen).  As such, she will begin bosutinib per Dr. Carreno's recommendation.  Contacted Ms. Becker to inform her of this change in her medication.  She would like to continue filling through [facility] since she had previously been utilizing their service for her dasatinib.  Informed her we will begin PA process and will contact her in next few days to update her of status, as well as to provide drug education on Bosulif.  Patient voiced understanding and has no questions at this time.     Summary:    Previously on dasatinib, labs showing resistance, now switching to bosutinib.  Released to [facility] and will f/u with patient for education and potential financial needs.     Jory Tejeda, PharmD  2021 15:31 CDT

## 2021-07-13 ENCOUNTER — TELEPHONE (OUTPATIENT)
Dept: ONCOLOGY | Facility: CLINIC | Age: 41
End: 2021-07-13

## 2021-07-13 NOTE — TELEPHONE ENCOUNTER
----- Message from Ethan Dow MD sent at 2021  9:14 AM CDT -----  Regarding: RE: no show  Tx u  ----- Message -----  From: Karmen Martinez  Sent: 2021   9:04 AM CDT  To: Ethan Dow MD, Barbara Shaw, #  Subject: RE: no show                                      Called patient and she is currently out of town she had a death in the family, she has a  to go to coming up and she said she would call back by the end of the week when she is back home to reschedule.  ----- Message -----  From: Ethan Dow MD  Sent: 2021   3:58 PM CDT  To: Karina Jameson, #  Subject: no show                                          Need to reschedule since we have started on a new drug for her CML

## 2021-07-15 NOTE — PROGRESS NOTES
[facility]     Oral Oncology Teaching and Consent      Patient Name (): Winsome Becker  (1980)   Oral Chemotherapy Regimen: bosutinib [Bosulif] 500  mg PO daily with food   Consulting Provider: Ethan Dow MD ([facility] [place] [facility])   Projected Start Date: TBD      Education and information provided to Patient.     Counseling Points  Administration Instructions:  · Dosing regimen.  · Medication should be taken with food and plenty of water.  · Proper storage and handling.  · Dosing may be changed based on side effects and lab results.  Side Effects & Management  · Side effects are often individualized and are usually able to be managed with medication or non-pharmacologic strategies.  · Importance of managing side effects preemptively and alerting provider if side effects are unable to be managed at home.    · Highlighted common side effects and management strategies  Fatigue Stay active   Diarrhea Loperamide (Immodium) - take 2 tablets after each loose stool   Nausea, Vomiting, Decreased Appetite Take previously prescribed medication.   Fluid retention Monitor and notify MD if troublesome   Liver/Kidney toxicity Keep lab appointments   Decreased Blood Levels (WBC, RBC, PLT, Hgb) Monitor for fever and signs of infection, signs and symptoms of bleeding, keep lab appointments.      · Current medications acceptable from an interaction standpoint, please alert of any changes as they can sometimes affect the medication.  Generally herbal products are not recommended.  · Protect skin from the sun.  · Barrier birth-control methods are recommended.     Contact Provider (or seek emergent care if warranted) If:  · Any of the above side effects are not able to be managed at home, increase dramatically, or become intolerable.  · Any signs or symptoms of allergic reaction including rash, trouble breathing, etc.  · Signs and symptoms of infection (fever, chills, etc.),  unusual bleeding or bruising, or confusion.  · Any unusual symptoms since starting this medication.    Questions/Comments:  Attempted to fill Bosulif at [facility], insurance required transfer to [facility].  Ms. Becker reports she has not been contacted by [facility] or received the medication.  Called [facility] [place] to confirm receipt of the prescription.  [facility] indicated they have a paid claim from 7/12/21 and have been trying to contact patient to arrange delivery, will try again today.    Reviewed dosing regimen and potential adverse effects.  She voiced understanding and had no questions or concerns at this time.  Recommended she review the literature that arrives with her medication, as well as the Bosulif website;  Call with any questions.     Consent:  Consent reviewed and signed.     Follow-up  Will follow-up with patient prior to next scheduled fill to monitor for side effects.     Jory Tejeda, PharmD  07/08/2021 11:52 CDT

## 2021-08-02 ENCOUNTER — SPECIALTY PHARMACY (OUTPATIENT)
Dept: ONCOLOGY | Facility: HOSPITAL | Age: 41
End: 2021-08-02

## 2021-08-02 ENCOUNTER — TELEPHONE (OUTPATIENT)
Dept: ONCOLOGY | Facility: CLINIC | Age: 41
End: 2021-08-02

## 2021-08-02 NOTE — TELEPHONE ENCOUNTER
Caller: Winsome Becker    Relationship to patient: Self    Best call back number: [phone]    Chief complaint: PATIENT CALLED STATING THAT SHE WOULD LIKE TO SCHEDULE AN APPT.  SHE WAS UNABLE TO KEEP July APPT AS SHE HAD NOT YET STARTED THE BOSULIF.  SHE WILL BE HOME THIS Friday AND WANTS TO KNOW IF WE COULD WORK HER IN THAT DAY.     Type of visit: LAB'/FU    Requested date: 8/6/21    If rescheduling, when is the original appointment: 7/12/21    Additional notes: PLEASE CONTACT PATIENT TO ADVISE.      THANKS

## 2021-08-02 NOTE — PROGRESS NOTES
[facility]    Oral Oncology Patient Follow-Up      Consult Details  Consulting Provider:   Ethan Dow MD ([facility] [place] [facility])   Medication and Regimen:  Bosulif 500 mg PO daily     Prescription Review  Dosing & Interactions:   Reviewed, appropriate and no significant interaction noted at this time..   Current Specialty Pharmacy [facility]     Intervention(s):                  I called and spoke with Winsome Becker today for routine follow up. The patient has not been on Bosulif for very long at this point. The medication ended up having to be transferred to [facility] due to insurance fill requirements, then it was mailed to her home. After that, as the patient works out of town for extended periods of time, the medication was mailed to her current location by family. Her first dose of the medication per her report was 7/30/21 Friday evening.     She reported that she thinks she is doing well. She had some diarrhea with the new medication and we did discuss Imodium management with that. She reports she takes the medication with food and takes it at night since the diarrhea seems to happen 8 hours or so after she takes a dose, so she wants to be sure she is going to be home.     Things went well receiving the medication from [facility]. No cost issues reported by the patient.      Summary:    She is doing well on her first few days of Bosulif. We will continue to follow.      Manolo Beckett, PharmD  08/02/2021 14:22 CDT

## 2021-08-06 ENCOUNTER — LAB (OUTPATIENT)
Dept: LAB | Facility: HOSPITAL | Age: 41
End: 2021-08-06

## 2021-08-06 DIAGNOSIS — C92.10 CML (CHRONIC MYELOCYTIC LEUKEMIA) (HCC): ICD-10-CM

## 2021-08-06 LAB
ALBUMIN SERPL-MCNC: 4.6 G/DL (ref 3.5–5.2)
ALBUMIN/GLOB SERPL: 1.3 G/DL
ALP SERPL-CCNC: 82 U/L (ref 39–117)
ALT SERPL W P-5'-P-CCNC: 11 U/L (ref 1–33)
ANION GAP SERPL CALCULATED.3IONS-SCNC: 8 MMOL/L (ref 5–15)
AST SERPL-CCNC: 22 U/L (ref 1–32)
BASOPHILS # BLD AUTO: 0.04 10*3/MM3 (ref 0–0.2)
BASOPHILS NFR BLD AUTO: 1.3 % (ref 0–1.5)
BILIRUB SERPL-MCNC: 0.3 MG/DL (ref 0–1.2)
BUN SERPL-MCNC: 15 MG/DL (ref 6–20)
BUN/CREAT SERPL: 19.7 (ref 7–25)
CALCIUM SPEC-SCNC: 9.1 MG/DL (ref 8.6–10.5)
CHLORIDE SERPL-SCNC: 108 MMOL/L (ref 98–107)
CO2 SERPL-SCNC: 25 MMOL/L (ref 22–29)
CREAT SERPL-MCNC: 0.76 MG/DL (ref 0.57–1)
DEPRECATED RDW RBC AUTO: 58.3 FL (ref 37–54)
EOSINOPHIL # BLD AUTO: 0.05 10*3/MM3 (ref 0–0.4)
EOSINOPHIL NFR BLD AUTO: 1.6 % (ref 0.3–6.2)
ERYTHROCYTE [DISTWIDTH] IN BLOOD BY AUTOMATED COUNT: 16.2 % (ref 12.3–15.4)
GFR SERPL CREATININE-BSD FRML MDRD: 101 ML/MIN/1.73
GLOBULIN UR ELPH-MCNC: 3.6 GM/DL
GLUCOSE SERPL-MCNC: 94 MG/DL (ref 65–99)
HCT VFR BLD AUTO: 38.1 % (ref 34–46.6)
HGB BLD-MCNC: 12.8 G/DL (ref 12–15.9)
IMM GRANULOCYTES # BLD AUTO: 0.01 10*3/MM3 (ref 0–0.05)
IMM GRANULOCYTES NFR BLD AUTO: 0.3 % (ref 0–0.5)
LYMPHOCYTES # BLD AUTO: 1.14 10*3/MM3 (ref 0.7–3.1)
LYMPHOCYTES NFR BLD AUTO: 37.5 % (ref 19.6–45.3)
MCH RBC QN AUTO: 32.7 PG (ref 26.6–33)
MCHC RBC AUTO-ENTMCNC: 33.6 G/DL (ref 31.5–35.7)
MCV RBC AUTO: 97.2 FL (ref 79–97)
MONOCYTES # BLD AUTO: 0.55 10*3/MM3 (ref 0.1–0.9)
MONOCYTES NFR BLD AUTO: 18.1 % (ref 5–12)
NEUTROPHILS NFR BLD AUTO: 1.25 10*3/MM3 (ref 1.7–7)
NEUTROPHILS NFR BLD AUTO: 41.2 % (ref 42.7–76)
NRBC BLD AUTO-RTO: 0 /100 WBC (ref 0–0.2)
PLATELET # BLD AUTO: 665 10*3/MM3 (ref 140–450)
PMV BLD AUTO: 9.6 FL (ref 6–12)
POTASSIUM SERPL-SCNC: 3.3 MMOL/L (ref 3.5–5.2)
PROT SERPL-MCNC: 8.2 G/DL (ref 6–8.5)
RBC # BLD AUTO: 3.92 10*6/MM3 (ref 3.77–5.28)
SODIUM SERPL-SCNC: 141 MMOL/L (ref 136–145)
WBC # BLD AUTO: 3.04 10*3/MM3 (ref 3.4–10.8)

## 2021-08-06 PROCEDURE — 80053 COMPREHEN METABOLIC PANEL: CPT

## 2021-08-06 PROCEDURE — 85025 COMPLETE CBC W/AUTO DIFF WBC: CPT

## 2021-08-06 PROCEDURE — 36415 COLL VENOUS BLD VENIPUNCTURE: CPT

## 2021-08-10 DIAGNOSIS — C92.10 CML (CHRONIC MYELOCYTIC LEUKEMIA) (HCC): Primary | ICD-10-CM

## 2021-08-10 DIAGNOSIS — E87.6 HYPOKALEMIA: ICD-10-CM

## 2021-08-10 RX ORDER — POTASSIUM CHLORIDE 20 MEQ/1
20 TABLET, EXTENDED RELEASE ORAL 3 TIMES DAILY
Qty: 9 TABLET | Refills: 0 | Status: SHIPPED | OUTPATIENT
Start: 2021-08-10 | End: 2021-08-13

## 2021-08-10 NOTE — TELEPHONE ENCOUNTER
----- Message from Ethan Dow MD sent at 8/6/2021  7:55 PM CDT -----  Platelets 665,000, potassium 3.3.  Please confirm that:  --She is taking the Bosulif 500 mg daily as prescribed  --: K-Dur 20 mEq p.o. 3 times daily x3 days  --Recheck CBC with differential and BMP in 1 week.  Thank you

## 2021-08-10 NOTE — TELEPHONE ENCOUNTER
Spoke with Winsome about her labs and that her platelet count is 665,000 and her potassium is 3.3.  Winsome stated that she has on been on the Bosulif since 7/30/21.  Discussed with her that Dr. Dow wants her to take a potassium supplement 3 times a day for 3 days and that she needs to get her labs checked in a week.  Winsome requested 8/20/21 at 2:30.

## 2021-08-12 LAB — REF LAB TEST METHOD: NORMAL

## 2021-08-13 LAB — REF LAB TEST METHOD: NORMAL

## 2021-08-23 ENCOUNTER — TELEPHONE (OUTPATIENT)
Dept: ONCOLOGY | Facility: CLINIC | Age: 41
End: 2021-08-23

## 2021-08-23 ENCOUNTER — LAB (OUTPATIENT)
Dept: LAB | Facility: HOSPITAL | Age: 41
End: 2021-08-23

## 2021-08-23 DIAGNOSIS — E87.6 HYPOKALEMIA: ICD-10-CM

## 2021-08-23 DIAGNOSIS — C92.10 CML (CHRONIC MYELOCYTIC LEUKEMIA) (HCC): ICD-10-CM

## 2021-08-23 LAB
ANION GAP SERPL CALCULATED.3IONS-SCNC: 9 MMOL/L (ref 5–15)
BASOPHILS # BLD AUTO: 0.05 10*3/MM3 (ref 0–0.2)
BASOPHILS NFR BLD AUTO: 0.9 % (ref 0–1.5)
BUN SERPL-MCNC: 13 MG/DL (ref 6–20)
BUN/CREAT SERPL: 16 (ref 7–25)
CALCIUM SPEC-SCNC: 8.8 MG/DL (ref 8.6–10.5)
CHLORIDE SERPL-SCNC: 107 MMOL/L (ref 98–107)
CO2 SERPL-SCNC: 21 MMOL/L (ref 22–29)
CREAT SERPL-MCNC: 0.81 MG/DL (ref 0.57–1)
DEPRECATED RDW RBC AUTO: 55.3 FL (ref 37–54)
EOSINOPHIL # BLD AUTO: 0.25 10*3/MM3 (ref 0–0.4)
EOSINOPHIL NFR BLD AUTO: 4.4 % (ref 0.3–6.2)
ERYTHROCYTE [DISTWIDTH] IN BLOOD BY AUTOMATED COUNT: 15.6 % (ref 12.3–15.4)
GFR SERPL CREATININE-BSD FRML MDRD: 94 ML/MIN/1.73
GLUCOSE SERPL-MCNC: 86 MG/DL (ref 65–99)
HCT VFR BLD AUTO: 34.4 % (ref 34–46.6)
HGB BLD-MCNC: 12 G/DL (ref 12–15.9)
IMM GRANULOCYTES # BLD AUTO: 0.02 10*3/MM3 (ref 0–0.05)
IMM GRANULOCYTES NFR BLD AUTO: 0.4 % (ref 0–0.5)
LYMPHOCYTES # BLD AUTO: 1.61 10*3/MM3 (ref 0.7–3.1)
LYMPHOCYTES NFR BLD AUTO: 28.4 % (ref 19.6–45.3)
MCH RBC QN AUTO: 33.5 PG (ref 26.6–33)
MCHC RBC AUTO-ENTMCNC: 34.9 G/DL (ref 31.5–35.7)
MCV RBC AUTO: 96.1 FL (ref 79–97)
MONOCYTES # BLD AUTO: 0.6 10*3/MM3 (ref 0.1–0.9)
MONOCYTES NFR BLD AUTO: 10.6 % (ref 5–12)
NEUTROPHILS NFR BLD AUTO: 3.13 10*3/MM3 (ref 1.7–7)
NEUTROPHILS NFR BLD AUTO: 55.3 % (ref 42.7–76)
NRBC BLD AUTO-RTO: 0 /100 WBC (ref 0–0.2)
PLATELET # BLD AUTO: 312 10*3/MM3 (ref 140–450)
PMV BLD AUTO: 10.1 FL (ref 6–12)
POTASSIUM SERPL-SCNC: 4 MMOL/L (ref 3.5–5.2)
RBC # BLD AUTO: 3.58 10*6/MM3 (ref 3.77–5.28)
SODIUM SERPL-SCNC: 137 MMOL/L (ref 136–145)
WBC # BLD AUTO: 5.66 10*3/MM3 (ref 3.4–10.8)

## 2021-08-23 PROCEDURE — 85025 COMPLETE CBC W/AUTO DIFF WBC: CPT

## 2021-08-23 PROCEDURE — 36415 COLL VENOUS BLD VENIPUNCTURE: CPT

## 2021-08-23 PROCEDURE — 80048 BASIC METABOLIC PNL TOTAL CA: CPT

## 2021-09-02 ENCOUNTER — TELEPHONE (OUTPATIENT)
Dept: ONCOLOGY | Facility: CLINIC | Age: 41
End: 2021-09-02

## 2021-09-02 ENCOUNTER — SPECIALTY PHARMACY (OUTPATIENT)
Dept: ONCOLOGY | Facility: HOSPITAL | Age: 41
End: 2021-09-02

## 2021-09-02 NOTE — TELEPHONE ENCOUNTER
Caller: Winsome Becker    Relationship to patient: Self    Best call back number: [phone]    Chief complaint: WORK SCHEDULE HAS CHANGED AND WANTING TO SEE IF CAN GET IN EARLIER FOR APPT SCHEDULED ON 09/08 WED TO CHANGE TO TUES 09/07 MORNING    Type of visit: FOLLOW UP WITH DR ADAMS    Requested date: 09/07TUES  ANYTIME MORNING TO 12PM   PREFERRED MORNING    WOULD BE THE BEST DATE WILL PROBABLY NOT BE ABLE TO MAKE WED APPT,     If rescheduling, when is the original appointment: 09/08    Additional notes:    PLEASE CALL PT BACK TO ADVISE IF CAN CHANGE TO EARLIER Tuesday APPT INSTEAD OF WED.     IF CALL AND CANT REACH PT, CAN SEND TEXT MESSAGE IF ABLE TO CHANGE APPT TO TUES 09/07

## 2021-09-02 NOTE — PROGRESS NOTES
"[facility]    Oral Oncology Patient Follow-Up      Consult Details  Consulting Provider:   Ethan Dow MD ([facility]   Medication and Regimen:  Bosutinib [Bosulif] 500 mg PO daily with food     Prescription Review  Dosing & Interactions:   Reviewed, appropriate and no significant interaction noted at this time..   Current Specialty Pharmacy [facility]     Intervention(s):                  Spoke with Ms. Becker today regarding her monthly routine follow-up on her oral chemotherapy. She stated she is tolerating the medication better than she has previously - her diarrhea episodes are more tolerable and have \"leveled out\" for her. She stated she is pleased with the medication overall. She is still receiving refills through the [facility] and after a few hiccups with it she says the process is much smoother now - there has been no delays in her refills or her therapy. I encouraged her to reach out to us at any time with any question or concern.     Summary:    No questions or concerns at this time, will continue to follow-up next month regarding her therapy.     Nelsy Diez, PharmD  09/02/2021 10:14 CDT    "

## 2021-09-07 ENCOUNTER — SPECIALTY PHARMACY (OUTPATIENT)
Dept: ONCOLOGY | Facility: HOSPITAL | Age: 41
End: 2021-09-07

## 2021-09-07 ENCOUNTER — TELEPHONE (OUTPATIENT)
Dept: ONCOLOGY | Facility: CLINIC | Age: 41
End: 2021-09-07

## 2021-09-07 ENCOUNTER — LAB (OUTPATIENT)
Dept: LAB | Facility: HOSPITAL | Age: 41
End: 2021-09-07

## 2021-09-07 ENCOUNTER — OFFICE VISIT (OUTPATIENT)
Dept: ONCOLOGY | Facility: CLINIC | Age: 41
End: 2021-09-07

## 2021-09-07 VITALS
HEIGHT: 66 IN | BODY MASS INDEX: 26.28 KG/M2 | OXYGEN SATURATION: 99 % | TEMPERATURE: 97.6 F | SYSTOLIC BLOOD PRESSURE: 130 MMHG | HEART RATE: 87 BPM | RESPIRATION RATE: 16 BRPM | DIASTOLIC BLOOD PRESSURE: 82 MMHG | WEIGHT: 163.5 LBS

## 2021-09-07 DIAGNOSIS — C92.10 CML (CHRONIC MYELOCYTIC LEUKEMIA) (HCC): ICD-10-CM

## 2021-09-07 DIAGNOSIS — C92.10 CML (CHRONIC MYELOCYTIC LEUKEMIA) (HCC): Primary | ICD-10-CM

## 2021-09-07 LAB
ALBUMIN SERPL-MCNC: 4.3 G/DL (ref 3.5–5.2)
ALBUMIN/GLOB SERPL: 1.4 G/DL
ALP SERPL-CCNC: 92 U/L (ref 39–117)
ALT SERPL W P-5'-P-CCNC: 19 U/L (ref 1–33)
ANION GAP SERPL CALCULATED.3IONS-SCNC: 9 MMOL/L (ref 5–15)
AST SERPL-CCNC: 25 U/L (ref 1–32)
BASOPHILS # BLD AUTO: 0.03 10*3/MM3 (ref 0–0.2)
BASOPHILS NFR BLD AUTO: 0.7 % (ref 0–1.5)
BILIRUB SERPL-MCNC: 0.2 MG/DL (ref 0–1.2)
BUN SERPL-MCNC: 13 MG/DL (ref 6–20)
BUN/CREAT SERPL: 16.5 (ref 7–25)
CALCIUM SPEC-SCNC: 9.4 MG/DL (ref 8.6–10.5)
CHLORIDE SERPL-SCNC: 105 MMOL/L (ref 98–107)
CO2 SERPL-SCNC: 25 MMOL/L (ref 22–29)
CREAT SERPL-MCNC: 0.79 MG/DL (ref 0.57–1)
DEPRECATED RDW RBC AUTO: 51.8 FL (ref 37–54)
EOSINOPHIL # BLD AUTO: 0.09 10*3/MM3 (ref 0–0.4)
EOSINOPHIL NFR BLD AUTO: 2.2 % (ref 0.3–6.2)
ERYTHROCYTE [DISTWIDTH] IN BLOOD BY AUTOMATED COUNT: 14.6 % (ref 12.3–15.4)
GFR SERPL CREATININE-BSD FRML MDRD: 97 ML/MIN/1.73
GLOBULIN UR ELPH-MCNC: 3.1 GM/DL
GLUCOSE SERPL-MCNC: 101 MG/DL (ref 65–99)
HCT VFR BLD AUTO: 38.8 % (ref 34–46.6)
HGB BLD-MCNC: 13.2 G/DL (ref 12–15.9)
IMM GRANULOCYTES # BLD AUTO: 0.01 10*3/MM3 (ref 0–0.05)
IMM GRANULOCYTES NFR BLD AUTO: 0.2 % (ref 0–0.5)
LYMPHOCYTES # BLD AUTO: 1.19 10*3/MM3 (ref 0.7–3.1)
LYMPHOCYTES NFR BLD AUTO: 28.5 % (ref 19.6–45.3)
MCH RBC QN AUTO: 32.6 PG (ref 26.6–33)
MCHC RBC AUTO-ENTMCNC: 34 G/DL (ref 31.5–35.7)
MCV RBC AUTO: 95.8 FL (ref 79–97)
MONOCYTES # BLD AUTO: 0.5 10*3/MM3 (ref 0.1–0.9)
MONOCYTES NFR BLD AUTO: 12 % (ref 5–12)
NEUTROPHILS NFR BLD AUTO: 2.35 10*3/MM3 (ref 1.7–7)
NEUTROPHILS NFR BLD AUTO: 56.4 % (ref 42.7–76)
NRBC BLD AUTO-RTO: 0 /100 WBC (ref 0–0.2)
PLATELET # BLD AUTO: 355 10*3/MM3 (ref 140–450)
PMV BLD AUTO: 9.5 FL (ref 6–12)
POTASSIUM SERPL-SCNC: 3.4 MMOL/L (ref 3.5–5.2)
PROT SERPL-MCNC: 7.4 G/DL (ref 6–8.5)
RBC # BLD AUTO: 4.05 10*6/MM3 (ref 3.77–5.28)
SODIUM SERPL-SCNC: 139 MMOL/L (ref 136–145)
WBC # BLD AUTO: 4.17 10*3/MM3 (ref 3.4–10.8)

## 2021-09-07 PROCEDURE — 81207 BCR/ABL1 GENE MINOR BP: CPT

## 2021-09-07 PROCEDURE — 88271 CYTOGENETICS DNA PROBE: CPT

## 2021-09-07 PROCEDURE — 99417 PROLNG OP E/M EACH 15 MIN: CPT | Performed by: INTERNAL MEDICINE

## 2021-09-07 PROCEDURE — 85025 COMPLETE CBC W/AUTO DIFF WBC: CPT

## 2021-09-07 PROCEDURE — 80053 COMPREHEN METABOLIC PANEL: CPT

## 2021-09-07 PROCEDURE — 81206 BCR/ABL1 GENE MAJOR BP: CPT

## 2021-09-07 PROCEDURE — 36415 COLL VENOUS BLD VENIPUNCTURE: CPT

## 2021-09-07 PROCEDURE — 88275 CYTOGENETICS 100-300: CPT

## 2021-09-07 PROCEDURE — 99215 OFFICE O/P EST HI 40 MIN: CPT | Performed by: INTERNAL MEDICINE

## 2021-09-07 RX ORDER — POTASSIUM CHLORIDE 20 MEQ/1
20 TABLET, EXTENDED RELEASE ORAL 3 TIMES DAILY
Qty: 9 TABLET | Refills: 0 | Status: SHIPPED | OUTPATIENT
Start: 2021-09-07 | End: 2021-09-10

## 2021-09-07 NOTE — TELEPHONE ENCOUNTER
Spoke with Winsome and she is agreeable. [facility].
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
## 2021-09-07 NOTE — PROGRESS NOTES
[facility] Specialty Pharmacy Services    Oral Oncology Patient Refill      Consult Details  Consulting Provider:   Ethan Dow MD ([facility])   Medication and Regimen:  Bosulif 500 mg PO daily     Prescription Review  Dosing & Interactions:   Reviewed, appropriate and no significant interaction noted at this time..   Current Specialty Pharmacy [facility]     Intervention(s):  Received Message to Specialty   ----- Message -----   From: Johanne Zavala MA   Sent: 9/7/2021   9:25 AM CDT   To: Althea Ruff RN   Subject: specialty med                                     Rx:  Bosulif 500 mg daily # 30 x 2 RF   ---------------------------------------------------------------------------    Patient had office visit today. Continue current therapy.     We previously spoke with this patient last week, 9/2/21. No issues.     Refills submitted today ahead of time. Patient previously filled at [facility] but was transferred to [facility] due to insurance/drug requirements.     [facility]/pharmacy #[id number] - [place], [place] - [address] - [phone]  - [phone]    [address] [address]   Phone:  [phone]  Fax:  [phone]                    Summary:    Refills submitted. We will follow up with the patient at next normal check-in.     Manolo Beckett PharmD  09/07/2021 11:04 CDT

## 2021-09-07 NOTE — TELEPHONE ENCOUNTER
----- Message from Ethan Dow MD sent at 9/7/2021 12:18 PM CDT -----  K+ 3.4 - kdur 20 meq tid x 3 days

## 2021-09-14 NOTE — NURSING NOTE
Dr suarez and crna at bedside in recovery for recovery re port and assessment   STACEY:    RUR 7%    Disposition: Discharge home tomorrow by 10am.    Transportation: Patient's sister will transport home.     Follow up: PCP/Specialist    Primary Contact: [facility] [place] [phone]    Pretty Tripathi RN/CRM  [phone]

## 2021-09-15 LAB — REF LAB TEST METHOD: NORMAL

## 2021-09-29 ENCOUNTER — SPECIALTY PHARMACY (OUTPATIENT)
Dept: ONCOLOGY | Facility: HOSPITAL | Age: 41
End: 2021-09-29

## 2021-09-29 NOTE — PROGRESS NOTES
[facility]    Oral Oncology Patient Follow-Up      Consult Details  Consulting Provider:   Ethan Dow MD ([facility])   Medication and Regimen:  Bosutinib [Bosulif] 500 mg PO daily     Prescription Review  Dosing & Interactions:   Reviewed, appropriate and no significant interaction noted at this time..   Current Specialty Pharmacy [facility]     Intervention(s):                  Spoke with patient regarding her monthly follow-up on her oral chemotherapy. She states she is not having any obvious side effects that she is worried about today. She does have some upset stomach but she takes Imodium with the therapy and states that works pretty good for her. She just received a refill from [facility] with no problems or delays. I encouraged her to reach out any time with any questions or concerns.     Summary:    No concerns or questions at this time, will defer out to next month for next follow-up.     Nelsy Diez, PharmD  09/29/2021 16:09 CDT

## 2021-10-01 ENCOUNTER — TELEPHONE (OUTPATIENT)
Dept: ONCOLOGY | Facility: CLINIC | Age: 41
End: 2021-10-01

## 2021-10-01 NOTE — TELEPHONE ENCOUNTER
Caller: Winsome Becker    Relationship to patient: Self    Best call back number: [phone]    Chief complaint: PT TO SCHED ECHO    Type of visit: ECHO    Requested date:PT REQUEST Monday  IF AVAILABLE    If rescheduling, when is the original appointment: NA    Additional notes:

## 2021-10-01 NOTE — TELEPHONE ENCOUNTER
CALLED SCHEDULING TO GET THIS R/S AND THEY HAD TO SEND OUT AN EMAIL DUE TO THIS IS FOR CHEMO. WILL CALL PT AS SOON AS I HEAR BACK. 10/01/2021 BENITO

## 2021-10-04 ENCOUNTER — TELEPHONE (OUTPATIENT)
Dept: ONCOLOGY | Facility: CLINIC | Age: 41
End: 2021-10-04

## 2021-10-04 DIAGNOSIS — C92.10 CML (CHRONIC MYELOCYTIC LEUKEMIA) (HCC): Primary | ICD-10-CM

## 2021-10-04 NOTE — TELEPHONE ENCOUNTER
SPOKE WITH [place] ABOUT HER ECHO APPT. WHEN SPEAKING TO HER I TOLD HER THAT SHE HAD MISSED THE ONE THAT WAS SCHEDULED ON 09/14/2021. BUT I DID GET IT R/S FOR 10/08/2021. BUT PT STATED THAT APPT WOULD NOT WORK FOR HER DUE TO HER WORKING IN [place]. I INFORMED PT THAT SCHEDULING IS BOOKED OUT, BUT SINCE IT WAS DUE TO HER CHEMO THEY WOULD HAVE TO SEND OUT AN EMAIL TO GET IT APPROVED TO GET HER IN EARLIER.  I DID GIVE PT THE NUMBER TO SCHEDULING SO SHE COULD MAKE HER APPT THAT WAS BETTER FOR HER. 10/04/2021 BS

## 2021-10-28 ENCOUNTER — SPECIALTY PHARMACY (OUTPATIENT)
Dept: ONCOLOGY | Facility: HOSPITAL | Age: 41
End: 2021-10-28

## 2021-10-28 NOTE — PROGRESS NOTES
[facility]    Oral Oncology Patient Follow-Up      Consult Details  Consulting Provider:   Ethan Dow MD ([facility])   Medication and Regimen:  Bosutinib [Bosulif] 500 mg PO daily     Prescription Review  Dosing & Interactions:   Reviewed, appropriate and no significant interaction noted at this time..   Current Specialty Pharmacy [facility]     Intervention(s):                  Spoke with patient regarding her monthly follow-up on her oral chemotherapy. She states she is not having any obvious side effects that she is worried about today. She still receives refills from [facility] with no problems or delays. I encouraged her to reach out any time with any questions or concerns.     Summary:    No questions or concerns at this time, will continue to follow-up for next month's visit.     Nelsy Diez, PharmD  10/28/2021 16:42 CDT

## 2021-11-02 ENCOUNTER — TELEPHONE (OUTPATIENT)
Dept: ONCOLOGY | Facility: CLINIC | Age: 41
End: 2021-11-02

## 2021-11-02 NOTE — TELEPHONE ENCOUNTER
Caller: VIRGINIA ANTUNEZ    Relationship to patient: PATIENT    Best call back number: [phone]    Type of visit: FOLLOW UP    Requested date: ANY     If rescheduling, when is the original appointment: 10/12/21    Additional notes: PATIENT STATED SHE ALSO NEEDED AN APPT WITH THE [facility] TO MAKE SURE HER HEART CAN WITHSTAND THE NEW MEDICINE SHE WILL BE TAKING.    PLEASE CALL TO DISCUSS.

## 2021-11-03 ENCOUNTER — TELEPHONE (OUTPATIENT)
Dept: ONCOLOGY | Facility: CLINIC | Age: 41
End: 2021-11-03

## 2021-11-03 NOTE — TELEPHONE ENCOUNTER
Caller: Winsome Becker    Relationship: Self    Best call back number: [phone]    What is the best time to reach you: ANYTIME         What was the call regarding: PATIENT IS RETURNING A CALL. SHE WASN'T SURE WHO CALLED HER    Do you require a callback: YES, PATIENT WORKS NIGHTS. VOICEMAIL ISNT WORKING. SHE WILL TRY AND ANSWER

## 2021-11-11 ENCOUNTER — LAB (OUTPATIENT)
Dept: LAB | Facility: HOSPITAL | Age: 41
End: 2021-11-11

## 2021-11-11 ENCOUNTER — TELEPHONE (OUTPATIENT)
Dept: ONCOLOGY | Facility: CLINIC | Age: 41
End: 2021-11-11

## 2021-11-11 DIAGNOSIS — E87.6 LOW BLOOD POTASSIUM: Primary | ICD-10-CM

## 2021-11-11 DIAGNOSIS — C92.10 CML (CHRONIC MYELOCYTIC LEUKEMIA) (HCC): ICD-10-CM

## 2021-11-11 LAB
ALBUMIN SERPL-MCNC: 3.9 G/DL (ref 3.5–5.2)
ALBUMIN/GLOB SERPL: 1.7 G/DL
ALP SERPL-CCNC: 101 U/L (ref 39–117)
ALT SERPL W P-5'-P-CCNC: 18 U/L (ref 1–33)
ANION GAP SERPL CALCULATED.3IONS-SCNC: 12 MMOL/L (ref 5–15)
AST SERPL-CCNC: 28 U/L (ref 1–32)
BASOPHILS # BLD AUTO: 0.01 10*3/MM3 (ref 0–0.2)
BASOPHILS NFR BLD AUTO: 0.2 % (ref 0–1.5)
BILIRUB SERPL-MCNC: 0.2 MG/DL (ref 0–1.2)
BUN SERPL-MCNC: 17 MG/DL (ref 6–20)
BUN/CREAT SERPL: 21.5 (ref 7–25)
CALCIUM SPEC-SCNC: 8.5 MG/DL (ref 8.6–10.5)
CHLORIDE SERPL-SCNC: 108 MMOL/L (ref 98–107)
CO2 SERPL-SCNC: 21 MMOL/L (ref 22–29)
CREAT SERPL-MCNC: 0.79 MG/DL (ref 0.57–1)
DEPRECATED RDW RBC AUTO: 51.4 FL (ref 37–54)
EOSINOPHIL # BLD AUTO: 0.09 10*3/MM3 (ref 0–0.4)
EOSINOPHIL NFR BLD AUTO: 2.2 % (ref 0.3–6.2)
ERYTHROCYTE [DISTWIDTH] IN BLOOD BY AUTOMATED COUNT: 14.8 % (ref 12.3–15.4)
GFR SERPL CREATININE-BSD FRML MDRD: 96 ML/MIN/1.73
GLOBULIN UR ELPH-MCNC: 2.3 GM/DL
GLUCOSE SERPL-MCNC: 130 MG/DL (ref 65–99)
HCT VFR BLD AUTO: 35.2 % (ref 34–46.6)
HGB BLD-MCNC: 11.6 G/DL (ref 12–15.9)
IMM GRANULOCYTES # BLD AUTO: 0.01 10*3/MM3 (ref 0–0.05)
IMM GRANULOCYTES NFR BLD AUTO: 0.2 % (ref 0–0.5)
LYMPHOCYTES # BLD AUTO: 1.68 10*3/MM3 (ref 0.7–3.1)
LYMPHOCYTES NFR BLD AUTO: 40.5 % (ref 19.6–45.3)
MCH RBC QN AUTO: 31 PG (ref 26.6–33)
MCHC RBC AUTO-ENTMCNC: 33 G/DL (ref 31.5–35.7)
MCV RBC AUTO: 94.1 FL (ref 79–97)
MONOCYTES # BLD AUTO: 0.46 10*3/MM3 (ref 0.1–0.9)
MONOCYTES NFR BLD AUTO: 11.1 % (ref 5–12)
NEUTROPHILS NFR BLD AUTO: 1.9 10*3/MM3 (ref 1.7–7)
NEUTROPHILS NFR BLD AUTO: 45.8 % (ref 42.7–76)
NRBC BLD AUTO-RTO: 0 /100 WBC (ref 0–0.2)
PLATELET # BLD AUTO: 224 10*3/MM3 (ref 140–450)
PMV BLD AUTO: 9.6 FL (ref 6–12)
POTASSIUM SERPL-SCNC: 2.9 MMOL/L (ref 3.5–5.2)
PROT SERPL-MCNC: 6.2 G/DL (ref 6–8.5)
RBC # BLD AUTO: 3.74 10*6/MM3 (ref 3.77–5.28)
SODIUM SERPL-SCNC: 141 MMOL/L (ref 136–145)
WBC # BLD AUTO: 4.15 10*3/MM3 (ref 3.4–10.8)

## 2021-11-11 PROCEDURE — 36415 COLL VENOUS BLD VENIPUNCTURE: CPT

## 2021-11-11 PROCEDURE — 85025 COMPLETE CBC W/AUTO DIFF WBC: CPT

## 2021-11-11 PROCEDURE — 80053 COMPREHEN METABOLIC PANEL: CPT

## 2021-11-11 RX ORDER — POTASSIUM CHLORIDE 20 MEQ/1
20 TABLET, EXTENDED RELEASE ORAL 3 TIMES DAILY
Qty: 9 TABLET | Refills: 0 | Status: SHIPPED | OUTPATIENT
Start: 2021-11-11 | End: 2021-11-14

## 2021-11-11 NOTE — TELEPHONE ENCOUNTER
----- Message from Ethan Dow MD sent at 11/11/2021  7:59 AM CST -----  Call in kdur 20 meq tid x 3 days then recheck k+ on Monday    Called patient with the above information. She has an appointment with Dr. Dow next Tuesday so she will get her potassium rechecked that day before her appointment. Patient verbalized understanding that the medication will be called into her pharmacy.     rp
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
## 2021-11-16 ENCOUNTER — OFFICE VISIT (OUTPATIENT)
Dept: ONCOLOGY | Facility: CLINIC | Age: 41
End: 2021-11-16

## 2021-11-16 VITALS
WEIGHT: 158.8 LBS | HEIGHT: 66 IN | TEMPERATURE: 97.5 F | DIASTOLIC BLOOD PRESSURE: 90 MMHG | BODY MASS INDEX: 25.52 KG/M2 | RESPIRATION RATE: 16 BRPM | HEART RATE: 84 BPM | SYSTOLIC BLOOD PRESSURE: 138 MMHG | OXYGEN SATURATION: 99 %

## 2021-11-16 DIAGNOSIS — C92.10 CML (CHRONIC MYELOCYTIC LEUKEMIA) (HCC): ICD-10-CM

## 2021-11-16 PROCEDURE — 99215 OFFICE O/P EST HI 40 MIN: CPT | Performed by: INTERNAL MEDICINE

## 2021-11-16 PROCEDURE — G2212 PROLONG OUTPT/OFFICE VIS: HCPCS | Performed by: INTERNAL MEDICINE

## 2021-11-16 RX ORDER — ONDANSETRON HYDROCHLORIDE 8 MG/1
8 TABLET, FILM COATED ORAL 3 TIMES DAILY PRN
Qty: 30 TABLET | Refills: 5 | Status: SHIPPED | OUTPATIENT
Start: 2021-11-16 | End: 2022-04-04

## 2021-11-17 ENCOUNTER — HOSPITAL ENCOUNTER (OUTPATIENT)
Dept: CARDIOLOGY | Facility: HOSPITAL | Age: 41
Discharge: HOME | End: 2021-11-17
Admitting: INTERNAL MEDICINE

## 2021-11-17 ENCOUNTER — LAB (OUTPATIENT)
Dept: LAB | Facility: HOSPITAL | Age: 41
End: 2021-11-17

## 2021-11-17 VITALS
BODY MASS INDEX: 25.51 KG/M2 | SYSTOLIC BLOOD PRESSURE: 138 MMHG | DIASTOLIC BLOOD PRESSURE: 90 MMHG | WEIGHT: 158.73 LBS | HEIGHT: 66 IN

## 2021-11-17 DIAGNOSIS — C92.10 CML (CHRONIC MYELOCYTIC LEUKEMIA) (HCC): ICD-10-CM

## 2021-11-17 LAB
ALBUMIN SERPL-MCNC: 4.1 G/DL (ref 3.5–5.2)
ALBUMIN/GLOB SERPL: 1.4 G/DL
ALP SERPL-CCNC: 103 U/L (ref 39–117)
ALT SERPL W P-5'-P-CCNC: 21 U/L (ref 1–33)
ANION GAP SERPL CALCULATED.3IONS-SCNC: 9 MMOL/L (ref 5–15)
AST SERPL-CCNC: 22 U/L (ref 1–32)
BILIRUB SERPL-MCNC: 0.2 MG/DL (ref 0–1.2)
BUN SERPL-MCNC: 16 MG/DL (ref 6–20)
BUN/CREAT SERPL: 21.1 (ref 7–25)
CALCIUM SPEC-SCNC: 8.8 MG/DL (ref 8.6–10.5)
CHLORIDE SERPL-SCNC: 107 MMOL/L (ref 98–107)
CO2 SERPL-SCNC: 21 MMOL/L (ref 22–29)
CREAT SERPL-MCNC: 0.76 MG/DL (ref 0.57–1)
GFR SERPL CREATININE-BSD FRML MDRD: 101 ML/MIN/1.73
GLOBULIN UR ELPH-MCNC: 2.9 GM/DL
GLUCOSE SERPL-MCNC: 96 MG/DL (ref 65–99)
POTASSIUM SERPL-SCNC: 3.8 MMOL/L (ref 3.5–5.2)
PROT SERPL-MCNC: 7 G/DL (ref 6–8.5)
SODIUM SERPL-SCNC: 137 MMOL/L (ref 136–145)

## 2021-11-17 PROCEDURE — 93306 TTE W/DOPPLER COMPLETE: CPT | Performed by: INTERNAL MEDICINE

## 2021-11-17 PROCEDURE — 36415 COLL VENOUS BLD VENIPUNCTURE: CPT

## 2021-11-17 PROCEDURE — 93356 MYOCRD STRAIN IMG SPCKL TRCK: CPT

## 2021-11-17 PROCEDURE — 93306 TTE W/DOPPLER COMPLETE: CPT

## 2021-11-17 PROCEDURE — 80053 COMPREHEN METABOLIC PANEL: CPT

## 2021-11-17 PROCEDURE — 93356 MYOCRD STRAIN IMG SPCKL TRCK: CPT | Performed by: INTERNAL MEDICINE

## 2021-11-18 LAB
BH CV ECHO MEAS - AO MAX PG (FULL): 8.1 MMHG
BH CV ECHO MEAS - AO MAX PG: 13.1 MMHG
BH CV ECHO MEAS - AO MEAN PG (FULL): 4 MMHG
BH CV ECHO MEAS - AO MEAN PG: 7 MMHG
BH CV ECHO MEAS - AO ROOT AREA (BSA CORRECTED): 1.5
BH CV ECHO MEAS - AO ROOT AREA: 5.7 CM^2
BH CV ECHO MEAS - AO ROOT DIAM: 2.7 CM
BH CV ECHO MEAS - AO V2 MAX: 181 CM/SEC
BH CV ECHO MEAS - AO V2 MEAN: 126 CM/SEC
BH CV ECHO MEAS - AO V2 VTI: 34.9 CM
BH CV ECHO MEAS - AVA(I,A): 1.6 CM^2
BH CV ECHO MEAS - AVA(I,D): 1.6 CM^2
BH CV ECHO MEAS - AVA(V,A): 1.6 CM^2
BH CV ECHO MEAS - AVA(V,D): 1.6 CM^2
BH CV ECHO MEAS - BSA(HAYCOCK): 1.8 M^2
BH CV ECHO MEAS - BSA: 1.8 M^2
BH CV ECHO MEAS - BZI_BMI: 25.5 KILOGRAMS/M^2
BH CV ECHO MEAS - BZI_METRIC_HEIGHT: 167.6 CM
BH CV ECHO MEAS - BZI_METRIC_WEIGHT: 71.7 KG
BH CV ECHO MEAS - EDV(CUBED): 69.9 ML
BH CV ECHO MEAS - EDV(MOD-SP2): 57.2 ML
BH CV ECHO MEAS - EDV(MOD-SP4): 62.9 ML
BH CV ECHO MEAS - EDV(TEICH): 75.1 ML
BH CV ECHO MEAS - EF(CUBED): 71.5 %
BH CV ECHO MEAS - EF(MOD-SP2): 70.6 %
BH CV ECHO MEAS - EF(MOD-SP4): 62.6 %
BH CV ECHO MEAS - EF(TEICH): 63.7 %
BH CV ECHO MEAS - ESV(CUBED): 19.9 ML
BH CV ECHO MEAS - ESV(MOD-SP2): 16.8 ML
BH CV ECHO MEAS - ESV(MOD-SP4): 23.5 ML
BH CV ECHO MEAS - ESV(TEICH): 27.3 ML
BH CV ECHO MEAS - FS: 34.2 %
BH CV ECHO MEAS - IVS/LVPW: 1.1
BH CV ECHO MEAS - IVSD: 1.1 CM
BH CV ECHO MEAS - LA DIMENSION: 2.9 CM
BH CV ECHO MEAS - LA/AO: 1.1
BH CV ECHO MEAS - LAT PEAK E' VEL: 8.5 CM/SEC
BH CV ECHO MEAS - LV DIASTOLIC VOL/BSA (35-75): 34.8 ML/M^2
BH CV ECHO MEAS - LV MASS(C)D: 140.4 GRAMS
BH CV ECHO MEAS - LV MASS(C)DI: 77.6 GRAMS/M^2
BH CV ECHO MEAS - LV MAX PG: 5 MMHG
BH CV ECHO MEAS - LV MEAN PG: 3 MMHG
BH CV ECHO MEAS - LV SYSTOLIC VOL/BSA (12-30): 13 ML/M^2
BH CV ECHO MEAS - LV V1 MAX: 112 CM/SEC
BH CV ECHO MEAS - LV V1 MEAN: 74.9 CM/SEC
BH CV ECHO MEAS - LV V1 VTI: 22.6 CM
BH CV ECHO MEAS - LVIDD: 4.1 CM
BH CV ECHO MEAS - LVIDS: 2.7 CM
BH CV ECHO MEAS - LVLD AP2: 7.8 CM
BH CV ECHO MEAS - LVLD AP4: 8 CM
BH CV ECHO MEAS - LVLS AP2: 5.8 CM
BH CV ECHO MEAS - LVLS AP4: 5.9 CM
BH CV ECHO MEAS - LVOT AREA (M): 2.5 CM^2
BH CV ECHO MEAS - LVOT AREA: 2.5 CM^2
BH CV ECHO MEAS - LVOT DIAM: 1.8 CM
BH CV ECHO MEAS - LVPWD: 0.97 CM
BH CV ECHO MEAS - MED PEAK E' VEL: 7.73 CM/SEC
BH CV ECHO MEAS - MV A MAX VEL: 71.8 CM/SEC
BH CV ECHO MEAS - MV DEC TIME: 0.2 SEC
BH CV ECHO MEAS - MV E MAX VEL: 104 CM/SEC
BH CV ECHO MEAS - MV E/A: 1.4
BH CV ECHO MEAS - SI(AO): 110.4 ML/M^2
BH CV ECHO MEAS - SI(CUBED): 27.6 ML/M^2
BH CV ECHO MEAS - SI(LVOT): 31.8 ML/M^2
BH CV ECHO MEAS - SI(MOD-SP2): 22.3 ML/M^2
BH CV ECHO MEAS - SI(MOD-SP4): 21.8 ML/M^2
BH CV ECHO MEAS - SI(TEICH): 26.4 ML/M^2
BH CV ECHO MEAS - SV(AO): 199.8 ML
BH CV ECHO MEAS - SV(CUBED): 50 ML
BH CV ECHO MEAS - SV(LVOT): 57.5 ML
BH CV ECHO MEAS - SV(MOD-SP2): 40.4 ML
BH CV ECHO MEAS - SV(MOD-SP4): 39.4 ML
BH CV ECHO MEAS - SV(TEICH): 47.8 ML
BH CV ECHO MEAS - TR MAX VEL: 254 CM/SEC
BH CV ECHO MEASUREMENTS AVERAGE E/E' RATIO: 12.82
LEFT ATRIUM VOLUME INDEX: 23.3 ML/M2
LEFT ATRIUM VOLUME: 42.2 CM3
MAXIMAL PREDICTED HEART RATE: 177 BPM
STRESS TARGET HR: 150 BPM

## 2021-11-24 ENCOUNTER — TELEPHONE (OUTPATIENT)
Dept: ONCOLOGY | Facility: CLINIC | Age: 41
End: 2021-11-24

## 2021-11-24 ENCOUNTER — SPECIALTY PHARMACY (OUTPATIENT)
Dept: ONCOLOGY | Facility: HOSPITAL | Age: 41
End: 2021-11-24

## 2021-11-24 NOTE — TELEPHONE ENCOUNTER
Call from Alma at Dr. Shelbie Gillespie office. She is asking if the patient will need to hold Bosulif for her upcoming hernia repair surgery. I discussed with Dr. Dow and there is no need to hold Bosulif. I called and left a message for Alma.

## 2021-11-24 NOTE — PROGRESS NOTES
[facility]    Oral Oncology Patient Follow-Up      Consult Details  Consulting Provider:   Ethan Dow MD ([facility])   Medication and Regimen:  Bosulif 500 mg PO daily     Prescription Review  Dosing & Interactions:   Reviewed, appropriate and no significant interaction noted at this time..   Current Specialty Pharmacy [facility]/pharmacy #[id number] - [place], [place] - [address] - [phone]  - [phone]      Intervention(s):                  Spoke with patient regarding her monthly follow-up on her oral chemotherapy. She states everything is going good. She denies any questions, needs or concerns at this time. I encouraged her to reach out any time if she needs assistance regarding her chemotherapy regimen.      Summary:    Patient doing well. No immediate needs identified. Continue routine follow up.      Manolo Beckett, PharmD  11/24/2021 14:50 CST

## 2021-12-01 ENCOUNTER — HOSPITAL ENCOUNTER (OUTPATIENT)
Dept: GENERAL RADIOLOGY | Facility: HOSPITAL | Age: 41
Discharge: HOME | End: 2021-12-01

## 2021-12-01 ENCOUNTER — TRANSCRIBE ORDERS (OUTPATIENT)
Dept: LAB | Facility: HOSPITAL | Age: 41
End: 2021-12-01

## 2021-12-01 ENCOUNTER — PRE-ADMISSION TESTING (OUTPATIENT)
Dept: PREADMISSION TESTING | Facility: HOSPITAL | Age: 41
End: 2021-12-01

## 2021-12-01 VITALS
RESPIRATION RATE: 18 BRPM | BODY MASS INDEX: 26.89 KG/M2 | DIASTOLIC BLOOD PRESSURE: 94 MMHG | OXYGEN SATURATION: 100 % | HEIGHT: 65 IN | WEIGHT: 161.38 LBS | SYSTOLIC BLOOD PRESSURE: 151 MMHG | HEART RATE: 89 BPM

## 2021-12-01 DIAGNOSIS — Z11.59 SCREENING FOR VIRAL DISEASE: Primary | ICD-10-CM

## 2021-12-01 LAB — REF LAB TEST METHOD: NORMAL

## 2021-12-01 PROCEDURE — 93005 ELECTROCARDIOGRAM TRACING: CPT

## 2021-12-01 PROCEDURE — 71046 X-RAY EXAM CHEST 2 VIEWS: CPT

## 2021-12-01 PROCEDURE — 93010 ELECTROCARDIOGRAM REPORT: CPT | Performed by: INTERNAL MEDICINE

## 2021-12-01 NOTE — DISCHARGE INSTRUCTIONS
PATIENT/FAMILY/RESPONSIBLE PARTY VERBALIZES UNDERSTANDING OF ABOVE EDUCATION.  COPY OF PAIN SCALE GIVEN AND REVIEWED WITH VERBALIZED UNDERSTANDING.  DAY OF SURGERY INSTRUCTIONS          ARRIVAL TIME: AS DIRECTED BY OFFICE    YOU MAY TAKE THE FOLLOWING MEDICATION(S) THE MORNING OF SURGERY WITH A SIP OF WATER: NONE      ALL OTHER HOME MEDICATION CHECK WITH YOUR PHYSICIAN      DO NOT TAKE ANY ERECTILE DYSFUNCTION MEDICATIONS (EX: CIALIS, VIAGRA) 24 HOURS PRIOR TO SURGERY                      MANAGING PAIN AFTER SURGERY    We know you are probably wondering what your pain will be like after surgery.  Following surgery it is unrealistic to expect you will not have pain.   Pain is how our bodies let us know that something is wrong or cautions us to be careful.  That said, our goal is to make your pain tolerable.    Methods we may use to treat your pain include (oral or IV medications, PCAs, epidurals, nerve blocks, etc.)   While some procedures require IV pain medications for a short time after surgery, transitioning to pain medications by mouth allows for better management of pain.   Your nurse will encourage you to take oral pain medications whenever possible.  IV medications work almost immediately, but only last a short while.  Taking medications by mouth allows for a more constant level of medication in your blood stream for a longer period of time.      Once your pain is out of control it is harder to get back under control.  It is important you are aware when your next dose of pain medication is due.  If you are admitted, your nurse may write the time of your next dose on the white board in your room to help you remember.      We are interested in your pain and encourage you to inform us about aggravating factors during your visit.   Many times a simple repositioning every few hours can make a big difference.    If your physician says it is okay, do not let your pain prevent you from getting out of bed. Be sure to  call your nurse for assistance prior to getting up so you do not fall.      Before surgery, please decide your tolerable pain goal.  These faces help describe the pain ratings we use on a 0-10 scale.   Be prepared to tell us your goal and whether or not you take pain or anxiety medications at home.          BEFORE YOU COME TO THE HOSPITAL  (Pre-op instructions)  • Do not eat, drink, smoke or chew gum after midnight the night before surgery.  This also includes no mints.  • Morning of surgery take only the medicines you have been instructed with a sip of water unless otherwise instructed  by your physician.  • Do not shave, wear makeup or dark nail polish.  • Remove all jewelry including rings.  • Leave anything you consider valuable at home.  • Leave your suitcase in the car until after your surgery.  • Bring the following with you if applicable:  o Picture ID and insurance, Medicare or Medicaid cards  o Co-pay/deductible required by insurance (cash, check, credit card)  o Copy of advance directive, living will or power-of- documents if not brought to pre-work  o CPAP or BIPAP mask and tubing  o Relaxation aids ( book, magazine), etc.  o Hearing aids                        ON THE DAY OF SURGERY  · On the day of surgery check in at registration located at the main entrance of the hospital. Only one family member or friend are allowed per patient.  ? You will be registered and given a beeper with instructions where to wait in the main lobby.  ? When your beeper lights up and vibrates a member of the Outpatient Surgery staff will meet you at the double doors under the stair steps and escort you to your preoperative room.   · You may have cloth compression devices placed on your legs. These help to prevent blood clots and reduce swelling in your legs.  · An IV may be inserted into one of your veins.  · In the operating room, you may be given one or more of the following:  ? A medicine to help you relax  "(sedative).  ? A medicine to numb the area (local anesthetic).  ? A medicine to make you fall asleep (general anesthetic).  ? A medicine that is injected into an area of your body to numb everything below the injection site (regional anesthetic).  · Your surgical site will be marked or identified.  · You may be given an antibiotic through your IV to help prevent infection.  Contact a health care provider if you:  · Develop a fever of more than 100.4°F (38°C) or other feelings of illness during the 48 hours before your surgery.  · Have symptoms that get worse.  Have questions or concerns about your surgery    General Anesthesia/Surgery, Adult  General anesthesia is the use of medicines to make a person \"go to sleep\" (unconscious) for a medical procedure. General anesthesia must be used for certain procedures, and is often recommended for procedures that:  · Last a long time.  · Require you to be still or in an unusual position.  · Are major and can cause blood loss.  The medicines used for general anesthesia are called general anesthetics. As well as making you unconscious for a certain amount of time, these medicines:  · Prevent pain.  · Control your blood pressure.  · Relax your muscles.  Tell a health care provider about:  · Any allergies you have.  · All medicines you are taking, including vitamins, herbs, eye drops, creams, and over-the-counter medicines.  · Any problems you or family members have had with anesthetic medicines.  · Types of anesthetics you have had in the past.  · Any blood disorders you have.  · Any surgeries you have had.  · Any medical conditions you have.  · Any recent upper respiratory, chest, or ear infections.  · Any history of:  ? Heart or lung conditions, such as heart failure, sleep apnea, asthma, or chronic obstructive pulmonary disease (COPD).  ?  service.  ? Depression or anxiety.  · Any tobacco or drug use, including marijuana or alcohol use.  · Whether you are pregnant or " may be pregnant.  What are the risks?  Generally, this is a safe procedure. However, problems may occur, including:  · Allergic reaction.  · Lung and heart problems.  · Inhaling food or liquid from the stomach into the lungs (aspiration).  · Nerve injury.  · Air in the bloodstream, which can lead to stroke.  · Extreme agitation or confusion (delirium) when you wake up from the anesthetic.  · Waking up during your procedure and being unable to move. This is rare.  These problems are more likely to develop if you are having a major surgery or if you have an advanced or serious medical condition. You can prevent some of these complications by answering all of your health care provider's questions thoroughly and by following all instructions before your procedure.  General anesthesia can cause side effects, including:  · Nausea or vomiting.  · A sore throat from the breathing tube.  · Hoarseness.  · Wheezing or coughing.  · Shaking chills.  · Tiredness.  · Body aches.  · Anxiety.  · Sleepiness or drowsiness.  · Confusion or agitation.  RISKS AND COMPLICATIONS OF SURGERY  Your health care provider will discuss possible risks and complications with you before surgery. Common risks and complications include:    · Problems due to the use of anesthetics.  · Blood loss and replacement (does not apply to minor surgical procedures).  · Temporary increase in pain due to surgery.  · Uncorrected pain or problems that the surgery was meant to correct.  · Infection.  · New damage.    What happens before the procedure?    Medicines  Ask your health care provider about:  · Changing or stopping your regular medicines. This is especially important if you are taking diabetes medicines or blood thinners.  · Taking medicines such as aspirin and ibuprofen. These medicines can thin your blood. Do not take these medicines unless your health care provider tells you to take them.  · Taking over-the-counter medicines, vitamins, herbs, and  supplements. Do not take these during the week before your procedure unless your health care provider approves them.  General instructions  · Starting 3-6 weeks before the procedure, do not use any products that contain nicotine or tobacco, such as cigarettes and e-cigarettes. If you need help quitting, ask your health care provider.  · If you brush your teeth on the morning of the procedure, make sure to spit out all of the toothpaste.  · Tell your health care provider if you become ill or develop a cold, cough, or fever.  · If instructed by your health care provider, bring your sleep apnea device with you on the day of your surgery (if applicable).  · Ask your health care provider if you will be going home the same day, the following day, or after a longer hospital stay.  ? Plan to have someone take you home from the hospital or clinic.  ? Plan to have a responsible adult care for you for at least 24 hours after you leave the hospital or clinic. This is important.  What happens during the procedure?  · You will be given anesthetics through both of the following:  ? A mask placed over your nose and mouth.  ? An IV in one of your veins.  · You may receive a medicine to help you relax (sedative).  · After you are unconscious, a breathing tube may be inserted down your throat to help you breathe. This will be removed before you wake up.  · An anesthesia specialist will stay with you throughout your procedure. He or she will:  ? Keep you comfortable and safe by continuing to give you medicines and adjusting the amount of medicine that you get.  ? Monitor your blood pressure, pulse, and oxygen levels to make sure that the anesthetics do not cause any problems.  The procedure may vary among health care providers and hospitals.  What happens after the procedure?  · Your blood pressure, temperature, heart rate, breathing rate, and blood oxygen level will be monitored until the medicines you were given have worn off.  · You  will wake up in a recovery area. You may wake up slowly.  · If you feel anxious or agitated, you may be given medicine to help you calm down.  · If you will be going home the same day, your health care provider may check to make sure you can walk, drink, and urinate.  · Your health care provider will treat any pain or side effects you have before you go home.  · Do not drive for 24 hours if you were given a sedative.  Summary  · General anesthesia is used to keep you still and prevent pain during a procedure.  · It is important to tell your healthcare provider about your medical history and any surgeries you have had, and previous experience with anesthesia.  · Follow your healthcare provider’s instructions about when to stop eating, drinking, or taking certain medicines before your procedure.  · Plan to have someone take you home from the hospital or clinic.  This information is not intended to replace advice given to you by your health care provider. Make sure you discuss any questions you have with your health care provider.  Document Released: 03/26/2009 Document Revised: 08/03/2018 Document Reviewed: 08/03/2018  [employer] Interactive Patient Education © 2019 [employer]       Fall Prevention in Hospitals, Adult  As a hospital patient, your condition and the treatments you receive can increase your risk for falls. Some additional risk factors for falls in a hospital include:  · Being in an unfamiliar environment.  · Being on bed rest.  · Your surgery.  · Taking certain medicines.  · Your tubing requirements, such as intravenous (IV) therapy or catheters.  It is important that you learn how to decrease fall risks while at the hospital. Below are important tips that can help prevent falls.  SAFETY TIPS FOR PREVENTING FALLS  Talk about your risk of falling.  · Ask your health care provider why you are at risk for falling. Is it your medicine, illness, tubing placement, or something else?  · Make a plan with your  health care provider to keep you safe from falls.  · Ask your health care provider or pharmacist about side effects of your medicines. Some medicines can make you dizzy or affect your coordination.  Ask for help.  · Ask for help before getting out of bed. You may need to press your call button.  · Ask for assistance in getting safely to the toilet.  · Ask for a walker or cane to be put at your bedside. Ask that most of the side rails on your bed be placed up before your health care provider leaves the room.  · Ask family or friends to sit with you.  · Ask for things that are out of your reach, such as your glasses, hearing aids, telephone, bedside table, or call button.  Follow these tips to avoid falling:  · Stay lying or seated, rather than standing, while waiting for help.  · Wear rubber-soled slippers or shoes whenever you walk in the hospital.  · Avoid quick, sudden movements.  ¨ Change positions slowly.  ¨ Sit on the side of your bed before standing.  ¨ Stand up slowly and wait before you start to walk.  · Let your health care provider know if there is a spill on the floor.  · Pay careful attention to the medical equipment, electrical cords, and tubes around you.  · When you need help, use your call button by your bed or in the bathroom. Wait for one of your health care providers to help you.  · If you feel dizzy or unsure of your footing, return to bed and wait for assistance.  · Avoid being distracted by the TV, telephone, or another person in your room.  · Do not lean or support yourself on rolling objects, such as IV poles or bedside tables.     This information is not intended to replace advice given to you by your health care provider. Make sure you discuss any questions you have with your health care provider.     Document Released: 12/15/2001 Document Revised: 01/08/2016 Document Reviewed: 08/25/2013  [employer] Interactive Patient Education ©2016 [employer]       [facility] 4%  Patient Instruction Sheet    Chlorhexidine Before Surgery  Chlorhexidine gluconate (CHG) is a germ-killing (antiseptic) solution that is used to clean the skin. It gets rid of the bacteria that normally live on the skin. Cleaning your skin with CHG before surgery helps lower the risk for infection after surgery.    How to use CHG solution  · You will take 2 showers, one shower the night before surgery, the second shower the morning of surgery before coming to the hospital.  · Use CHG only as told by your health care provider, and follow the instructions on the label.  · Use CHG solution while taking a shower. Follow these steps when using CHG solution (unless your health care provider gives you different instructions):  1. Start the shower.  2. Use your normal soap and shampoo to wash your face and hair.  3. Turn off the shower or move out of the shower stream.  4. Pour the CHG onto a clean washcloth. Do not use any type of brush or rough-edged sponge.  5. Starting at your neck, lather your body down to your toes. Make sure you:  6. Pay special attention to the part of your body where you will be having surgery. Scrub this area for at least 1 minute.  7. Use the full amount of CHG as directed. Usually, this is one half bottle for each shower.  8. Do not use CHG on your head or face. If the solution gets into your ears or eyes, rinse them well with water.  9. Avoid your genital area.  10. Avoid any areas of skin that have broken skin, cuts, or scrapes.  11. Scrub your back and under your arms. Make sure to wash skin folds.  12. Let the lather sit on your skin for 1-2 minutes or as long as told by your health care  provider.  13. Thoroughly rinse your entire body in the shower. Make sure that all body creases and crevices are rinsed well.  14. Dry off with a clean towel. Do not put any substances on your body afterward, such as powder, lotion, or perfume.  15. Put on clean clothes or pajamas.  16. If it is the night  before your surgery, sleep in clean sheets.    What are the risks?  Risks of using CHG include:  · A skin reaction.  · Hearing loss, if CHG gets in your ears.  · Eye injury, if CHG gets in your eyes and is not rinsed out.  · The CHG product catching fire.  Make sure that you avoid smoking and flames after applying CHG to your skin.  Do not use CHG:  · If you have a chlorhexidine allergy or have previously reacted to chlorhexidine.  · On babies younger than 2 months of age.      On the day of surgery, when you are taken to your room in Outpatient Surgery you will be given a CHG prepackaged cloth to wipe the site for your surgery.  How to use CHG prepackaged cloths  · Follow the instructions on the label.  · Use the CHG cloth on clean, dry skin. Follow these steps when using a CHG cloth (unless your health care provider gives you different instructions):  1. Using the CHG cloth, vigorously scrub the part of your body where you will be having surgery. Scrub using a back-and-forth motion for 3 minutes. The area on your body should be completely wet with CHG when you are finished scrubbing.  2. Do not rinse. Discard the cloth and let the area air-dry for 1 minute. Do not put any substances on your body afterward, such as powder, lotion, or perfume.  Contact a health care provider if:  · Your skin gets irritated after scrubbing.  · You have questions about using your solution or cloth.  Get help right away if:  · Your eyes become very red or swollen.  · Your eyes itch badly.  · Your skin itches badly and is red or swollen.  · Your hearing changes.  · You have trouble seeing.  · You have swelling or tingling in your mouth or throat.  · You have trouble breathing.  · You swallow any chlorhexidine.  Summary  · Chlorhexidine gluconate (CHG) is a germ-killing (antiseptic) solution that is used to clean the skin. Cleaning your skin with CHG before surgery helps lower the risk for infection after surgery.  · You may be given CHG  to use at home. It may be in a bottle or in a prepackaged cloth to use on your skin. Carefully follow your health care provider's instructions and the instructions on the product label.  · Do not use CHG if you have a chlorhexidine allergy.  · Contact your health care provider if your skin gets irritated after scrubbing.  This information is not intended to replace advice given to you by your health care provider. Make sure you discuss any questions you have with your health care provider.  Document Released: 09/11/2013 Document Revised: 11/15/2018 Document Reviewed: 11/15/2018  [employer] Interactive Patient Education © 2019 [employer]

## 2021-12-02 ENCOUNTER — TELEPHONE (OUTPATIENT)
Dept: ONCOLOGY | Facility: CLINIC | Age: 41
End: 2021-12-02

## 2021-12-02 LAB
QT INTERVAL: 380 MS
QTC INTERVAL: 462 MS

## 2021-12-02 NOTE — TELEPHONE ENCOUNTER
----- Message from Ethan Dow MD sent at 12/1/2021 10:30 PM CST -----  Please get me in touch with Dr. Nickolas Carreno at [facility].  He had seen this patient previously.  Thank you

## 2021-12-02 NOTE — TELEPHONE ENCOUNTER
----- Message from Ethan Dow MD sent at 12/1/2021 10:30 PM CST -----  Please get me in touch with Dr. Nickolas Carreno at [facility].  He had seen this patient previously.  Thank you

## 2021-12-02 NOTE — TELEPHONE ENCOUNTER
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  Patient has been on Bosulif since 07/30/2021 (BCR/ABL 48% at that time).  Previously discussed with Dr. Carreno, BMT at [facility] who had seen the patient previously.  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  Today I spoke to the patient who swears that she has been taking her medicine daily except for 5 days when she had a \"stomach bug.\"  But has been taking otherwise.  I then spoke to Dr. Carreno who will again take a look at the mutation study that was done previously to see if we can use another TKI (i.e.: Ponatinib).  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 Carreno does not believe this would be helpful unless the patient is bridging towards transplant.  If ponatinib is not felt to be indicated over fails, then the patient would qualify for transplant at that time.  Awaiting word from Dr. Carerno prior to moving forward.  The patient is made aware of the plans.  She verbalized understanding and agreement.  "

## 2021-12-02 NOTE — TELEPHONE ENCOUNTER
Received call back from Stacie, she calls to re-direct contact for Dr Carreno to:  [phone]  Call placed to the above phone number and message was left for Dr Carreno to return Dr Dow's call to discuss mutual patient Winsome Lyon case.

## 2021-12-02 NOTE — TELEPHONE ENCOUNTER
12/2/21 @ 9:30 am: Call placed to [facility]  for Dr Carreno, requesting return call regarding mutual patient Winsome Eugenio.   Message was left with our phone number and brief message for roldan    [phone]

## 2021-12-03 ENCOUNTER — TELEPHONE (OUTPATIENT)
Dept: ONCOLOGY | Facility: CLINIC | Age: 41
End: 2021-12-03

## 2021-12-03 DIAGNOSIS — C92.10 CML (CHRONIC MYELOCYTIC LEUKEMIA) (HCC): Primary | ICD-10-CM

## 2021-12-03 NOTE — TELEPHONE ENCOUNTER
"Discussed with Dr. Carreno as a follow-up to yesterday (see below).  Dr. Carreno recommends repeating the BCR/ABL kinase domain mutation to see if any new mutations have emerged. Meanwhile he recommends initiation of either nilotinib or ponatinib, preferring the nilotinib due to fewer potential toxicities.  I then contacted the patient and discussed the plan.  She confessed that she has had a lot of personal problems and is now drinking, \"somewhat heavily.\"  Thus we agreed:  --She will stop alcohol  --Obtain baseline EKG and 2D echo this week   --Redraw CBC with differential, CMP, BCR/ABL PCR, and send BCR/ABL kinase domain mutation study ASAP  --Will switch over to nilotinib 400 mg p.o. every 12 hours-give on empty stomach, 1 hour before or 2 hours after food  --Stop bosulif when she receives the nilotinib  --Ask pharmacy to discuss potential toxicities of nilotinib with the patient (particularly the danger of QT prolongation, sudden death, arterial thrombotic event, cardiac ischemia, ischemic stroke, thrombocytopenia/neutropenia/anemia, hemorrhage, electrolyte disturbances, hepatotoxicity, pancreatitis, hyper/hypothyroidism, rash, headache, nausea, fatigue, pruritus, diarrhea, arthralgias)  --Return to office in 2 weeks with CBC and differential.          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  Patient has been on Bosulif since 07/30/2021 (BCR/ABL 48% at that time).  Previously discussed with Dr. Carreno, BMT at [facility] who had seen the patient previously.  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  Today I spoke to the patient who swears that she has been taking her medicine daily except for 5 days when she had a \"stomach bug.\"  But has been taking otherwise.  I then spoke to Dr. Carreno who will again take a look at the mutation study that was done previously to see if we can use " another TKI (i.e.: Ponatinib).  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 Carreno does not believe this would be helpful unless the patient is bridging towards transplant.  If ponatinib is not felt to be indicated over fails, then the patient would qualify for transplant at that time.  Awaiting word from Dr. Carreno prior to moving forward.  The patient is made aware of the plans.  She verbalized understanding and agreement.

## 2021-12-04 ENCOUNTER — LAB (OUTPATIENT)
Dept: LAB | Facility: HOSPITAL | Age: 41
End: 2021-12-04

## 2021-12-04 LAB — SARS-COV-2 ORF1AB RESP QL NAA+PROBE: NOT DETECTED

## 2021-12-04 PROCEDURE — U0004 COV-19 TEST NON-CDC HGH THRU: HCPCS | Performed by: STUDENT IN AN ORGANIZED HEALTH CARE EDUCATION/TRAINING PROGRAM

## 2021-12-04 PROCEDURE — C9803 HOPD COVID-19 SPEC COLLECT: HCPCS | Performed by: STUDENT IN AN ORGANIZED HEALTH CARE EDUCATION/TRAINING PROGRAM

## 2021-12-06 ENCOUNTER — ANESTHESIA EVENT (OUTPATIENT)
Dept: PERIOP | Facility: HOSPITAL | Age: 41
End: 2021-12-06

## 2021-12-07 ENCOUNTER — HOSPITAL ENCOUNTER (OUTPATIENT)
Facility: HOSPITAL | Age: 41
Setting detail: HOSPITAL OUTPATIENT SURGERY
Discharge: HOME | End: 2021-12-07
Attending: STUDENT IN AN ORGANIZED HEALTH CARE EDUCATION/TRAINING PROGRAM | Admitting: STUDENT IN AN ORGANIZED HEALTH CARE EDUCATION/TRAINING PROGRAM

## 2021-12-07 ENCOUNTER — ANESTHESIA (OUTPATIENT)
Dept: PERIOP | Facility: HOSPITAL | Age: 41
End: 2021-12-07

## 2021-12-07 VITALS
DIASTOLIC BLOOD PRESSURE: 69 MMHG | OXYGEN SATURATION: 95 % | HEART RATE: 66 BPM | TEMPERATURE: 99.1 F | SYSTOLIC BLOOD PRESSURE: 115 MMHG | RESPIRATION RATE: 16 BRPM

## 2021-12-07 DIAGNOSIS — K43.9 VENTRAL HERNIA WITHOUT OBSTRUCTION OR GANGRENE: Primary | ICD-10-CM

## 2021-12-07 LAB — B-HCG UR QL: NEGATIVE

## 2021-12-07 PROCEDURE — 0 LIDOCAINE 1 % SOLUTION 20 ML VIAL: Performed by: STUDENT IN AN ORGANIZED HEALTH CARE EDUCATION/TRAINING PROGRAM

## 2021-12-07 PROCEDURE — 25010000002 MIDAZOLAM PER 1 MG: Performed by: ANESTHESIOLOGY

## 2021-12-07 PROCEDURE — 81025 URINE PREGNANCY TEST: CPT | Performed by: STUDENT IN AN ORGANIZED HEALTH CARE EDUCATION/TRAINING PROGRAM

## 2021-12-07 PROCEDURE — 25010000002 FENTANYL CITRATE (PF) 100 MCG/2ML SOLUTION: Performed by: NURSE ANESTHETIST, CERTIFIED REGISTERED

## 2021-12-07 PROCEDURE — 25010000002 CEFAZOLIN PER 500 MG: Performed by: STUDENT IN AN ORGANIZED HEALTH CARE EDUCATION/TRAINING PROGRAM

## 2021-12-07 PROCEDURE — 25010000002 FENTANYL CITRATE (PF) 50 MCG/ML SOLUTION: Performed by: ANESTHESIOLOGY

## 2021-12-07 PROCEDURE — C1781 MESH (IMPLANTABLE): HCPCS | Performed by: STUDENT IN AN ORGANIZED HEALTH CARE EDUCATION/TRAINING PROGRAM

## 2021-12-07 PROCEDURE — 25010000002 HEPARIN (PORCINE) PER 1000 UNITS: Performed by: STUDENT IN AN ORGANIZED HEALTH CARE EDUCATION/TRAINING PROGRAM

## 2021-12-07 PROCEDURE — 25010000002 PROPOFOL 10 MG/ML EMULSION: Performed by: NURSE ANESTHETIST, CERTIFIED REGISTERED

## 2021-12-07 PROCEDURE — 25010000002 DEXAMETHASONE PER 1 MG: Performed by: NURSE ANESTHETIST, CERTIFIED REGISTERED

## 2021-12-07 PROCEDURE — 25010000002 ONDANSETRON PER 1 MG: Performed by: NURSE ANESTHETIST, CERTIFIED REGISTERED

## 2021-12-07 DEVICE — VENTRALEX HERNIA PATCH, 6.4 CM (2.5"), MEDIUM CIRCLE WITH STRAP
Type: IMPLANTABLE DEVICE | Site: ABDOMEN | Status: FUNCTIONAL
Brand: VENTRALEX

## 2021-12-07 RX ORDER — LIDOCAINE HYDROCHLORIDE 20 MG/ML
INJECTION, SOLUTION EPIDURAL; INFILTRATION; INTRACAUDAL; PERINEURAL AS NEEDED
Status: DISCONTINUED | OUTPATIENT
Start: 2021-12-07 | End: 2021-12-07 | Stop reason: SURG

## 2021-12-07 RX ORDER — FLUMAZENIL 0.1 MG/ML
0.2 INJECTION INTRAVENOUS AS NEEDED
Status: DISCONTINUED | OUTPATIENT
Start: 2021-12-07 | End: 2021-12-07 | Stop reason: HOSPADM

## 2021-12-07 RX ORDER — FENTANYL CITRATE 50 UG/ML
INJECTION, SOLUTION INTRAMUSCULAR; INTRAVENOUS AS NEEDED
Status: DISCONTINUED | OUTPATIENT
Start: 2021-12-07 | End: 2021-12-07 | Stop reason: SURG

## 2021-12-07 RX ORDER — ONDANSETRON 2 MG/ML
INJECTION INTRAMUSCULAR; INTRAVENOUS AS NEEDED
Status: DISCONTINUED | OUTPATIENT
Start: 2021-12-07 | End: 2021-12-07 | Stop reason: SURG

## 2021-12-07 RX ORDER — SODIUM CHLORIDE, SODIUM LACTATE, POTASSIUM CHLORIDE, CALCIUM CHLORIDE 600; 310; 30; 20 MG/100ML; MG/100ML; MG/100ML; MG/100ML
100 INJECTION, SOLUTION INTRAVENOUS CONTINUOUS
Status: DISCONTINUED | OUTPATIENT
Start: 2021-12-07 | End: 2021-12-07 | Stop reason: HOSPADM

## 2021-12-07 RX ORDER — ROCURONIUM BROMIDE 10 MG/ML
INJECTION, SOLUTION INTRAVENOUS AS NEEDED
Status: DISCONTINUED | OUTPATIENT
Start: 2021-12-07 | End: 2021-12-07 | Stop reason: SURG

## 2021-12-07 RX ORDER — DEXAMETHASONE SODIUM PHOSPHATE 4 MG/ML
INJECTION, SOLUTION INTRA-ARTICULAR; INTRALESIONAL; INTRAMUSCULAR; INTRAVENOUS; SOFT TISSUE AS NEEDED
Status: DISCONTINUED | OUTPATIENT
Start: 2021-12-07 | End: 2021-12-07 | Stop reason: SURG

## 2021-12-07 RX ORDER — ACETAMINOPHEN 500 MG
1000 TABLET ORAL ONCE
Status: COMPLETED | OUTPATIENT
Start: 2021-12-07 | End: 2021-12-07

## 2021-12-07 RX ORDER — MIDAZOLAM HYDROCHLORIDE 1 MG/ML
1 INJECTION INTRAMUSCULAR; INTRAVENOUS
Status: DISCONTINUED | OUTPATIENT
Start: 2021-12-07 | End: 2021-12-07 | Stop reason: HOSPADM

## 2021-12-07 RX ORDER — ONDANSETRON 2 MG/ML
4 INJECTION INTRAMUSCULAR; INTRAVENOUS ONCE AS NEEDED
Status: DISCONTINUED | OUTPATIENT
Start: 2021-12-07 | End: 2021-12-07 | Stop reason: HOSPADM

## 2021-12-07 RX ORDER — LABETALOL HYDROCHLORIDE 5 MG/ML
5 INJECTION, SOLUTION INTRAVENOUS
Status: DISCONTINUED | OUTPATIENT
Start: 2021-12-07 | End: 2021-12-07 | Stop reason: HOSPADM

## 2021-12-07 RX ORDER — LIDOCAINE HYDROCHLORIDE 10 MG/ML
0.5 INJECTION, SOLUTION EPIDURAL; INFILTRATION; INTRACAUDAL; PERINEURAL ONCE AS NEEDED
Status: DISCONTINUED | OUTPATIENT
Start: 2021-12-07 | End: 2021-12-07 | Stop reason: HOSPADM

## 2021-12-07 RX ORDER — PROPOFOL 10 MG/ML
VIAL (ML) INTRAVENOUS AS NEEDED
Status: DISCONTINUED | OUTPATIENT
Start: 2021-12-07 | End: 2021-12-07 | Stop reason: SURG

## 2021-12-07 RX ORDER — HEPARIN SODIUM 5000 [USP'U]/ML
5000 INJECTION, SOLUTION INTRAVENOUS; SUBCUTANEOUS ONCE
Status: COMPLETED | OUTPATIENT
Start: 2021-12-07 | End: 2021-12-07

## 2021-12-07 RX ORDER — BUPIVACAINE HCL/0.9 % NACL/PF 0.1 %
2 PLASTIC BAG, INJECTION (ML) EPIDURAL ONCE
Status: COMPLETED | OUTPATIENT
Start: 2021-12-07 | End: 2021-12-07

## 2021-12-07 RX ORDER — OXYCODONE HYDROCHLORIDE 5 MG/1
5 TABLET ORAL EVERY 8 HOURS PRN
Qty: 15 TABLET | Refills: 0 | Status: SHIPPED | OUTPATIENT
Start: 2021-12-07 | End: 2022-04-04

## 2021-12-07 RX ORDER — FENTANYL CITRATE 50 UG/ML
25 INJECTION, SOLUTION INTRAMUSCULAR; INTRAVENOUS
Status: DISCONTINUED | OUTPATIENT
Start: 2021-12-07 | End: 2021-12-07 | Stop reason: HOSPADM

## 2021-12-07 RX ORDER — SODIUM CHLORIDE, SODIUM LACTATE, POTASSIUM CHLORIDE, CALCIUM CHLORIDE 600; 310; 30; 20 MG/100ML; MG/100ML; MG/100ML; MG/100ML
1000 INJECTION, SOLUTION INTRAVENOUS CONTINUOUS
Status: DISCONTINUED | OUTPATIENT
Start: 2021-12-07 | End: 2021-12-07 | Stop reason: HOSPADM

## 2021-12-07 RX ORDER — NEOSTIGMINE METHYLSULFATE 5 MG/5 ML
SYRINGE (ML) INTRAVENOUS AS NEEDED
Status: DISCONTINUED | OUTPATIENT
Start: 2021-12-07 | End: 2021-12-07 | Stop reason: SURG

## 2021-12-07 RX ORDER — MIDAZOLAM HYDROCHLORIDE 1 MG/ML
2 INJECTION INTRAMUSCULAR; INTRAVENOUS ONCE
Status: COMPLETED | OUTPATIENT
Start: 2021-12-07 | End: 2021-12-07

## 2021-12-07 RX ORDER — OXYCODONE AND ACETAMINOPHEN 7.5; 325 MG/1; MG/1
2 TABLET ORAL EVERY 4 HOURS PRN
Status: DISCONTINUED | OUTPATIENT
Start: 2021-12-07 | End: 2021-12-07 | Stop reason: HOSPADM

## 2021-12-07 RX ORDER — MAGNESIUM HYDROXIDE 1200 MG/15ML
LIQUID ORAL AS NEEDED
Status: DISCONTINUED | OUTPATIENT
Start: 2021-12-07 | End: 2021-12-07 | Stop reason: HOSPADM

## 2021-12-07 RX ORDER — SODIUM CHLORIDE 0.9 % (FLUSH) 0.9 %
3 SYRINGE (ML) INJECTION EVERY 12 HOURS SCHEDULED
Status: DISCONTINUED | OUTPATIENT
Start: 2021-12-07 | End: 2021-12-07 | Stop reason: HOSPADM

## 2021-12-07 RX ORDER — ONDANSETRON 4 MG/1
4 TABLET, FILM COATED ORAL EVERY 8 HOURS PRN
Qty: 15 TABLET | Refills: 0 | Status: SHIPPED | OUTPATIENT
Start: 2021-12-07 | End: 2022-04-04

## 2021-12-07 RX ORDER — SODIUM CHLORIDE 0.9 % (FLUSH) 0.9 %
3 SYRINGE (ML) INJECTION AS NEEDED
Status: DISCONTINUED | OUTPATIENT
Start: 2021-12-07 | End: 2021-12-07 | Stop reason: HOSPADM

## 2021-12-07 RX ORDER — SUCCINYLCHOLINE/SOD CL,ISO/PF 200MG/10ML
SYRINGE (ML) INTRAVENOUS AS NEEDED
Status: DISCONTINUED | OUTPATIENT
Start: 2021-12-07 | End: 2021-12-07 | Stop reason: SURG

## 2021-12-07 RX ORDER — IBUPROFEN 200 MG
600 TABLET ORAL EVERY 8 HOURS
Qty: 100 TABLET | Refills: 2
Start: 2021-12-07 | End: 2022-04-04

## 2021-12-07 RX ORDER — OXYCODONE AND ACETAMINOPHEN 10; 325 MG/1; MG/1
1 TABLET ORAL ONCE AS NEEDED
Status: COMPLETED | OUTPATIENT
Start: 2021-12-07 | End: 2021-12-07

## 2021-12-07 RX ORDER — SODIUM CHLORIDE 0.9 % (FLUSH) 0.9 %
3-10 SYRINGE (ML) INJECTION AS NEEDED
Status: DISCONTINUED | OUTPATIENT
Start: 2021-12-07 | End: 2021-12-07 | Stop reason: HOSPADM

## 2021-12-07 RX ORDER — ACETAMINOPHEN 325 MG/1
975 TABLET ORAL EVERY 8 HOURS
Qty: 100 TABLET | Refills: 2
Start: 2021-12-07 | End: 2022-04-04

## 2021-12-07 RX ORDER — NALOXONE HCL 0.4 MG/ML
0.4 VIAL (ML) INJECTION AS NEEDED
Status: DISCONTINUED | OUTPATIENT
Start: 2021-12-07 | End: 2021-12-07 | Stop reason: HOSPADM

## 2021-12-07 RX ADMIN — Medication 100 MG: at 07:13

## 2021-12-07 RX ADMIN — SUGAMMADEX 200 MG: 100 INJECTION, SOLUTION INTRAVENOUS at 07:49

## 2021-12-07 RX ADMIN — OXYCODONE AND ACETAMINOPHEN 1 TABLET: 325; 10 TABLET ORAL at 08:26

## 2021-12-07 RX ADMIN — ACETAMINOPHEN 1000 MG: 500 TABLET, FILM COATED ORAL at 06:59

## 2021-12-07 RX ADMIN — LIDOCAINE HYDROCHLORIDE 100 MG: 20 INJECTION, SOLUTION EPIDURAL; INFILTRATION; INTRACAUDAL; PERINEURAL at 07:54

## 2021-12-07 RX ADMIN — LIDOCAINE HYDROCHLORIDE 40 MG: 20 INJECTION, SOLUTION EPIDURAL; INFILTRATION; INTRACAUDAL; PERINEURAL at 07:13

## 2021-12-07 RX ADMIN — FENTANYL CITRATE 100 MCG: 50 INJECTION, SOLUTION INTRAMUSCULAR; INTRAVENOUS at 07:13

## 2021-12-07 RX ADMIN — Medication 2 G: at 07:17

## 2021-12-07 RX ADMIN — DEXAMETHASONE SODIUM PHOSPHATE 4 MG: 4 INJECTION, SOLUTION INTRA-ARTICULAR; INTRALESIONAL; INTRAMUSCULAR; INTRAVENOUS; SOFT TISSUE at 07:34

## 2021-12-07 RX ADMIN — MIDAZOLAM 2 MG: 1 INJECTION INTRAMUSCULAR; INTRAVENOUS at 06:59

## 2021-12-07 RX ADMIN — SODIUM CHLORIDE, POTASSIUM CHLORIDE, SODIUM LACTATE AND CALCIUM CHLORIDE 1000 ML: 600; 310; 30; 20 INJECTION, SOLUTION INTRAVENOUS at 06:28

## 2021-12-07 RX ADMIN — Medication 3 MG: at 07:45

## 2021-12-07 RX ADMIN — ONDANSETRON 4 MG: 2 INJECTION INTRAMUSCULAR; INTRAVENOUS at 07:34

## 2021-12-07 RX ADMIN — HEPARIN SODIUM 5000 UNITS: 5000 INJECTION INTRAVENOUS; SUBCUTANEOUS at 06:59

## 2021-12-07 RX ADMIN — FENTANYL CITRATE 25 MCG: 50 INJECTION INTRAMUSCULAR; INTRAVENOUS at 08:17

## 2021-12-07 RX ADMIN — PROPOFOL 200 MG: 10 INJECTION, EMULSION INTRAVENOUS at 07:13

## 2021-12-07 RX ADMIN — GLYCOPYRROLATE 0.4 MG: 0.2 INJECTION, SOLUTION INTRAMUSCULAR; INTRAVENOUS at 07:45

## 2021-12-07 RX ADMIN — ROCURONIUM BROMIDE 20 MG: 10 INJECTION INTRAVENOUS at 07:17

## 2021-12-07 RX ADMIN — ROCURONIUM BROMIDE 5 MG: 10 INJECTION INTRAVENOUS at 07:13

## 2021-12-07 NOTE — OP NOTE
Ventral Hernia Repair with Mesh  Operative Report:     Patient: Winsome Becker  MRN: [id number]    YOB: 1980  Age: 41 y.o.  Sex: female  Unit:  PAD OR Room/Bed: PAD OR/MAIN OR Location: [facility]      Admitting Physician: SHELBIE DORAN    Primary Care Physician: Sid Crockett DO             INDICATIONS: This is a 41 y.o. year old female who presents with a symptomatic ventral hernia. After a discussion of risks, benefits, and alternatives, consent was obtained.     DATE OF OPERATION: 12/7/2021     Surgeon(s) and Role:     * Shelbie Doran MD - Primary    ANESTHESIA: General     PREOPERATIVE DIAGNOSIS: Ventral Hernia     POSTOPERATIVE DIAGNOSIS: Same    PROCEDURES PERFORMED:  Ventral hernia repair with mesh     PROCEDURE DETAILS:   Consent was obtained. The patient was given pre-operative antibiotics and subcutaneous heparin. The site was marked pre-operatively. She was transferred to the OR and general anesthesia was induced. The abdomen was prepped with chloraprep and draped in sterile fashion. A timeout was performed. Local anesthetic was infiltrated. A vertical incision was made over the hernia and dissection was carried down to the fascia with cautery. The hernia sac was dissected free and was reduced into the abdomen. The fascia was cleared circumferentially around the hernia defect. The hernia defect was 2cm. A 6.4cm mesh [place] with tabs was placed into the preperitoneal space. The mesh was sewn into place with two 0-ethibond sutures. The fascia was closed with interrupted 0-Ethibond sutures. The incision was closed with interrupted 3-0 vicryl dermal sutures followed by a running 4-0 monocryl subcuticular. The incision was dressed with skin glue. All needle and sponge counts were correct. The patient tolerated the procedure well and was transferred to pacu in good condition.     Findings:2cm ventral hernia  Estimated Blood Loss: 30mL  Complications: none apparent             Specimens: None      Disposition: PACU - hemodynamically stable.           Condition: stable    Shelbie Doran MD  11/22/2021

## 2021-12-07 NOTE — ANESTHESIA PREPROCEDURE EVALUATION
Anesthesia Evaluation     Patient summary reviewed   no history of anesthetic complications:  NPO Solid Status: > 8 hours  NPO Liquid Status: > 8 hours           Airway   Mallampati: I  TM distance: <3 FB  Neck ROM: full  No difficulty expected  Dental - normal exam     Pulmonary    (+) a smoker Current Abstained day of surgery, asthma,  Cardiovascular   Exercise tolerance: excellent (>7 METS)    (+) hypertension, dysrhythmias PVC,   (-) pacemaker, valvular problems/murmurs, past MI, cardiac stents, CABG      Neuro/Psych- negative ROS  (-) seizures, CVA  GI/Hepatic/Renal/Endo - negative ROS   (-) liver disease, no renal disease, diabetes    Musculoskeletal (-) negative ROS    Abdominal    Substance History - negative use     OB/GYN negative ob/gyn ROS         Other   blood dyscrasia,   history of cancer                      Anesthesia Plan    ASA 2     general     intravenous induction     Anesthetic plan, all risks, benefits, and alternatives have been provided, discussed and informed consent has been obtained with: patient.

## 2021-12-07 NOTE — ANESTHESIA PROCEDURE NOTES
Airway  Urgency: elective    Date/Time: 12/7/2021 7:13 AM  Airway not difficult    General Information and Staff    Patient location during procedure: OR  CRNA: Dipak López CRNA    Indications and Patient Condition  Indications for airway management: airway protection    Preoxygenated: yes  Mask difficulty assessment: 1 - vent by mask    Final Airway Details  Final airway type: endotracheal airway      Successful airway: ETT  Cuffed: yes   Successful intubation technique: direct laryngoscopy  Facilitating devices/methods: intubating stylet  Endotracheal tube insertion site: oral  Blade: Briones  Blade size: 2  ETT size (mm): 7.5  Cormack-Lehane Classification: grade I - full view of glottis  Placement verified by: capnometry and palpation of cuff   Measured from: lips  ETT/EBT  to lips (cm): 22  Number of attempts at approach: 1  Assessment: lips, teeth, and gum same as pre-op and atraumatic intubation

## 2021-12-07 NOTE — ANESTHESIA POSTPROCEDURE EVALUATION
Patient: Winsome Becker    Procedure Summary     Date: 12/07/21 Room / Location:  PAD OR 06 /  PAD OR    Anesthesia Start: 0709 Anesthesia Stop: 0756    Procedure: OPEN EPIGASTRIC HERNIA REPAIR WITH POSSIBLE MESH (N/A Abdomen) Diagnosis:     Surgeons: Shelbie Doran MD Provider: Dipak López CRNA    Anesthesia Type: general ASA Status: 2          Anesthesia Type: general    Vitals  Vitals Value Taken Time   /79 12/07/21 0915   Temp 99.1 °F (37.3 °C) 12/07/21 0850   Pulse 73 12/07/21 0915   Resp 16 12/07/21 0915   SpO2 94 % 12/07/21 0915           Post Anesthesia Care and Evaluation    Patient location during evaluation: PACU  Patient participation: complete - patient participated  Level of consciousness: awake and alert  Pain management: adequate  Airway patency: patent  Anesthetic complications: No anesthetic complications  PONV Status: none  Cardiovascular status: acceptable and hemodynamically stable  Respiratory status: acceptable  Hydration status: acceptable    Comments: Blood pressure 103/60, pulse 62, temperature 99.1 °F (37.3 °C), resp. rate 16, last menstrual period 10/05/2021, SpO2 94 %, not currently breastfeeding.    Patient discharged from PACU based upon Jozef score. Please see RN notes for further details

## 2021-12-07 NOTE — DISCHARGE INSTRUCTIONS

## 2021-12-08 ENCOUNTER — SPECIALTY PHARMACY (OUTPATIENT)
Dept: ONCOLOGY | Facility: HOSPITAL | Age: 41
End: 2021-12-08

## 2021-12-08 DIAGNOSIS — C92.10 CML (CHRONIC MYELOCYTIC LEUKEMIA) (HCC): Primary | ICD-10-CM

## 2021-12-08 NOTE — PROGRESS NOTES
"[facility]    Oral Oncology Initial Consult      Consult Details  Patient Name (): Winsome Becker  (1980)   Consulting Provider:   Ethan Dow MD ([facility])   Medication and Regimen:  Tasigna 400 mg PO BID     Script Review and Evaluation:  Dosing & Interactions:   Reviewed, interaction noted, will  patient regarding possible increase in serum concentration of Amlodipine by way of CY inhibition, patient will need to monitor for increased amlodipine effects and toxicities (hypotension, edema).   Oral Treatment Plan Signed?: Yes   Oral Treatment Plan Released?: Yes, released on 21.   Specialty Pharmacy Selected:  [facility]   Prior Authorization Status: Submitted - Awaiting Response   Scheduled Counseling Date: COMPLETED 21    Predicted Prescription Fill Date: TBD     Intervention(s):                  Received message from physician:   Discussed with Dr. Carreno as a follow-up to yesterday (see below).  Dr. Carreno recommends repeating the BCR/ABL kinase domain mutation to see if any new mutations have emerged. Meanwhile he recommends initiation of either nilotinib or ponatinib, preferring the nilotinib due to fewer potential toxicities.  I then contacted the patient and discussed the plan.  She confessed that she has had a lot of personal problems and is now drinking, \"somewhat heavily.\"  Thus we agreed:   --She will stop alcohol   --Obtain baseline EKG and 2D echo this week   --Redraw CBC with differential, CMP, BCR/ABL PCR, and send BCR/ABL kinase domain mutation study ASAP   --Will switch over to nilotinib 400 mg p.o. every 12 hours-give on empty stomach, 1 hour before or 2 hours after food   --Stop bosulif when she receives the nilotinib   --Ask pharmacy to discuss potential toxicities of nilotinib with the patient (particularly the danger of QT prolongation, sudden death, arterial thrombotic event, " cardiac ischemia, ischemic stroke, thrombocytopenia/neutropenia/anemia, hemorrhage, electrolyte disturbances, hepatotoxicity, pancreatitis, hyper/hypothyroidism, rash, headache, nausea, fatigue, pruritus, diarrhea, arthralgias)   --Return to office in 2 weeks with CBC and differential.      RX signed. Sent to [facility]. Called [facility] and let them know patient is to continue Bosulif for now until she can get this medication. Scheduling education. Workup ongoing.      Initiated:  Manolo Beckett PharmD  21 08:26    [facility]     Oral Oncology Teaching and Consent      Patient Name (): Winsome Becker  (1980)   Oral Chemotherapy Regimen: Nilotinib (Tasigna) 400 mg PO twice daily    Consulting Provider: Ethan Dow MD ([facility] [place] [facility])   Projected Start Date:  - READY FOR FILL AT [facility]      Education and information provided to Patient.     Counseling Points  Administration Instructions:  · Dosing regimen.  · Medication should be taken on an empty stomach  · Proper storage and handling.  · Dosing may be changed based on side effects and lab results.  Side Effects & Management  · Side effects are often individualized and are usually able to be managed with medication or non-pharmacologic strategies.  · Importance of managing side effects preemptively and alerting provider if side effects are unable to be managed at home.    · Highlighted common side effects and management strategies  Fatigue Stay active   Rash Monitor and notify provider.   Aches/pains, headache OTC acetaminophen or NSAIDs unless instructed otherwise by a provider   Diarrhea Loperamide (Immodium) - take 2 tablets after each loose stool   Nausea, Vomiting Take previously prescribed medication.   Swelling Monitor and notify provider   Decreased Blood Levels (WBC, PLT, Hgb), Increases in liver enzymes or blood glucose. Monitor for fever and signs of infection, keep lab appointments, monitor  for yellowing of skin or eyes.      · Current medications acceptable from an interaction standpoint, please alert of any changes as they can sometimes affect the medication.  Generally herbal products are not recommended.  · Protect skin from the sun.  · Barrier birth-control methods are recommended.     Contact Provider (or seek emergent care if warranted) If:  · Any of the above side effects are not able to be managed at home, increase dramatically, or become intolerable.  · Any signs or symptoms of allergic reaction including rash, trouble breathing, etc.  · Signs and symptoms of infection (fever, chills, etc.), unusual bleeding or bruising, or confusion.  · Any unusual symptoms since starting this medication.     Materials Provided  · Handout from [employer] ([employer] Patient Education) went over discussing side effect management recommendations.    Questions/Comments:  None at this time     Consent:  Consent reviewed and signed.     Follow-up  Will follow-up with patient prior to next scheduled fill to monitor for side effects.     FINALIZED  Manolo Beckett PharmD  12/08/2021 15:11 CST      Patient received and started taking first dose on 12/30/21 per patient call encounter  Manolo Beckett PharmD  01/03/22  07:24 CST

## 2021-12-09 ENCOUNTER — TELEPHONE (OUTPATIENT)
Dept: INTERNAL MEDICINE | Facility: CLINIC | Age: 41
End: 2021-12-09

## 2021-12-09 RX ORDER — FLUCONAZOLE 150 MG/1
150 TABLET ORAL ONCE
Qty: 1 TABLET | Refills: 0 | Status: SHIPPED | OUTPATIENT
Start: 2021-12-09 | End: 2021-12-09

## 2021-12-09 NOTE — TELEPHONE ENCOUNTER
Caller: Winsome Becker    Relationship: Self    Best call back number: [phone]    What medication are you requesting: ANTIBIOTIC FOR YEAST INFECTION    What are your current symptoms: IRRITATION    How long have you been experiencing symptoms:     Have you had these symptoms before:    [x] Yes  [] No    Have you been treated for these symptoms before:   [x] Yes  [] No    If a prescription is needed, what is your preferred pharmacy and phone number:    [facility] [phone]  Additional notes:  PATIENT JUST HAD SURGERY ON Tuesday AND FROM TAKING PAIN MEDICATION SHE NOW HAS A YEAST INFECTION

## 2021-12-10 ENCOUNTER — LAB (OUTPATIENT)
Dept: LAB | Facility: HOSPITAL | Age: 41
End: 2021-12-10

## 2021-12-10 DIAGNOSIS — C92.10 CML (CHRONIC MYELOCYTIC LEUKEMIA) (HCC): ICD-10-CM

## 2021-12-10 LAB
ALBUMIN SERPL-MCNC: 4.2 G/DL (ref 3.5–5.2)
ALBUMIN/GLOB SERPL: 1.4 G/DL
ALP SERPL-CCNC: 103 U/L (ref 39–117)
ALT SERPL W P-5'-P-CCNC: 16 U/L (ref 1–33)
ANION GAP SERPL CALCULATED.3IONS-SCNC: 9 MMOL/L (ref 5–15)
AST SERPL-CCNC: 18 U/L (ref 1–32)
BASOPHILS # BLD AUTO: 0.02 10*3/MM3 (ref 0–0.2)
BASOPHILS NFR BLD AUTO: 0.3 % (ref 0–1.5)
BILIRUB SERPL-MCNC: 0.2 MG/DL (ref 0–1.2)
BUN SERPL-MCNC: 13 MG/DL (ref 6–20)
BUN/CREAT SERPL: 15.5 (ref 7–25)
CALCIUM SPEC-SCNC: 9 MG/DL (ref 8.6–10.5)
CHLORIDE SERPL-SCNC: 104 MMOL/L (ref 98–107)
CO2 SERPL-SCNC: 26 MMOL/L (ref 22–29)
CREAT SERPL-MCNC: 0.84 MG/DL (ref 0.57–1)
DEPRECATED RDW RBC AUTO: 55.7 FL (ref 37–54)
EOSINOPHIL # BLD AUTO: 0.02 10*3/MM3 (ref 0–0.4)
EOSINOPHIL NFR BLD AUTO: 0.3 % (ref 0.3–6.2)
ERYTHROCYTE [DISTWIDTH] IN BLOOD BY AUTOMATED COUNT: 15.9 % (ref 12.3–15.4)
GFR SERPL CREATININE-BSD FRML MDRD: 90 ML/MIN/1.73
GLOBULIN UR ELPH-MCNC: 3 GM/DL
GLUCOSE SERPL-MCNC: 113 MG/DL (ref 65–99)
HCT VFR BLD AUTO: 40.2 % (ref 34–46.6)
HGB BLD-MCNC: 13.4 G/DL (ref 12–15.9)
IMM GRANULOCYTES # BLD AUTO: 0.03 10*3/MM3 (ref 0–0.05)
IMM GRANULOCYTES NFR BLD AUTO: 0.5 % (ref 0–0.5)
LYMPHOCYTES # BLD AUTO: 1.95 10*3/MM3 (ref 0.7–3.1)
LYMPHOCYTES NFR BLD AUTO: 30.4 % (ref 19.6–45.3)
MCH RBC QN AUTO: 31.7 PG (ref 26.6–33)
MCHC RBC AUTO-ENTMCNC: 33.3 G/DL (ref 31.5–35.7)
MCV RBC AUTO: 95 FL (ref 79–97)
MONOCYTES # BLD AUTO: 0.61 10*3/MM3 (ref 0.1–0.9)
MONOCYTES NFR BLD AUTO: 9.5 % (ref 5–12)
NEUTROPHILS NFR BLD AUTO: 3.78 10*3/MM3 (ref 1.7–7)
NEUTROPHILS NFR BLD AUTO: 59 % (ref 42.7–76)
NRBC BLD AUTO-RTO: 0 /100 WBC (ref 0–0.2)
PLATELET # BLD AUTO: 263 10*3/MM3 (ref 140–450)
PMV BLD AUTO: 9.8 FL (ref 6–12)
POTASSIUM SERPL-SCNC: 3.5 MMOL/L (ref 3.5–5.2)
PROT SERPL-MCNC: 7.2 G/DL (ref 6–8.5)
RBC # BLD AUTO: 4.23 10*6/MM3 (ref 3.77–5.28)
SODIUM SERPL-SCNC: 139 MMOL/L (ref 136–145)
WBC NRBC COR # BLD: 6.41 10*3/MM3 (ref 3.4–10.8)

## 2021-12-10 PROCEDURE — 36415 COLL VENOUS BLD VENIPUNCTURE: CPT

## 2021-12-10 PROCEDURE — 85025 COMPLETE CBC W/AUTO DIFF WBC: CPT

## 2021-12-10 PROCEDURE — 80053 COMPREHEN METABOLIC PANEL: CPT

## 2021-12-16 DIAGNOSIS — I10 ESSENTIAL HYPERTENSION: ICD-10-CM

## 2021-12-16 RX ORDER — AMLODIPINE BESYLATE 10 MG/1
10 TABLET ORAL DAILY
Qty: 90 TABLET | Refills: 1 | OUTPATIENT
Start: 2021-12-16

## 2021-12-23 ENCOUNTER — APPOINTMENT (OUTPATIENT)
Dept: CARDIOLOGY | Facility: HOSPITAL | Age: 41
End: 2021-12-23

## 2021-12-28 LAB — REF LAB TEST METHOD: NORMAL

## 2021-12-30 ENCOUNTER — OFFICE VISIT (OUTPATIENT)
Dept: INTERNAL MEDICINE | Facility: CLINIC | Age: 41
End: 2021-12-30

## 2021-12-30 ENCOUNTER — LAB (OUTPATIENT)
Dept: LAB | Facility: HOSPITAL | Age: 41
End: 2021-12-30

## 2021-12-30 ENCOUNTER — TELEPHONE (OUTPATIENT)
Dept: ONCOLOGY | Facility: CLINIC | Age: 41
End: 2021-12-30

## 2021-12-30 VITALS
HEIGHT: 65 IN | HEART RATE: 87 BPM | BODY MASS INDEX: 26.99 KG/M2 | RESPIRATION RATE: 16 BRPM | OXYGEN SATURATION: 99 % | TEMPERATURE: 98.2 F | DIASTOLIC BLOOD PRESSURE: 88 MMHG | SYSTOLIC BLOOD PRESSURE: 132 MMHG | WEIGHT: 162 LBS

## 2021-12-30 DIAGNOSIS — I10 ESSENTIAL HYPERTENSION: ICD-10-CM

## 2021-12-30 DIAGNOSIS — J06.9 UPPER RESPIRATORY TRACT INFECTION, UNSPECIFIED TYPE: Primary | ICD-10-CM

## 2021-12-30 DIAGNOSIS — Z12.31 ENCOUNTER FOR SCREENING MAMMOGRAM FOR MALIGNANT NEOPLASM OF BREAST: ICD-10-CM

## 2021-12-30 DIAGNOSIS — R05.9 COUGH: ICD-10-CM

## 2021-12-30 DIAGNOSIS — J06.9 UPPER RESPIRATORY TRACT INFECTION, UNSPECIFIED TYPE: ICD-10-CM

## 2021-12-30 LAB — SARS-COV-2 RNA PNL SPEC NAA+PROBE: NOT DETECTED

## 2021-12-30 PROCEDURE — 87635 SARS-COV-2 COVID-19 AMP PRB: CPT

## 2021-12-30 PROCEDURE — C9803 HOPD COVID-19 SPEC COLLECT: HCPCS

## 2021-12-30 PROCEDURE — 99213 OFFICE O/P EST LOW 20 MIN: CPT | Performed by: NURSE PRACTITIONER

## 2021-12-30 RX ORDER — DEXTROMETHORPHAN HYDROBROMIDE AND PROMETHAZINE HYDROCHLORIDE 15; 6.25 MG/5ML; MG/5ML
5 SYRUP ORAL 4 TIMES DAILY PRN
Qty: 240 ML | Refills: 0 | Status: SHIPPED | OUTPATIENT
Start: 2021-12-30 | End: 2022-01-27 | Stop reason: SDUPTHER

## 2021-12-30 RX ORDER — AMLODIPINE BESYLATE 10 MG/1
10 TABLET ORAL DAILY
Qty: 90 TABLET | Refills: 1 | Status: SHIPPED | OUTPATIENT
Start: 2021-12-30 | End: 2022-04-04 | Stop reason: SDUPTHER

## 2021-12-30 NOTE — PROGRESS NOTES
Chief Complaint   Patient presents with   • Illness     cough, sinus congestion, runny nose for 2 days       History:  Winsome Becker is a 41 y.o. female who presents today for follow-up for evaluation of the above:    Illness  This is a new problem. The current episode started yesterday. The problem occurs intermittently. The problem has been unchanged. Associated symptoms include congestion and coughing. Pertinent negatives include no fever. The symptoms are aggravated by coughing. She has tried nothing for the symptoms.        12/28/2021 symptom onset  No known exposure  Unvaccinated.        ROS:  Review of Systems   Constitutional: Negative for fever.   HENT: Positive for congestion and rhinorrhea.    Respiratory: Positive for cough.    All other systems reviewed and are negative.      Ms. Becker  reports that she has been smoking cigarettes. She has a 5.00 pack-year smoking history. She has never used smokeless tobacco. She reports current alcohol use. She reports that she does not use drugs.      Current Outpatient Medications:   •  acetaminophen (Tylenol) 325 MG tablet, Take 3 tablets by mouth Every 8 (Eight) Hours. Take every 8 hours for 3 days then take prn as needed., Disp: 100 tablet, Rfl: 2  •  amLODIPine (NORVASC) 10 MG tablet, Take 1 tablet by mouth Daily., Disp: 90 tablet, Rfl: 1  •  bosutinib (BOSULIF) 500 MG chemo tablet, Take 1 tablet by mouth Daily. Take with food. Swallow tablet whole; do not cut, crush, or break, Disp: 30 tablet, Rfl: 2  •  ibuprofen (Motrin IB) 200 MG tablet, Take 3 tablets by mouth Every 8 (Eight) Hours. Take every 8 hours for three days then take as needed., Disp: 100 tablet, Rfl: 2  •  Nilotinib HCl (TASIGNA) 200 MG capsule capsule, Take 2 capsules by mouth 2 (Two) Times a Day. Take on EMPTY stomach, 1 hour before or 2 hours after a meal., Disp: 120 capsule, Rfl: 3  •  ondansetron (Zofran) 4 MG tablet, Take 1 tablet by mouth Every 8 (Eight) Hours As Needed for Nausea.,  "Disp: 15 tablet, Rfl: 0  •  ondansetron (ZOFRAN) 8 MG tablet, Take 1 tablet by mouth 3 (Three) Times a Day As Needed for Nausea or Vomiting., Disp: 30 tablet, Rfl: 5  •  oxyCODONE (Roxicodone) 5 MG immediate release tablet, Take 1 tablet by mouth Every 8 (Eight) Hours As Needed for Severe Pain ., Disp: 15 tablet, Rfl: 0  •  promethazine-dextromethorphan (PROMETHAZINE-DM) 6.25-15 MG/5ML syrup, Take 5 mL by mouth 4 (Four) Times a Day As Needed for Cough., Disp: 240 mL, Rfl: 0      OBJECTIVE:  /88 (BP Location: Left arm, Patient Position: Sitting, Cuff Size: Adult)   Pulse 87   Temp 98.2 °F (36.8 °C) (Temporal)   Resp 16   Ht 165 cm (64.96\")   Wt 73.5 kg (162 lb)   SpO2 99%   BMI 26.99 kg/m²    Physical Exam  Vitals reviewed.   Constitutional:       Appearance: She is well-developed.   HENT:      Head: Normocephalic and atraumatic.   Eyes:      Pupils: Pupils are equal, round, and reactive to light.   Cardiovascular:      Rate and Rhythm: Normal rate and regular rhythm.      Heart sounds: Normal heart sounds.   Pulmonary:      Effort: Pulmonary effort is normal.      Breath sounds: Normal breath sounds.   Abdominal:      General: Bowel sounds are normal.      Palpations: Abdomen is soft.   Musculoskeletal:         General: Normal range of motion.      Cervical back: Normal range of motion and neck supple.   Skin:     General: Skin is warm and dry.   Neurological:      Mental Status: She is alert and oriented to person, place, and time.         Assessment/Plan    Diagnoses and all orders for this visit:    1. Upper respiratory tract infection, unspecified type (Primary)  -     COVID-19,[employer] IN-HOUSE,Nasal Swab No Transport Media 3-4 HR TAT - Swab, Nasal Cavity; Future    2. Essential hypertension  -     amLODIPine (NORVASC) 10 MG tablet; Take 1 tablet by mouth Daily.  Dispense: 90 tablet; Refill: 1    3. Encounter for screening mammogram for malignant neoplasm of breast  -     Mammo screening digital " tomosynthesis bilateral w CAD; Future    4. Cough  -     promethazine-dextromethorphan (PROMETHAZINE-DM) 6.25-15 MG/5ML syrup; Take 5 mL by mouth 4 (Four) Times a Day As Needed for Cough.  Dispense: 240 mL; Refill: 0        Symptom Relief for Viral Illnesses       1. DIAGNOSIS  2. GENERAL INSTRUCTIONS   · Cold or cough  · Middle ear fluid (Virgilio Media with Effusion, OME)  · Flu  · Viral sore throat  · Bronchitis    You have been diagnosed with an illness caused by a virus.  Antibiotics do not work on viruses.  When antibiotics aren't needed, they won't help you, and the side effects could still hurt you.  The treatments prescribed below will help you feel better while your body fights off the virus.   · Drink extra water and fluids  · Use a cool mist vaporizer or saline nasal spray to relieve congestion  · For sore throats in older children and adults, use ice chips, sore throat spray, or lozenges  · Use honey to relieve cough.  Do not give honey to an infant younger than 1 year.      3. SPECIFIC MEDICINES  4. FOLLOW UP   Use over-the-counter medications specific to your symptoms. Use medicines according to the package instructions or as directed by your healthcare professional.  Stop the medication when the symptoms get better.   If not improved in 3-5 days, if new symptoms occur, or if you have other concerns, please call or return to the office for a recheck.         To learn more about antibiotic prescribing and use, visit www.cdc.gov/antibiotic-use       An After Visit Summary was printed and given to the patient at discharge.  Return in about 3 months (around 3/30/2022) for Annual physical. Sooner if problems arise.          Aida KINGSLEY. 12/30/2021   Electronically Signed

## 2021-12-30 NOTE — TELEPHONE ENCOUNTER
Provider: DR CONCEPCION    Caller: VIRGINIA    Relationship to Patient: SELF    Reason for Call: VIRGINIA IS CALLING TO LET DR ADAMS KNOW THAT SHE HAS STARTED THE MEDICATION Nilotinib HCl (TASIGNA) 200 MG capsule capsule  SHE WAS TOLD TO CALL AND LET HIM KNOW.

## 2022-01-04 LAB
REF LAB TEST METHOD: NORMAL
REF LAB TEST METHOD: NORMAL

## 2022-01-10 ENCOUNTER — SPECIALTY PHARMACY (OUTPATIENT)
Dept: ONCOLOGY | Facility: HOSPITAL | Age: 42
End: 2022-01-10

## 2022-01-10 NOTE — PROGRESS NOTES
GENERAL SURGERY PROGRESS NOTE     CC: Abdominal pain    INTERVAL HISTORY: Patient reports minimal incisional pain today that is well managed with medications.  Denies nausea and vomiting.      REVIEW OF SYSTEMS    CONSTITUTIONAL: Denies - fever and chills.   CARDIOVASCuLAR: Denies - chest pain.   RESPIRATORY: Denies - shortness of breath.    Pertinent past history  Sleep apnea  Hiatal hernia  Hemorrhoids  Hyperplastic polyp  Celiac disease    PHYSICAL EXAM:   Constitutional:   Visit Vitals  /72 (BP Location: [facility], Patient Position: Supine)   Pulse 61   Temp 98.2 °F (36.8 °C) (Tympanic)   Resp 18   Ht 6' 1\" (1.854 m)   Wt 98 kg   SpO2 96%   BMI 28.50 kg/m²    -appears comforable -Normal body habitus  Resp: -Normal effort -normal breath sounds bilaterally  CV: -Regular rate and rhythm -no lower extremity edema  GI: Soft, non-distended.  Minimal incisional tenderness, no rebound or guarding.  Umbilical incision is closed with monocryl suture, no incisional erythema.  Other incisions are clean with overlying Dermabond, no incisional erythema or exudates.  No palpable masses, no HSM.  Psych: -Oriented to person, place, and time -Normal mood and affect    Laboratory Results:  WBC (K/mcL)   Date Value   08/30/2018 11.3 (H)     HGB (g/dL)   Date Value   08/30/2018 13.9     PLT (K/mcL)   Date Value   08/30/2018 160     Creatinine (mg/dL)   Date Value   08/30/2018 1.06     ASSESSMENT:  Acute suppurative appendicitis s/p laparoscopic appendectomy on 8/29/18.  Some spillage of appendiceal exudate at the umbilical incision.    PLAN:    · Discontinue IV fluids  · Continue IV antibiotics  · Continue regular diet    The patient was seen and examined with Dr. Perez and the plan of care was discussed.    Discussed with: Patient    FOUZIA Espana   [facility]    Oral Oncology Patient Follow-Up      Consult Details  Consulting Provider:   Ethan Dow MD ([facility])   Medication and Regimen:  nilotinib [Tasigna] 400 mg PO BID     Prescription Review  Dosing & Interactions:   Reviewed, counseled patient regarding possible increase in serum concentration of Amlodipine by way of CY inhibition, patient will need to monitor for increased amlodipine effects and toxicities (hypotension, edema).   Current Specialty Pharmacy [facility]     Intervention(s):                  Spoke with Ms. Becker for her monthly routine follow-up regarding the oral oncology medication. She stated that she has been tolerating the Tasigna really well and has not had any obvious side effects from the medication. She has not missed any doses while on the medication. The patient still receives refills through [facility], and has not had any delays in getting those refills. The patient also stated that there has been no changes to her maintenance medications and no new allergies that she is aware of.   Finally, the patient stated she has not had any recent stays in the hospital and has not visited the ER in the last month. I encouraged the patient to reach out anytime with any questions or concerns they might have regarding their oral chemotherapy.     Summary:    No needs expressed by the patient or otherwise identified. Will follow-up next month regarding the patient’s oral chemotherapy with a routine telephone consultation.     Nelsy Diez, Ol[place]  01/10/2022 16:01 CST

## 2022-01-27 DIAGNOSIS — R05.9 COUGH: ICD-10-CM

## 2022-01-27 RX ORDER — DEXTROMETHORPHAN HYDROBROMIDE AND PROMETHAZINE HYDROCHLORIDE 15; 6.25 MG/5ML; MG/5ML
5 SYRUP ORAL 4 TIMES DAILY PRN
Qty: 240 ML | Refills: 0 | Status: SHIPPED | OUTPATIENT
Start: 2022-01-27 | End: 2022-04-04 | Stop reason: SDUPTHER

## 2022-02-04 ENCOUNTER — TELEPHONE (OUTPATIENT)
Dept: ONCOLOGY | Facility: CLINIC | Age: 42
End: 2022-02-04

## 2022-02-04 NOTE — TELEPHONE ENCOUNTER
Caller: VIRGINIA ANTUNEZ    Relationship to patient: SELF    Best call back number: [phone]    Chief complaint: PT CALLED TO SEE IF SHE COULD COME IN FOR LAB WORK TODAY. PT WAS OFF WORK AND STARTED A NEW MEDICATION A WHILE AGO, AND WANTED TO CHECK HER BLOOD LEVELS. ATTEMPTED WARM TRANSFER, DID NOT RECEIVE ANSWER    Type of visit: LAB    Requested date: TODAY 02/04/22
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
## 2022-02-09 ENCOUNTER — TELEPHONE (OUTPATIENT)
Dept: ONCOLOGY | Facility: CLINIC | Age: 42
End: 2022-02-09

## 2022-02-09 NOTE — TELEPHONE ENCOUNTER
Caller: VIRGINIA ANTUNEZ    Relationship: SELF    Best call back number: [phone]    What is the best time to reach you: ANY    Who are you requesting to speak with (clinical staff, provider,  specific staff member): DR ADAMS    What was the call regarding: PT IS CALLING IN REGARD TO HER MEDICATION TASIGNA. PT STATES SHE HAS ONLY BEEN TAKING 1 PILL TWICE A DAY BUT WAS TOLD BY HER PHARMACY THAT HER SCRIPT IS FOR 2 PILLS TWICE A DAY. PT IS CALLING TO CHECK AND SEE WHICH DOSAGE SHE SHOULD BE TAKING. PT WOULD LIKE SOMEONE TO CALL HER TODAY SO THAT SHE KNOW WHAT SHE SHOULD BE TAKING    Do you require a callback: YES

## 2022-02-09 NOTE — TELEPHONE ENCOUNTER
Patient states she has only been taking the medication twice a day with a total of 2 pills. Per the instructions it states for her to take 2 pills twice a day for a total of 4 pills. Patient states she is going to start taking it that way as of today.    rp

## 2022-02-14 ENCOUNTER — TELEPHONE (OUTPATIENT)
Dept: ONCOLOGY | Facility: CLINIC | Age: 42
End: 2022-02-14

## 2022-02-17 ENCOUNTER — TELEPHONE (OUTPATIENT)
Dept: ONCOLOGY | Facility: CLINIC | Age: 42
End: 2022-02-17

## 2022-02-17 ENCOUNTER — LAB (OUTPATIENT)
Dept: LAB | Facility: HOSPITAL | Age: 42
End: 2022-02-17

## 2022-02-17 DIAGNOSIS — C92.10 CML (CHRONIC MYELOCYTIC LEUKEMIA): Primary | ICD-10-CM

## 2022-02-17 DIAGNOSIS — C92.10 CML (CHRONIC MYELOCYTIC LEUKEMIA): ICD-10-CM

## 2022-02-17 LAB
ALBUMIN SERPL-MCNC: 4 G/DL (ref 3.5–5.2)
ALBUMIN/GLOB SERPL: 1.4 G/DL
ALP SERPL-CCNC: 90 U/L (ref 39–117)
ALT SERPL W P-5'-P-CCNC: 15 U/L (ref 1–33)
ANION GAP SERPL CALCULATED.3IONS-SCNC: 9 MMOL/L (ref 5–15)
AST SERPL-CCNC: 18 U/L (ref 1–32)
BASOPHILS # BLD AUTO: 0.02 10*3/MM3 (ref 0–0.2)
BASOPHILS NFR BLD AUTO: 0.4 % (ref 0–1.5)
BILIRUB SERPL-MCNC: 0.7 MG/DL (ref 0–1.2)
BUN SERPL-MCNC: 21 MG/DL (ref 6–20)
BUN/CREAT SERPL: 27.3 (ref 7–25)
CALCIUM SPEC-SCNC: 8.5 MG/DL (ref 8.6–10.5)
CHLORIDE SERPL-SCNC: 110 MMOL/L (ref 98–107)
CO2 SERPL-SCNC: 22 MMOL/L (ref 22–29)
CREAT SERPL-MCNC: 0.77 MG/DL (ref 0.57–1)
DEPRECATED RDW RBC AUTO: 61.6 FL (ref 37–54)
EOSINOPHIL # BLD AUTO: 0.13 10*3/MM3 (ref 0–0.4)
EOSINOPHIL NFR BLD AUTO: 2.5 % (ref 0.3–6.2)
ERYTHROCYTE [DISTWIDTH] IN BLOOD BY AUTOMATED COUNT: 17.2 % (ref 12.3–15.4)
GFR SERPL CREATININE-BSD FRML MDRD: 99 ML/MIN/1.73
GLOBULIN UR ELPH-MCNC: 2.8 GM/DL
GLUCOSE SERPL-MCNC: 92 MG/DL (ref 65–99)
HCT VFR BLD AUTO: 36.9 % (ref 34–46.6)
HGB BLD-MCNC: 11.9 G/DL (ref 12–15.9)
IMM GRANULOCYTES # BLD AUTO: 0.02 10*3/MM3 (ref 0–0.05)
IMM GRANULOCYTES NFR BLD AUTO: 0.4 % (ref 0–0.5)
LYMPHOCYTES # BLD AUTO: 1.74 10*3/MM3 (ref 0.7–3.1)
LYMPHOCYTES NFR BLD AUTO: 32.8 % (ref 19.6–45.3)
MCH RBC QN AUTO: 31.6 PG (ref 26.6–33)
MCHC RBC AUTO-ENTMCNC: 32.2 G/DL (ref 31.5–35.7)
MCV RBC AUTO: 97.9 FL (ref 79–97)
MONOCYTES # BLD AUTO: 0.55 10*3/MM3 (ref 0.1–0.9)
MONOCYTES NFR BLD AUTO: 10.4 % (ref 5–12)
NEUTROPHILS NFR BLD AUTO: 2.84 10*3/MM3 (ref 1.7–7)
NEUTROPHILS NFR BLD AUTO: 53.5 % (ref 42.7–76)
NRBC BLD AUTO-RTO: 0 /100 WBC (ref 0–0.2)
PLATELET # BLD AUTO: 423 10*3/MM3 (ref 140–450)
PMV BLD AUTO: 9.1 FL (ref 6–12)
POTASSIUM SERPL-SCNC: 3.9 MMOL/L (ref 3.5–5.2)
PROT SERPL-MCNC: 6.8 G/DL (ref 6–8.5)
RBC # BLD AUTO: 3.77 10*6/MM3 (ref 3.77–5.28)
SODIUM SERPL-SCNC: 141 MMOL/L (ref 136–145)
WBC NRBC COR # BLD: 5.3 10*3/MM3 (ref 3.4–10.8)

## 2022-02-17 PROCEDURE — 80053 COMPREHEN METABOLIC PANEL: CPT

## 2022-02-17 PROCEDURE — 85025 COMPLETE CBC W/AUTO DIFF WBC: CPT

## 2022-02-17 PROCEDURE — 36415 COLL VENOUS BLD VENIPUNCTURE: CPT

## 2022-02-17 NOTE — TELEPHONE ENCOUNTER
Patient was last seen 11/16/2021. She was to f/u 5 weeks. Patient canceled her appt that was scheduled on 12/27/2021 for follow up. She did have labs drawn on 12/10/2021.  At this time patient has never rescheduled her follow up with Dr. Dow. Orders for labs will be put in for her to have drawn today. She was advised by Swetha to schedule a follow up with in 3 weeks with Dr. Dow.

## 2022-02-17 NOTE — TELEPHONE ENCOUNTER
----- Message from Karina Yan sent at 2/17/2022  9:49 AM CST -----  I called her back. She will come for labs today and will make appt to see Dr MARCELO. Can you put lab orders in?  ----- Message -----  From: Johanne Zavala MA  Sent: 2/17/2022   9:40 AM CST  To: Karina Yan    I discussed with Dr. Dow. She can have labs drawn but she really needs to come in for f/u in the next 2 to 3 weeks. So if she has labs today she will need to come in for f/u in 2 to 3 weeks.   ----- Message -----  From: Karina Yan  Sent: 2/17/2022   9:00 AM CST  To: Johanne Zavala MA    Winsoem is off work today and wants to know if she can come get labs done? Her last labs were Dec. She works odd hours/day so doesn't know when she can get in to see Dr MARCELO yet. She would need lab orders.

## 2022-02-17 NOTE — TELEPHONE ENCOUNTER
SPOKE W/PT AND SHE WILL COME FOR LAB TODAY AND COME TO THE OFFICE TO SET UP APPT W/DR MARCELO      ----- Message from Johanne Zavala MA sent at 2/17/2022  9:39 AM CST -----  I discussed with Dr. Dow. She can have labs drawn but she really needs to come in for f/u in the next 2 to 3 weeks. So if she has labs today she will need to come in for f/u in 2 to 3 weeks.   ----- Message -----  From: Karina Yan  Sent: 2/17/2022   9:00 AM CST  To: Johanne Zavala MA    Winsome is off work today and wants to know if she can come get labs done? Her last labs were Dec. She works odd hours/day so doesn't know when she can get in to see Dr MARCELO yet. She would need lab orders.

## 2022-02-22 ENCOUNTER — SPECIALTY PHARMACY (OUTPATIENT)
Dept: ONCOLOGY | Facility: HOSPITAL | Age: 42
End: 2022-02-22

## 2022-02-22 NOTE — PROGRESS NOTES
[facility]    Oral Oncology Patient Follow-Up      Consult Details  Consulting Provider:   Ethan Dow MD ([facility])   Medication and Regimen:  Tasigna 400 mg PO BID     Prescription Review  Dosing & Interactions:   Reviewed, interaction noted, will  patient regarding possible increase in serum concentration of Amlodipine by way of CY inhibition, patient will need to monitor for increased amlodipine effects and toxicities (hypotension, edema)...   Current Specialty Pharmacy [facility]     Specialty Pharmacy Follow up Note     Winsome is a 41 y.o. female, who is followed by the specialty pharmacy service for Tasigna. This patient was seen or contacted today for a routine follow up regarding their specialty medication.     Visit Type: telephone with patient     Indication, effectiveness, safety and convenience of her specialty medication(s) were reviewed today.     Reviewed and verified with patient: Medication, Dose, Compliance    Refill history appropriate, if able to assess: yes    Patient Consultation  History provided by: patient  History limited by: n/a  Oral Chemo Agent: Tasigna  Consultation complete: yes  Drug Interactions Reviewed: yes    Toxicity Assessment  Dermatologic Toxicity: no  Fluid Retention: no  Gastrointestinal Toxicity: no  CNS Toxicity: no  Other Toxicity: no  Required urgent care visit or ED visit since last assessment: no    Results  Abnormal renal function:no  Abnormal LFTs: no  Abnormal CBC: reviewed    Concerns  Financial Concerns: no  Plan of Care Concerns: no  Other: no     Discussion with patient:    Spoke with patient today. She reports she is doing well. No changes in medications or allergies. She has no questions or concerns. Tasigna comes from [facility] and she has no issues there either.     Needs Today: None  Assistance Required Today: None    Patient encouraged to contact us if any  questions or concerns arise.     The next routine follow up will be scheduled approximately 28-30 days from today.        Electronically signed 02/22/22 at 16:29 CST by:  Manolo Beckett PharmD  Specialty Pharmacist - Hematology & Oncology  [facility]  [phone]

## 2022-03-02 LAB
REF LAB TEST METHOD: NORMAL
REF LAB TEST METHOD: NORMAL

## 2022-03-03 ENCOUNTER — TELEPHONE (OUTPATIENT)
Dept: ONCOLOGY | Facility: CLINIC | Age: 42
End: 2022-03-03

## 2022-03-03 NOTE — TELEPHONE ENCOUNTER
----- Message from Ethan Dow MD sent at 3/2/2022  8:18 PM CST -----  Persistently abnormal BCR/ABL:  1.  Pls call patient to confirm that she is taking her bosulif daily as prescribed  2.  We need to see her back in the office ASAP

## 2022-03-03 NOTE — TELEPHONE ENCOUNTER
I spoke with Winsome and she reports that she is no longer taking bosulif, she is taking Tasigna.

## 2022-03-16 ENCOUNTER — TELEPHONE (OUTPATIENT)
Dept: ONCOLOGY | Facility: CLINIC | Age: 42
End: 2022-03-16

## 2022-03-16 NOTE — TELEPHONE ENCOUNTER
"Called patient in concern of all the cancelled appointments that she has made. I let her aware that as her provider we have expectations of her making her appts so that we can successfully and appropriately take care of her. Given her diagnosis and questionable lab results we need her to keep her appts. Pt stated that she understood but she could not help that she has to work, I vocalized to patient that I completely understood what she was saying but ultimately her health mattered most. I told patient at this time she is a risk management patient and needed her to comply. Patient did agree but also stated that she had to work to keep her insurance so that she could make her appts. We agreed that she would call \"hopefully by this Friday\" and reschedule her appt as she will be off of work for the next couple of weeks once this specific job ended. I gave patient my direct number for her to call me.  "

## 2022-03-21 ENCOUNTER — SPECIALTY PHARMACY (OUTPATIENT)
Dept: ONCOLOGY | Facility: HOSPITAL | Age: 42
End: 2022-03-21

## 2022-03-21 NOTE — PROGRESS NOTES
[facility] Pharm[facility]    Oral Oncology Patient Follow-Up      Consult Details  Consulting Provider:   Ethan Dow MD ([facility])   Medication and Regimen:  Tasigna 400 mg PO BID     Prescription Review  Dosing & Interactions:   Reviewed, interaction noted, will  patient regarding amlodipine; patient is monitoring for toxicities..   Current Specialty Pharmacy [facility]/pharmacy #[id number] [facility] [id number] [facility] [phone] [facility] [phone]       Intervention(s):                  Spoke with Ms. Becker regarding routine follow up for oral oncology medication. She has not experienced any tolerability issues while taking the Tasigna and does not report any missed doses. The patient is receiving Tasigna through [facility] and does not report any delays in receiving refills.     Summary:    There were no needs expressed by the patient or otherwise identified. Encouraged patient to contact us if any questions or concerns arise. Will follow up in 28-30 regarding patient's oral oncology medication with a routine telephone consultation.      AMADOR BASS, PharmD  03/21/2022 15:43 CDT

## 2022-03-22 ENCOUNTER — TELEPHONE (OUTPATIENT)
Dept: ONCOLOGY | Facility: CLINIC | Age: 42
End: 2022-03-22

## 2022-03-22 DIAGNOSIS — C92.10 CML (CHRONIC MYELOCYTIC LEUKEMIA): Primary | ICD-10-CM

## 2022-03-22 NOTE — TELEPHONE ENCOUNTER
Call from patient wanting to make an appt with Dr. Dow. Pt agreed to be seen on 4/26/22 with labs prior to her appt. We have made the appt one month out from now. I reiterated to patient that she should make sure to plan ahead with her work schedule so that she makes this appt. I tried to get pt to schedule later in the day but pt felt that the early appt would work best for her. As explained and previously explained to the pt, for us to properly treat her with her diagnosis, she has to comply so we can continue her care. Patient verbalized understanding.

## 2022-03-28 ENCOUNTER — TELEPHONE (OUTPATIENT)
Dept: ONCOLOGY | Facility: CLINIC | Age: 42
End: 2022-03-28

## 2022-03-28 NOTE — TELEPHONE ENCOUNTER
Received call from Deshawn [facility]ist  [facility]. He calls to clarify patient Winsome Eugenio current medication Tasigna. He reports patient notes are not clear and has not been seen since Nov 2021. He is unsure what medication she should be taking.    Reviewed patient chart, patient s/b taking Tasigna 200 mg two tabs by mouth twice daily  (4 tabs per day)  Deshawn alcaraz/vinicius and will contact patient mail order Pharmacy [facility]    Patient has been vishnu by  Carmen Schumacher late April 2022.

## 2022-04-04 ENCOUNTER — OFFICE VISIT (OUTPATIENT)
Dept: INTERNAL MEDICINE | Facility: CLINIC | Age: 42
End: 2022-04-04

## 2022-04-04 VITALS
HEART RATE: 88 BPM | RESPIRATION RATE: 16 BRPM | OXYGEN SATURATION: 98 % | TEMPERATURE: 97.9 F | SYSTOLIC BLOOD PRESSURE: 146 MMHG | BODY MASS INDEX: 28.74 KG/M2 | HEIGHT: 65 IN | DIASTOLIC BLOOD PRESSURE: 90 MMHG | WEIGHT: 172.5 LBS

## 2022-04-04 DIAGNOSIS — F17.210 CIGARETTE SMOKER: ICD-10-CM

## 2022-04-04 DIAGNOSIS — E66.3 OVERWEIGHT (BMI 25.0-29.9): ICD-10-CM

## 2022-04-04 DIAGNOSIS — Z00.01 ANNUAL VISIT FOR GENERAL ADULT MEDICAL EXAMINATION WITH ABNORMAL FINDINGS: Primary | ICD-10-CM

## 2022-04-04 DIAGNOSIS — R06.2 WHEEZING: ICD-10-CM

## 2022-04-04 DIAGNOSIS — I10 ESSENTIAL HYPERTENSION: ICD-10-CM

## 2022-04-04 DIAGNOSIS — R05.9 COUGH: ICD-10-CM

## 2022-04-04 DIAGNOSIS — C92.10 CML (CHRONIC MYELOCYTIC LEUKEMIA): ICD-10-CM

## 2022-04-04 DIAGNOSIS — J40 BRONCHITIS: ICD-10-CM

## 2022-04-04 PROCEDURE — 99214 OFFICE O/P EST MOD 30 MIN: CPT | Performed by: INTERNAL MEDICINE

## 2022-04-04 PROCEDURE — 99396 PREV VISIT EST AGE 40-64: CPT | Performed by: INTERNAL MEDICINE

## 2022-04-04 RX ORDER — DEXTROMETHORPHAN HYDROBROMIDE AND PROMETHAZINE HYDROCHLORIDE 15; 6.25 MG/5ML; MG/5ML
5 SYRUP ORAL 4 TIMES DAILY PRN
Qty: 240 ML | Refills: 0 | Status: SHIPPED | OUTPATIENT
Start: 2022-04-04 | End: 2022-10-14

## 2022-04-04 RX ORDER — ALBUTEROL SULFATE 90 UG/1
2 AEROSOL, METERED RESPIRATORY (INHALATION) EVERY 4 HOURS PRN
Qty: 8 G | Refills: 0 | Status: SHIPPED | OUTPATIENT
Start: 2022-04-04

## 2022-04-04 RX ORDER — DOXYCYCLINE HYCLATE 100 MG/1
100 CAPSULE ORAL 2 TIMES DAILY
Qty: 14 CAPSULE | Refills: 0 | Status: SHIPPED | OUTPATIENT
Start: 2022-04-04 | End: 2022-04-11

## 2022-04-04 RX ORDER — AMLODIPINE BESYLATE 10 MG/1
10 TABLET ORAL DAILY
Qty: 90 TABLET | Refills: 1 | Status: SHIPPED | OUTPATIENT
Start: 2022-04-04 | End: 2022-10-14 | Stop reason: SDUPTHER

## 2022-04-04 NOTE — PROGRESS NOTES
CC: f/u preventive health AND cough    History:  Winsome Becker is a 41 y.o. female who presents today for evaluation of the above problems.  She notes she has been doing reasonably well, but has been having persistent cough with wheezing.  She had asthma as a child, but has not had symptoms and has not required an inhaler in the past several years.  However, she does work with asbestos and wears appropriate respirator.  She does continue smoking.      ROS:  Review of Systems   Constitutional: Negative for fever.   Respiratory: Positive for cough, shortness of breath and wheezing.    Cardiovascular: Positive for leg swelling. Negative for chest pain and palpitations.       Allergies   Allergen Reactions   • Latex Rash   • Penicillins Rash     Past Medical History:   Diagnosis Date   • Asthma    • Bronchitis, chronic (HCC)    • CML (chronic myelocytic leukemia) (HCC)    • Foot fracture    • Hypertension      Past Surgical History:   Procedure Laterality Date   • COLONOSCOPY N/A 12/4/2019    Tubular adenoma at 40 cm, Diverticulosis repeat exam in 5 years   • DENTAL PROCEDURE      emergency surgery for tooth extraction   • ENDOSCOPY N/A 12/4/2019    Enlarged gastric folds showing marked chronic active gastritis + for H Pylori treated   • EPIGASTRIC HERNIA REPAIR N/A 12/7/2021    Procedure: OPEN EPIGASTRIC HERNIA REPAIR WITH POSSIBLE MESH;  Surgeon: Shelbie Doran MD;  Location: [facility] OR;  Service: General;  Laterality: N/A;   • TUBAL ABDOMINAL LIGATION  2000     Family History   Problem Relation Age of Onset   • Breast cancer Neg Hx    • Colon cancer Neg Hx    • Colon polyps Neg Hx       reports that she has been smoking cigarettes. She started smoking about 14 years ago. She has a 12.04 pack-year smoking history. She has never used smokeless tobacco. She reports current alcohol use. She reports that she does not use drugs.      Current Outpatient Medications:   •  amLODIPine (NORVASC) 10 MG tablet, Take 1  "tablet by mouth Daily., Disp: 90 tablet, Rfl: 1  •  Nilotinib HCl (TASIGNA) 200 MG capsule capsule, Take 2 capsules by mouth 2 (Two) Times a Day. Take on EMPTY stomach, 1 hour before or 2 hours after a meal., Disp: 120 capsule, Rfl: 3  •  promethazine-dextromethorphan (PROMETHAZINE-DM) 6.25-15 MG/5ML syrup, Take 5 mL by mouth 4 (Four) Times a Day As Needed for Cough., Disp: 240 mL, Rfl: 0    OBJECTIVE:  /90 (BP Location: Left arm, Patient Position: Sitting, Cuff Size: Adult)   Pulse 88   Temp 97.9 °F (36.6 °C)   Resp 16   Ht 165 cm (64.96\")   Wt 78.2 kg (172 lb 8 oz)   SpO2 98%   Breastfeeding No   BMI 28.74 kg/m²    Physical Exam  Constitutional:       General: She is not in acute distress.  Cardiovascular:      Rate and Rhythm: Normal rate and regular rhythm.      Heart sounds: Normal heart sounds. No murmur heard.  Pulmonary:      Effort: Pulmonary effort is normal.      Breath sounds: Normal breath sounds. No wheezing.   Musculoskeletal:      Right lower leg: Edema (1+) present.      Left lower leg: Edema (1+) present.   Neurological:      Mental Status: She is alert and oriented to person, place, and time.      Gait: Gait normal.   Psychiatric:         Mood and Affect: Mood normal.         Behavior: Behavior normal.         Assessment/Plan     Diagnoses and all orders for this visit:    1. Annual visit for general adult medical examination with abnormal findings (Primary)  -     Hemoglobin A1c; Future  -     Lipid Panel; Future  Immunizations:      - Tetanus: Unknown or >10 years ago. Recommend to have at pharmacy or on injury.      - Influenza: Recommend yearly.      - Pneumovax: Due, but refused. Encouraged to discuss with Dr. Dow.       - Prevnar: Due, but refused. Encouraged to discuss with Dr. Dow.       - Shingrix: Series after 50      - COVID: Declined at the recommendation of Dr. Dow.   CRC screening: Due at 45  Mammogram: Due at 48  PAP: was done on approximately " 3/2021 and the result was: ASCUS with NEGATIVE high risk HPV. Repeat 3 years.   DEXA: DEXA scan at 63     2. Bronchitis  3. Cough  4. Wheezing  -     albuterol sulfate  (90 Base) MCG/ACT inhaler; Inhale 2 puffs Every 4 (Four) Hours As Needed for Wheezing.  Dispense: 8 g; Refill: 0  -     doxycycline (Vibramycin) 100 MG capsule; Take 1 capsule by mouth 2 (Two) Times a Day for 7 days.  Dispense: 14 capsule; Refill: 0  -     promethazine-dextromethorphan (PROMETHAZINE-DM) 6.25-15 MG/5ML syrup; Take 5 mL by mouth 4 (Four) Times a Day As Needed for Cough.  Dispense: 240 mL; Refill: 0  -     Full Pulmonary Function Test With Bronchodilator; Future  Doxy for coverage given chronicity and immunosuppression from CML. Albuterol and cough syrup prescribed with plan to evaluate with PFTs.     5. Essential hypertension  -     amLODIPine (NORVASC) 10 MG tablet; Take 1 tablet by mouth Daily.  Dispense: 90 tablet; Refill: 1  Fair control, BP goal for age is <140/90 per JNC 8 guidelines, continue current medications and encourage adherence as she did not take her medicaiton.     6. Cigarette smoker  Patient was counseled on and understood the many dangers of continuing to use tobacco. Despite this, Ms. Becker states quitting is not an immediate priority at this time. I reminded the patient that if quitting becomes an increased priority to contact us for help with quitting including pharmacologic & nonpharmacologic options or any additional resources.    7. Overweight (BMI 25.0-29.9)  Patient's Body mass index is 28.74 kg/m². indicating that she is overweight (BMI 25-29.9). Patient's (Body mass index is 28.74 kg/m².) indicates that they are overweight with health conditions that include hypertension . Weight is worsening. BMI is is above average; BMI management plan is completed. We discussed portion control and increasing exercise. .    8. CML (chronic myelocytic leukemia) (HCC)  Continue oral chemotherapy per   Dow.        An After Visit Summary was printed and given to the patient at discharge.  Return in about 4 months (around 8/4/2022) for Recheck.         Sid Crockett D.O. 4/4/2022   Electronically signed.

## 2022-04-12 ENCOUNTER — PATIENT OUTREACH (OUTPATIENT)
Dept: ONCOLOGY | Facility: HOSPITAL | Age: 42
End: 2022-04-12

## 2022-04-12 NOTE — OUTREACH NOTE
[facility] Services    Oral Oncology Patient Outreach      Prescription Details  Consulting Provider:   Ethan Dow MD ([facility] [place] [place] [facility] On[facility])   Medication and Regimen:  Tasigna       An attempt was made by the Oral Oncology Clinic to contact this patient for a follow-up on their chemotherapy medication. The Oral Oncology Clinic can be reached at [phone].        Manolo Beckett, [place]  04/12/22  14:02 CDT

## 2022-04-13 ENCOUNTER — PATIENT OUTREACH (OUTPATIENT)
Dept: ONCOLOGY | Facility: HOSPITAL | Age: 42
End: 2022-04-13

## 2022-04-13 NOTE — OUTREACH NOTE
[facility]    Oral Oncology Patient Outreach      Prescription Details  Consulting Provider:   Ethan Dow MD ([facility] [place] [facility])   Medication and Regimen:  Tasigna       An attempt was made by the Oral Oncology Clinic to contact this patient for a follow-up on their chemotherapy medication. The Oral Oncology Clinic can be reached at [phone].        AMADOR BASS, [place]  04/13/22  16:04 CDT

## 2022-04-23 NOTE — PROGRESS NOTES
"[facility] HEMATOLOGY AND ONCOLOGY  [address]  [place] [address] [phone]    Patient Name: Winsome Becker  Encounter Date: 04/26/2022  YOB: 1980  Patient Number: [id number]      REASON FOR VISIT:   Winsome Becker is a 42 yo female who was previously followed by Dr. Zheng for CML diagnosed sometime 2005.  Previously treated with Gleevec on and off, resumed 02/04/2010.  She was on Sprycel since sometime 02/2016 until she stopped 2 days prior to starting Bosulif on 07/30/2021 which was stopped before switching to Nilotinib since 12/30/2022 - was taking 400 mg daily (error on her part but claims \"the pharmacy wrote it that way\") instead of q12h as prescribed - until 02/29/2022 when she started taking q12h.    I have reviewed the HPI and verified with the patient the accuracy of it. No changes to interval history since the information was documented. Ethan Dow MD 04/26/22     Previous pertinent interval history:    --On 12/03/2022- Discussed with Dr. Carreno as a follow-up to yesterday (see below).  Dr. Carreno recommends repeating the BCR/ABL kinase domain mutation to see if any new mutations have emerged. Meanwhile he recommends initiation of either nilotinib or ponatinib, preferring the nilotinib due to fewer potential toxicities.  I then contacted the patient and discussed the plan.  She confessed that she has had a lot of personal problems and is now drinking, \"somewhat heavily.\"  Thus we agreed:  --She will stop alcohol  --Obtain baseline EKG and 2D echo this week   --Redraw CBC with differential, CMP, BCR/ABL PCR, and send BCR/ABL kinase domain mutation study ASAP  --Will switch over to nilotinib 400 mg p.o. every 12 hours-give on empty stomach, 1 hour before or 2 hours after food  --Stop bosulif when she receives the nilotinib  --Ask pharmacy to discuss potential toxicities of nilotinib with the patient " (particularly the danger of QT prolongation, sudden death, arterial thrombotic event, cardiac ischemia, ischemic stroke, thrombocytopenia/neutropenia/anemia, hemorrhage, electrolyte disturbances, hepatotoxicity, pancreatitis, hyper/hypothyroidism, rash, headache, nausea, fatigue, pruritus, diarrhea, arthralgias)  --Return to office in 2 weeks with CBC and differential --Never returned to office         Problem List Items Addressed This Visit        Other    CML (chronic myelocytic leukemia) (HCC) - Primary        Oncology/Hematology History Overview Note   Ms Becker is a pleasant 35 year old female with diagnosis of chronic myelogenous leukemia diagnosed over 12 years ago. She has  been on Gleevec on and off. She was started back on 02/04/10.  Ms Becker is a pleasant  female with chronic myelogenous leukemia. She initially had been placed on imatinib, and  she failed highdose  therapy. She took this infrequently, however. Patient was placed on Sprycel. Had better compliance to this, but her  bcr/abl transcript shaila. 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 This is  a mutation that is reported to confer resistance to bcr/abl 1 tyrosine kinase inhibitors. Patient was informed of the news.  INTERVAL HISTORY  Winsome is a very pleasant 35 year old female patient with chronic myelogenous leukemia who presents today in followup.  She is currently  taking Sprycel and states she been compliant with it since her last prescription. She states she has had some problems in the past with  pharmacy but overall she has been fairly compliant with it over the last month or two. She last had a BCR/ABL transcript on 04/13/2016  that found the transcript detected at 21.3649% on the international scale. This was down from 30.94 in March and 40.52 back in  September of 14. She is also following with Dr. Benigno mcclure at [place]     CML (chronic myelocytic  leukemia) (Formerly [facility])   7/26/2017 Initial Diagnosis    CML (chronic myeloid leukemia)     3/25/2021 - 3/25/2021 Chemotherapy    OP CML Dasatinib 100 mg     7/12/2021 - 10/4/2021 Chemotherapy    OP CML Bosutinib     12/6/2021 -  Chemotherapy    OP CML Nilotinib         PAST MEDICAL HISTORY:  ALLERGIES:  Allergies   Allergen Reactions   • Latex Rash   • Penicillins Rash     CURRENT MEDICATIONS:  Outpatient Encounter Medications as of 4/26/2022   Medication Sig Dispense Refill   • albuterol sulfate  (90 Base) MCG/ACT inhaler Inhale 2 puffs Every 4 (Four) Hours As Needed for Wheezing. 8 g 0   • amLODIPine (NORVASC) 10 MG tablet Take 1 tablet by mouth Daily. 90 tablet 1   • Nilotinib HCl (TASIGNA) 200 MG capsule capsule Take 2 capsules by mouth 2 (Two) Times a Day. Take on EMPTY stomach, 1 hour before or 2 hours after a meal. 120 capsule 3   • promethazine-dextromethorphan (PROMETHAZINE-DM) 6.25-15 MG/5ML syrup Take 5 mL by mouth 4 (Four) Times a Day As Needed for Cough. 240 mL 0     No facility-administered encounter medications on file as of 4/26/2022.     Adult illnesses:   Chronic myeloid leukemia (CML)  Chronic bronchitis  Iron deficiency anemia tobacco dependence    Past surgeries:  Colonoscopy, 12/4/2019. At 40 cm biopsy. Adenomatous polyp, tubular type  Endoscopy, 12/4/2019- small bowel biopsy. Negative.  Gastric antrum, biopsy: chronic gastritis. Positive h.pylori.  Tubular abdominal ligation  Marrow biopsy, 01/31/2020- persistent CML, chronic phase.  Clonal T -cell population identified by PCR.  Flow cytometry negative.  Ventral hernia repair, 12/07/2021-Dr. cleveland      ADULT ILLNESSES:  Patient Active Problem List   Diagnosis Code   • CML (chronic myelocytic leukemia) (Formerly [facility]) C92.10   • Iron deficiency anemia D50.9   • Cigarette smoker F17.210   • Essential hypertension I10   • Overweight (BMI 25.0-29.9) E66.3   • Ventral hernia without obstruction or gangrene K43.9     SURGERIES:  Past Surgical History:    Procedure Laterality Date   • COLONOSCOPY N/A 12/4/2019    Tubular adenoma at 40 cm, Diverticulosis repeat exam in 5 years   • DENTAL PROCEDURE      emergency surgery for tooth extraction   • ENDOSCOPY N/A 12/4/2019    Enlarged gastric folds showing marked chronic active gastritis + for H Pylori treated   • EPIGASTRIC HERNIA REPAIR N/A 12/7/2021    Procedure: OPEN EPIGASTRIC HERNIA REPAIR WITH POSSIBLE MESH;  Surgeon: Shelbie Doran MD;  Location: [facility] OR;  Service: General;  Laterality: N/A;   • TUBAL ABDOMINAL LIGATION  2000     HEALTH MAINTENANCE ITEMS:  Health Maintenance Due   Topic Date Due   • Pneumococcal Vaccine 0-64 (1 - PCV) Never done   • COVID-19 Vaccine (1) Never done   • TDAP/TD VACCINES (1 - Tdap) Never done       <no information>  Last Completed Colonoscopy          COLORECTAL CANCER SCREENING (COLONOSCOPY - Every 5 Years) Next due on 12/4/2024 04/14/2020  Occult Blood X 3, Stool - Stool, Per Rectum    01/14/2020  Occult Blood X 3, Stool - Stool, Per Rectum    12/04/2019  COLONOSCOPY    12/04/2019  Surgical Procedure: COLONOSCOPY                There is no immunization history on file for this patient.  Last Completed Mammogram     This patient has no relevant Health Maintenance data.            FAMILY HISTORY:  Family History   Problem Relation Age of Onset   • Breast cancer Neg Hx    • Colon cancer Neg Hx    • Colon polyps Neg Hx      SOCIAL HISTORY:  Social History     Socioeconomic History   • Marital status: Single   Tobacco Use   • Smoking status: Current Every Day Smoker     Packs/day: 0.86     Years: 14.00     Pack years: 12.04     Types: Cigarettes     Start date: 2008   • Smokeless tobacco: Never Used   • Tobacco comment: 1/2 pack a day   Vaping Use   • Vaping Use: Never used   Substance and Sexual Activity   • Alcohol use: Yes     Comment: Occasional   • Drug use: No   • Sexual activity: Yes     Partners: Male     Birth control/protection: Condom       REVIEW OF  "SYSTEMS:  Nilotinib tolerance:  Has been taking since 12/08/2021.  Has some fluid retention of the legs.  No nausea and vomiting.  Has no loose stools nor overt diarrhea, no GI hemorrhage, no anemia, no thrombocytopenia, no neutropenia, no febrile neutropenia, no hepatotoxicity, no acute renal failure, no sob (pleural effusion), no chest pain (pericarditis), no hypertension, no hypersensitivity reaction, no anaphylaxis, no rash (Platt-Grey syndrome, erythema multiforme), no belly pains (pancreatitis, gastritis), no rash, no vomiting, no fatigue, no fever, no headache, no tinnitus, no cough/fever (pneumonia), no myalgias (increased CK), no hypokalemia, no dehydration, no dysgeusia.  Is still taking daily.  Says for the first 2 months was taking only 200 mg bid until she started taking 400 mg bid since ~ 02/2022.    Constitutional:  Manages her ADLs, chores, errands, driving.  No appetite change, \"I eat good.\"   Energy is fairly good.  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  \"12-14 hour days.\"  Has regained 17 pounds (had lost 15 pounds at her 2 prior visits) since her last visit.  Is again eating well.  No fever, no chills.  No drenching night sweats.  HENT: No sore throat.  Has seasonal sinus symptoms.  No rhinorrhea.  No headaches.  Eyes: Wears glasses that help her close and distant vision.  Respiratory:  No SOB with no MARTINI.  Occasional dry cough. No wheezing.  Still admits to tobacco use - smokes 1/2 ppd since age 29.  \"Uh-huh.\"  Cardiovascular: No chest pain.  No palpitations.  No orthopnea  Gastrointestinal: Resolution of pain from mid-abdomen since ventral hernia repair.  No dysphagia.  No nausea.  No vomiting.  No dyspepsia.  No constipation.  Stools are soft at times.  No diarrhea.  Has not needed imodium.  No melena. No hematochezia.  EGD/c-scope, 12/04/2020 (above)  Endocrine: No hot flashes.  Genitourinary:  No dysuria.  No incontinence.  Musculoskeletal:  No new arthralgias.  As noted " "distal lower extremity swelling for the past several months associated with pain/discomfort.  The swelling improves overnight and is least in the morning.  Skin: No rash  Neurological:   No dizziness.  No facial asymmetry. No headaches.  No neuropathy.  Hematological: Negative for adenopathy. Says she bruises easily.  Psychiatric/Behavioral:  No anxiety.  No depression.       VITAL SIGNS: /88   Pulse 79   Temp 97 °F (36.1 °C)   Resp 16   Ht 165.1 cm (65\")   Wt 79.4 kg (175 lb)   SpO2 100%   Breastfeeding No   BMI 29.12 kg/m² Body surface area is 1.87 meters squared.  Pain Score    04/26/22 0840   PainSc:   7         PHYSICAL EXAMINATION:   General Appearance: Pleasant, cooperative, heavy set but visibly stouter, modestly kept female, awake, alert, oriented and in no acute distress. Patient appears stated age.  ECOG 0  HEENT: Normocephalic. Sclerae clear, conjunctiva pink, extraocular movements intact, pupils, round, reactive to light and  accommodation. She is wearing a surgical mask today.  NECK: Supple, no jugular venous distention, thyroid not enlarged.  LYMPH: No cervical, supraclavicular, axillary, or inguinal lymphadenopathy.  LUNGS: Good air movement, no rales, rhonchi, rubs or wheezes with auscultation  CARDIO: Regular sinus rhythm, no murmurs, gallops or rubs.  ABDOMEN: Nondistended, slightly globose, soft, No tenderness, no guarding, no rebound, obvious hepatosplenomegaly. No abdominal masses. Bowel sounds positive.  Ventral hernia repair incision is well-healed.    MUSCL: No joint swelling, decreased motion, or inflammation  EXTREMS: 1+ pitting bipedal edema about the ankles with subtle ankle and calf tenderness.  No venous cords.  No asymmetry.  No clubbing, cyanosis.  NEURO: Grossly nonfocal, Gait is coordinated and independent, Cognition is preserved.  SKIN: No rashes, no ecchymoses, no petechia.  PSYCH: Oriented to time, place and person. Memory is preserved. Mood and affect appear " normal      LABS    Lab Results - Last 18 Months   Lab Units 04/26/22  0827 02/17/22  1416 12/10/21  0937 11/11/21  0719 09/07/21  1029 08/23/21  1335 06/08/21  1240 03/15/21  0929   HEMOGLOBIN g/dL 12.2 11.9* 13.4 11.6* 13.2 12.0   < > 13.2   HEMATOCRIT % 36.5 36.9 40.2 35.2 38.8 34.4   < > 39.6   MCV fL 98.1* 97.9* 95.0 94.1 95.8 96.1   < > 101.8*   WBC 10*3/mm3 4.52 5.30 6.41 4.15 4.17 5.66   < > 5.59   RDW % 15.2 17.2* 15.9* 14.8 14.6 15.6*   < > 14.3   MPV fL 9.2 9.1 9.8 9.6 9.5 10.1   < > 8.6   PLATELETS 10*3/mm3 286 423 263 224 355 312   < > 361   IMM GRAN % % 0.2 0.4 0.5 0.2 0.2 0.4   < >  --    NEUTROS ABS 10*3/mm3 2.37 2.84 3.78 1.90 2.35 3.13   < > 1.64*   LYMPHS ABS 10*3/mm3 1.59 1.74 1.95 1.68 1.19 1.61   < >  --    MONOS ABS 10*3/mm3 0.47 0.55 0.61 0.46 0.50 0.60   < >  --    EOS ABS 10*3/mm3 0.06 0.13 0.02 0.09 0.09 0.25   < > 0.06   BASOS ABS 10*3/mm3 0.02 0.02 0.02 0.01 0.03 0.05   < > 0.06   IMMATURE GRANS (ABS) 10*3/mm3 0.01 0.02 0.03 0.01 0.01 0.02   < >  --    NRBC /100 WBC 0.0 0.0 0.0 0.0 0.0 0.0   < >  --    NEUTROPHIL % %  --   --   --   --   --   --   --  29.3*   MONOCYTES % %  --   --   --   --   --   --   --  7.1   BASOPHIL % %  --   --   --   --   --   --   --  1.0   ATYP LYMPH % %  --   --   --   --   --   --   --  1.0    < > = values in this interval not displayed.       Lab Results - Last 18 Months   Lab Units 02/17/22  1416 12/10/21  0937 11/17/21  1028 11/11/21  0719 09/07/21  1029 08/23/21  1335 08/06/21  0911   GLUCOSE mg/dL 92 113* 96 130* 101* 86 94   SODIUM mmol/L 141 139 137 141 139 137 141   POTASSIUM mmol/L 3.9 3.5 3.8 2.9* 3.4* 4.0 3.3*   CO2 mmol/L 22.0 26.0 21.0* 21.0* 25.0 21.0* 25.0   CHLORIDE mmol/L 110* 104 107 108* 105 107 108*   ANION GAP mmol/L 9.0 9.0 9.0 12.0 9.0 9.0 8.0   CREATININE mg/dL 0.77 0.84 0.76 0.79 0.79 0.81 0.76   BUN mg/dL 21* 13 16 17 13 13 15   BUN / CREAT RATIO  27.3* 15.5 21.1 21.5 16.5 16.0 19.7   CALCIUM mg/dL 8.5* 9.0 8.8 8.5* 9.4 8.8 9.1   ALK  PHOS U/L 90 103 103 101 92  --  82   TOTAL PROTEIN g/dL 6.8 7.2 7.0 6.2 7.4  --  8.2   ALT (SGPT) U/L 15 16 21 18 19  --  11   AST (SGOT) U/L 18 18 22 28 25  --  22   BILIRUBIN mg/dL 0.7 0.2 0.2 0.2 0.2  --  0.3   ALBUMIN g/dL 4.00 4.20 4.10 3.90 4.30  --  4.60   GLOBULIN gm/dL 2.8 3.0 2.9 2.3 3.1  --  3.6       Lab Results - Last 18 Months   Lab Units 02/17/22  1416 12/10/21  0937 11/11/21  0719 09/07/21  1029 08/06/21  0911 03/15/21  0929   REFERENCE LAB REPORT  See Attached Report  See Attached Report See Attached Result   See Attached Result   See Attached Result  See Attached Report See Attached Report See Attached Report  See Attached Report See Attached Result  See Attached Result       Lab Results - Last 18 Months   Lab Units 06/08/21  1240   IRON mcg/dL 116   TIBC mcg/dL 443   IRON SATURATION % 26   FERRITIN ng/mL 44.59     ASSESSMENT:  1.  CML:    -- Chronic phase  -- On dasatinib.  -- Genotrace - IS QRT-PCR: 57.64%, 09/17/2013; 40.52%, 09/20/2014; 30.9%, 03/05/2016; 21.36%, 0/14/2016; 8.52%, 07/28/2017; 8.47%, 06/12/2016; 13.5%, 09/01/2020  -- Marrow biopsy, 01/31/2020- persistent CML, chronic phase.  Clonal T -cell population identified by PCR.  Flow cytometry negative.  -- 09/01/2020-BCR/ABL PCR positive for BCR/ABL 1 rearrangement (13.5% of cells): Consistent with persistence of CML clone.  --03/16/2021- BCR-ABL1 QPCR-positive: Major breakpoint (18.956%), minor breakpoint (0.033%).  Interpretation: Consistent with residual CML.  --06/08/2021- BCR-ABL 1 QPCR-positive (14.8403) for the BCR-ABL-1 e13a2 (b2a2, p219) fusion transcript  --06/08/2021- CML chromosome/FISH profile:  Abnormal- 54% of nuclei positive for BCR/ABL1 gene fusion  --06/08/2021 - BCR-ABL1 Kinase Domain Mutation - Mutation detected within the BCR-ABL1 kinase domain.  Nucleotide change:  c.949T>C.  Predicted amino acid change:  F317L.  Comment: No plate changes detected within the BCR-ABL 1 kinase domain.  Cellular and biochemical  "studies suggest that the resistance induced by this mutation may be overcome by dose escalation.  --06/28/2021 and again on 07/07/2021, I discussed results of the BCR/ABL 1 PCR quant, kinase domain mutation, and CML FISH profile from 6/8/2021 with Dr. Carreno (BMT at [facility]) who has seen the patient previously.  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  Dr. Carreno reviewed the mutation, noting resistance to dasatinib.  Recommends nilotinib or bosutinib.  If TKI options have been exhausted, will consider transplant.    --07/28/2021-dasatinib discontinued  --07/30/2021-Bosulif initiated  --08/06/2021- BCR/ABL RT-PCR-positive:  Major breakpoint (47.104%), minor breakpoint 0.034% consistent with persistent CML  --08/06/2021-MPN/CML FISH panel: 1. Positive for a BCR/ABL1 rearrangement (65% of cells) consistent with persistence of CML clone.  --09/07/2021-(+) for a BCR/ABL1 rearrangement (39% of cells).  Major breakpoint (18.748%), minor breakpoint (0.017%)  --11/11/2021-+) for a BCR/ABL1 rearrangement (42% of cells).   --12/02/2021- Phone call: BCR/ABL 1Q PCR positive with 42% copies of BCR transcript.  Patient has been on Bosulif since 07/30/2021 (BCR/ABL 48% at that time).  Previously discussed with Dr. Carreno, BMT at [facility] who had seen the patient previously.  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  Today I spoke to the patient who swears that she has been taking her medicine daily except for 5 days when she had a \"stomach bug.\"  But has been taking otherwise.  I then spoke to Dr. Carreno who will again take a look at the mutation study that was done previously to see if we can use another TKI (i.e.: Ponatinib).  We talked about " "other options, to include Omacetaxine which is an injection active against T315 I mutated CML, but Dr. Carreno does not believe this would be helpful unless the patient is bridging towards transplant.  If ponatinib is not felt to be indicated or fails, then the patient would qualify for transplant at that time.  Awaiting word from Dr. Carreno prior to moving forward.  The patient is made aware of the plans.  She verbalized understanding and agreemen  --12/10/2021-(+) for a BCR/ABL1 rearrangement (31% of cells).  Major breakpoint (15.085%), minor breakpoint (0.008%)  --12/30/2021- Nilotinib initiated- was taking 400 mg daily (error on her part but claims \"the pharmacy wrote it that way\") instead of q12h as prescribed - until 02/29/2022 when she started taking q12h.  -- 02/17/2022-BCR/ABL-FISH positive 41.5%:  RT-PCR major breakpoint (40.046%) consistent with residual CML    2.  Normocytic anemia.  Previously on oral iron.  -- Hgb 11.9; MCV 97.9, 02/17/2022 (prior range: Hemoglobin 9.3-13.2; MCV 85.5-101.8)  -- EGD, 12/4/2019 showed marked gastritis, (+) H-pylori (above).  Has completed antibiotics    3.  Intermittent thrombocytosis.  CML.  Has normalized.  -- 423,000, 02/17/2022 (prior:  208,000-671,000)  4.  History of palpable right breast mass.    --Mammogram and breast ultrasound, 05/14/2019 showed mass lesions within both medial and lateral right breast including a mass corresponding to the palpable abnormality.  Subsequently confirmed to represent benign cyst by ultrasound.  ACR BI-RADS 2 benign findings.  Negative.  --09/17/20201259-[id number] left breast calcifications, biopsy recommended, ACR BI-RADS Category 4, suspicious.  --10/06/2020-stereotactic biopsy left breast calcifications, 3 o'clock position.  Final diagnosis: Fibrocystic changes.  Focal columnar cell hyperplasia without atypia.  Foci of fibroadenomatous change.  Microcalcifications.  No histologic evidence of malignancy.    5.  Gastritis, EGD " "- 12/04/2020  6.  Colon polyps, c-scope - 12/04/2020  7.  ETOH use/abuse.    8.   Tobacco use.  Still smoking 1/2 ppd  9.   Self-directed.  Repeatedly misses office appointments and blood tests.     --Had appointment on 07/12/2021 when I had planned to switch her to Bosulif but she missed the visit.  --Had appointment on 08/12/2021 again to discuss switching her to Bosulif but she again missed the visit.  --Had appointment to be seen 12/17/2021 to discuss tolerance to Nilotinib and BCR/ABL findings - again missed visit    10.  Symptomatic ventral hernia  --Repaired, 12/07/2022-Dr. Doran  11.   Leg swelling, NOS.  Nilotinib effect?      Plan:  1.   Draw BCR/ABL PCR and send BCR/ABL kinase domain mutation study ASAP (today)  2.   Schedule bilateral venous dopplers of legs, EKG and 2D echo this week  3.   Apprised of labs, 02/17/2022 and 04/26/2022 with normal hemoglobin and  otherwise normal CBC (WBC 4.52 w normal diff, platelets 286,000), stable CMP, BCR-ABL 1 (+) rearrangement of 41% (prior:  31%, 12/10/2022; 42%, 11/11/2022; 39%, 09/07/2021; 65%, 08/06/2021) transcript levels consistent with residual CML. Nilotinib tolerance discussed.  Leg swelling and fatigue.  No inordinate toxicities.    4.   Note report of baseline 2D echocardiogram, 11/16/2022- LVEF 61-65%.  5.   Phone call on 12/03/2022 discussed with Dr. Carreno as a follow-up to yesterday (see below).  Dr. Carreno recommends repeating the BCR/ABL kinase domain mutation to see if any new mutations have emerged. Meanwhile he recommends initiation of either nilotinib or ponatinib, preferring the nilotinib due to fewer potential toxicities.  I then contacted the patient and discussed the plan.  She confessed that she has had a lot of personal problems and is now drinking, \"somewhat heavily.\"  Thus we agreed:  --She will stop alcohol  --Obtain baseline EKG and 2D echo this week   --Redraw CBC with differential, CMP, BCR/ABL PCR, and send BCR/ABL kinase domain " mutation study ASAP  --Will switch over to nilotinib 400 mg p.o. every 12 hours-give on empty stomach, 1 hour before or 2 hours after food  --Stop bosulif when she receives the nilotinib  --Ask pharmacy to discuss potential toxicities of nilotinib with the patient (particularly the danger of QT prolongation, sudden death, arterial thrombotic event, cardiac ischemia, ischemic stroke, thrombocytopenia/neutropenia/anemia, hemorrhage, electrolyte disturbances, hepatotoxicity, pancreatitis, hyper/hypothyroidism, rash, headache, nausea, fatigue, pruritus, diarrhea, arthralgias)  --Return to office in 2 weeks with CBC and differential.    6.   Previously reviewed available history of CML (scant information from the original diagnosis) and history of prior medications (previously on Gleevec for unspecified period of time, since been on dasatinib, bosulif, nilotinib).  Review most recent BCR/ABL PCR from 09/17/2013 through 02/17/2022 (above) indicating persistence of CML clone.    7.   Potential toxicities of nilotinib discussed: Peripheral edema (9 to 15%) QT prolongation (28%; develops:>60 msec from baseline: 4%; adults:>500 msec); fatigue (23-32%); arthralgia (16-26%); musculoskeletal pain (11-12%); arterial thromboembolic event, cardiac ischemia, ischemic stroke, thrombotic microangiopathy, peripheral vascular disease, cytopenias (thrombocytopenia, neutropenia, anemia), hemorrhage, hypokalemia, hyperkalemia, hypocalcemia, hyponatremia, hepatotoxicity, pancreatitis, pneumonia, pleural effusion, pericardial effusion, tumor lysis syndrome, hypothyroidism, hyperthyroidism, HBV reactivation, rash, headache, nausea, fatigue, pruritus, diarrhea, URI, arthralgia, constipation, cough, vomiting, fever, pain, asthenia, muscle spasms, dyspnea, peripheral edema, alopecia, night sweats, decreased appetite, dyspepsia, insomnia, hypertension, influenza, xeroderma, dizziness, gastroenteritis).      8.   HOLD nilotinib (leg  pains/swelling) for now    9.   Teaching sheets for ponatinib (will consider if fails nilotinib).  Potential toxicities discussed: Arterial occlusion, MI, stroke, PVD, renal artery stenosis, venous thromboembolism, retinal vein thrombosis, thrombotic microangiopathy, CHF, fluid retention (severe), hypertension (severe), hypertensive crisis, ventricular arrhythmia, bradycardia arrhythmia, hepatotoxicity, fulminant hepatic failure, pancreatitis, peripheral neuropathy, cranial neuropathy, ocular toxicity, blindness, reversible posterior leukoencephalopathy syndrome, myelosuppression (pancytopenia), hemorrhage, sepsis, pneumonia, tumor lysis syndrome, impaired wound healing, GI perforation/fistula, arterial aneurysm/dissection/rupture, Platt-Grey syndrome, erythema multiforme, secondary malignancy, rash, arthralgia, abdominal pain, asthenia, headache, constipation, xeroderma, hepatotoxicity, fever, ocular toxicity, pancreatitis, hemorrhage, nausea, arrhythmia, vomiting, diarrhea, myalgia, dyspnea, cough, mucositis, hyperlipidemia, decreased appetite, dizziness, bone pain, URI, pneumonia, CHF, insomnia, muscle spasms, UTI, rigors, decreased weight, impaired glucose tolerance, alopecia, sepsis, anxiety, hyperuricemia, cellulitis, febrile neutropenia, hypothyroidism, lymphopenia.  Questions answered.  Patient agrees to a trial of therapy if deemed indicated (nilotinib failure).    10.   Rx:   Lasix 20 mg po daily # 30                   Kdur 20 meq po daily # 30                     11.   Draw weekly CBC with differential and CMP  12.   Tobacco cessation again encouraged.  13.   Return to office in 2 weeks with pre-office CBC and CMP.  Promises to keep her appointment this time.       ADDENDUM:  Discussed with Manolo Beckett, [place]   This patient is always very kind, but unfortunately (mostly) due to her work hours she has been extremely difficult to follow up with.  I remember her switching from Bosulif to Tasigna.  Above is the prescription that was sent to her pharmacy showing it was prescribed as the 200 mg capsules (highest strength available) for her to take two capsules (400 mg) twice daily since the start.   If the patient states that her prescription said otherwise, it could have possibly been a transcribing error at [facility] onto her bottle (which I will investigate, but won't help much at this point - edit: just investigated, never a transcription error at [facility]).  The prescription was submitted 12/8 and insurance work completed 12/15, delivered to patient 12/21.  Chart review shows a call from her stating she started taking it on 12/30 (I am not sure what the reason for the delay between 12/21 and 12/30 was though).  Subsequent fill dates are: 1/20, 3/24, and 4/18 each time for a 28 day supply. So it is apparent that she did not fill in February due to her taking it incorrectly. During our January follow up call from my partner Nelsy which occurred 1/10/22 she confirmed compliance with her 400 mg BID dose according to the notes. I do not know where the confusion occurred. But she apparently started taking it the way it stated on the bottle around Feb 9.      Moving forward for her, awaiting the repeat BCR/ABL PCR, the concern seems to be how to best manage her current issue of swelling and perhaps what to do with her CML treatment moving forward.  I agree holding Nilotinib for a bit is okay until that improves since your note indicates it's bothersome. Reviewing her medication list, I do see a moderate level drug-drug interaction that we have counseled her on before:  Amlodipine + Nilotinib can increase effect of Amlodipine and both of these medications can lead to swelling/edema - additive effect.  Chart review shows her PCP discontinued Amlodipine recently (earlier this month) so I will confirm with her that she is no longer taking Amlodipine if it wasn't indicated for her.  I agree that for her  edema, initiation of Lasix is appropriate - the dose you prescribed is appropriate as her starting dose. If not effective in diuresis, maybe increasing to 40 mg daily or 20 mg twice daily.  If she doesn't see improvement in a few days it may not be working at that dose and may need an increase until effective.  She could hopefully then wean off. I would recommend initiation of a potassium supplement as she may experience hypokalemia while on Lasix and her potassium today was on the lower end already at 3.4    I spent ~ 130 minutes caring for Winsome on this date of service. This time includes time spent by me in the following activities: preparing for the visit, reviewing tests, performing a medically appropriate examination and/or evaluation, counseling and educating the patient/family/caregiver, ordering medications, tests, or procedures and documenting information in the medical record

## 2022-04-25 ENCOUNTER — TELEPHONE (OUTPATIENT)
Dept: ONCOLOGY | Facility: CLINIC | Age: 42
End: 2022-04-25

## 2022-04-25 DIAGNOSIS — C92.10 CML (CHRONIC MYELOCYTIC LEUKEMIA): Primary | ICD-10-CM

## 2022-04-25 NOTE — TELEPHONE ENCOUNTER
----- Message from Ethan Dow MD sent at 4/23/2022 12:01 PM CDT -----  Regarding: pls research then send off asap  Draw CBC w diff, BCR/ABL PCR and send BCR/ABL kinase domain mutation study (is a separate test) ASAP

## 2022-04-26 ENCOUNTER — TELEPHONE (OUTPATIENT)
Dept: ONCOLOGY | Facility: CLINIC | Age: 42
End: 2022-04-26

## 2022-04-26 ENCOUNTER — HOSPITAL ENCOUNTER (OUTPATIENT)
Dept: ULTRASOUND IMAGING | Facility: HOSPITAL | Age: 42
Discharge: HOME | End: 2022-04-26
Admitting: INTERNAL MEDICINE

## 2022-04-26 ENCOUNTER — OFFICE VISIT (OUTPATIENT)
Dept: ONCOLOGY | Facility: CLINIC | Age: 42
End: 2022-04-26

## 2022-04-26 ENCOUNTER — LAB (OUTPATIENT)
Dept: LAB | Facility: HOSPITAL | Age: 42
End: 2022-04-26

## 2022-04-26 VITALS
HEIGHT: 65 IN | OXYGEN SATURATION: 100 % | DIASTOLIC BLOOD PRESSURE: 88 MMHG | HEART RATE: 79 BPM | WEIGHT: 175 LBS | BODY MASS INDEX: 29.16 KG/M2 | TEMPERATURE: 97 F | SYSTOLIC BLOOD PRESSURE: 142 MMHG | RESPIRATION RATE: 16 BRPM

## 2022-04-26 DIAGNOSIS — C92.10 CML (CHRONIC MYELOCYTIC LEUKEMIA): Primary | ICD-10-CM

## 2022-04-26 DIAGNOSIS — C92.10 CML (CHRONIC MYELOCYTIC LEUKEMIA): ICD-10-CM

## 2022-04-26 DIAGNOSIS — E87.6 LOW BLOOD POTASSIUM: Primary | ICD-10-CM

## 2022-04-26 LAB
ALBUMIN SERPL-MCNC: 4.1 G/DL (ref 3.5–5.2)
ALBUMIN/GLOB SERPL: 1.7 G/DL
ALP SERPL-CCNC: 103 U/L (ref 39–117)
ALT SERPL W P-5'-P-CCNC: 21 U/L (ref 1–33)
ANION GAP SERPL CALCULATED.3IONS-SCNC: 7 MMOL/L (ref 5–15)
AST SERPL-CCNC: 21 U/L (ref 1–32)
BASOPHILS # BLD AUTO: 0.02 10*3/MM3 (ref 0–0.2)
BASOPHILS NFR BLD AUTO: 0.4 % (ref 0–1.5)
BILIRUB SERPL-MCNC: 0.3 MG/DL (ref 0–1.2)
BUN SERPL-MCNC: 12 MG/DL (ref 6–20)
BUN/CREAT SERPL: 18.5 (ref 7–25)
CALCIUM SPEC-SCNC: 9 MG/DL (ref 8.6–10.5)
CHLORIDE SERPL-SCNC: 108 MMOL/L (ref 98–107)
CO2 SERPL-SCNC: 25 MMOL/L (ref 22–29)
CREAT SERPL-MCNC: 0.65 MG/DL (ref 0.57–1)
DEPRECATED RDW RBC AUTO: 55.1 FL (ref 37–54)
EGFRCR SERPLBLD CKD-EPI 2021: 112.2 ML/MIN/1.73
EOSINOPHIL # BLD AUTO: 0.06 10*3/MM3 (ref 0–0.4)
EOSINOPHIL NFR BLD AUTO: 1.3 % (ref 0.3–6.2)
ERYTHROCYTE [DISTWIDTH] IN BLOOD BY AUTOMATED COUNT: 15.2 % (ref 12.3–15.4)
GLOBULIN UR ELPH-MCNC: 2.4 GM/DL
GLUCOSE SERPL-MCNC: 105 MG/DL (ref 65–99)
HCT VFR BLD AUTO: 36.5 % (ref 34–46.6)
HGB BLD-MCNC: 12.2 G/DL (ref 12–15.9)
HOLD SPECIMEN: NORMAL
IMM GRANULOCYTES # BLD AUTO: 0.01 10*3/MM3 (ref 0–0.05)
IMM GRANULOCYTES NFR BLD AUTO: 0.2 % (ref 0–0.5)
LYMPHOCYTES # BLD AUTO: 1.59 10*3/MM3 (ref 0.7–3.1)
LYMPHOCYTES NFR BLD AUTO: 35.2 % (ref 19.6–45.3)
MCH RBC QN AUTO: 32.8 PG (ref 26.6–33)
MCHC RBC AUTO-ENTMCNC: 33.4 G/DL (ref 31.5–35.7)
MCV RBC AUTO: 98.1 FL (ref 79–97)
MONOCYTES # BLD AUTO: 0.47 10*3/MM3 (ref 0.1–0.9)
MONOCYTES NFR BLD AUTO: 10.4 % (ref 5–12)
NEUTROPHILS NFR BLD AUTO: 2.37 10*3/MM3 (ref 1.7–7)
NEUTROPHILS NFR BLD AUTO: 52.5 % (ref 42.7–76)
NRBC BLD AUTO-RTO: 0 /100 WBC (ref 0–0.2)
PLATELET # BLD AUTO: 286 10*3/MM3 (ref 140–450)
PMV BLD AUTO: 9.2 FL (ref 6–12)
POTASSIUM SERPL-SCNC: 3.4 MMOL/L (ref 3.5–5.2)
PROT SERPL-MCNC: 6.5 G/DL (ref 6–8.5)
RBC # BLD AUTO: 3.72 10*6/MM3 (ref 3.77–5.28)
SODIUM SERPL-SCNC: 140 MMOL/L (ref 136–145)
WBC NRBC COR # BLD: 4.52 10*3/MM3 (ref 3.4–10.8)

## 2022-04-26 PROCEDURE — 99215 OFFICE O/P EST HI 40 MIN: CPT | Performed by: INTERNAL MEDICINE

## 2022-04-26 PROCEDURE — 93970 EXTREMITY STUDY: CPT

## 2022-04-26 PROCEDURE — 99417 PROLNG OP E/M EACH 15 MIN: CPT | Performed by: INTERNAL MEDICINE

## 2022-04-26 PROCEDURE — 85025 COMPLETE CBC W/AUTO DIFF WBC: CPT

## 2022-04-26 PROCEDURE — 80053 COMPREHEN METABOLIC PANEL: CPT

## 2022-04-26 PROCEDURE — 36415 COLL VENOUS BLD VENIPUNCTURE: CPT

## 2022-04-26 RX ORDER — FUROSEMIDE 20 MG/1
20 TABLET ORAL DAILY
Qty: 30 TABLET | Refills: 0 | Status: SHIPPED | OUTPATIENT
Start: 2022-04-26 | End: 2022-05-18

## 2022-04-26 RX ORDER — POTASSIUM CHLORIDE 20 MEQ/1
20 TABLET, EXTENDED RELEASE ORAL 3 TIMES DAILY
Qty: 9 TABLET | Refills: 0 | Status: SHIPPED | OUTPATIENT
Start: 2022-04-26 | End: 2022-04-29

## 2022-04-26 NOTE — TELEPHONE ENCOUNTER
----- Message from Ethan Dow MD sent at 4/26/2022  8:56 AM CDT -----  Regarding: BCR/ABL kinase domain mutation study  Pls send for BCR/ABL kinase domain mutation study - this is different from the PCR study (which we also need)

## 2022-04-26 NOTE — TELEPHONE ENCOUNTER
----- Message from Ethan Dow MD sent at 4/26/2022 10:13 AM CDT -----  Potassium 3.4-K-Dur p.o. 3 times daily x3 days      Left patient a detailed voicemail with the above information. Provided her our call back number and told her that the medication will be sent to her pharmacy.    rp

## 2022-04-29 ENCOUNTER — TELEPHONE (OUTPATIENT)
Dept: ONCOLOGY | Facility: CLINIC | Age: 42
End: 2022-04-29

## 2022-04-29 NOTE — TELEPHONE ENCOUNTER
Spoke with patient and let her know that she is scheduled for 2D echo on 05/04/2022 at 1. She will get the EKG performed at the same time. Winsome verbalized understnading.

## 2022-05-03 LAB
REF LAB TEST METHOD: NORMAL
REF LAB TEST METHOD: NORMAL

## 2022-05-04 ENCOUNTER — HOSPITAL ENCOUNTER (OUTPATIENT)
Dept: CARDIOLOGY | Facility: HOSPITAL | Age: 42
Discharge: HOME | End: 2022-05-04

## 2022-05-04 ENCOUNTER — LAB (OUTPATIENT)
Dept: LAB | Facility: HOSPITAL | Age: 42
End: 2022-05-04

## 2022-05-04 ENCOUNTER — TELEPHONE (OUTPATIENT)
Dept: ONCOLOGY | Facility: CLINIC | Age: 42
End: 2022-05-04

## 2022-05-04 VITALS
DIASTOLIC BLOOD PRESSURE: 88 MMHG | WEIGHT: 175 LBS | HEIGHT: 65 IN | BODY MASS INDEX: 29.16 KG/M2 | SYSTOLIC BLOOD PRESSURE: 142 MMHG

## 2022-05-04 DIAGNOSIS — C92.10 CML (CHRONIC MYELOCYTIC LEUKEMIA): Primary | ICD-10-CM

## 2022-05-04 DIAGNOSIS — C92.10 CML (CHRONIC MYELOCYTIC LEUKEMIA): ICD-10-CM

## 2022-05-04 PROCEDURE — 93010 ELECTROCARDIOGRAM REPORT: CPT | Performed by: EMERGENCY MEDICINE

## 2022-05-04 PROCEDURE — 93306 TTE W/DOPPLER COMPLETE: CPT

## 2022-05-04 PROCEDURE — 93306 TTE W/DOPPLER COMPLETE: CPT | Performed by: INTERNAL MEDICINE

## 2022-05-04 PROCEDURE — 93005 ELECTROCARDIOGRAM TRACING: CPT | Performed by: INTERNAL MEDICINE

## 2022-05-04 NOTE — TELEPHONE ENCOUNTER
----- Message from Ethan Dow MD sent at 5/3/2022  8:51 PM CDT -----  The results reported are for the bcr/abl FISH and PCR but what I need is the report of this study:      BCR/ABL KINASE DOMAIN MUTATION     If it wasn't ordered as stated then it was not sent.  Pls confirm if it was. If not pls resend ASAP as the patient will be coming back next week and I need that result to decide if she should stay on nilotinib or be switched over to ponatinib.  Thank u    ----- Message -----  From: Aguilar Chang CMA  Sent: 5/3/2022   2:48 PM CDT  To: Ethan Dow MD    Report should be in there now    ----- Message -----  From: Ethan Dow MD  Sent: 5/3/2022   1:21 PM CDT  To: [facility]    Pls confirm that the BCR/ABL kinase domain mutation study has been ordered. Summer colvin

## 2022-05-05 LAB
QT INTERVAL: 382 MS
QTC INTERVAL: 420 MS

## 2022-05-06 LAB
BH CV ECHO MEAS - AO MAX PG: 12.8 MMHG
BH CV ECHO MEAS - AO MEAN PG: 7 MMHG
BH CV ECHO MEAS - AO ROOT DIAM: 3.1 CM
BH CV ECHO MEAS - AO V2 MAX: 179 CM/SEC
BH CV ECHO MEAS - AO V2 VTI: 38.6 CM
BH CV ECHO MEAS - AVA(I,D): 1.68 CM2
BH CV ECHO MEAS - EDV(CUBED): 79.5 ML
BH CV ECHO MEAS - EDV(MOD-SP4): 79 ML
BH CV ECHO MEAS - EF(MOD-SP4): 68.4 %
BH CV ECHO MEAS - ESV(CUBED): 17.6 ML
BH CV ECHO MEAS - ESV(MOD-SP4): 25 ML
BH CV ECHO MEAS - FS: 39.5 %
BH CV ECHO MEAS - IVS/LVPW: 0.89 CM
BH CV ECHO MEAS - IVSD: 0.8 CM
BH CV ECHO MEAS - LA DIMENSION: 3.2 CM
BH CV ECHO MEAS - LV DIASTOLIC VOL/BSA (35-75): 42.3 CM2
BH CV ECHO MEAS - LV MASS(C)D: 114.2 GRAMS
BH CV ECHO MEAS - LV MAX PG: 3.9 MMHG
BH CV ECHO MEAS - LV MEAN PG: 2 MMHG
BH CV ECHO MEAS - LV SYSTOLIC VOL/BSA (12-30): 13.4 CM2
BH CV ECHO MEAS - LV V1 MAX: 99.3 CM/SEC
BH CV ECHO MEAS - LV V1 VTI: 20.6 CM
BH CV ECHO MEAS - LVIDD: 4.3 CM
BH CV ECHO MEAS - LVIDS: 2.6 CM
BH CV ECHO MEAS - LVOT AREA: 3.1 CM2
BH CV ECHO MEAS - LVOT DIAM: 2 CM
BH CV ECHO MEAS - LVPWD: 0.9 CM
BH CV ECHO MEAS - MV A MAX VEL: 76.5 CM/SEC
BH CV ECHO MEAS - MV DEC SLOPE: 614 CM/SEC2
BH CV ECHO MEAS - MV DEC TIME: 0.14 MSEC
BH CV ECHO MEAS - MV E MAX VEL: 107 CM/SEC
BH CV ECHO MEAS - MV E/A: 1.4
BH CV ECHO MEAS - MV P1/2T: 63 MSEC
BH CV ECHO MEAS - MVA(P1/2T): 3.5 CM2
BH CV ECHO MEAS - PA V2 MAX: 102 CM/SEC
BH CV ECHO MEAS - PI END-D VEL: 225 CM/SEC
BH CV ECHO MEAS - RAP SYSTOLE: 5 MMHG
BH CV ECHO MEAS - RVSP: 29.4 MMHG
BH CV ECHO MEAS - SI(MOD-SP4): 28.9 ML/M2
BH CV ECHO MEAS - SV(LVOT): 64.7 ML
BH CV ECHO MEAS - SV(MOD-SP4): 54 ML
BH CV ECHO MEAS - TR MAX PG: 24.4 MMHG
BH CV ECHO MEAS - TR MAX VEL: 247 CM/SEC
LEFT ATRIUM VOLUME INDEX: 35.8 ML/M2
LEFT ATRIUM VOLUME: 69 CM3
MAXIMAL PREDICTED HEART RATE: 177 BPM
STRESS TARGET HR: 150 BPM

## 2022-05-10 DIAGNOSIS — C92.10 CML (CHRONIC MYELOCYTIC LEUKEMIA): ICD-10-CM

## 2022-05-17 ENCOUNTER — TELEPHONE (OUTPATIENT)
Dept: ONCOLOGY | Facility: CLINIC | Age: 42
End: 2022-05-17

## 2022-05-17 NOTE — TELEPHONE ENCOUNTER
Caller: LAW     Relationship: [facility]     Best call back number: [phone]    CAN SPEAK WITH ANYONE WHEN CALLING BACK     What is the best time to reach you: BEFORE 4:30    Who are you requesting to speak with (clinical staff, provider,  specific staff member): DR ADAMS OR  CLINICAL STAFF       What was the call regarding:     HAD CALLED LAST WEEK TO REQUEST REFILL TASIGNA 200MG FOR PATIENT AND WAS TOLD REQUEST WOULD BE SENT.     HAS NOT RECEIVED SCRIPT BACK TO REFILL CALLING TO FOLLOW UP ON THAT.     Do you require a callback: YES

## 2022-05-17 NOTE — TELEPHONE ENCOUNTER
Called and spoke with staff at specialty pharmacy. I let them know that Winsome missed her appt so we are not able to send in any refills until she is seen.

## 2022-05-18 RX ORDER — FUROSEMIDE 20 MG/1
TABLET ORAL
Qty: 30 TABLET | Refills: 0 | Status: SHIPPED | OUTPATIENT
Start: 2022-05-18 | End: 2022-06-22

## 2022-05-19 ENCOUNTER — SPECIALTY PHARMACY (OUTPATIENT)
Dept: ONCOLOGY | Facility: HOSPITAL | Age: 42
End: 2022-05-19

## 2022-05-19 NOTE — PROGRESS NOTES
[facility]    Oral Oncology Patient Follow-Up      Consult Details  Consulting Provider:   Ethan Dow MD ([facility])   Medication and Regimen:  Tasigna 400 mg PO BID     Prescription Review  Current Specialty Pharmacy [id number] - [place], [place] - [address] [address] [phone] [facility] [phone] FX        Intervention(s):                  Patient's pharmacy ([facility]) requested a refill on the patient's Tasigna on 5/12/22. However, Dr. Dow will not send in another refill until the patient is seen in the office to re-evaluate prior to resumption of prescription. So Dr. Dow resulted in holding therapy until the patent makes an appointment and is seen in his office. Johanne Zavala MA stated that she had spoke with the specialty pharmacy stating that Winsome had missed her appointment and the office is not able to send in any refills until she is seen. As of today, patient does NOT have an appointment to see Dr. Dow.     Summary:    No specialty services needed at this time. Will defer patient out to next month for follow-up and will accommodate refills upon office request.        Electronically signed 05/19/2022 09:25 CDT by:  Nelsy Diez PharmD  Specialty Pharmacist - Hematology & Oncology  [facility]  [phone]

## 2022-05-27 ENCOUNTER — TELEPHONE (OUTPATIENT)
Dept: ONCOLOGY | Facility: CLINIC | Age: 42
End: 2022-05-27

## 2022-06-01 LAB — REF LAB TEST METHOD: NORMAL

## 2022-06-10 NOTE — TELEPHONE ENCOUNTER
PATIENT CALLED, STILL HAS NOT HEARD FROM ANYONE ABOUT MAKING AN APPOINTMENT. SHE IS ALMOST OUT OF HER MEDICATION     SHE STATED SHE MISSED AN APPOINTMENT AND WAS TOLD WE WOULD CALL HER BACK TO SEE IF DR HUGO WOULD STILL SEE HER. SHE HAS NOT HEARD FROM ANYONE AND NOW SHE IS ALMOST OUT OF MEDICATION

## 2022-06-14 ENCOUNTER — TELEPHONE (OUTPATIENT)
Dept: ONCOLOGY | Facility: CLINIC | Age: 42
End: 2022-06-14

## 2022-06-14 DIAGNOSIS — C92.10 CML (CHRONIC MYELOCYTIC LEUKEMIA): Primary | ICD-10-CM

## 2022-06-14 NOTE — TELEPHONE ENCOUNTER
Referral to Dr. Bernal/Shayne per Crystal. Patient to be dismissed from the practice. Okay to refill pt specialty medications per Crystal. Message to specialty pharmacy to send today.

## 2022-06-21 ENCOUNTER — SPECIALTY PHARMACY (OUTPATIENT)
Dept: ONCOLOGY | Facility: HOSPITAL | Age: 42
End: 2022-06-21

## 2022-06-21 NOTE — PROGRESS NOTES
"[facility]    Oral Oncology Patient Follow-Up      Consult Details  Consulting Provider:   Ethan Dow MD ([facility])   Medication and Regimen:  Tasigna 400 mg PO BID     Prescription Review  Current Specialty Pharmacy [id number] - [place], [place] - [address] [address] [phone] [facility] [phone] FX        Intervention(s):                  Telephone encounter on 6/14/22 from Johanne Zavala MA stated, \"Referral to Dr. Bernal/Shayne per Crystal. Patient to be dismissed from the practice. Okay to refill pt specialty medications per Crystal. Message to specialty pharmacy to send today.\"     Summary:    No specialty services needed at this time. Will dis-enroll patient from specialty program since patient will no longer be seen here.        Electronically signed 06/21/2022 16:02 CDT by:  Nelsy Diez, [place]  Specialty Pharmacist - Hematology & Oncology  [facility]  [phone]    "

## 2022-06-22 DIAGNOSIS — E87.6 HYPOKALEMIA: Primary | ICD-10-CM

## 2022-06-22 RX ORDER — FUROSEMIDE 20 MG/1
TABLET ORAL
Qty: 30 TABLET | Refills: 0 | Status: SHIPPED | OUTPATIENT
Start: 2022-06-22

## 2022-06-22 NOTE — PROGRESS NOTES
MEDICAL ONCOLOGY CONSULTATION    Pt Name: Stacia Alfonso  MRN: [id number]  YOB: 1980  Date of evaluation: 6/23/2022    REASON FOR CONSULTATION: CML  REQUESTING PHYSICIAN: Dr Alanis Fields    History Obtained From: patient and old medical records    HISTORY OF PRESENT ILLNESS:  Essentially, the patient is a 35years old female who has chronic CML diagnosed initially in 2005. Diagnosis  · CML, 2005  · M244V (c. 760A>G? 38%)  · BCR/ABL 1 tyrosine kinase inhibitors-resistance  · Nucleotide change: c.949T>C  · Predicted amino acid change: F317L. Treatment Summary  · 2005 Gleevec (Patient didn't take on regular basis)  · 02/2016 - 07/2021 Sprycel  · 07/30/21 - 12/2021 Bosulif  · 12/2021 Nilotinib 400mg BID    Hematology/Cancer History  Katarina Howard was diagnosed with CML in 2005 by Dr Angel Brown. · 2005 Initiated Buel Peabody (Patient didn't take on regular basis)  · 09/17/13 Genotrace - IS QRT-PCR: 57.64%  · 09/20/2014 Genotrace - IS QRT-PCR: 40.52%  · 02/2016 Initiated Sprycel  · 2/10/16 CTA [facility]): Normal CT of the chest. No evidence of pulmonary embolism  · 03/05/2016 Genotrace - IS QRT-PCR: 30.9%  · 01/14/2016 Genotrace - IS QRT-PCR: 21.36%  · 07/28/17 Genotrace - IS QRT-PCR: 8.52%  · 06/12/16 Genotrace - IS QRT-PCR: 8.47%  · 01/31/2020  Marrow biopsy- persistent CML, chronic phase. Clonal T -cell population identified by PCR. Flow cytometry negative. · 09/01/2020 Genotrace - IS QRT-PCR: 13.5%  · 09/01/2020-BCR/ABL PCR positive for BCR/ABL 1 rearrangement (13.5% of cells): Consistent with persistence of CML clone. · 03/16/2021- BCR-ABL1 QPCR-positive: Major breakpoint (18.956%), minor breakpoint (0.033%). Interpretation: Consistent with residual CML.   · 06/08/2021- BCR-ABL 1 QPCR-positive (14.8403) for the BCR-ABL-1 e13a2 (b2a2, p219) fusion transcript  · 06/08/2021- CML chromosome/FISH profile: Abnormal- 54% of nuclei positive for BCR/ABL1 gene fusion  · 06/08/2021 - BCR-ABL1 Kinase Domain Mutation - Mutation detected within the BCR-ABL1 kinase domain. Nucleotide change: c.949T>C. 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 Comment: No plate changes detected within the BCR-ABL 1 kinase domain. Cellular and biochemical studies suggest that the resistance induced by this mutation may be overcome by dose escalation. · 06/28/2021 and again on 07/07/2021, Dr Lexy Chung discussed results of the BCR/ABL 1 PCR quant, kinase domain mutation, and CML FISH profile from 6/8/2021 with Dr. Gary Ramirez (BMT at [facility]) who has seen the patient previously. 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 Dr. Gary Ramirez reviewed the mutation, noting resistance to dasatinib. Recommends nilotinib or bosutinib. If TKI options have been exhausted, will consider transplant. · 07/28/2021-dasatinib discontinued   · 07/30/2021-Bosulif initiated  · 08/06/2021- BCR/ABL RT-PCR-positive: Major breakpoint (47.104%), minor breakpoint 0.034% consistent with persistent CML  · 08/06/2021-MPN/CML FISH panel: 1. Positive for a BCR/ABL1 rearrangement (65% of cells) consistent with persistence of CML clone. · 09/07/2021-(+) for a BCR/ABL1 rearrangement (39% of cells). Major breakpoint (18.748%), minor breakpoint (0.017%)  · 11/11/2021-+) for a BCR/ABL1 rearrangement (42% of cells). · 12/02/2021- Dr Lexy Chung phone call: BCR/ABL 1Q PCR positive with 42% copies of BCR transcript. Patient has been on Bosulif since 07/30/2021 (BCR/ABL 48% at that time). Previously discussed with Dr. Gary Ramirez, BMT at [facility] who had seen the patient previously. 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  Today I spoke to the patient who swears that she has been taking her medicine daily except for 5 days when she had a \"stomach bug.\" But has been taking otherwise. I then spoke to Dr. Rafat Del Valle who will again take a look at the mutation study that was done previously to see if we can use another TKI (i.e.: Ponatinib). 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 Rafat Del Valle does not believe this would be helpful unless the patient is bridging towards transplant. If ponatinib is not felt to be indicated or fails, then the patient would qualify for transplant at that time. Awaiting word from Dr. Rafat Del Valle prior to moving forward. The patient is made aware of the plans. She verbalized understanding and agreement  · 12/10/2021-(+) for a BCR/ABL1 rearrangement (31% of cells). Major breakpoint (15.085%), minor breakpoint (0.008%)  · 12/30/2021- Nilotinib initiated- was taking 400 mg daily (error on her part but claims \"the pharmacy wrote it that way\") instead of q12h as prescribed - until 02/29/2022 when she started taking q12h. · 02/17/2022-BCR/ABL-FISH positive 41.5%: RT-PCR major breakpoint (40.046%) consistent with residual CML. · 4/26/22 US bilateral lower extremity [facility]): Normal duplex ultrasound of the bilateral lower extremities without evidence of venous thrombus. · 5/4/22- Transthoracic echo [facility]): Left ventricular systolic function is normal. Left ventricular ejection fraction appears to be 61-65%. Normal right ventricular cavity size and systolic function noted. No hemodynamically significant valvular abnormalities identified on this study.      Past Medical History:    Past Medical History:   Diagnosis Date    CML (chronic myelocytic leukemia) ([facility] [address]        Past Surgical History:    Past Surgical History:   Procedure Laterality Date    TUBAL LIGATION         Social History:    Marital status:   Smoking status:Currently; 1/2 pack daily;12 years  ETOH status:Occasionally  Resides: [place], [place]    Family History:   No family history on file.    Current Hospital Medications:    Current Outpatient Medications   Medication Sig Dispense Refill    amLODIPine (NORVASC) 10 MG tablet 10 mg daily      nilotinib (TASIGNA) 200 MG capsule Take 400 mg by mouth 2 times daily      furosemide (LASIX) 20 MG tablet 20 mg daily      promethazine-dextromethorphan (PROMETHAZINE-DM) 6.25-15 MG/5ML syrup Take 5 mLs by mouth every 12 hours as needed for Cough (Patient not taking: Reported on 6/23/2022) 100 mL 0     No current facility-administered medications for this visit. Allergies:    Allergies   Allergen Reactions    Latex     Penicillins          Subjective   REVIEW OF SYSTEMS:   CONSTITUTIONAL: no fever, no night sweats, fatigue;  HEENT: no blurring of vision, no double vision, no hearing difficulty, no tinnitus, no ulceration, no dysplasia, no epistaxis;  LUNGS: no cough, no hemoptysis, no wheeze,  no shortness of breath;  CARDIOVASCULAR: no palpitation, no chest pain, no shortness of breath;  GI: no abdominal pain, no nausea, no vomiting, no diarrhea, no constipation;  ROHAN: no dysuria, no hematuria, no frequency or urgency, no nephrolithiasis;  MUSCULOSKELETAL: no joint pain, bilateral LE swelling, no stiffness;  ENDOCRINE: no polyuria, no polydipsia, no cold or heat intolerance;  HEMATOLOGY: no easy bruising or bleeding, no history of clotting disorder;  DERMATOLOGY: no skin rash, no eczema, no pruritus;  PSYCHIATRY: no depression, no anxiety, no panic attacks, no suicidal ideation, no homicidal ideation;  NEUROLOGY: no syncope, no seizures, no numbness or tingling of hands, no numbness or tingling of feet, no paresis;    Objective   /60   Pulse 88   Ht 5' 6\" (1.676 m)   Wt 175 lb (79.4 kg)   SpO2 99%   BMI 28.25 kg/m²     PHYSICAL EXAM:  CONSTITUTIONAL: Alert, appropriate, no acute distress  EYES: Non icteric, EOM intact, pupils equal round   ENT: Mucus membranes moist, no oral pharyngeal lesions, external inspection of ears and nose are normal  NECK: Supple, no masses. No palpable thyroid mass  CHEST/LUNGS: CTA bilaterally, normal respiratory effort   CARDIOVASCULAR: RRR, no murmurs. Bilateral lower extremity edema  ABDOMEN: soft non-tender, active bowel sounds, no HSM. No palpable masses  EXTREMITIES: warm, full ROM in all 4 extremities, no focal weakness. SKIN: warm, dry with no rashes or lesions  LYMPH: No cervical, clavicular, axillary, or inguinal lymphadenopathy  NEUROLOGIC: follows commands, non focal   PSYCH: mood and affect appropriate. Alert and oriented to time, place, person. LABORATORY RESULTS REVIEWED/ANALYZED BY ME:  Lab Results   Component Value Date    WBC 6.79 06/23/2022    HGB 11.7 06/23/2022    HCT 36.0 06/23/2022    .1 (H) 06/23/2022     06/23/2022     Lab Results   Component Value Date    NEUTROABS 4.08 06/23/2022       RADIOLOGY STUDIES REVIEWED BY ME:    ASSESSMENT:    Orders Placed This Encounter   Procedures    Miscellaneous Sendout     Standing Status:   Future     Standing Expiration Date:   6/23/2023     Order Specific Question:   Specify Req. Test (1 Test/Order)     Answer:   [facility] BCR/ABL quant & TKI resistance mutation panel    Comprehensive Metabolic Panel     Standing Status:   Future     Number of Occurrences:   1     Standing Expiration Date:   6/23/2023      Rashid Bertrand was seen today for new patient. Diagnoses and all orders for this visit:    CML (chronic myelocytic leukemia) ([facility] [address]  -     Miscellaneous Sendout; Future  -     Comprehensive Metabolic Panel; Future    Care plan discussed with patient    Localized swelling of lower extremity    Antineoplastic drugs causing adverse effect, sequela    Bilateral leg edema    CML with 317 mutation as per last study  The patient was counseled today about diagnosis, staging, prognosis, diagnostic tests, medications, side effects and disease management. The patient is new for me today.   She has been transferred from \[facility]"" oncology,  [place] office. Apparently, issues with visit compliance. The patient tells me that she missed a couple of appointments. She also assures me that she is consistently/compliant with her TKI medication. Apparently, BCR/ABL was not at target. She also has a new mutation as of June last year. She has been seen by [facility], BMT . Essentially, the patient has been on several prior line therapy.  -Currently on Tasigna  400mg p.o. twice daily. Patient claims compliance with her medication.  -Treatment currently being tolerated with complaints of bilateral leg swelling/pain    Plan  -Continue Tasigna for now  -Test for mutation  -Test for BCR/ABL quantitative    Side effects-patient has bilateral leg edema.  -She is currently on Norvasc  -I asked her to discuss with PCP regarding stopping Norvasc  -This could also be related to her to [employer]        PLAN:  · RTC with MD 2 weeks  · CBC CMP today  · BCR/ABL quantitative and TKI resistance mutation panel with [employer]  · Continue Nilotinib 400mg BID  · Continue follow-up with PCP/Dr Brandon Zheng for LE swelling  · Discussed with hematopathology      IYulissa am pre-charting as a registered nurse for Kira Hui MD. Electronically signed by Yulissa Farias RN on 6/23/2022 at 3:13 PM CDT. Nadya Huitron am scribing for Kira Hui MD. Electronically signed by Yulissa Farias RN on 6/23/2022 at 12:16 PM CDT. I, Dr Alison Rendon, personally performed the services described in this documentation as scribed by Yulissa Farias RN in my presence and is both accurate and complete. I have seen, examined and reviewed this patient medication list, appropriate labs and imaging studies. I reviewed relevant medical records and others physicians notes. I discussed the plans of care with the patient. I answered all the questions to the patients satisfaction.  I have also reviewed the chief complaint (CC) and part of the history (History of Present Illness (HPI), Past Family Social History [facility]), or Review of Systems (ROS) and made changes when appropriated. (Please note that portions of this note were completed with a voice recognition program. Efforts were made to edit the dictations but occasionally words are mis-transcribed.)    Electronically signed by Omar Sunshine MD on 6/23/2022 at 5:22 PM      The total time (65min) I spent to see the patient today includes at least one or more of the following: preparing to see the patient by reviewing prior tests, prior notes or other relevant information, performing appropriate independent examination and evaluation, counseling, ordering of medications, tests or procedures, communicating with other healthcare professionals when appropriated to coordinate care, documenting clinic information in the electronic medical record or other health records, independently interpreting results of tests, managing test results and communicating the results to the patient/family or caregiver.

## 2022-06-23 ENCOUNTER — OFFICE VISIT (OUTPATIENT)
Dept: HEMATOLOGY | Age: 42
End: 2022-06-23
Payer: MEDICAID

## 2022-06-23 ENCOUNTER — HOSPITAL ENCOUNTER (OUTPATIENT)
Dept: INFUSION THERAPY | Age: 42
Discharge: HOME | End: 2022-06-23
Payer: MEDICAID

## 2022-06-23 VITALS
OXYGEN SATURATION: 99 % | BODY MASS INDEX: 28.12 KG/M2 | SYSTOLIC BLOOD PRESSURE: 120 MMHG | HEIGHT: 66 IN | WEIGHT: 175 LBS | DIASTOLIC BLOOD PRESSURE: 60 MMHG | HEART RATE: 88 BPM

## 2022-06-23 DIAGNOSIS — M79.89 LOCALIZED SWELLING OF LOWER EXTREMITY: ICD-10-CM

## 2022-06-23 DIAGNOSIS — R89.9 ABNORMAL LABORATORY TEST: ICD-10-CM

## 2022-06-23 DIAGNOSIS — R89.9 ABNORMAL LABORATORY TEST: Primary | ICD-10-CM

## 2022-06-23 DIAGNOSIS — C92.10 CML (CHRONIC MYELOCYTIC LEUKEMIA) (HCC): Primary | ICD-10-CM

## 2022-06-23 DIAGNOSIS — Z71.89 CARE PLAN DISCUSSED WITH PATIENT: ICD-10-CM

## 2022-06-23 DIAGNOSIS — T45.1X5S ANTINEOPLASTIC DRUGS CAUSING ADVERSE EFFECT, SEQUELA: ICD-10-CM

## 2022-06-23 DIAGNOSIS — C92.10 CML (CHRONIC MYELOCYTIC LEUKEMIA) (HCC): ICD-10-CM

## 2022-06-23 DIAGNOSIS — R60.0 BILATERAL LEG EDEMA: ICD-10-CM

## 2022-06-23 LAB
ALBUMIN SERPL-MCNC: 4.4 G/DL (ref 3.5–5.2)
ALP BLD-CCNC: 99 U/L (ref 35–104)
ALT SERPL-CCNC: 19 U/L (ref 9–52)
ANION GAP SERPL CALCULATED.3IONS-SCNC: 11 MMOL/L (ref 7–19)
AST SERPL-CCNC: 23 U/L (ref 14–36)
BASOPHILS ABSOLUTE: 0.06 K/UL (ref 0.01–0.08)
BASOPHILS RELATIVE PERCENT: 0.9 % (ref 0.1–1.2)
BILIRUB SERPL-MCNC: 0.6 MG/DL (ref 0.2–1.3)
BUN BLDV-MCNC: 18 MG/DL (ref 7–17)
CALCIUM SERPL-MCNC: 9.5 MG/DL (ref 8.4–10.2)
CHLORIDE BLD-SCNC: 105 MMOL/L (ref 98–111)
CO2: 26 MMOL/L (ref 22–29)
CREAT SERPL-MCNC: 0.8 MG/DL (ref 0.5–1)
EOSINOPHILS ABSOLUTE: 0.08 K/UL (ref 0.04–0.54)
EOSINOPHILS RELATIVE PERCENT: 1.2 % (ref 0.7–7)
GFR NON-AFRICAN AMERICAN: >60
GLOBULIN: 3.1 G/DL
GLUCOSE BLD-MCNC: 97 MG/DL (ref 74–106)
HCT VFR BLD CALC: 36 % (ref 34.1–44.9)
HEMOGLOBIN: 11.7 G/DL (ref 11.2–15.7)
LYMPHOCYTES ABSOLUTE: 1.98 K/UL (ref 1.18–3.74)
LYMPHOCYTES RELATIVE PERCENT: 29.2 % (ref 19.3–53.1)
MCH RBC QN AUTO: 32.9 PG (ref 25.6–32.2)
MCHC RBC AUTO-ENTMCNC: 32.5 G/DL (ref 32.3–35.5)
MCV RBC AUTO: 101.1 FL (ref 79.4–94.8)
MONOCYTES ABSOLUTE: 0.56 K/UL (ref 0.24–0.82)
MONOCYTES RELATIVE PERCENT: 8.2 % (ref 4.7–12.5)
NEUTROPHILS ABSOLUTE: 4.08 K/UL (ref 1.56–6.13)
NEUTROPHILS RELATIVE PERCENT: 60.1 % (ref 34–71.1)
PDW BLD-RTO: 15.6 % (ref 11.7–14.4)
PLATELET # BLD: 229 K/UL (ref 182–369)
PMV BLD AUTO: 10 FL (ref 7.4–10.4)
POTASSIUM SERPL-SCNC: 3.1 MMOL/L (ref 3.5–5.1)
RBC # BLD: 3.56 M/UL (ref 3.93–5.22)
SODIUM BLD-SCNC: 142 MMOL/L (ref 137–145)
TOTAL PROTEIN: 7.5 G/DL (ref 6.3–8.2)
WBC # BLD: 6.79 K/UL (ref 3.98–10.04)

## 2022-06-23 PROCEDURE — 99205 OFFICE O/P NEW HI 60 MIN: CPT | Performed by: INTERNAL MEDICINE

## 2022-06-23 PROCEDURE — G8427 DOCREV CUR MEDS BY ELIG CLIN: HCPCS | Performed by: INTERNAL MEDICINE

## 2022-06-23 PROCEDURE — 36415 COLL VENOUS BLD VENIPUNCTURE: CPT

## 2022-06-23 PROCEDURE — 85025 COMPLETE CBC W/AUTO DIFF WBC: CPT

## 2022-06-23 PROCEDURE — 99202 OFFICE O/P NEW SF 15 MIN: CPT

## 2022-06-23 PROCEDURE — 80053 COMPREHEN METABOLIC PANEL: CPT

## 2022-06-23 PROCEDURE — 4004F PT TOBACCO SCREEN RCVD TLK: CPT | Performed by: INTERNAL MEDICINE

## 2022-06-23 PROCEDURE — G8419 CALC BMI OUT NRM PARAM NOF/U: HCPCS | Performed by: INTERNAL MEDICINE

## 2022-06-23 RX ORDER — FUROSEMIDE 20 MG/1
20 TABLET ORAL DAILY
COMMUNITY
Start: 2022-06-22 | End: 2022-07-07 | Stop reason: SDUPTHER

## 2022-06-23 RX ORDER — AMLODIPINE BESYLATE 10 MG/1
10 TABLET ORAL DAILY
Status: ON HOLD | COMMUNITY
Start: 2022-06-01 | End: 2022-10-08 | Stop reason: SDUPTHER

## 2022-06-24 DIAGNOSIS — E87.6 LOW SERUM POTASSIUM LEVEL: Primary | ICD-10-CM

## 2022-06-24 RX ORDER — POTASSIUM CHLORIDE 20 MEQ/1
20 TABLET, EXTENDED RELEASE ORAL 2 TIMES DAILY
Qty: 90 TABLET | Refills: 0 | Status: SHIPPED | OUTPATIENT
Start: 2022-06-24 | End: 2022-08-03

## 2022-06-24 NOTE — TELEPHONE ENCOUNTER
Spoke with patient to notify of potassium level. Per Atlee Hamman he has requested for patient to start taking Potassium 20 mEq BID. Patient has been notified and script will be sent to pharmacy.

## 2022-07-06 NOTE — PROGRESS NOTES
resistance induced by this mutation may be overcome by dose escalation. · 06/28/2021 and again on 07/07/2021, Dr Shante Yanez discussed results of the BCR/ABL 1 PCR quant, kinase domain mutation, and CML FISH profile from 6/8/2021 with Dr. Darlene Segovia (BMT at [facility]) who has seen the patient previously. 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 Dr. Darlene Segovia reviewed the mutation, noting resistance to dasatinib. Recommends nilotinib or bosutinib. If TKI options have been exhausted, will consider transplant. · 07/28/2021-dasatinib discontinued   · 07/30/2021-Bosulif initiated  · 08/06/2021- BCR/ABL RT-PCR-positive: Major breakpoint (47.104%), minor breakpoint 0.034% consistent with persistent CML  · 08/06/2021-MPN/CML FISH panel: 1. Positive for a BCR/ABL1 rearrangement (65% of cells) consistent with persistence of CML clone. · 09/07/2021-(+) for a BCR/ABL1 rearrangement (39% of cells). Major breakpoint (18.748%), minor breakpoint (0.017%)  · 11/11/2021-+) for a BCR/ABL1 rearrangement (42% of cells). · 12/02/2021- Dr Shante Yanez phone call: BCR/ABL 1Q PCR positive with 42% copies of BCR transcript. Patient has been on Bosulif since 07/30/2021 (BCR/ABL 48% at that time). Previously discussed with Dr. Darlene Segovia, BMT at [facility] who had seen the patient previously. 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 Today I spoke to the patient who swears that she has been taking her medicine daily except for 5 days when she had a \"stomach bug.\" But has been taking otherwise. I then spoke to Dr. Darlene Segovia who will again take a look at the mutation study that was done previously to see if we can use another TKI (i.e.: Ponatinib).  We talked about other options, History:   Diagnosis Date    CML (chronic myelocytic leukemia) ([facility] [address]        Past Surgical History:    Past Surgical History:   Procedure Laterality Date    TUBAL LIGATION         Social History:    Marital status:   Smoking status:Currently; 1/2 pack daily;12 years  ETOH status:Occasionally  Resides: [place], [place]    Family History:   No family history on file. Current Hospital Medications:    Current Outpatient Medications   Medication Sig Dispense Refill    furosemide (LASIX) 20 MG tablet Take 1 tablet by mouth daily 60 tablet 5    potassium chloride (KLOR-CON M) 20 MEQ extended release tablet Take 1 tablet by mouth 2 times daily 90 tablet 0    amLODIPine (NORVASC) 10 MG tablet 10 mg daily      nilotinib (TASIGNA) 200 MG capsule Take 400 mg by mouth 2 times daily      promethazine-dextromethorphan (PROMETHAZINE-DM) 6.25-15 MG/5ML syrup Take 5 mLs by mouth every 12 hours as needed for Cough (Patient not taking: Reported on 6/23/2022) 100 mL 0     No current facility-administered medications for this visit. Allergies:    Allergies   Allergen Reactions    Latex     Penicillins          Subjective   REVIEW OF SYSTEMS:   CONSTITUTIONAL: no fever, no night sweats,  fatigue;  HEENT: no blurring of vision, no double vision, no hearing difficulty, no tinnitus, no ulceration, no dysplasia, no epistaxis;   LUNGS: no cough, no hemoptysis, no wheeze,  no shortness of breath;  CARDIOVASCULAR: no palpitation, no chest pain, no shortness of breath;  GI: no abdominal pain, no nausea, no vomiting, no diarrhea, no constipation;  ROHAN: no dysuria, no hematuria, no frequency or urgency, no nephrolithiasis;  MUSCULOSKELETAL:BERNARD ankle pain, no joint pain, no swelling, no stiffness;  ENDOCRINE: no polyuria, no polydipsia, no cold or heat intolerance;  HEMATOLOGY: no easy bruising or bleeding, no history of clotting disorder;  DERMATOLOGY: no skin rash, no eczema, no pruritus;  PSYCHIATRY: no depression, no anxiety, no panic attacks, no suicidal ideation, no homicidal ideation;  NEUROLOGY: no syncope, no seizures, no numbness or tingling of hands, no numbness or tingling of feet, no paresis;     Objective   /80   Pulse 87   Ht 5' 6\" (1.676 m)   Wt 167 lb 6.4 oz (75.9 kg)   SpO2 99%   BMI 27.02 kg/m²     PHYSICAL EXAM:  CONSTITUTIONAL: Alert, appropriate, no acute distress  EYES: Non icteric, EOM intact, pupils equal round   ENT: Mucus membranes moist, no oral pharyngeal lesions, external inspection of ears and nose are normal.  NECK: Supple, no masses. No palpable thyroid mass  CHEST/LUNGS: CTA bilaterally, normal respiratory effort   CARDIOVASCULAR: RRR, no murmurs. No lower extremity edema  ABDOMEN: soft non-tender, active bowel sounds, no HSM. No palpable masses  EXTREMITIES: warm, full ROM in all 4 extremities, no focal weakness. SKIN: warm, dry with no rashes or lesions  LYMPH: No cervical, clavicular, axillary, or inguinal lymphadenopathy  NEUROLOGIC: follows commands, non focal   PSYCH: mood and affect appropriate. Alert and oriented to time, place, person      LABORATORY RESULTS REVIEWED/ANALYZED BY ME:  Lab Results   Component Value Date    WBC 6.20 07/07/2022    HGB 12.5 07/07/2022    HCT 39.5 07/07/2022    .9 (H) 07/07/2022     07/07/2022     Lab Results   Component Value Date    NEUTROABS 3.72 07/07/2022     Lab Results   Component Value Date     06/23/2022    K 3.1 (L) 06/23/2022     06/23/2022    CO2 26 06/23/2022    BUN 18 (H) 06/23/2022    CREATININE 0.8 06/23/2022    GLUCOSE 97 06/23/2022    CALCIUM 9.5 06/23/2022    PROT 7.5 06/23/2022    LABALBU 4.4 06/23/2022    BILITOT 0.6 06/23/2022    ALKPHOS 99 06/23/2022    AST 23 06/23/2022    ALT 19 06/23/2022    LABGLOM >60 06/23/2022    GLOB 3.1 06/23/2022         RADIOLOGY STUDIES REVIEWED BY ME:  None    ASSESSMENT:    No orders of the defined types were placed in this encounter.      Bernard Villagran was seen today for follow-up. Diagnoses and all orders for this visit:    CML (chronic myelocytic leukemia) (HCC)  -     furosemide (LASIX) 20 MG tablet; Take 1 tablet by mouth daily    Care plan discussed with patient  -     furosemide (LASIX) 20 MG tablet; Take 1 tablet by mouth daily    Leg edema  -     furosemide (LASIX) 20 MG tablet; Take 1 tablet by mouth daily    Hypokalemia    Other orders  -     CBC with Auto Differential    CML with 317 mutation as per last study  She has been transferred from [facility] oncology, Dr. Alisha Olivarez office. Apparently, issues with visit compliance. The patient tells me that she missed a couple of appointments. She also assures me that she is consistently/compliant with her TKI medication. Apparently, BCR/ABL was not at target in February 2022. She also had a new mutation as of June last year. She has been seen by [facility], BMT . Essentially, the patient has been on several prior line therapy.  -Currently on Tasigna  400mg p.o. twice daily. Patient claims compliance with her medication.  -Treatment currently being tolerated with complaints of bilateral leg swelling/pain  -June 2022-repeat BCR/ABL = 7.21%. Mutation panel not detected. Plan  -Continue Tasigna for now  -Test for mutation  -Test for BCR/ABL quantitative    Side effects-patient has bilateral leg edema.  -She is currently on Norvasc  -I asked her to discuss with PCP regarding stopping Norvasc  -This could also be related to her Tasigna  -Okay to continue Lasix 20 mg p.o. daily. Hypokalemia secondary to Lasix. Potassium replacement.         PLAN:  · RTC with MD 4 weeks  · CBC CMP next visit  · Increase Potassium 20 mEq TID  · BCR/ABL quantitative and TKI resistance mutation panel with [facility]  · Continue Nilotinib 400mg BID-message sent to Corrina Mendes   · Continue follow-up with PCP/Dr Sonny Vaughn for LE swelling  · Continue Lasix 20 mg daily-refill sent          Jennifer GOMES am pre charting  as Medical Assistant for [employer]) Attila Pierre MD. Electronically signed by Jeniffer Raines MA on 7/7/2022 at 4:19 PM CDT. Wale Osman am scribing as Medical Assistant for Hunter Davila MD. Electronically signed by Jeniffer Raines MA on 7/7/2022 at 1:56 PM CDT. I, Dr Homa Ye, personally performed the services described in this documentation as scribed by Jeniffer Raines MA in my presence and is both accurate and complete. I have seen, examined and reviewed this patient medication list, appropriate labs and imaging studies. I reviewed relevant medical records and others physicians notes. I discussed the plans of care with the patient. I answered all the questions to the patients satisfaction. I have also reviewed the chief complaint (CC) and part of the history (History of Present Illness (HPI), Past Family Social History [facility]), or Review of Systems (ROS) and made changes when appropriated. (Please note that portions of this note were completed with a voice recognition program. Efforts were made to edit the dictations but occasionally words are mis-transcribed. )Electronically signed by Hunter Davila MD on 7/7/2022 at 6:03 PM

## 2022-07-07 ENCOUNTER — HOSPITAL ENCOUNTER (OUTPATIENT)
Dept: INFUSION THERAPY | Age: 42
Discharge: HOME | End: 2022-07-07
Payer: MEDICAID

## 2022-07-07 ENCOUNTER — OFFICE VISIT (OUTPATIENT)
Dept: HEMATOLOGY | Age: 42
End: 2022-07-07
Payer: MEDICAID

## 2022-07-07 VITALS
SYSTOLIC BLOOD PRESSURE: 112 MMHG | BODY MASS INDEX: 26.9 KG/M2 | HEIGHT: 66 IN | HEART RATE: 87 BPM | OXYGEN SATURATION: 99 % | WEIGHT: 167.4 LBS | DIASTOLIC BLOOD PRESSURE: 80 MMHG

## 2022-07-07 DIAGNOSIS — R60.0 LEG EDEMA: ICD-10-CM

## 2022-07-07 DIAGNOSIS — C92.10 CML (CHRONIC MYELOCYTIC LEUKEMIA) (HCC): Primary | ICD-10-CM

## 2022-07-07 DIAGNOSIS — E87.6 HYPOKALEMIA: ICD-10-CM

## 2022-07-07 DIAGNOSIS — Z71.89 CARE PLAN DISCUSSED WITH PATIENT: ICD-10-CM

## 2022-07-07 LAB
BASOPHILS ABSOLUTE: 0.05 K/UL (ref 0.01–0.08)
BASOPHILS RELATIVE PERCENT: 0.8 % (ref 0.1–1.2)
EOSINOPHILS ABSOLUTE: 0.09 K/UL (ref 0.04–0.54)
EOSINOPHILS RELATIVE PERCENT: 1.5 % (ref 0.7–7)
HCT VFR BLD CALC: 39.5 % (ref 34.1–44.9)
HEMOGLOBIN: 12.5 G/DL (ref 11.2–15.7)
LYMPHOCYTES ABSOLUTE: 1.85 K/UL (ref 1.18–3.74)
LYMPHOCYTES RELATIVE PERCENT: 29.8 % (ref 19.3–53.1)
MCH RBC QN AUTO: 32.6 PG (ref 25.6–32.2)
MCHC RBC AUTO-ENTMCNC: 31.6 G/DL (ref 32.3–35.5)
MCV RBC AUTO: 102.9 FL (ref 79.4–94.8)
MONOCYTES ABSOLUTE: 0.48 K/UL (ref 0.24–0.82)
MONOCYTES RELATIVE PERCENT: 7.7 % (ref 4.7–12.5)
NEUTROPHILS ABSOLUTE: 3.72 K/UL (ref 1.56–6.13)
NEUTROPHILS RELATIVE PERCENT: 60 % (ref 34–71.1)
PDW BLD-RTO: 15.2 % (ref 11.7–14.4)
PLATELET # BLD: 311 K/UL (ref 182–369)
PMV BLD AUTO: 9.3 FL (ref 7.4–10.4)
RBC # BLD: 3.84 M/UL (ref 3.93–5.22)
WBC # BLD: 6.2 K/UL (ref 3.98–10.04)

## 2022-07-07 PROCEDURE — 99211 OFF/OP EST MAY X REQ PHY/QHP: CPT

## 2022-07-07 PROCEDURE — 36415 COLL VENOUS BLD VENIPUNCTURE: CPT | Performed by: INTERNAL MEDICINE

## 2022-07-07 PROCEDURE — 4004F PT TOBACCO SCREEN RCVD TLK: CPT | Performed by: INTERNAL MEDICINE

## 2022-07-07 PROCEDURE — G8419 CALC BMI OUT NRM PARAM NOF/U: HCPCS | Performed by: INTERNAL MEDICINE

## 2022-07-07 PROCEDURE — G8427 DOCREV CUR MEDS BY ELIG CLIN: HCPCS | Performed by: INTERNAL MEDICINE

## 2022-07-07 PROCEDURE — 99214 OFFICE O/P EST MOD 30 MIN: CPT | Performed by: INTERNAL MEDICINE

## 2022-07-07 PROCEDURE — 85025 COMPLETE CBC W/AUTO DIFF WBC: CPT

## 2022-07-07 RX ORDER — FUROSEMIDE 20 MG/1
20 TABLET ORAL DAILY
Qty: 60 TABLET | Refills: 5 | Status: SHIPPED | OUTPATIENT
Start: 2022-07-07

## 2022-08-03 DIAGNOSIS — C92.10 CML (CHRONIC MYELOCYTIC LEUKEMIA) (HCC): ICD-10-CM

## 2022-08-03 DIAGNOSIS — E87.6 LOW SERUM POTASSIUM LEVEL: ICD-10-CM

## 2022-08-03 DIAGNOSIS — E87.6 LOW SERUM POTASSIUM LEVEL: Primary | ICD-10-CM

## 2022-08-03 RX ORDER — POTASSIUM CHLORIDE 1500 MG/1
TABLET, EXTENDED RELEASE ORAL
Qty: 90 TABLET | Refills: 0 | Status: SHIPPED | OUTPATIENT
Start: 2022-08-03 | End: 2022-08-04

## 2022-08-03 NOTE — PROGRESS NOTES
MEDICAL ONCOLOGY PROGRESS NOTE    Pt Name: Olive Jeronimo  MRN: [id number]  YOB: 1980  Date of evaluation: 8/4/2022    HISTORY OF PRESENT ILLNESS:  Essentially, the patient is a 35years old female who has chronic CML diagnosed initially in 2005. Diagnosis  CML, 2005  BCR/ABL1 kinase 7.21%    Treatment Summary  2005 Gleevec (Patient didn't take on regular basis)  02/2016 - 07/2021 Sprycel  07/30/21 - 12/2021 Bosulif  12/2021 Nilotinib 400mg BID    Hematology/Cancer History  Eva Paz was diagnosed with CML in 2005 by Dr Matthew Garcia. 2005 Initiated Ry Greens (Patient didn't take on regular basis)  09/17/13 Genotrace - IS QRT-PCR: 57.64%  09/20/2014 Genotrace - IS QRT-PCR: 40.52%  02/2016 Initiated Sprycel  2/10/16 [facility]): Normal CT of the chest. No evidence of pulmonary embolism  03/05/2016 Genotrace - IS QRT-PCR: 30.9%  01/14/2016 Genotrace - IS QRT-PCR: 21.36%  07/28/17 Genotrace - IS QRT-PCR: 8.52%  06/12/16 Genotrace - IS QRT-PCR: 8.47%  01/31/2020  Marrow biopsy- persistent CML, chronic phase. Clonal T -cell population identified by PCR. Flow cytometry negative. 09/01/2020 Genotrace - IS QRT-PCR: 13.5%  09/01/2020-BCR/ABL PCR positive for BCR/ABL 1 rearrangement (13.5% of cells): Consistent with persistence of CML clone. 03/16/2021- BCR-ABL1 QPCR-positive: Major breakpoint (18.956%), minor breakpoint (0.033%). Interpretation: Consistent with residual CML. 06/08/2021- BCR-ABL 1 QPCR-positive (14.8403) for the BCR-ABL-1 e13a2 (b2a2, p219) fusion transcript  06/08/2021- CML chromosome/FISH profile: Abnormal- 54% of nuclei positive for BCR/ABL1 gene fusion  06/08/2021 - BCR-ABL1 Kinase Domain Mutation - Mutation detected within the BCR-ABL1 kinase domain. Nucleotide change: c.949T>C. 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 Comment: No plate changes detected within the BCR-ABL 1 kinase domain.  Cellular and biochemical studies suggest that the resistance induced by this mutation may be overcome by dose escalation. 06/28/2021 and again on 07/07/2021, Dr Anita Metz discussed results of the BCR/ABL 1 PCR quant, kinase domain mutation, and CML FISH profile from 6/8/2021 with Dr. Jose Dickerson (BMT at [facility]) who has seen the patient previously. 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 Dr. Jose Dickerson reviewed the mutation, noting resistance to dasatinib. Recommends nilotinib or bosutinib. If TKI options have been exhausted, will consider transplant.  07/28/2021-dasatinib discontinued   07/30/2021-Bosulif initiated  08/06/2021- BCR/ABL RT-PCR-positive: Major breakpoint (47.104%), minor breakpoint 0.034% consistent with persistent CML  08/06/2021-MPN/CML FISH panel: 1. Positive for a BCR/ABL1 rearrangement (65% of cells) consistent with persistence of CML clone. 09/07/2021-(+) for a BCR/ABL1 rearrangement (39% of cells). Major breakpoint (18.748%), minor breakpoint (0.017%)  11/11/2021-+) for a BCR/ABL1 rearrangement (42% of cells). 12/02/2021- Dr Anita Metz phone call: BCR/ABL 1Q PCR positive with 42% copies of BCR transcript. Patient has been on Bosulif since 07/30/2021 (BCR/ABL 48% at that time). Previously discussed with Dr. Jose Dickerson, BMT at [facility] who had seen the patient previously. 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 Today I spoke to the patient who swears that she has been taking her medicine daily except for 5 days when she had a \"stomach bug.\" But has been taking otherwise. I then spoke to Dr. Jose Dickerson who will again take a look at the mutation study that was done previously to see if we can use another TKI (i.e.: Ponatinib).  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 Angelica Gaines does not believe this would be helpful unless the patient is bridging towards transplant. If ponatinib is not felt to be indicated or fails, then the patient would qualify for transplant at that time. Awaiting word from Dr. Angelica Gaines prior to moving forward. The patient is made aware of the plans. She verbalized understanding and agreement  12/10/2021-(+) for a BCR/ABL1 rearrangement (31% of cells). Major breakpoint (15.085%), minor breakpoint (0.008%)  12/30/2021- Nilotinib initiated- was taking 400 mg daily (error on her part but claims \"the pharmacy wrote it that way\") instead of q12h as prescribed - until 02/29/2022 when she started taking q12h.  02/17/2022-BCR/ABL-FISH positive 41.5%: RT-PCR major breakpoint (40.046%) consistent with residual CML.  4/26/22 US bilateral lower extremity [facility]): Normal duplex ultrasound of the bilateral lower extremities without evidence of venous thrombus. 5/4/22- Transthoracic echo [facility]): Left ventricular systolic function is normal. Left ventricular ejection fraction appears to be 61-65%. Normal right ventricular cavity size and systolic function noted. No hemodynamically significant valvular abnormalities identified on this study. 6/23/2022 [facility]  BCR-ABL1 is 7.21%; MMR: NO.  BCR/ABL 1 mutation not detected. 6/23/2022-she was first seen by me. She was transferred from Dr. Clemens [facility]. Apparently she was discharged from that practice due to compliance issues. The patient tells me that she has issues compliance with her medical visits due to her work schedule. She is to continue her nilotinib for now. Recommended to repeat quantitative BCR/ABL and mutation panel. 7/7/2022-patient is to continue nilotinib 400 mg p.o. twice daily at this time. Interval decrease quantitative BCR/ABL. 7. 21% (previously 41%).     Past Medical History:    Past Medical History:   Diagnosis Date    CML (chronic myelocytic leukemia) ([facility] [address]        Past Surgical History:    Past Surgical History:   Procedure Laterality Date    TUBAL LIGATION         Social History:    Marital status:   Smoking status:Currently; 1/2 pack daily;12 years  ETOH status:Occasionally  Resides: [place], [place]    Family History:   No family history on file. Current Hospital Medications:    Current Outpatient Medications   Medication Sig Dispense Refill    KLOR-CON M20 20 MEQ extended release tablet TAKE 1 TABLET BY MOUTH TWICE A DAY 90 tablet 0    nilotinib (TASIGNA) 200 MG capsule Take 2 capsules by mouth 2 times daily 120 capsule 3    furosemide (LASIX) 20 MG tablet Take 1 tablet by mouth daily 60 tablet 5    amLODIPine (NORVASC) 10 MG tablet 10 mg daily      promethazine-dextromethorphan (PROMETHAZINE-DM) 6.25-15 MG/5ML syrup Take 5 mLs by mouth every 12 hours as needed for Cough 100 mL 0     No current facility-administered medications for this visit. Allergies:    Allergies   Allergen Reactions    Latex     Penicillins          Subjective   REVIEW OF SYSTEMS:   CONSTITUTIONAL: no fever, no night sweats,BERNARD foot pain, no fatigue;  HEENT: no blurring of vision, no double vision, no hearing difficulty, no tinnitus, no ulceration, no dysplasia, no epistaxis;   LUNGS: no cough, no hemoptysis, no wheeze,  no shortness of breath;  CARDIOVASCULAR: no palpitation, no chest pain, no shortness of breath;  GI: no abdominal pain, no nausea, no vomiting, no diarrhea, no constipation;  ROHAN: no dysuria, no hematuria, no frequency or urgency, no nephrolithiasis;  MUSCULOSKELETAL: no joint pain,mild BENRARD ankle swelling, no stiffness;  ENDOCRINE: no polyuria, no polydipsia, no cold or heat intolerance;  HEMATOLOGY: no easy bruising or bleeding, no history of clotting disorder;  DERMATOLOGY: no skin rash, no eczema, no pruritus;  PSYCHIATRY: no depression, no anxiety, no panic attacks, no suicidal ideation, no homicidal ideation;  NEUROLOGY: no syncope, no seizures, no numbness or tingling of hands, no numbness or tingling of feet, no paresis;      Objective   /80 (Site: Right Upper Arm, Position: Sitting)   Pulse 91   Ht 5' 6\" (1.676 m)   Wt 173 lb 14.4 oz (78.9 kg)   SpO2 96%   BMI 28.07 kg/m²     PHYSICAL EXAM:  CONSTITUTIONAL: Alert, appropriate, no acute distress  EYES: Non icteric, EOM intact, pupils equal round   ENT: Mucus membranes moist, no oral pharyngeal lesions, external inspection of ears and nose are normal.  NECK: Supple, no masses. No palpable thyroid mass  CHEST/LUNGS: CTA bilaterally, normal respiratory effort   CARDIOVASCULAR: RRR, no murmurs. No lower extremity edema  ABDOMEN: soft non-tender, active bowel sounds, no HSM. No palpable masses  EXTREMITIES: warm, full ROM in all 4 extremities, no focal weakness. SKIN: warm, dry with no rashes or lesions  LYMPH: No cervical, clavicular, axillary, or inguinal lymphadenopathy  NEUROLOGIC: follows commands, non focal   PSYCH: mood and affect appropriate. Alert and oriented to time, place, person      LABORATORY RESULTS REVIEWED/ANALYZED BY ME:  8/4/2022 CBC  WBC 5.33  HGB 12.6    Neut 2.63    RADIOLOGY STUDIES REVIEWED BY ME:  None    ASSESSMENT:    No orders of the defined types were placed in this encounter. Sundar Rivas was seen today for follow-up. Diagnoses and all orders for this visit:    CML (chronic myelocytic leukemia) ([facility] [address]    Care plan discussed with patient    CML with 317 mutation as per last study  She has been transferred from [facility] oncology, Dr. Chalino Reynolds office. Apparently, issues with visit compliance. The patient tells me that she missed a couple of appointments. She also assures me that she is consistently/compliant with her TKI medication. Apparently, BCR/ABL was not at target in February 2022. She also had a new mutation as of June last year. She has been seen by [facility], BMT .   Essentially, the patient has been on several prior line therapy.  -Currently on Tasigna  400mg p.o. twice daily. Patient claims compliance with her medication.  -Treatment currently being tolerated with complaints of bilateral leg swelling/pain  -June 2022-repeat BCR/ABL = 7.21%. Mutation panel not detected. Plan  -Continue Tasigna for now  -Test for BCR/ABL quantitative    Side effects-patient has bilateral leg edema.  -She is currently on Norvasc  -I asked her to discuss with PCP regarding stopping Norvasc  -This could also be related to her Tasigna  -Okay to continue Lasix 20 mg p.o. daily. Hypokalemia secondary to Lasix. Potassium replacement. Hypokalemia     PLAN:  RTC with MD 6 weeks  CBC CMP next visit  Continue Micro-K 10mg TID-script sent  BCR/ABL quantitative PCR [facility] Oct 20  Continue Nilotinib 400mg BID-message sent to Sandhya Gomez follow-up with PCP/Dr Anusha Castañeda for LE swelling  Continue Lasix 20 mg daily    IBailee am pre charting  as Medical Assistant for Sheryl Wray MD. Electronically signed by Bailee Garcia MA on 8/4/2022 at 7:53 AM CDT. Ml Chairez am scribing as Medical Assistant for Sheryl Wray MD. Electronically signed by Bailee Garcia MA on 8/4/2022 at 9:42 AM CDT. I, Dr Yair Richardson, personally performed the services described in this documentation as scribed by Bailee Garcia MA in my presence and is both accurate and complete. I have seen, examined and reviewed this patient medication list, appropriate labs and imaging studies. I reviewed relevant medical records and others physicians notes. I discussed the plans of care with the patient. I answered all the questions to the patients satisfaction. I have also reviewed the chief complaint (CC) and part of the history (History of Present Illness (HPI), Past Family Social History [facility]), or Review of Systems (ROS) and made changes when appropriated.        (Please note that portions of this note were completed with a voice recognition program. Efforts were made to edit the dictations but occasionally words are mis-transcribed.)  Electronically signed by Rohith Henry MD on 8/4/2022 at 9:42 AM

## 2022-08-04 ENCOUNTER — HOSPITAL ENCOUNTER (OUTPATIENT)
Dept: INFUSION THERAPY | Age: 42
Discharge: HOME | End: 2022-08-04
Payer: MEDICAID

## 2022-08-04 ENCOUNTER — OFFICE VISIT (OUTPATIENT)
Dept: HEMATOLOGY | Age: 42
End: 2022-08-04
Payer: MEDICAID

## 2022-08-04 VITALS
SYSTOLIC BLOOD PRESSURE: 130 MMHG | DIASTOLIC BLOOD PRESSURE: 80 MMHG | OXYGEN SATURATION: 96 % | HEART RATE: 91 BPM | HEIGHT: 66 IN | WEIGHT: 173.9 LBS | BODY MASS INDEX: 27.95 KG/M2

## 2022-08-04 DIAGNOSIS — C92.10 CML (CHRONIC MYELOCYTIC LEUKEMIA) (HCC): ICD-10-CM

## 2022-08-04 DIAGNOSIS — R60.0 LEG EDEMA: ICD-10-CM

## 2022-08-04 DIAGNOSIS — E87.6 LOW SERUM POTASSIUM LEVEL: ICD-10-CM

## 2022-08-04 DIAGNOSIS — C92.10 CML (CHRONIC MYELOCYTIC LEUKEMIA) (HCC): Primary | ICD-10-CM

## 2022-08-04 DIAGNOSIS — Z71.89 CARE PLAN DISCUSSED WITH PATIENT: ICD-10-CM

## 2022-08-04 DIAGNOSIS — T45.1X5S ANTINEOPLASTIC DRUGS CAUSING ADVERSE EFFECT, SEQUELA: ICD-10-CM

## 2022-08-04 LAB
ALBUMIN SERPL-MCNC: 4.4 G/DL (ref 3.5–5.2)
ALP BLD-CCNC: 105 U/L (ref 35–104)
ALT SERPL-CCNC: 23 U/L (ref 5–33)
ANION GAP SERPL CALCULATED.3IONS-SCNC: 12 MMOL/L (ref 7–19)
AST SERPL-CCNC: 21 U/L (ref 5–32)
BASOPHILS ABSOLUTE: 0.04 K/UL (ref 0.01–0.08)
BASOPHILS RELATIVE PERCENT: 0.8 % (ref 0.1–1.2)
BILIRUB SERPL-MCNC: <0.2 MG/DL (ref 0.2–1.2)
BUN BLDV-MCNC: 25 MG/DL (ref 6–20)
CALCIUM SERPL-MCNC: 9.1 MG/DL (ref 8.6–10)
CHLORIDE BLD-SCNC: 107 MMOL/L (ref 98–111)
CO2: 24 MMOL/L (ref 22–29)
CREAT SERPL-MCNC: 1.2 MG/DL (ref 0.5–0.9)
EOSINOPHILS ABSOLUTE: 0.11 K/UL (ref 0.04–0.54)
EOSINOPHILS RELATIVE PERCENT: 2.1 % (ref 0.7–7)
GFR AFRICAN AMERICAN: >59
GFR NON-AFRICAN AMERICAN: 49
GLUCOSE BLD-MCNC: 62 MG/DL (ref 74–109)
HCT VFR BLD CALC: 39.5 % (ref 34.1–44.9)
HEMOGLOBIN: 12.6 G/DL (ref 11.2–15.7)
LYMPHOCYTES ABSOLUTE: 2.1 K/UL (ref 1.18–3.74)
LYMPHOCYTES RELATIVE PERCENT: 39.4 % (ref 19.3–53.1)
MCH RBC QN AUTO: 32.6 PG (ref 25.6–32.2)
MCHC RBC AUTO-ENTMCNC: 31.9 G/DL (ref 32.3–35.5)
MCV RBC AUTO: 102.3 FL (ref 79.4–94.8)
MONOCYTES ABSOLUTE: 0.42 K/UL (ref 0.24–0.82)
MONOCYTES RELATIVE PERCENT: 7.9 % (ref 4.7–12.5)
NEUTROPHILS ABSOLUTE: 2.63 K/UL (ref 1.56–6.13)
NEUTROPHILS RELATIVE PERCENT: 49.2 % (ref 34–71.1)
PDW BLD-RTO: 14 % (ref 11.7–14.4)
PLATELET # BLD: 216 K/UL (ref 182–369)
PMV BLD AUTO: 10.3 FL (ref 7.4–10.4)
POTASSIUM SERPL-SCNC: 4.2 MMOL/L (ref 3.5–5)
RBC # BLD: 3.86 M/UL (ref 3.93–5.22)
SODIUM BLD-SCNC: 143 MMOL/L (ref 136–145)
TOTAL PROTEIN: 7.4 G/DL (ref 6.6–8.7)
WBC # BLD: 5.33 K/UL (ref 3.98–10.04)

## 2022-08-04 PROCEDURE — 99212 OFFICE O/P EST SF 10 MIN: CPT

## 2022-08-04 PROCEDURE — 4004F PT TOBACCO SCREEN RCVD TLK: CPT | Performed by: INTERNAL MEDICINE

## 2022-08-04 PROCEDURE — G8427 DOCREV CUR MEDS BY ELIG CLIN: HCPCS | Performed by: INTERNAL MEDICINE

## 2022-08-04 PROCEDURE — 36415 COLL VENOUS BLD VENIPUNCTURE: CPT

## 2022-08-04 PROCEDURE — 99214 OFFICE O/P EST MOD 30 MIN: CPT | Performed by: INTERNAL MEDICINE

## 2022-08-04 PROCEDURE — G8419 CALC BMI OUT NRM PARAM NOF/U: HCPCS | Performed by: INTERNAL MEDICINE

## 2022-08-04 PROCEDURE — 85025 COMPLETE CBC W/AUTO DIFF WBC: CPT

## 2022-08-04 RX ORDER — POTASSIUM CHLORIDE 750 MG/1
10 CAPSULE, EXTENDED RELEASE ORAL 3 TIMES DAILY
Qty: 90 CAPSULE | Refills: 5 | Status: SHIPPED | OUTPATIENT
Start: 2022-08-04

## 2022-09-11 DIAGNOSIS — E87.6 LOW SERUM POTASSIUM LEVEL: ICD-10-CM

## 2022-09-12 RX ORDER — POTASSIUM CHLORIDE 1500 MG/1
TABLET, EXTENDED RELEASE ORAL
Qty: 90 TABLET | Refills: 0 | OUTPATIENT
Start: 2022-09-12

## 2022-09-19 ENCOUNTER — APPOINTMENT (OUTPATIENT)
Dept: INFUSION THERAPY | Age: 42
DRG: 194 | End: 2022-09-19
Payer: MEDICAID

## 2022-09-30 NOTE — PROGRESS NOTES
MEDICAL ONCOLOGY PROGRESS NOTE    Pt Name: Casey Vergara  MRN: [id number]  YOB: 1980  Date of evaluation: 10/3/2022    HISTORY OF PRESENT ILLNESS:  Essentially, the patient is a 38years old female who has chronic CML diagnosed initially in 2005. She is currently on erlotinib 400 mg p.o. twice daily. She is compliant with her medication. She denies any early satiety. She denies any other symptoms. She feels fatigued. She has been working 12 hours shift x6 days a week. She has some bilateral leg swelling and takes Lasix 20 mg p.o. every other day. She also take potassium pills. Unfortunately, she continues to smoke half pack a day. She is updated with age-appropriate cancer screening. Diagnosis  CML, 2005  BCR/ABL1 kinase 7.21%    Treatment Summary  2005 Gleevec (Patient didn't take on regular basis)  02/2016 - 07/2021 Sprycel  07/30/21 - 12/2021 Bosulif  12/2021 Nilotinib 400mg BID    Hematology/Cancer History  Liz Antony was diagnosed with CML in 2005 by Dr Miguel Angel Saunders. 2005 Initiated Vickki Folds (Patient didn't take on regular basis)  09/17/13 Genotrace - IS QRT-PCR: 57.64%  09/20/2014 Genotrace - IS QRT-PCR: 40.52%  02/2016 Initiated Sprycel  2/10/16 [facility]): Normal CT of the chest. No evidence of pulmonary embolism  03/05/2016 Genotrace - IS QRT-PCR: 30.9%  01/14/2016 Genotrace - IS QRT-PCR: 21.36%  07/28/17 Genotrace - IS QRT-PCR: 8.52%  06/12/16 Genotrace - IS QRT-PCR: 8.47%  01/31/2020  Marrow biopsy- persistent CML, chronic phase. Clonal T -cell population identified by PCR. Flow cytometry negative. 09/01/2020 Genotrace - IS QRT-PCR: 13.5%  09/01/2020-BCR/ABL PCR positive for BCR/ABL 1 rearrangement (13.5% of cells): Consistent with persistence of CML clone. 03/16/2021- BCR-ABL1 QPCR-positive: Major breakpoint (18.956%), minor breakpoint (0.033%). Interpretation: Consistent with residual CML.   06/08/2021- BCR-ABL 1 QPCR-positive (14.8403) for the BCR-ABL-1 e13a2 (b2a2, p219) fusion transcript  06/08/2021- CML chromosome/FISH profile: Abnormal- 54% of nuclei positive for BCR/ABL1 gene fusion  06/08/2021 - BCR-ABL1 Kinase Domain Mutation - Mutation detected within the BCR-ABL1 kinase domain. Nucleotide change: c.949T>C. 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 Comment: No plate changes detected within the BCR-ABL 1 kinase domain. Cellular and biochemical studies suggest that the resistance induced by this mutation may be overcome by dose escalation. 06/28/2021 and again on 07/07/2021, Dr James Perez discussed results of the BCR/ABL 1 PCR quant, kinase domain mutation, and CML FISH profile from 6/8/2021 with Dr. Mabel Dunbar (BMT at [facility]) who has seen the patient previously. 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 Dr. Mabel Dunbar reviewed the mutation, noting resistance to dasatinib. Recommends nilotinib or bosutinib. If TKI options have been exhausted, will consider transplant.  07/28/2021-dasatinib discontinued   07/30/2021-Bosulif initiated  08/06/2021- BCR/ABL RT-PCR-positive: Major breakpoint (47.104%), minor breakpoint 0.034% consistent with persistent CML  08/06/2021-MPN/CML FISH panel: 1. Positive for a BCR/ABL1 rearrangement (65% of cells) consistent with persistence of CML clone. 09/07/2021-(+) for a BCR/ABL1 rearrangement (39% of cells). Major breakpoint (18.748%), minor breakpoint (0.017%)  11/11/2021-+) for a BCR/ABL1 rearrangement (42% of cells). 12/02/2021- Dr James Perez phone call: BCR/ABL 1Q PCR positive with 42% copies of BCR transcript. Patient has been on Bosulif since 07/30/2021 (BCR/ABL 48% at that time). Previously discussed with Dr. Mabel Dunbar, BMT at [facility] who had seen the patient previously.  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 Today I spoke to the patient who swears that she has been taking her medicine daily except for 5 days when she had a \"stomach bug.\" But has been taking otherwise. I then spoke to Dr. Cynthia Mccauley who will again take a look at the mutation study that was done previously to see if we can use another TKI (i.e.: Ponatinib). 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 Cynthia Mccauley does not believe this would be helpful unless the patient is bridging towards transplant. If ponatinib is not felt to be indicated or fails, then the patient would qualify for transplant at that time. Awaiting word from Dr. Cynthia Mccauley prior to moving forward. The patient is made aware of the plans. She verbalized understanding and agreement  12/10/2021-(+) for a BCR/ABL1 rearrangement (31% of cells). Major breakpoint (15.085%), minor breakpoint (0.008%)  12/30/2021- Nilotinib initiated- was taking 400 mg daily (error on her part but claims \"the pharmacy wrote it that way\") instead of q12h as prescribed - until 02/29/2022 when she started taking q12h.  02/17/2022-BCR/ABL-FISH positive 41.5%: RT-PCR major breakpoint (40.046%) consistent with residual CML.  4/26/22 US bilateral lower extremity [facility]): Normal duplex ultrasound of the bilateral lower extremities without evidence of venous thrombus. 5/4/22- Transthoracic echo [facility]): Left ventricular systolic function is normal. Left ventricular ejection fraction appears to be 61-65%. Normal right ventricular cavity size and systolic function noted. No hemodynamically significant valvular abnormalities identified on this study. 6/23/2022 [facility]  BCR-ABL1 is 7.21%; MMR: NO.  BCR/ABL 1 mutation not detected. 6/23/2022-she was first seen by me. She was transferred from Dr. Keenan Funk office. Apparently she was discharged from that practice due to compliance issues.   The patient tells me that she has issues compliance with her medical visits due to her work schedule. She is to continue her nilotinib for now. Recommended to repeat quantitative BCR/ABL and mutation panel. 7/7/2022-patient is to continue nilotinib 400 mg p.o. twice daily at this time. Interval decrease quantitative BCR/ABL. 7. 21% (previously 41%). Past Medical History:    Past Medical History:   Diagnosis Date    CML (chronic myelocytic leukemia) ([facility] [address]        Past Surgical History:    Past Surgical History:   Procedure Laterality Date    TUBAL LIGATION         Social History:    Marital status:   Smoking status:Currently; 1/2 pack daily;12 years  ETOH status:Occasionally  Resides: [place], [place]    Family History:   No family history on file. Current Hospital Medications:    Current Outpatient Medications   Medication Sig Dispense Refill    potassium chloride (MICRO-K) 10 MEQ extended release capsule Take 1 capsule by mouth in the morning, at noon, and at bedtime 90 capsule 5    nilotinib (TASIGNA) 200 MG capsule Take 2 capsules by mouth 2 times daily 120 capsule 3    furosemide (LASIX) 20 MG tablet Take 1 tablet by mouth daily 60 tablet 5    amLODIPine (NORVASC) 10 MG tablet 10 mg daily      promethazine-dextromethorphan (PROMETHAZINE-DM) 6.25-15 MG/5ML syrup Take 5 mLs by mouth every 12 hours as needed for Cough 100 mL 0     No current facility-administered medications for this visit. Allergies:    Allergies   Allergen Reactions    Latex     Penicillins          Subjective   REVIEW OF SYSTEMS:   CONSTITUTIONAL: no fever, no night sweats, fatigue;  HEENT: no blurring of vision, no double vision, no hearing difficulty, no tinnitus, no ulceration, no dysplasia, no epistaxis;   LUNGS: no cough, no hemoptysis, no wheeze,  no shortness of breath;  CARDIOVASCULAR: no palpitation, no chest pain, no shortness of breath;  GI: no abdominal pain, no nausea, no vomiting, no diarrhea, no constipation;  ROHAN: no dysuria, no hematuria, no frequency or urgency, no nephrolithiasis;  MUSCULOSKELETAL: no joint pain, no swelling, no stiffness; bilateral leg edema  ENDOCRINE: no polyuria, no polydipsia, no cold or heat intolerance;  HEMATOLOGY: no easy bruising or bleeding, no history of clotting disorder;  DERMATOLOGY: no skin rash, no eczema, no pruritus;  PSYCHIATRY: no depression, no anxiety, no panic attacks, no suicidal ideation, no homicidal ideation;  NEUROLOGY: no syncope, no seizures, no numbness or tingling of hands, no numbness or tingling of feet, no paresis;       Objective   /68   Pulse (!) 101   Wt 173 lb (78.5 kg)   SpO2 94%   BMI 27.92 kg/m²     PHYSICAL EXAM:  CONSTITUTIONAL: Alert, appropriate, no acute distress  EYES: Non icteric, EOM intact, pupils equal round   ENT: Mucus membranes moist, no oral pharyngeal lesions, external inspection of ears and nose are normal.  NECK: Supple, no masses. No palpable thyroid mass  CHEST/LUNGS: CTA bilaterally, normal respiratory effort   CARDIOVASCULAR:tachycardic,  no murmurs. No lower extremity edema  ABDOMEN: soft non-tender, active bowel sounds, no HSM. No palpable masses  EXTREMITIES: warm, full ROM in all 4 extremities, no focal weakness. SKIN: warm, dry with no rashes or lesions  LYMPH: No cervical, clavicular, axillary, or inguinal lymphadenopathy  NEUROLOGIC: follows commands, non focal   PSYCH: mood and affect appropriate. Alert and oriented to time, place, person    LABORATORY RESULTS REVIEWED/ANALYZED BY ME:  10/3/2022 CBC  WBC  HGB  PLT  Neut    RADIOLOGY STUDIES REVIEWED BY ME:  None    ASSESSMENT:    Orders Placed This Encounter   Procedures    Comprehensive Metabolic Panel     Standing Status:   Future     Standing Expiration Date:   10/3/2023    Miscellaneous Sendout     Standing Status:   Future     Standing Expiration Date:   10/3/2023     Order Specific Question:   Specify Req.  Test (1 Test/Order)     Answer:   BCR/ABL Mercy Armstrong was seen today for follow-up. Diagnoses and all orders for this visit:    Care plan discussed with patient    CML (chronic myelocytic leukemia) ([facility] [address]  -     Comprehensive Metabolic Panel; Future  -     Miscellaneous Sendout; Future    Renal impairment      CML with 317 mutation as per last study  She has been transferred from [facility] oncology, Dr. Melo Confer office. Apparently, issues with visit compliance. The patient tells me that she missed a couple of appointments. She also assures me that she is consistently/compliant with her TKI medication. Apparently, BCR/ABL was not at target in February 2022. She also had a new mutation as of June last year. She has been seen by [facility], BMT . Essentially, the patient has been on several prior line therapy.  -Currently on Tasigna  400mg p.o. twice daily. Patient claims compliance with her medication.  -Treatment currently being tolerated with complaints of bilateral leg swelling/pain  -June 2022-repeat BCR/ABL = 7.21%. Mutation panel not detected. Plan  -Continue Tasigna for now  -Test for BCR/ABL quantitative    Side effects-patient has bilateral leg edema.  -She is currently on Norvasc  -I asked her to discuss with PCP regarding stopping Norvasc  -This could also be related to her Tasigna  -Okay to continue Lasix 20 mg p.o. daily. Hypokalemia secondary to Lasix. Potassium replacement.  -Continue Lasix as needed. Hypokalemia   -Recheck potassium levels today    PLAN:  RTC with MD 6 weeks  CBC CMP next visit  Continue Micro-K 10mg TID  BCR/ABL quantitative PCR [facility] Oct 20  Continue Nilotinib 400mg BID  Continue follow-up with PCP/Dr Joe Hankins for LE swelling  Continue Lasix 20 mg QOD  Proceed with age-appropriate cancer screening, mammogram    I, Juan Pablo Aquino am pre charting  as Medical Assistant for Dejan Robb MD. Electronically signed by Juan Pablo Aquino MA on 10/3/2022 at 8:18 AM CDT.     Shiraz Barragan am scribing as Medical Assistant for Erick Norris MD. Electronically signed by Nesha Hairston MA on 10/3/2022 at 8:32 AM CDT. I, Dr Sarah Oneill, personally performed the services described in this documentation as scribed by Nesha Hairston MA in my presence and is both accurate and complete. I have seen, examined and reviewed this patient medication list, appropriate labs and imaging studies. I reviewed relevant medical records and others physicians notes. I discussed the plans of care with the patient. I answered all the questions to the patients satisfaction. I have also reviewed the chief complaint (CC) and part of the history (History of Present Illness (HPI), Past Family Social History [facility]), or Review of Systems (ROS) and made changes when appropriated.        (Please note that portions of this note were completed with a voice recognition program. Efforts were made to edit the dictations but occasionally words are mis-transcribed.)  Electronically signed by Erick Norris MD on 10/3/2022 at 8:37 AM

## 2022-10-03 ENCOUNTER — OFFICE VISIT (OUTPATIENT)
Dept: HEMATOLOGY | Age: 42
End: 2022-10-03
Payer: MEDICAID

## 2022-10-03 ENCOUNTER — HOSPITAL ENCOUNTER (OUTPATIENT)
Dept: INFUSION THERAPY | Age: 42
Discharge: HOME | DRG: 194 | End: 2022-10-03
Payer: MEDICAID

## 2022-10-03 VITALS
HEART RATE: 101 BPM | SYSTOLIC BLOOD PRESSURE: 108 MMHG | OXYGEN SATURATION: 94 % | DIASTOLIC BLOOD PRESSURE: 68 MMHG | WEIGHT: 173 LBS | BODY MASS INDEX: 27.92 KG/M2

## 2022-10-03 DIAGNOSIS — C92.10 CML (CHRONIC MYELOCYTIC LEUKEMIA) (HCC): ICD-10-CM

## 2022-10-03 DIAGNOSIS — N28.9 RENAL IMPAIRMENT: ICD-10-CM

## 2022-10-03 DIAGNOSIS — R60.0 LEG EDEMA: ICD-10-CM

## 2022-10-03 DIAGNOSIS — Z71.89 CARE PLAN DISCUSSED WITH PATIENT: ICD-10-CM

## 2022-10-03 DIAGNOSIS — Z71.89 CARE PLAN DISCUSSED WITH PATIENT: Primary | ICD-10-CM

## 2022-10-03 LAB
ALBUMIN SERPL-MCNC: 4.1 G/DL (ref 3.5–5.2)
ALP BLD-CCNC: 90 U/L (ref 35–104)
ALT SERPL-CCNC: 54 U/L (ref 9–52)
ANION GAP SERPL CALCULATED.3IONS-SCNC: 11 MMOL/L (ref 7–19)
AST SERPL-CCNC: 25 U/L (ref 14–36)
BASOPHILS ABSOLUTE: 0.02 K/UL (ref 0.01–0.08)
BASOPHILS RELATIVE PERCENT: 0.2 % (ref 0.1–1.2)
BILIRUB SERPL-MCNC: 0.8 MG/DL (ref 0.2–1.3)
BUN BLDV-MCNC: 14 MG/DL (ref 7–17)
CALCIUM SERPL-MCNC: 8.9 MG/DL (ref 8.4–10.2)
CHLORIDE BLD-SCNC: 105 MMOL/L (ref 98–111)
CO2: 23 MMOL/L (ref 22–29)
CREAT SERPL-MCNC: 1 MG/DL (ref 0.5–1)
EOSINOPHILS ABSOLUTE: 0.01 K/UL (ref 0.04–0.54)
EOSINOPHILS RELATIVE PERCENT: 0.1 % (ref 0.7–7)
GFR NON-AFRICAN AMERICAN: >60
GLOBULIN: 3.3 G/DL
GLUCOSE BLD-MCNC: 107 MG/DL (ref 74–106)
HCT VFR BLD CALC: 37.2 % (ref 34.1–44.9)
HEMOGLOBIN: 12 G/DL (ref 11.2–15.7)
LYMPHOCYTES ABSOLUTE: 1.75 K/UL (ref 1.18–3.74)
LYMPHOCYTES RELATIVE PERCENT: 16.9 % (ref 19.3–53.1)
MCH RBC QN AUTO: 31.8 PG (ref 25.6–32.2)
MCHC RBC AUTO-ENTMCNC: 32.3 G/DL (ref 32.3–35.5)
MCV RBC AUTO: 98.7 FL (ref 79.4–94.8)
MONOCYTES ABSOLUTE: 1.35 K/UL (ref 0.24–0.82)
MONOCYTES RELATIVE PERCENT: 13 % (ref 4.7–12.5)
NEUTROPHILS ABSOLUTE: 7.21 K/UL (ref 1.56–6.13)
NEUTROPHILS RELATIVE PERCENT: 69.6 % (ref 34–71.1)
PDW BLD-RTO: 14.6 % (ref 11.7–14.4)
PLATELET # BLD: 256 K/UL (ref 182–369)
PMV BLD AUTO: 9.2 FL (ref 7.4–10.4)
POTASSIUM SERPL-SCNC: 3.3 MMOL/L (ref 3.5–5.1)
RBC # BLD: 3.77 M/UL (ref 3.93–5.22)
SODIUM BLD-SCNC: 139 MMOL/L (ref 137–145)
TOTAL PROTEIN: 7.5 G/DL (ref 6.3–8.2)
WBC # BLD: 10.36 K/UL (ref 3.98–10.04)

## 2022-10-03 PROCEDURE — 99214 OFFICE O/P EST MOD 30 MIN: CPT | Performed by: INTERNAL MEDICINE

## 2022-10-03 PROCEDURE — 4004F PT TOBACCO SCREEN RCVD TLK: CPT | Performed by: INTERNAL MEDICINE

## 2022-10-03 PROCEDURE — G8484 FLU IMMUNIZE NO ADMIN: HCPCS | Performed by: INTERNAL MEDICINE

## 2022-10-03 PROCEDURE — G8427 DOCREV CUR MEDS BY ELIG CLIN: HCPCS | Performed by: INTERNAL MEDICINE

## 2022-10-03 PROCEDURE — 99212 OFFICE O/P EST SF 10 MIN: CPT

## 2022-10-03 PROCEDURE — 85025 COMPLETE CBC W/AUTO DIFF WBC: CPT

## 2022-10-03 PROCEDURE — 36415 COLL VENOUS BLD VENIPUNCTURE: CPT

## 2022-10-03 PROCEDURE — 80053 COMPREHEN METABOLIC PANEL: CPT

## 2022-10-03 PROCEDURE — G8419 CALC BMI OUT NRM PARAM NOF/U: HCPCS | Performed by: INTERNAL MEDICINE

## 2022-10-03 NOTE — PROGRESS NOTES
MEDICAL ONCOLOGY PROGRESS NOTE    Pt Name: Sadie Arzola  MRN: [id number]  YOB: 1980  Date of evaluation: 10/3/2022    HISTORY OF PRESENT ILLNESS:  Essentially, the patient is a 38years old female who has chronic CML diagnosed initially in 2005. Diagnosis  CML, 2005  BCR/ABL1 kinase 7.21%    Treatment Summary  2005 Gleevec (Patient didn't take on regular basis)  02/2016 - 07/2021 Sprycel  07/30/21 - 12/2021 Bosulif  12/2021 Nilotinib 400mg BID    Hematology/Cancer History  Mariana Morris was diagnosed with CML in 2005 by Dr Angie Diallo. 2005 Initiated Elvan Ort (Patient didn't take on regular basis)  09/17/13 Genotrace - IS QRT-PCR: 57.64%  09/20/2014 Genotrace - IS QRT-PCR: 40.52%  02/2016 Initiated Sprycel  2/10/16 [facility]): Normal CT of the chest. No evidence of pulmonary embolism  03/05/2016 Genotrace - IS QRT-PCR: 30.9%  01/14/2016 Genotrace - IS QRT-PCR: 21.36%  07/28/17 Genotrace - IS QRT-PCR: 8.52%  06/12/16 Genotrace - IS QRT-PCR: 8.47%  01/31/2020  Marrow biopsy- persistent CML, chronic phase. Clonal T -cell population identified by PCR. Flow cytometry negative. 09/01/2020 Genotrace - IS QRT-PCR: 13.5%  09/01/2020-BCR/ABL PCR positive for BCR/ABL 1 rearrangement (13.5% of cells): Consistent with persistence of CML clone. 03/16/2021- BCR-ABL1 QPCR-positive: Major breakpoint (18.956%), minor breakpoint (0.033%). Interpretation: Consistent with residual CML. 06/08/2021- BCR-ABL 1 QPCR-positive (14.8403) for the BCR-ABL-1 e13a2 (b2a2, p219) fusion transcript  06/08/2021- CML chromosome/FISH profile: Abnormal- 54% of nuclei positive for BCR/ABL1 gene fusion  06/08/2021 - BCR-ABL1 Kinase Domain Mutation - Mutation detected within the BCR-ABL1 kinase domain. Nucleotide change: c.949T>C. 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 Comment: No plate changes detected within the BCR-ABL 1 kinase domain.  Cellular and biochemical studies suggest that the resistance induced by this mutation may be overcome by dose escalation. 06/28/2021 and again on 07/07/2021, Dr Norah Colon discussed results of the BCR/ABL 1 PCR quant, kinase domain mutation, and CML FISH profile from 6/8/2021 with Dr. Sam Damon (BMT at [facility]) who has seen the patient previously. 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 Dr. Sam Damon reviewed the mutation, noting resistance to dasatinib. Recommends nilotinib or bosutinib. If TKI options have been exhausted, will consider transplant.  07/28/2021-dasatinib discontinued   07/30/2021-Bosulif initiated  08/06/2021- BCR/ABL RT-PCR-positive: Major breakpoint (47.104%), minor breakpoint 0.034% consistent with persistent CML  08/06/2021-MPN/CML FISH panel: 1. Positive for a BCR/ABL1 rearrangement (65% of cells) consistent with persistence of CML clone. 09/07/2021-(+) for a BCR/ABL1 rearrangement (39% of cells). Major breakpoint (18.748%), minor breakpoint (0.017%)  11/11/2021-+) for a BCR/ABL1 rearrangement (42% of cells). 12/02/2021- Dr Norah Colon phone call: BCR/ABL 1Q PCR positive with 42% copies of BCR transcript. Patient has been on Bosulif since 07/30/2021 (BCR/ABL 48% at that time). Previously discussed with Dr. Sam Damon, BMT at [facility] who had seen the patient previously. 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 Today I spoke to the patient who swears that she has been taking her medicine daily except for 5 days when she had a \"stomach bug.\" But has been taking otherwise. I then spoke to Dr. Sam Damon who will again take a look at the mutation study that was done previously to see if we can use another TKI (i.e.: Ponatinib).  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 Jan Gary does not believe this would be helpful unless the patient is bridging towards transplant. If ponatinib is not felt to be indicated or fails, then the patient would qualify for transplant at that time. Awaiting word from Dr. Jan Gary prior to moving forward. The patient is made aware of the plans. She verbalized understanding and agreement  12/10/2021-(+) for a BCR/ABL1 rearrangement (31% of cells). Major breakpoint (15.085%), minor breakpoint (0.008%)  12/30/2021- Nilotinib initiated- was taking 400 mg daily (error on her part but claims \"the pharmacy wrote it that way\") instead of q12h as prescribed - until 02/29/2022 when she started taking q12h.  02/17/2022-BCR/ABL-FISH positive 41.5%: RT-PCR major breakpoint (40.046%) consistent with residual CML.  4/26/22 US bilateral lower extremity [facility]): Normal duplex ultrasound of the bilateral lower extremities without evidence of venous thrombus. 5/4/22- Transthoracic echo [facility]): Left ventricular systolic function is normal. Left ventricular ejection fraction appears to be 61-65%. Normal right ventricular cavity size and systolic function noted. No hemodynamically significant valvular abnormalities identified on this study. 6/23/2022 [facility]  BCR-ABL1 is 7.21%; MMR: NO.  BCR/ABL 1 mutation not detected. 6/23/2022-she was first seen by me. She was transferred from Dr. Valery Weems office. Apparently she was discharged from that practice due to compliance issues. The patient tells me that she has issues compliance with her medical visits due to her work schedule. She is to continue her nilotinib for now. Recommended to repeat quantitative BCR/ABL and mutation panel. 7/7/2022-patient is to continue nilotinib 400 mg p.o. twice daily at this time. Interval decrease quantitative BCR/ABL. 7. 21% (previously 41%).     Past Medical History:    Past Medical History:   Diagnosis Date    CML (chronic myelocytic leukemia) ([facility] [address]        Past Surgical History:    Past Surgical History:   Procedure Laterality Date    TUBAL LIGATION         Social History:    Marital status:   Smoking status:Currently; 1/2 pack daily;12 years  ETOH status:Occasionally  Resides: [place], [place]    Family History:   No family history on file. Current Hospital Medications:    Current Outpatient Medications   Medication Sig Dispense Refill    potassium chloride (MICRO-K) 10 MEQ extended release capsule Take 1 capsule by mouth in the morning, at noon, and at bedtime 90 capsule 5    nilotinib (TASIGNA) 200 MG capsule Take 2 capsules by mouth 2 times daily 120 capsule 3    furosemide (LASIX) 20 MG tablet Take 1 tablet by mouth daily 60 tablet 5    amLODIPine (NORVASC) 10 MG tablet 10 mg daily      promethazine-dextromethorphan (PROMETHAZINE-DM) 6.25-15 MG/5ML syrup Take 5 mLs by mouth every 12 hours as needed for Cough 100 mL 0     No current facility-administered medications for this visit. Allergies:    Allergies   Allergen Reactions    Latex     Penicillins          Subjective   REVIEW OF SYSTEMS:   CONSTITUTIONAL: no fever, no night sweats, fatigue;  HEENT: no blurring of vision, no double vision, no hearing difficulty, no tinnitus, no ulceration, no dysplasia, no epistaxis;   LUNGS: no cough, no hemoptysis, no wheeze,  no shortness of breath;  CARDIOVASCULAR: no palpitation, no chest pain, no shortness of breath;  GI: no abdominal pain, no nausea, no vomiting, no diarrhea, no constipation;  ROHAN: no dysuria, no hematuria, no frequency or urgency, no nephrolithiasis;  MUSCULOSKELETAL: no joint pain, no swelling, no stiffness; bilateral leg edema  ENDOCRINE: no polyuria, no polydipsia, no cold or heat intolerance;  HEMATOLOGY: no easy bruising or bleeding, no history of clotting disorder;  DERMATOLOGY: no skin rash, no eczema, no pruritus;  PSYCHIATRY: no depression, no anxiety, no panic attacks, no suicidal ideation, no homicidal ideation;  NEUROLOGY: no syncope, no seizures, no numbness or tingling of hands, no numbness or tingling of feet, no paresis;       Objective   /68   Pulse (!) 101   Wt 173 lb (78.5 kg)   SpO2 94%   BMI 27.92 kg/m²     PHYSICAL EXAM:  CONSTITUTIONAL: Alert, appropriate, no acute distress  EYES: Non icteric, EOM intact, pupils equal round   ENT: Mucus membranes moist, no oral pharyngeal lesions, external inspection of ears and nose are normal.  NECK: Supple, no masses. No palpable thyroid mass  CHEST/LUNGS: CTA bilaterally, normal respiratory effort   CARDIOVASCULAR:tachycardic,  no murmurs. No lower extremity edema  ABDOMEN: soft non-tender, active bowel sounds, no HSM. No palpable masses  EXTREMITIES: warm, full ROM in all 4 extremities, no focal weakness. SKIN: warm, dry with no rashes or lesions  LYMPH: No cervical, clavicular, axillary, or inguinal lymphadenopathy  NEUROLOGIC: follows commands, non focal   PSYCH: mood and affect appropriate. Alert and oriented to time, place, person    LABORATORY RESULTS REVIEWED/ANALYZED BY ME:  10/3/2022 CBC  WBC 10.36  HGB 12    Neut 7.21    RADIOLOGY STUDIES REVIEWED BY ME:  None    ASSESSMENT:    Orders Placed This Encounter   Procedures    Comprehensive Metabolic Panel     Standing Status:   Future     Standing Expiration Date:   10/3/2023    Miscellaneous Sendout     Standing Status:   Future     Standing Expiration Date:   10/3/2023     Order Specific Question:   Specify Req. Test (1 Test/Order)     Answer:   BCR/ABL Quantitative        Denise Christopher was seen today for follow-up. Diagnoses and all orders for this visit:    Care plan discussed with patient    CML (chronic myelocytic leukemia) ([facility] [address]  -     Comprehensive Metabolic Panel; Future  -     Miscellaneous Sendout; Future    Renal impairment      CML with 317 mutation as per last study  She has been transferred from [facility] oncology, Dr. Crouch Poisson office. Apparently, issues with visit compliance.   The patient tells me that she missed a couple of appointments. She also assures me that she is consistently/compliant with her TKI medication. Apparently, BCR/ABL was not at target in February 2022. She also had a new mutation as of June last year. She has been seen by [facility], BMT . Essentially, the patient has been on several prior line therapy.  -Currently on Tasigna  400mg p.o. twice daily. Patient claims compliance with her medication.  -Treatment currently being tolerated with complaints of bilateral leg swelling/pain  -June 2022-repeat BCR/ABL = 7.21%. Mutation panel not detected. Plan  -Continue Tasigna for now  -Test for BCR/ABL quantitative    Side effects-patient has bilateral leg edema.  -She is currently on Norvasc  -I asked her to discuss with PCP regarding stopping Norvasc  -This could also be related to her Tasigna  -Okay to continue Lasix 20 mg p.o. daily. Hypokalemia secondary to Lasix. Potassium replacement. Hypokalemia     PLAN:  RTC with MD 6 weeks  CBC CMP next visit  Continue Micro-K 10mg TID  BCR/ABL quantitative PCR [facility] Oct 20  Continue Nilotinib 400mg BID  Continue follow-up with PCP/Dr Juno Bishop for LE swelling  Continue Lasix 20 mg QOD    ITamy am pre charting  as Medical Assistant for Nathan Bernal MD. Electronically signed by Tamy Humphrey MA on 10/3/2022 at 8:18 AM CDT. Jeri Hodges am scribing as Medical Assistant for Nathan Bernal MD. Electronically signed by Tamy Humphrey MA on 10/3/2022 at 8:32 AM CDT. I, Dr Gia Hoang, personally performed the services described in this documentation as scribed by Tamy Humphrey MA in my presence and is both accurate and complete. I have seen, examined and reviewed this patient medication list, appropriate labs and imaging studies. I reviewed relevant medical records and others physicians notes. I discussed the plans of care with the patient. I answered all the questions to the patients satisfaction. I have also reviewed the chief complaint (CC) and part of the history (History of Present Illness (HPI), Past Family Social History [facility]), or Review of Systems (ROS) and made changes when appropriated.        (Please note that portions of this note were completed with a voice recognition program. Efforts were made to edit the dictations but occasionally words are mis-transcribed.)  Electronically signed by Marek Salmon MA on 10/3/2022 at 8:41 AM

## 2022-10-05 ENCOUNTER — HOSPITAL ENCOUNTER (INPATIENT)
Age: 42
LOS: 3 days | Discharge: HOSPICE | DRG: 194 | End: 2022-10-08
Attending: PEDIATRICS | Admitting: HOSPITALIST
Payer: MEDICAID

## 2022-10-05 ENCOUNTER — APPOINTMENT (OUTPATIENT)
Dept: CT IMAGING | Age: 42
DRG: 194 | End: 2022-10-05
Payer: MEDICAID

## 2022-10-05 ENCOUNTER — APPOINTMENT (OUTPATIENT)
Dept: GENERAL RADIOLOGY | Age: 42
DRG: 194 | End: 2022-10-05
Payer: MEDICAID

## 2022-10-05 ENCOUNTER — TELEPHONE (OUTPATIENT)
Dept: INTERNAL MEDICINE | Facility: CLINIC | Age: 42
End: 2022-10-05

## 2022-10-05 DIAGNOSIS — E87.6 HYPOKALEMIA: ICD-10-CM

## 2022-10-05 DIAGNOSIS — C92.10 CML (CHRONIC MYELOCYTIC LEUKEMIA) (HCC): ICD-10-CM

## 2022-10-05 DIAGNOSIS — J18.9 PNEUMONIA OF LEFT LOWER LOBE DUE TO INFECTIOUS ORGANISM: Primary | ICD-10-CM

## 2022-10-05 DIAGNOSIS — J15.3: ICD-10-CM

## 2022-10-05 PROBLEM — C91.10 CLL (CHRONIC LYMPHOCYTIC LEUKEMIA) (HCC): Status: ACTIVE | Noted: 2022-10-05

## 2022-10-05 PROBLEM — Z72.0 TOBACCO ABUSE: Status: ACTIVE | Noted: 2022-10-05

## 2022-10-05 LAB
ALBUMIN SERPL-MCNC: 3.2 G/DL (ref 3.5–5.2)
ALP BLD-CCNC: 82 U/L (ref 35–104)
ALT SERPL-CCNC: 27 U/L (ref 5–33)
ANION GAP SERPL CALCULATED.3IONS-SCNC: 14 MMOL/L (ref 7–19)
AST SERPL-CCNC: 21 U/L (ref 5–32)
BACTERIA: ABNORMAL /HPF
BASOPHILS ABSOLUTE: 0 K/UL (ref 0–0.2)
BASOPHILS RELATIVE PERCENT: 0.2 % (ref 0–1)
BILIRUB SERPL-MCNC: 0.4 MG/DL (ref 0.2–1.2)
BILIRUBIN URINE: ABNORMAL
BLOOD, URINE: ABNORMAL
BUN BLDV-MCNC: 11 MG/DL (ref 6–20)
CALCIUM SERPL-MCNC: 8.9 MG/DL (ref 8.6–10)
CHLORIDE BLD-SCNC: 99 MMOL/L (ref 98–111)
CLARITY: ABNORMAL
CO2: 21 MMOL/L (ref 22–29)
COARSE CASTS, UA: ABNORMAL /LPF (ref 0–5)
COLOR: ABNORMAL
CREAT SERPL-MCNC: 1 MG/DL (ref 0.5–0.9)
D DIMER: 4.59 UG/ML FEU (ref 0–0.48)
EOSINOPHILS ABSOLUTE: 0 K/UL (ref 0–0.6)
EOSINOPHILS RELATIVE PERCENT: 0.4 % (ref 0–5)
EPITHELIAL CELLS, UA: ABNORMAL /HPF
GFR AFRICAN AMERICAN: >59
GFR NON-AFRICAN AMERICAN: >60
GLUCOSE BLD-MCNC: 104 MG/DL (ref 74–109)
GLUCOSE URINE: NEGATIVE MG/DL
HCT VFR BLD CALC: 33.2 % (ref 37–47)
HEMOGLOBIN: 11.3 G/DL (ref 12–16)
IMMATURE GRANULOCYTES #: 0.1 K/UL
KETONES, URINE: ABNORMAL MG/DL
LACTIC ACID: 1.1 MMOL/L (ref 0.5–1.9)
LEUKOCYTE ESTERASE, URINE: ABNORMAL
LYMPHOCYTES ABSOLUTE: 1 K/UL (ref 1.1–4.5)
LYMPHOCYTES RELATIVE PERCENT: 9.2 % (ref 20–40)
MAGNESIUM: 2.3 MG/DL (ref 1.6–2.6)
MCH RBC QN AUTO: 31.7 PG (ref 27–31)
MCHC RBC AUTO-ENTMCNC: 34 G/DL (ref 33–37)
MCV RBC AUTO: 93.3 FL (ref 81–99)
MONOCYTES ABSOLUTE: 0.9 K/UL (ref 0–0.9)
MONOCYTES RELATIVE PERCENT: 8.3 % (ref 0–10)
NEUTROPHILS ABSOLUTE: 8.7 K/UL (ref 1.5–7.5)
NEUTROPHILS RELATIVE PERCENT: 81.4 % (ref 50–65)
NITRITE, URINE: NEGATIVE
PDW BLD-RTO: 13.7 % (ref 11.5–14.5)
PH UA: 6 (ref 5–8)
PLATELET # BLD: 216 K/UL (ref 130–400)
PMV BLD AUTO: 9.9 FL (ref 9.4–12.3)
POTASSIUM SERPL-SCNC: 3 MMOL/L (ref 3.5–5)
PROTEIN UA: 300 MG/DL
RBC # BLD: 3.56 M/UL (ref 4.2–5.4)
RBC UA: ABNORMAL /HPF (ref 0–2)
SARS-COV-2, NAAT: NOT DETECTED
SODIUM BLD-SCNC: 134 MMOL/L (ref 136–145)
SPECIFIC GRAVITY UA: 1.03 (ref 1–1.03)
TOTAL PROTEIN: 7.2 G/DL (ref 6.6–8.7)
TROPONIN: <0.01 NG/ML (ref 0–0.03)
UROBILINOGEN, URINE: 1 E.U./DL
WBC # BLD: 10.6 K/UL (ref 4.8–10.8)
WBC UA: ABNORMAL /HPF (ref 0–5)

## 2022-10-05 PROCEDURE — 80053 COMPREHEN METABOLIC PANEL: CPT

## 2022-10-05 PROCEDURE — 87635 SARS-COV-2 COVID-19 AMP PRB: CPT

## 2022-10-05 PROCEDURE — 83605 ASSAY OF LACTIC ACID: CPT

## 2022-10-05 PROCEDURE — 6370000000 HC RX 637 (ALT 250 FOR IP): Performed by: PEDIATRICS

## 2022-10-05 PROCEDURE — 71275 CT ANGIOGRAPHY CHEST: CPT | Performed by: RADIOLOGY

## 2022-10-05 PROCEDURE — 96374 THER/PROPH/DIAG INJ IV PUSH: CPT

## 2022-10-05 PROCEDURE — 36415 COLL VENOUS BLD VENIPUNCTURE: CPT

## 2022-10-05 PROCEDURE — 99285 EMERGENCY DEPT VISIT HI MDM: CPT

## 2022-10-05 PROCEDURE — 93005 ELECTROCARDIOGRAM TRACING: CPT | Performed by: PEDIATRICS

## 2022-10-05 PROCEDURE — 87086 URINE CULTURE/COLONY COUNT: CPT

## 2022-10-05 PROCEDURE — 6360000004 HC RX CONTRAST MEDICATION: Performed by: PEDIATRICS

## 2022-10-05 PROCEDURE — 71275 CT ANGIOGRAPHY CHEST: CPT

## 2022-10-05 PROCEDURE — 87040 BLOOD CULTURE FOR BACTERIA: CPT

## 2022-10-05 PROCEDURE — 81001 URINALYSIS AUTO W/SCOPE: CPT

## 2022-10-05 PROCEDURE — 85379 FIBRIN DEGRADATION QUANT: CPT

## 2022-10-05 PROCEDURE — 96375 TX/PRO/DX INJ NEW DRUG ADDON: CPT

## 2022-10-05 PROCEDURE — 83735 ASSAY OF MAGNESIUM: CPT

## 2022-10-05 PROCEDURE — 71045 X-RAY EXAM CHEST 1 VIEW: CPT

## 2022-10-05 PROCEDURE — 84484 ASSAY OF TROPONIN QUANT: CPT

## 2022-10-05 PROCEDURE — 2700000000 HC OXYGEN THERAPY PER DAY

## 2022-10-05 PROCEDURE — 85025 COMPLETE CBC W/AUTO DIFF WBC: CPT

## 2022-10-05 PROCEDURE — 6360000002 HC RX W HCPCS: Performed by: PEDIATRICS

## 2022-10-05 PROCEDURE — 2580000003 HC RX 258: Performed by: PEDIATRICS

## 2022-10-05 PROCEDURE — 1210000000 HC MED SURG R&B

## 2022-10-05 PROCEDURE — 71045 X-RAY EXAM CHEST 1 VIEW: CPT | Performed by: RADIOLOGY

## 2022-10-05 RX ORDER — ONDANSETRON 2 MG/ML
4 INJECTION INTRAMUSCULAR; INTRAVENOUS ONCE
Status: COMPLETED | OUTPATIENT
Start: 2022-10-05 | End: 2022-10-05

## 2022-10-05 RX ORDER — ACETAMINOPHEN 650 MG/1
650 SUPPOSITORY RECTAL EVERY 6 HOURS PRN
Status: DISCONTINUED | OUTPATIENT
Start: 2022-10-05 | End: 2022-10-08 | Stop reason: HOSPADM

## 2022-10-05 RX ORDER — 0.9 % SODIUM CHLORIDE 0.9 %
1000 INTRAVENOUS SOLUTION INTRAVENOUS ONCE
Status: COMPLETED | OUTPATIENT
Start: 2022-10-05 | End: 2022-10-05

## 2022-10-05 RX ORDER — SODIUM CHLORIDE 0.9 % (FLUSH) 0.9 %
5-40 SYRINGE (ML) INJECTION EVERY 12 HOURS SCHEDULED
Status: DISCONTINUED | OUTPATIENT
Start: 2022-10-05 | End: 2022-10-08 | Stop reason: HOSPADM

## 2022-10-05 RX ORDER — POTASSIUM CHLORIDE 20 MEQ/1
40 TABLET, EXTENDED RELEASE ORAL PRN
Status: DISCONTINUED | OUTPATIENT
Start: 2022-10-05 | End: 2022-10-08 | Stop reason: HOSPADM

## 2022-10-05 RX ORDER — POLYETHYLENE GLYCOL 3350 17 G/17G
17 POWDER, FOR SOLUTION ORAL DAILY PRN
Status: DISCONTINUED | OUTPATIENT
Start: 2022-10-05 | End: 2022-10-08 | Stop reason: HOSPADM

## 2022-10-05 RX ORDER — SODIUM CHLORIDE 9 MG/ML
INJECTION, SOLUTION INTRAVENOUS PRN
Status: DISCONTINUED | OUTPATIENT
Start: 2022-10-05 | End: 2022-10-08 | Stop reason: HOSPADM

## 2022-10-05 RX ORDER — MORPHINE SULFATE 2 MG/ML
2 INJECTION, SOLUTION INTRAMUSCULAR; INTRAVENOUS ONCE
Status: COMPLETED | OUTPATIENT
Start: 2022-10-05 | End: 2022-10-05

## 2022-10-05 RX ORDER — ENOXAPARIN SODIUM 100 MG/ML
40 INJECTION SUBCUTANEOUS DAILY
Status: DISCONTINUED | OUTPATIENT
Start: 2022-10-06 | End: 2022-10-08 | Stop reason: HOSPADM

## 2022-10-05 RX ORDER — ACETAMINOPHEN 325 MG/1
650 TABLET ORAL EVERY 6 HOURS PRN
Status: DISCONTINUED | OUTPATIENT
Start: 2022-10-05 | End: 2022-10-08 | Stop reason: HOSPADM

## 2022-10-05 RX ORDER — POTASSIUM CHLORIDE 7.45 MG/ML
10 INJECTION INTRAVENOUS PRN
Status: DISCONTINUED | OUTPATIENT
Start: 2022-10-05 | End: 2022-10-08 | Stop reason: HOSPADM

## 2022-10-05 RX ORDER — FUROSEMIDE 20 MG/1
20 TABLET ORAL DAILY
Status: DISCONTINUED | OUTPATIENT
Start: 2022-10-06 | End: 2022-10-08 | Stop reason: HOSPADM

## 2022-10-05 RX ORDER — IPRATROPIUM BROMIDE AND ALBUTEROL SULFATE 2.5; .5 MG/3ML; MG/3ML
1 SOLUTION RESPIRATORY (INHALATION)
Status: DISCONTINUED | OUTPATIENT
Start: 2022-10-06 | End: 2022-10-08 | Stop reason: HOSPADM

## 2022-10-05 RX ORDER — POTASSIUM CHLORIDE 7.45 MG/ML
10 INJECTION INTRAVENOUS ONCE
Status: COMPLETED | OUTPATIENT
Start: 2022-10-05 | End: 2022-10-05

## 2022-10-05 RX ORDER — DEXTROMETHORPHAN HYDROBROMIDE AND PROMETHAZINE HYDROCHLORIDE 15; 6.25 MG/5ML; MG/5ML
5 SYRUP ORAL EVERY 4 HOURS PRN
Status: DISCONTINUED | OUTPATIENT
Start: 2022-10-05 | End: 2022-10-05 | Stop reason: CLARIF

## 2022-10-05 RX ORDER — BUDESONIDE 0.25 MG/2ML
0.25 INHALANT ORAL 2 TIMES DAILY
Status: DISCONTINUED | OUTPATIENT
Start: 2022-10-05 | End: 2022-10-08 | Stop reason: HOSPADM

## 2022-10-05 RX ORDER — AZITHROMYCIN 250 MG/1
500 TABLET, FILM COATED ORAL ONCE
Status: COMPLETED | OUTPATIENT
Start: 2022-10-05 | End: 2022-10-05

## 2022-10-05 RX ORDER — MAGNESIUM SULFATE IN WATER 40 MG/ML
2000 INJECTION, SOLUTION INTRAVENOUS PRN
Status: DISCONTINUED | OUTPATIENT
Start: 2022-10-05 | End: 2022-10-08 | Stop reason: HOSPADM

## 2022-10-05 RX ORDER — KETOROLAC TROMETHAMINE 30 MG/ML
15 INJECTION, SOLUTION INTRAMUSCULAR; INTRAVENOUS ONCE
Status: COMPLETED | OUTPATIENT
Start: 2022-10-05 | End: 2022-10-05

## 2022-10-05 RX ORDER — MAGNESIUM SULFATE 1 G/100ML
1000 INJECTION INTRAVENOUS PRN
Status: DISCONTINUED | OUTPATIENT
Start: 2022-10-05 | End: 2022-10-08 | Stop reason: HOSPADM

## 2022-10-05 RX ORDER — CALCIUM CARBONATE 200(500)MG
500 TABLET,CHEWABLE ORAL 3 TIMES DAILY PRN
Status: DISCONTINUED | OUTPATIENT
Start: 2022-10-05 | End: 2022-10-08 | Stop reason: HOSPADM

## 2022-10-05 RX ORDER — SODIUM CHLORIDE 0.9 % (FLUSH) 0.9 %
5-40 SYRINGE (ML) INJECTION PRN
Status: DISCONTINUED | OUTPATIENT
Start: 2022-10-05 | End: 2022-10-08 | Stop reason: HOSPADM

## 2022-10-05 RX ORDER — POTASSIUM CHLORIDE 750 MG/1
10 TABLET, EXTENDED RELEASE ORAL 3 TIMES DAILY
Status: DISCONTINUED | OUTPATIENT
Start: 2022-10-05 | End: 2022-10-06

## 2022-10-05 RX ORDER — DEXTROMETHORPHAN POLISTIREX 30 MG/5ML
15 SUSPENSION ORAL EVERY 4 HOURS PRN
Status: DISCONTINUED | OUTPATIENT
Start: 2022-10-05 | End: 2022-10-06

## 2022-10-05 RX ORDER — MECOBALAMIN 5000 MCG
5 TABLET,DISINTEGRATING ORAL NIGHTLY PRN
Status: DISCONTINUED | OUTPATIENT
Start: 2022-10-05 | End: 2022-10-08 | Stop reason: HOSPADM

## 2022-10-05 RX ORDER — POTASSIUM CHLORIDE 20 MEQ/1
20 TABLET, EXTENDED RELEASE ORAL ONCE
Status: COMPLETED | OUTPATIENT
Start: 2022-10-05 | End: 2022-10-05

## 2022-10-05 RX ORDER — PROMETHAZINE HYDROCHLORIDE 6.25 MG/5ML
6.25 SYRUP ORAL EVERY 4 HOURS PRN
Status: DISCONTINUED | OUTPATIENT
Start: 2022-10-05 | End: 2022-10-06

## 2022-10-05 RX ORDER — ONDANSETRON 2 MG/ML
4 INJECTION INTRAMUSCULAR; INTRAVENOUS EVERY 6 HOURS PRN
Status: DISCONTINUED | OUTPATIENT
Start: 2022-10-05 | End: 2022-10-08 | Stop reason: HOSPADM

## 2022-10-05 RX ORDER — ONDANSETRON 4 MG/1
4 TABLET, ORALLY DISINTEGRATING ORAL EVERY 8 HOURS PRN
Status: DISCONTINUED | OUTPATIENT
Start: 2022-10-05 | End: 2022-10-08 | Stop reason: HOSPADM

## 2022-10-05 RX ADMIN — POTASSIUM CHLORIDE 20 MEQ: 1500 TABLET, EXTENDED RELEASE ORAL at 21:21

## 2022-10-05 RX ADMIN — POTASSIUM CHLORIDE 10 MEQ: 7.46 INJECTION, SOLUTION INTRAVENOUS at 21:25

## 2022-10-05 RX ADMIN — CEFTRIAXONE 1000 MG: 1 INJECTION, POWDER, FOR SOLUTION INTRAMUSCULAR; INTRAVENOUS at 21:20

## 2022-10-05 RX ADMIN — KETOROLAC TROMETHAMINE 15 MG: 30 INJECTION, SOLUTION INTRAMUSCULAR; INTRAVENOUS at 18:29

## 2022-10-05 RX ADMIN — ONDANSETRON 4 MG: 2 INJECTION INTRAMUSCULAR; INTRAVENOUS at 18:34

## 2022-10-05 RX ADMIN — AZITHROMYCIN MONOHYDRATE 500 MG: 250 TABLET ORAL at 21:32

## 2022-10-05 RX ADMIN — MORPHINE SULFATE 2 MG: 2 INJECTION, SOLUTION INTRAMUSCULAR; INTRAVENOUS at 18:34

## 2022-10-05 RX ADMIN — SODIUM CHLORIDE 1000 ML: 9 INJECTION, SOLUTION INTRAVENOUS at 18:33

## 2022-10-05 RX ADMIN — IOPAMIDOL 90 ML: 755 INJECTION, SOLUTION INTRAVENOUS at 20:48

## 2022-10-05 ASSESSMENT — ENCOUNTER SYMPTOMS
VOMITING: 0
BACK PAIN: 0
COUGH: 1
RHINORRHEA: 0
CHEST TIGHTNESS: 1
SHORTNESS OF BREATH: 1
GASTROINTESTINAL NEGATIVE: 1
NAUSEA: 0
ABDOMINAL PAIN: 0
EYE DISCHARGE: 0

## 2022-10-05 ASSESSMENT — PAIN DESCRIPTION - ORIENTATION: ORIENTATION: LEFT

## 2022-10-05 ASSESSMENT — PAIN SCALES - GENERAL: PAINLEVEL_OUTOF10: 8

## 2022-10-05 ASSESSMENT — PAIN DESCRIPTION - DESCRIPTORS: DESCRIPTORS: SHARP

## 2022-10-05 ASSESSMENT — PAIN DESCRIPTION - LOCATION: LOCATION: BACK;RIB CAGE

## 2022-10-05 NOTE — LETTER
[facility] 4 ONCOLOGY UNIT  [facility] [address]  Phone: [phone]          October 7, 2022     Patient: Marco A Farris   YOB: 1980   Date of Visit: 10/5/2022       To Whom It May Concern:    Patient is still hospitalized at [facility] at this time. If you have any questions or concerns, please don't hesitate to call.     Sincerely,

## 2022-10-05 NOTE — TELEPHONE ENCOUNTER
Noted thank you. Did she say if they prescribed her antibiotics? If having shortness of breath she should present to the ED for urgent evaluation.

## 2022-10-05 NOTE — TELEPHONE ENCOUNTER
Caller: Winsome Becker    Relationship to patient: Self    Best call back number: [phone]    Patient is needing:   PATIENT CALLED AND ADVISED THAT SHE HAD TO GO TO THE EMERGENCY ROOM AND WAS DIAGNOSED WITH PNEUMONIA, FEVER. PATIENT CHOSE TO COME BACK TO [place] TO BE SEEN AND HOSPITALIZED.     PATIENT WILL COME TO OFFICE TO SIGN GRIS FORM FOR WHERE SHE WAS SEEN @ [place], [place]

## 2022-10-05 NOTE — TELEPHONE ENCOUNTER
PATIENT HAS BEEN CALLED, SHE STATED THAT THEY WANTED TO KEEP HER IN THE HOSPITAL.  SHE STATED THAT IF SHE NEEDED TO BE ADMITTED THEN SHE WANTED TO COME BACK TO [place] FOR THE ADMISSION.  IT WAS EXPLAINED TO PATIENT THAT THIS OFFICE DOES NOT ADMIT TO THE HOSPITAL AND THAT SHE WOULD HAVE TO BE SEEN IN THE ER.  PATIENT VOICED UNDERSTANDING AND STATED THAT SHE HAS ALSO CONTACTED DR LLANES'S OFFICE WHICH SEES HER FOR LEUKEMIA.      [phone]  REQUESTED RECORDS FROM [facility]---[phone]

## 2022-10-06 LAB
ANION GAP SERPL CALCULATED.3IONS-SCNC: 14 MMOL/L (ref 7–19)
BASOPHILS ABSOLUTE: 0 K/UL (ref 0–0.2)
BASOPHILS RELATIVE PERCENT: 0.2 % (ref 0–1)
BUN BLDV-MCNC: 9 MG/DL (ref 6–20)
CALCIUM SERPL-MCNC: 8 MG/DL (ref 8.6–10)
CHLORIDE BLD-SCNC: 105 MMOL/L (ref 98–111)
CO2: 22 MMOL/L (ref 22–29)
CREAT SERPL-MCNC: 0.8 MG/DL (ref 0.5–0.9)
EKG P AXIS: 68 DEGREES
EKG P-R INTERVAL: 135 MS
EKG Q-T INTERVAL: 325 MS
EKG QRS DURATION: 69 MS
EKG QTC CALCULATION (BAZETT): 407 MS
EKG T AXIS: -16 DEGREES
EOSINOPHILS ABSOLUTE: 0.1 K/UL (ref 0–0.6)
EOSINOPHILS RELATIVE PERCENT: 0.6 % (ref 0–5)
GFR AFRICAN AMERICAN: >59
GFR NON-AFRICAN AMERICAN: >60
GLUCOSE BLD-MCNC: 102 MG/DL (ref 74–109)
HCT VFR BLD CALC: 30.9 % (ref 37–47)
HEMOGLOBIN: 10.6 G/DL (ref 12–16)
IMMATURE GRANULOCYTES #: 0.1 K/UL
LYMPHOCYTES ABSOLUTE: 1.4 K/UL (ref 1.1–4.5)
LYMPHOCYTES RELATIVE PERCENT: 13 % (ref 20–40)
MAGNESIUM: 2.2 MG/DL (ref 1.6–2.6)
MCH RBC QN AUTO: 32.3 PG (ref 27–31)
MCHC RBC AUTO-ENTMCNC: 34.3 G/DL (ref 33–37)
MCV RBC AUTO: 94.2 FL (ref 81–99)
MONOCYTES ABSOLUTE: 1.1 K/UL (ref 0–0.9)
MONOCYTES RELATIVE PERCENT: 10.2 % (ref 0–10)
NEUTROPHILS ABSOLUTE: 8.2 K/UL (ref 1.5–7.5)
NEUTROPHILS RELATIVE PERCENT: 75.3 % (ref 50–65)
PDW BLD-RTO: 14.2 % (ref 11.5–14.5)
PLATELET # BLD: 197 K/UL (ref 130–400)
PMV BLD AUTO: 10 FL (ref 9.4–12.3)
POTASSIUM REFLEX MAGNESIUM: 3.4 MMOL/L (ref 3.5–5)
RBC # BLD: 3.28 M/UL (ref 4.2–5.4)
SODIUM BLD-SCNC: 141 MMOL/L (ref 136–145)
WBC # BLD: 10.8 K/UL (ref 4.8–10.8)

## 2022-10-06 PROCEDURE — 83735 ASSAY OF MAGNESIUM: CPT

## 2022-10-06 PROCEDURE — 2580000003 HC RX 258: Performed by: HOSPITALIST

## 2022-10-06 PROCEDURE — 2700000000 HC OXYGEN THERAPY PER DAY

## 2022-10-06 PROCEDURE — 6360000002 HC RX W HCPCS

## 2022-10-06 PROCEDURE — 6360000002 HC RX W HCPCS: Performed by: HOSPITALIST

## 2022-10-06 PROCEDURE — 6370000000 HC RX 637 (ALT 250 FOR IP): Performed by: INTERNAL MEDICINE

## 2022-10-06 PROCEDURE — 1210000000 HC MED SURG R&B

## 2022-10-06 PROCEDURE — 36415 COLL VENOUS BLD VENIPUNCTURE: CPT

## 2022-10-06 PROCEDURE — 6370000000 HC RX 637 (ALT 250 FOR IP): Performed by: HOSPITALIST

## 2022-10-06 PROCEDURE — 85025 COMPLETE CBC W/AUTO DIFF WBC: CPT

## 2022-10-06 PROCEDURE — 6370000000 HC RX 637 (ALT 250 FOR IP)

## 2022-10-06 PROCEDURE — 99222 1ST HOSP IP/OBS MODERATE 55: CPT | Performed by: INTERNAL MEDICINE

## 2022-10-06 PROCEDURE — 94640 AIRWAY INHALATION TREATMENT: CPT

## 2022-10-06 PROCEDURE — 80048 BASIC METABOLIC PNL TOTAL CA: CPT

## 2022-10-06 RX ORDER — BENZONATATE 100 MG/1
100 CAPSULE ORAL 3 TIMES DAILY PRN
Status: DISCONTINUED | OUTPATIENT
Start: 2022-10-06 | End: 2022-10-08 | Stop reason: HOSPADM

## 2022-10-06 RX ORDER — HYDROCODONE BITARTRATE AND ACETAMINOPHEN 7.5; 325 MG/1; MG/1
1 TABLET ORAL EVERY 6 HOURS PRN
Status: DISCONTINUED | OUTPATIENT
Start: 2022-10-06 | End: 2022-10-08 | Stop reason: HOSPADM

## 2022-10-06 RX ORDER — POTASSIUM CHLORIDE 20 MEQ/1
20 TABLET, EXTENDED RELEASE ORAL 3 TIMES DAILY
Status: DISCONTINUED | OUTPATIENT
Start: 2022-10-06 | End: 2022-10-08 | Stop reason: HOSPADM

## 2022-10-06 RX ORDER — GUAIFENESIN AND CODEINE PHOSPHATE 100; 10 MG/5ML; MG/5ML
5 SOLUTION ORAL EVERY 4 HOURS PRN
Status: DISCONTINUED | OUTPATIENT
Start: 2022-10-06 | End: 2022-10-08 | Stop reason: HOSPADM

## 2022-10-06 RX ADMIN — IPRATROPIUM BROMIDE AND ALBUTEROL SULFATE 1 AMPULE: 2.5; .5 SOLUTION RESPIRATORY (INHALATION) at 06:40

## 2022-10-06 RX ADMIN — FUROSEMIDE 20 MG: 20 TABLET ORAL at 08:19

## 2022-10-06 RX ADMIN — AZITHROMYCIN DIHYDRATE 250 MG: 500 INJECTION, POWDER, LYOPHILIZED, FOR SOLUTION INTRAVENOUS at 20:53

## 2022-10-06 RX ADMIN — HYDROCODONE BITARTRATE AND ACETAMINOPHEN 1 TABLET: 7.5; 325 TABLET ORAL at 08:40

## 2022-10-06 RX ADMIN — BUDESONIDE 250 MCG: 0.25 SUSPENSION RESPIRATORY (INHALATION) at 19:13

## 2022-10-06 RX ADMIN — ACETAMINOPHEN 650 MG: 325 TABLET ORAL at 00:59

## 2022-10-06 RX ADMIN — WATER 1000 MG: 1 INJECTION INTRAMUSCULAR; INTRAVENOUS; SUBCUTANEOUS at 20:44

## 2022-10-06 RX ADMIN — IPRATROPIUM BROMIDE AND ALBUTEROL SULFATE 1 AMPULE: 2.5; .5 SOLUTION RESPIRATORY (INHALATION) at 14:25

## 2022-10-06 RX ADMIN — SODIUM CHLORIDE, PRESERVATIVE FREE 10 ML: 5 INJECTION INTRAVENOUS at 13:40

## 2022-10-06 RX ADMIN — BUDESONIDE 250 MCG: 0.25 SUSPENSION RESPIRATORY (INHALATION) at 06:40

## 2022-10-06 RX ADMIN — SODIUM CHLORIDE, PRESERVATIVE FREE 10 ML: 5 INJECTION INTRAVENOUS at 20:45

## 2022-10-06 RX ADMIN — POTASSIUM CHLORIDE 20 MEQ: 1500 TABLET, EXTENDED RELEASE ORAL at 08:20

## 2022-10-06 RX ADMIN — POTASSIUM CHLORIDE 10 MEQ: 1500 TABLET, EXTENDED RELEASE ORAL at 00:50

## 2022-10-06 RX ADMIN — ENOXAPARIN SODIUM 40 MG: 100 INJECTION SUBCUTANEOUS at 08:17

## 2022-10-06 RX ADMIN — POTASSIUM CHLORIDE 20 MEQ: 1500 TABLET, EXTENDED RELEASE ORAL at 20:44

## 2022-10-06 RX ADMIN — HYDROCODONE BITARTRATE AND ACETAMINOPHEN 1 TABLET: 7.5; 325 TABLET ORAL at 14:34

## 2022-10-06 RX ADMIN — IPRATROPIUM BROMIDE AND ALBUTEROL SULFATE 1 AMPULE: 2.5; .5 SOLUTION RESPIRATORY (INHALATION) at 10:30

## 2022-10-06 RX ADMIN — SODIUM CHLORIDE, PRESERVATIVE FREE 10 ML: 5 INJECTION INTRAVENOUS at 00:52

## 2022-10-06 RX ADMIN — IPRATROPIUM BROMIDE AND ALBUTEROL SULFATE 1 AMPULE: 2.5; .5 SOLUTION RESPIRATORY (INHALATION) at 19:13

## 2022-10-06 RX ADMIN — HYDROCODONE BITARTRATE AND ACETAMINOPHEN 1 TABLET: 7.5; 325 TABLET ORAL at 20:45

## 2022-10-06 ASSESSMENT — PAIN DESCRIPTION - LOCATION
LOCATION: CHEST;BACK;BREAST
LOCATION: BREAST;CHEST;BACK
LOCATION: BREAST;BACK;CHEST
LOCATION: CHEST

## 2022-10-06 ASSESSMENT — PAIN DESCRIPTION - DESCRIPTORS
DESCRIPTORS: STABBING;SHARP
DESCRIPTORS: ACHING
DESCRIPTORS: SHARP;STABBING
DESCRIPTORS: SHARP;STABBING

## 2022-10-06 ASSESSMENT — PAIN SCALES - GENERAL
PAINLEVEL_OUTOF10: 3
PAINLEVEL_OUTOF10: 10
PAINLEVEL_OUTOF10: 9
PAINLEVEL_OUTOF10: 10
PAINLEVEL_OUTOF10: 10

## 2022-10-06 ASSESSMENT — PAIN DESCRIPTION - ORIENTATION
ORIENTATION: LEFT

## 2022-10-06 NOTE — CONSULTS
MEDICAL ONCOLOGY CONSULTATION    Pt Name: Mose Gowers  MRN: [id number]  YOB: 1980  Date of evaluation: 10/6/2022    REASON FOR CONSULTATION: CML, continuity of care  REQUESTING PHYSICIAN: Hospitalist    History Obtained From:    patient, electronic medical record    HISTORY OF PRESENT ILLNESS:  The patient is well-known to my clinic. She was recently seen for a diagnosis of CML. She is currently on treatment with Nilotinib. She was just recently seen by me in clinic about a few days ago. The patient presented to the ER department with complaints of increased dyspnea on exertion. In addition, she had chest pain. She went to an outside ER where she was diagnosed with pneumonia. She had a dry cough and fatigue. She has an occupational history of working recent basis for the past 4 days. CTA was performed emergency showed no evidence of pulmonary embolism. Troponin was negative. D-dimer was slightly elevated. She received azithromycin and Rocephin in the emergency. She received Toradol and morphine for pain. She was admitted to the hospitalist.  10/5/2022-portable chest x-ray showed large left lower lobe infiltrate. 10/5/2022-CTA showed no evidence of pulmonary embolism but left lower lobe pulmonary infiltrate identified. PRIOR ONCOLOGIC HISTORY  Essentially, the patient is a 38years old female who has chronic CML diagnosed initially in 2005. She is currently on erlotinib 400 mg p.o. twice daily. She is compliant with her medication. She denies any early satiety. She denies any other symptoms. She feels fatigued. She has been working 12 hours shift x6 days a week. She has some bilateral leg swelling and takes Lasix 20 mg p.o. every other day. She also take potassium pills. Unfortunately, she continues to smoke half pack a day. She is updated with age-appropriate cancer screening.      Diagnosis  CML, 2005  BCR/ABL1 kinase 7.21%     Treatment Summary  2005 Gleevec (Patient didn't take on regular basis)  02/2016 - 07/2021 Sprycel  07/30/21 - 12/2021 Bosulif  12/2021 Nilotinib 400mg BID     Hematology/Cancer History  Ministerio Henson was diagnosed with CML in 2005 by Dr Lawson Gaona. 2005 Initiated Rice Lites (Patient didn't take on regular basis)  09/17/13 Genotrace - IS QRT-PCR: 57.64%  09/20/2014 Genotrace - IS QRT-PCR: 40.52%  02/2016 Initiated Sprycel  2/10/16 CTA [facility]): Normal CT of the chest. No evidence of pulmonary embolism  03/05/2016 Genotrace - IS QRT-PCR: 30.9%  01/14/2016 Genotrace - IS QRT-PCR: 21.36%  07/28/17 Genotrace - IS QRT-PCR: 8.52%  06/12/16 Genotrace - IS QRT-PCR: 8.47%  01/31/2020  Marrow biopsy- persistent CML, chronic phase. Clonal T -cell population identified by PCR. Flow cytometry negative. 09/01/2020 Genotrace - IS QRT-PCR: 13.5%  09/01/2020-BCR/ABL PCR positive for BCR/ABL 1 rearrangement (13.5% of cells): Consistent with persistence of CML clone. 03/16/2021- BCR-ABL1 QPCR-positive: Major breakpoint (18.956%), minor breakpoint (0.033%). Interpretation: Consistent with residual CML. 06/08/2021- BCR-ABL 1 QPCR-positive (14.8403) for the BCR-ABL-1 e13a2 (b2a2, p219) fusion transcript  06/08/2021- CML chromosome/FISH profile: Abnormal- 54% of nuclei positive for BCR/ABL1 gene fusion  06/08/2021 - BCR-ABL1 Kinase Domain Mutation - Mutation detected within the BCR-ABL1 kinase domain. Nucleotide change: c.949T>C. 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 Comment: No plate changes detected within the BCR-ABL 1 kinase domain. Cellular and biochemical studies suggest that the resistance induced by this mutation may be overcome by dose escalation. 06/28/2021 and again on 07/07/2021, Dr Jose R Hill discussed results of the BCR/ABL 1 PCR quant, kinase domain mutation, and CML FISH profile from 6/8/2021 with Dr. Cynthia Mccauley (BMT at [facility]) who has seen the patient previously.  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 Dr. Luis Antonio Meneses reviewed the mutation, noting resistance to dasatinib. Recommends nilotinib or bosutinib. If TKI options have been exhausted, will consider transplant.  07/28/2021-dasatinib discontinued   07/30/2021-Bosulif initiated  08/06/2021- BCR/ABL RT-PCR-positive: Major breakpoint (47.104%), minor breakpoint 0.034% consistent with persistent CML  08/06/2021-MPN/CML FISH panel: 1. Positive for a BCR/ABL1 rearrangement (65% of cells) consistent with persistence of CML clone. 09/07/2021-(+) for a BCR/ABL1 rearrangement (39% of cells). Major breakpoint (18.748%), minor breakpoint (0.017%)  11/11/2021-+) for a BCR/ABL1 rearrangement (42% of cells). 12/02/2021- Dr Evan Borrego phone call: BCR/ABL 1Q PCR positive with 42% copies of BCR transcript. Patient has been on Bosulif since 07/30/2021 (BCR/ABL 48% at that time). Previously discussed with Dr. Luis Antonio Meneses, BMT at [facility] who had seen the patient previously. 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 Today I spoke to the patient who swears that she has been taking her medicine daily except for 5 days when she had a \"stomach bug.\" But has been taking otherwise. I then spoke to Dr. Luis Antonio Meneses who will again take a look at the mutation study that was done previously to see if we can use another TKI (i.e.: Ponatinib). 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 Luis Antonio Meneses does not believe this would be helpful unless the patient is bridging towards transplant. If ponatinib is not felt to be indicated or fails, then the patient would qualify for transplant at that time. Awaiting word from Dr. Luis Antonio Meneses prior to moving forward. The patient is made aware of the plans.  She verbalized understanding and agreement  12/10/2021-(+) for a BCR/ABL1 rearrangement (31% of cells). Major breakpoint (15.085%), minor breakpoint (0.008%)  12/30/2021- Nilotinib initiated- was taking 400 mg daily (error on her part but claims \"the pharmacy wrote it that way\") instead of q12h as prescribed - until 02/29/2022 when she started taking q12h.  02/17/2022-BCR/ABL-FISH positive 41.5%: RT-PCR major breakpoint (40.046%) consistent with residual CML.  4/26/22 US bilateral lower extremity [facility]): Normal duplex ultrasound of the bilateral lower extremities without evidence of venous thrombus. 5/4/22- Transthoracic echo [facility]): Left ventricular systolic function is normal. Left ventricular ejection fraction appears to be 61-65%. Normal right ventricular cavity size and systolic function noted. No hemodynamically significant valvular abnormalities identified on this study. 6/23/2022 [facility]  BCR-ABL1 is 7.21%; MMR: NO.  BCR/ABL 1 mutation not detected. 6/23/2022-she was first seen by me. She was transferred from Dr. Taylor Mccollum [facility]. Apparently she was discharged from that practice due to compliance issues. The patient tells me that she has issues compliance with her medical visits due to her work schedule. She is to continue her nilotinib for now. Recommended to repeat quantitative BCR/ABL and mutation panel. 7/7/2022-patient is to continue nilotinib 400 mg p.o. twice daily at this time. Interval decrease quantitative BCR/ABL. 7. 21% (previously 41%). Past Medical History:    Past Medical History:   Diagnosis Date    CML (chronic myelocytic leukemia) ([facility] [address]        Past Surgical History:    Past Surgical History:   Procedure Laterality Date    TUBAL LIGATION         Social History:    Marital status:   Smoking status:Currently; 1/2 pack daily;12 years  ETOH status:Occasionally  Resides: [address]    Family History:   History reviewed. No pertinent family history.     Current Hospital Medications:    Current Facility-Administered Medications   Medication Dose Route Frequency Provider Last Rate Last Admin    Benzocaine-Menthol (CEPACOL) 1 lozenge  1 lozenge Oral Q2H PRN Kaila Zuleta MD        benzonatate (TESSALON) capsule 100 mg  100 mg Oral TID PRN Kaila Zuleta MD        guaiFENesin-codeine (TUSSI-ORGANIDIN NR) 100-10 MG/5ML syrup 5 mL  5 mL Oral Q4H PRN Kaila Zuleta MD        cefTRIAXone (ROCEPHIN) 1,000 mg in sterile water 10 mL IV syringe  1,000 mg IntraVENous Q24H Kaila Zuleta MD        azithromycin (ZITHROMAX) 250 mg in dextrose 5 % 250 mL IVPB  250 mg IntraVENous Q24H Kaila Zuleta MD        potassium chloride (KLOR-CON M) extended release tablet 40 mEq  40 mEq Oral PRN Kaila Zuleta MD        Or    potassium bicarb-citric acid (EFFER-K) effervescent tablet 40 mEq  40 mEq Oral PRN Kaila Zuleta MD        Or    potassium chloride 10 mEq/100 mL IVPB (Peripheral Line)  10 mEq IntraVENous PRN Kaila Zuleta MD        magnesium sulfate 1000 mg in dextrose 5% 100 mL IVPB  1,000 mg IntraVENous PRN Kaila Zuleta MD        sodium chloride flush 0.9 % injection 5-40 mL  5-40 mL IntraVENous 2 times per day Kaila Zuleta MD   10 mL at 10/06/22 0052    sodium chloride flush 0.9 % injection 5-40 mL  5-40 mL IntraVENous PRN Kaila Zuleta MD        0.9 % sodium chloride infusion   IntraVENous PRN Kaila Zuleta MD        enoxaparin (LOVENOX) injection 40 mg  40 mg SubCUTAneous Daily Kaila Zuleta MD        acetaminophen (TYLENOL) tablet 650 mg  650 mg Oral Q6H PRN Kaila Zuleta MD   650 mg at 10/06/22 0059    Or    acetaminophen (TYLENOL) suppository 650 mg  650 mg Rectal Q6H PRN Kaila Zultea MD        potassium chloride (KLOR-CON M) extended release tablet 40 mEq  40 mEq Oral PRN Kaila Zuleta MD        Or    potassium bicarb-citric acid (EFFER-K) effervescent tablet 40 mEq  40 mEq Oral PRN Kaila Zuleta MD        Or    potassium chloride 10 mEq/100 mL IVPB (Peripheral Line)  10 mEq IntraVENous PRN Vicente Woodward MD        magnesium sulfate 2000 mg in 50 mL IVPB premix  2,000 mg IntraVENous PRN Vicente Woodward MD        ondansetron (ZOFRAN-ODT) disintegrating tablet 4 mg  4 mg Oral Q8H PRN Vicente Woodward MD        Or    ondansetron [facility]) injection 4 mg  4 mg IntraVENous Q6H PRN Vicente Woodward MD        polyethylene glycol (GLYCOLAX) packet 17 g  17 g Oral Daily PRN Vicente Woodward MD        melatonin disintegrating tablet 5 mg  5 mg Oral Nightly PRN Vicente Woodward MD        calcium carbonate (TUMS) chewable tablet 500 mg  500 mg Oral TID PRN Vicente Woodward MD        potassium chloride (KLOR-CON M) extended release tablet 10 mEq  10 mEq Oral TID Vicente Woodward MD   10 mEq at 10/06/22 0050    furosemide (LASIX) tablet 20 mg  20 mg Oral Daily Vicente Woodward MD        ipratropium-albuterol (DUONEB) nebulizer solution 1 ampule  1 ampule Inhalation Q4H WA YEISON Nelson CNP        budesonide (PULMICORT) nebulizer suspension 250 mcg  0.25 mg Nebulization BID YEISON Nelson CNP           Allergies:    Allergies   Allergen Reactions    Latex     Penicillins          Subjective   REVIEW OF SYSTEMS:   CONSTITUTIONAL: no fever, no night sweats, fatigue;  HEENT: no blurring of vision, no double vision, no hearing difficulty, no tinnitus, no ulceration, no epistaxis;  LUNGS: cough, no hemoptysis, no wheeze,  shortness of breath;  CARDIOVASCULAR: no palpitation, left chest chest pain, shortness of breath;  GI: no abdominal pain, no nausea, no vomiting, no diarrhea, no constipation;  ROHAN: no dysuria, no hematuria, no frequency or urgency, no nephrolithiasis;  MUSCULOSKELETAL: no joint pain, no swelling, no stiffness;  ENDOCRINE: no polyuria, no polydipsia, no cold or heat intolerance;  HEMATOLOGY: no easy bruising or bleeding, no history of clotting disorder;  DERMATOLOGY: no skin rash, no eczema, no pruritus;  PSYCHIATRY: no depression, no anxiety  NEUROLOGY: no syncope, no seizures, no numbness or tingling of hands, no numbness or tingling of feet, no paresis;    Objective   /63   Pulse 97   Temp 98.6 °F (37 °C)   Resp 20   Wt 170 lb 8 oz (77.3 kg)   SpO2 93%   BMI 27.52 kg/m²     PHYSICAL EXAM:  CONSTITUTIONAL: Alert, appropriate, no acute distress  EYES: Non icteric, EOM intact, pupils equal round   ENT: Mucus membranes moist,external inspection of ears and nose are normal  NECK: Supple, no masses. No palpable thyroid mass  CHEST/LUNGS: CTA bilaterally, normal respiratory effort   CARDIOVASCULAR: RRR, no murmurs. No lower extremity edema  ABDOMEN: soft non-tender, active bowel sounds, no HSM. No palpable masses  EXTREMITIES: warm, full ROM in all 4 extremities, no focal weakness. SKIN: warm, dry with no rashes or lesions  LYMPH: No cervical, clavicular, axillary, or inguinal lymphadenopathy  NEUROLOGIC: follows commands, non focal   PSYCH: mood and affect appropriate. Alert and oriented to time, place, person        LABORATORY RESULTS REVIEWED/ANALYZED BY ME:  Recent Labs     10/06/22  0433 10/05/22  1815 10/03/22  1006   WBC 10.8 10.6 10.36*   HGB 10.6* 11.3* 12.0   HCT 30.9* 33.2* 37.2   MCV 94.2 93.3 98.7*    216 256       Lab Results   Component Value Date     10/06/2022    K 3.4 (L) 10/06/2022     10/06/2022    CO2 22 10/06/2022    BUN 9 10/06/2022    CREATININE 0.8 10/06/2022    GLUCOSE 102 10/06/2022    CALCIUM 8.0 (L) 10/06/2022    PROT 7.2 10/05/2022    LABALBU 3.2 (L) 10/05/2022    BILITOT 0.4 10/05/2022    ALKPHOS 82 10/05/2022    AST 21 10/05/2022    ALT 27 10/05/2022    LABGLOM >60 10/06/2022    GFRAA >59 10/06/2022    GLOB 3.3 10/03/2022       No results found for: INR, PROTIME    RADIOLOGY STUDIES REPORT/REVIEWED AND INTERPRETED BY ME:  XR CHEST PORTABLE    Result Date: 10/5/2022  Large LLL infiltrate Recommendation: Follow up as clinically indicated.  Electronically Signed by Lexus Terry MD at 05-Oct-2022 08:05:31 PM             CTA PULMONARY W CONTRAST    Result Date: 10/5/2022  1. LLL pulmonary infiltrate identified 2. No evidence of PE Recommendation: Follow up as clinically indicated. All CT scans at this facility utilize dose modulation, iterative reconstruction, and/or weight based dosing when appropriate to reduce radiation dose to as low as reasonably achievable. Electronically Signed by Oma Barrera MD at 05-Oct-2022 10:22:29 PM                My interpretation: Left lower lobe consolidation consistent with acute pneumonic process      ASSESSMENT:  Community-acquired pneumonia  -Azithromycin/ceftriaxone    Hypokalemia  -As per hospitalist    CML with 317 mutation as per last study  She has been transferred from [facility] oncology, Dr. Jackelyn Ferguson office. Apparently, issues with visit compliance. The patient tells me that she missed a couple of appointments. She also assures me that she is consistently/compliant with her TKI medication. Apparently, BCR/ABL was not at target in February 2022. She also had a new mutation as of June last year. She has been seen by of, BMT . Essentially, the patient has been on several prior line therapy.  -Currently on Tasigna  400mg p.o. twice daily. Patient claims compliance with her medication.  -Treatment currently being tolerated with complaints of bilateral leg swelling/pain  -June 2022-repeat BCR/ABL = 7.21%. Mutation panel not detected.  -10/3/2022-she had a quantitative BCR/ABL ordered     Plan  -Continue Tasigna 400 mg p.o. twice daily     Side effects-patient has bilateral leg edema.  -She is currently on Norvasc  -I asked her to discuss with PCP regarding stopping Norvasc  -This could also be related to her Tasigna  -Okay to continue Lasix 20 mg p.o. daily. Hypokalemia secondary to Lasix. Potassium replacement.  -Continue Lasix as needed.      Hypokalemia   -Potassium 3.4  -Continue oral replacement      PLAN:  Continue oral replace potassium, increase to 20 mEq every 8 hours  Continue Tasigna 400 mg p.o. twice daily  Norco as needed for chest pain  Continue Lasix  Continue antibiotics    I have seen, examined and reviewed this patient medication list, appropriate labs and imaging studies. I reviewed relevant medical records and others physicians notes. I discussed the plans of care with the patient. I answered all the questions to the patients satisfaction. I have also reviewed the chief complaint (CC) and part of the history (History of Present Illness (HPI), Past Family Social History [facility]), or Review of Systems (ROS) and made changes when appropriated.        (Please note that portions of this note were completed with a voice recognition program. Efforts were made to edit the dictations but occasionally words are mis-transcribed.)    Nancy Yost MD    10/06/22  5:40 AM

## 2022-10-06 NOTE — ED PROVIDER NOTES
[facility] EMERGENCY DEPT  eMERGENCY dEPARTMENT eNCOUnter      Pt Name: Nina Marcus  MRN: [id number]  [place] 1980  Date of evaluation: 10/5/2022  Provider: Rene Angel MD    CHIEF COMPLAINT       Chief Complaint   Patient presents with    Shortness of Breath     SOB x2-3 days, fever         HISTORY OF PRESENT ILLNESS   (Location/Symptom, Timing/Onset,Context/Setting, Quality, Duration, Modifying Factors, Severity)  Note limiting factors. Nina Marcus is a 38 y.o. female who presents to the emergency department shortness of breath. Patient has been having shortness of breath for the last 2 to 3 days. Patient states that exercise or exertion worsens. Patient is having left chest pain overlying the rib underneath her left breast.  Patient states she was diagnosed with bronchitis earlier this week and began having a fever. Patient was seen yesterday in [place], [place] where she works and they wish to admit her to the hospital but patient left to come home and be seen here. Patient states that she has had an intermittent cough. Patient denies nausea, vomiting, diaphoresis, black or bloody stools, or lower extremity swelling or pain. HPI    NursingNotes were reviewed. REVIEW OF SYSTEMS    (2-9 systems for level 4, 10 or more for level 5)     Review of Systems   Constitutional:  Positive for chills and fever. HENT:  Negative for congestion and rhinorrhea. Eyes:  Negative for discharge. Respiratory:  Positive for cough and shortness of breath. Cardiovascular:  Positive for chest pain. Negative for palpitations. Gastrointestinal:  Negative for abdominal pain, nausea and vomiting. Genitourinary:  Negative for difficulty urinating and dysuria. Musculoskeletal:  Negative for back pain and neck pain. Skin:  Negative for rash and wound. Neurological:  Negative for syncope and light-headedness. Psychiatric/Behavioral:  Negative for agitation and confusion.     All other systems reviewed and are negative. PAST MEDICALHISTORY     Past Medical History:   Diagnosis Date    CML (chronic myelocytic leukemia) ([facility] [address]          SURGICAL HISTORY       Past Surgical History:   Procedure Laterality Date    TUBAL LIGATION           CURRENT MEDICATIONS     Previous Medications    AMLODIPINE (NORVASC) 10 MG TABLET    10 mg daily    FUROSEMIDE (LASIX) 20 MG TABLET    Take 1 tablet by mouth daily    NILOTINIB (TASIGNA) 200 MG CAPSULE    Take 2 capsules by mouth 2 times daily    POTASSIUM CHLORIDE (MICRO-K) 10 MEQ EXTENDED RELEASE CAPSULE    Take 1 capsule by mouth in the morning, at noon, and at bedtime    PROMETHAZINE-DEXTROMETHORPHAN (PROMETHAZINE-DM) 6.25-15 MG/5ML SYRUP    Take 5 mLs by mouth every 12 hours as needed for Cough       ALLERGIES     Latex and Penicillins    FAMILY HISTORY     History reviewed. No pertinent family history. SOCIAL HISTORY       Social History     Socioeconomic History    Marital status:      Spouse name: None    Number of children: None    Years of education: None    Highest education level: None   Tobacco Use    Smoking status: Every Day     Types: Cigarettes    Smokeless tobacco: Never   Vaping Use    Vaping Use: Never used   Substance and Sexual Activity    Alcohol use: Never    Drug use: Never       SCREENINGS    Hamilton Coma Scale  Eye Opening: Spontaneous  Best Verbal Response: Oriented  Best Motor Response: Obeys commands  Rody Coma Scale Score: 15        PHYSICAL EXAM    (up to 7 for level 4, 8 or more for level 5)     ED Triage Vitals [10/05/22 1641]   BP Temp Temp Source Heart Rate Resp SpO2 Height Weight   (!) 105/92 (!) 101.6 °F (38.7 °C) Oral (!) 119 18 95 % -- 170 lb (77.1 kg)       Physical Exam  Vitals and nursing note reviewed. Constitutional:       General: She is not in acute distress. Comments: Appears mildly tachypneic. HENT:      Head: Normocephalic and atraumatic.       Right Ear: External ear normal.      Left Ear: External ear normal.      Nose: Nose normal.      Mouth/Throat:      Mouth: Mucous membranes are moist.      Pharynx: Oropharynx is clear. No oropharyngeal exudate. Eyes:      General: No scleral icterus. Conjunctiva/sclera: Conjunctivae normal.      Pupils: Pupils are equal, round, and reactive to light. Cardiovascular:      Rate and Rhythm: Normal rate and regular rhythm. Pulses: Normal pulses. Heart sounds: Normal heart sounds. Pulmonary:      Effort: Pulmonary effort is normal. No respiratory distress. Breath sounds: No stridor. Rhonchi (Left lower lobe) and rales present. Chest:      Chest wall: Tenderness (Laterally beneath the left breast.) present. Abdominal:      General: Bowel sounds are normal. There is no distension. Palpations: Abdomen is soft. Tenderness: There is no abdominal tenderness. There is no guarding or rebound. Musculoskeletal:         General: No tenderness or deformity. Cervical back: Neck supple. No rigidity. Right lower leg: No edema. Left lower leg: No edema. Skin:     General: Skin is warm and dry. Capillary Refill: Capillary refill takes less than 2 seconds. Coloration: Skin is not jaundiced. Neurological:      General: No focal deficit present. Mental Status: She is alert and oriented to person, place, and time. Mental status is at baseline. Cranial Nerves: No cranial nerve deficit. Sensory: No sensory deficit. Motor: No weakness. Coordination: Coordination normal.   Psychiatric:         Mood and Affect: Mood normal.         Behavior: Behavior normal.       DIAGNOSTIC RESULTS     EKG: All EKG's areinterpreted by the Emergency Department Physician who either signs or Co-signs this chart in the absence of a cardiologist.    EKG dated 10/5/2022 at 1830 7 PM: Normal sinus rhythm, rate 94. Left atrial enlargement. Poor R wave progression.   T wave flattening/inversion in multiple leads.    RADIOLOGY:  Non-plain film images such as CT, Ultrasound and MRI are read by the radiologist. Plain radiographic images are visualized and preliminarily interpreted bythe emergency physician with the below findings:          XR CHEST PORTABLE   Final Result   Large LLL infiltrate   Recommendation: Follow up as clinically indicated. Electronically Signed by Baljeet Morrissey MD at 05-Oct-2022 08:05:31 PM               CTA PULMONARY W CONTRAST    (Results Pending)           LABS:  Labs Reviewed   CBC WITH AUTO DIFFERENTIAL - Abnormal; Notable for the following components:       Result Value    RBC 3.56 (*)     Hemoglobin 11.3 (*)     Hematocrit 33.2 (*)     MCH 31.7 (*)     Neutrophils % 81.4 (*)     Lymphocytes % 9.2 (*)     Neutrophils Absolute 8.7 (*)     Lymphocytes Absolute 1.0 (*)     All other components within normal limits   COMPREHENSIVE METABOLIC PANEL - Abnormal; Notable for the following components:    Sodium 134 (*)     Potassium 3.0 (*)     CO2 21 (*)     Creatinine 1.0 (*)     Albumin 3.2 (*)     All other components within normal limits   D-DIMER, QUANTITATIVE - Abnormal; Notable for the following components:    D-Dimer, Quant 4.59 (*)     All other components within normal limits   URINALYSIS WITH REFLEX TO CULTURE - Abnormal; Notable for the following components:    Color, UA DARK YELLOW (*)     Clarity, UA CLOUDY (*)     Bilirubin Urine SMALL (*)     Ketones, Urine TRACE (*)     Blood, Urine LARGE (*)     Leukocyte Esterase, Urine TRACE (*)     All other components within normal limits   MICROSCOPIC URINALYSIS - Abnormal; Notable for the following components:    RBC, UA 6-10 (*)     Bacteria, UA Rare (*)     All other components within normal limits   COVID-19, RAPID   CULTURE, BLOOD 1   CULTURE, BLOOD 2   CULTURE, URINE   LACTIC ACID   TROPONIN       All other labs were within normal range or not returned as of this dictation.     EMERGENCY DEPARTMENT COURSE and DIFFERENTIAL DIAGNOSIS/MDM: Vitals:    Vitals:    10/05/22 1641 10/05/22 1834 10/05/22 1843   BP: (!) 105/92  139/84   Pulse: (!) 119  (!) 104   Resp: 18 18 18   Temp: (!) 101.6 °F (38.7 °C)     TempSrc: Oral     SpO2: 95%  93%   Weight: 170 lb (77.1 kg)         MDM  47-year-old female with CML presents with difficulty breathing and fever. Lab, EKG, and radiology results reviewed. Discussed with Dr. Ean Robles, hospitalist, who will admit patient for further evaluation and treatment. Patient is currently undergoing a CTA pulmonary due to elevated D-dimer, chest pain and difficulty breathing. Patient has received IV Rocephin, azithromycin, and potassium. CONSULTS:  None    PROCEDURES:  Unless otherwise noted below, none     Procedures    FINAL IMPRESSION      1. Pneumonia of left lower lobe due to infectious organism    2. Hypokalemia    3.  CML (chronic myelocytic leukemia) ([facility] [address]          DISPOSITION/PLAN   DISPOSITION Decision To Admit 10/05/2022 08:45:19 PM           (Please note that portions of this note were completed with a voice recognition program.  Efforts were made to edit thedictations but occasionally words are mis-transcribed.)    Lesli Marcus MD (electronically signed)  Attending Emergency Physician          Lesli Marcus MD  10/05/22 2243

## 2022-10-06 NOTE — PROGRESS NOTES
Pt refused cough medicine and throat lozenges. Pt was educated on the importance of these meds and that taking them would help relieve her symptoms. Pt had still refused after my advice.

## 2022-10-06 NOTE — PLAN OF CARE
Problem: Pain  Goal: Verbalizes/displays adequate comfort level or baseline comfort level  Outcome: Not Progressing     Problem: Pain  Goal: Verbalizes/displays adequate comfort level or baseline comfort level  Outcome: Not Progressing

## 2022-10-06 NOTE — PROGRESS NOTES
Pt was offered cough medicine again. Pt was educated again on why taking this medication will help her symptoms improve. Pt refused after education. AMA forms were offered to pt and pt was notified that if she does leave AMA, insurance will not cover her stay. Pt stated that she would \"think about it. \"

## 2022-10-06 NOTE — CARE COORDINATION
Patient Contact Information:  [address] [address]  [phone] (home)   Above information verified? [x]   Yes  []   No    Had HOME OXYGEN prior to admission:    [address]:   If home oxygen is needed, patient does not have a preferred supplier. Has a pulse oximetry unit at home:   []   Yes  [x]   No    Have you been vaccinated for COVID-19 (SARS-CoV-2)? []   Yes  [x]   No                   If so, when? Which :  []   [email or web address]  []   Moderna  []   Floresita Russ  []   Other:       Pharmacy:    [facility], [address] [place] [address] [address] [place] [address]  Phone: [phone] Fax: [phone]    [address] [place] [address] Ran Rudolph  [place] [address] [address]  Phone: [phone] Fax: [phone]      Active with HD/PD prior to admission:           []   Yes  [x]   No  HD Center:       Financial:  Payor: Elif Miller / Plan: Joel Civil / Product Type: *No Product type* /     Pre-Cert required for SNF:   [x]   Yes  []   No    Patient Deficits:  []   Yes   [x]   No    If yes:  []   Confusion/Memory  []   Visual  []   Motor/Sensory         []   Right arm         []   Right leg         []   Left arm         []   Left leg  []   Language/Speech         []   Aphasia         []   Dysarthria         []   Swallow         Rody Coma Scale  Eye Opening: Spontaneous  Best Verbal Response: Oriented  Best Motor Response: Obeys commands  Cecil Coma Scale Score: 15    Patient Deficit Notes: Additional CM/SW Notes:   Patient lives at home with her grandmother and plans to return home at discharge. Prior to admission, patient was independent with ADLs. She is not interested in [facility].     [address] [facility]  Patient declined to participate in CHW program. Andrea Olivarez RN  [facility]  Care Management     10/06/22 8823   Service Assessment   Patient Orientation Alert and Oriented;Person;Place;Situation   Cognition Alert   History Provided By Patient   Primary Caregiver Self   Support Systems Family Members  (grandmother)   PCP Verified by CM Yes   Prior Functional Level Independent in ADLs/IADLs   Current Functional Level Independent in ADLs/IADLs   Can patient return to prior living arrangement Yes   Ability to make needs known: Good   Family able to assist with home care needs: Yes   Social/Functional History   Lives With Family   Type of [address] One level   Home Access Stairs to enter with rails   Entrance Stairs - Number of Steps 6   Bathroom Shower/Tub Tub/Shower unit   ADL Assistance Independent   Homemaking Assistance Independent   Ambulation Assistance Independent   Transfer Assistance Independent   Active  Yes   Occupation Full time employment   Discharge Planning   Type of Residence House   Living Arrangements Family Members   Current Services Prior To Admission None   Potential Assistance Needed Durable Medical Equipment   Potential DME Needed Oxygen Therapy (Comment)   Potential Assistance Purchasing Medications No   Type of [address] None   Patient expects to be discharged to: [address] Discharge   Services At/After Discharge None   Mode of Transport at Discharge Other (see comment)  (grandmother)   Condition of Participation: Discharge Planning   The Patient and/or Patient Representative was provided with a Choice of Provider? Patient   The Patient and/Or Patient Representative agree with the Discharge Plan?  Yes

## 2022-10-06 NOTE — PROGRESS NOTES
Progress Note  Date:10/6/2022       Room:0410/410-02  Patient Leona Leyva     Date of Birth:[birth year]     Age:42 y.o. Subjective    Subjective: 20-year-old lady with a history of CML on Tasigna, who presented to the hospital with concerns of shortness of breath. Patient was recently seen at a hospital in [place] diagnosed with pneumonia however did not want to get admitted and wanted treatment locally. On admission CTA with no evidence of PE however showed left lower lobe infiltrates concerning for pneumonia. Was initiated on ceftriaxone and azithromycin and admitted for further work-up. Was seen in house by oncology recommended continuation of current treatment. Patient was seen in house this morning, since overnight still with some concerns of noncompliance to treatment plan. This morning patient has presented with no issues, she denied any chest pain or worsening shortness of breath, nausea or vomiting. On assessment this morning stated she is comfortable denied any pain and stated Lortab was prescribed by oncologist.      Review of Systems: 12 point system reviewed and negative except as stated above. Objective         Vitals Last 24 Hours:  TEMPERATURE:  Temp  Av.6 °F (37.6 °C)  Min: 98.6 °F (37 °C)  Max: 101.6 °F (38.7 °C)  RESPIRATIONS RANGE: Resp  Av  Min: 18  Max: 20  PULSE OXIMETRY RANGE: SpO2  Av.4 %  Min: 88 %  Max: 95 %  PULSE RANGE: Pulse  Av.1  Min: 92  Max: 119  BLOOD PRESSURE RANGE: Systolic (78WCY), MLJ:319 , Min:105 , CVR:751   ; Diastolic (68QBB), KYR:47, Min:63, Max:92    I/O (24Hr): Intake/Output Summary (Last 24 hours) at 10/6/2022 1402  Last data filed at 10/6/2022 0840  Gross per 24 hour   Intake 120 ml   Output --   Net 120 ml     Objective:  Vital signs: (most recent): Blood pressure 119/78, pulse (!) 103, temperature 100 °F (37.8 °C), temperature source Oral, resp. rate 20, weight 170 lb 8 oz (77.3 kg), SpO2 95 %.       Physical Examination:  General: Well-developed, no acute distress lying comfortably in bed. HEENT: Atraumatic normocephalic, range of motion normal   Cardiac: Normal S1-S2 no murmurs rub or gallop. Respiratory: clear To auscultation bilaterally, + rhonchi or rales, no wheezing  Abdomen: Soft, positive bowel sounds in all quadrants, no distention, nontender to palpation  Extremities: no tenderness, + LE edema, moves all extremities  Psych: Affect normal and good eye contact, behavioral normal.      Labs/Imaging/Diagnostics    Labs:  CBC:  Recent Labs     10/05/22  1815 10/06/22  0433   WBC 10.6 10.8   RBC 3.56* 3.28*   HGB 11.3* 10.6*   HCT 33.2* 30.9*   MCV 93.3 94.2   RDW 13.7 14.2    197     CHEMISTRIES:  Recent Labs     10/05/22  1815 10/06/22  0433   * 141   K 3.0* 3.4*   CL 99 105   CO2 21* 22   BUN 11 9   CREATININE 1.0* 0.8   GLUCOSE 104 102   MG 2.3 2.2     PT/INR:No results for input(s): PROTIME, INR in the last 72 hours. APTT:No results for input(s): APTT in the last 72 hours. LIVER PROFILE:  Recent Labs     10/05/22  1815   AST 21   ALT 27   BILITOT 0.4   ALKPHOS 82       Imaging Last 24 Hours:  XR CHEST PORTABLE    Result Date: 10/5/2022  NO PRIOR REPORT AVAILABLE Exam: X-RAY OF THECHEST Clinical data:Cough. Technique:Single view of the chest. Prior studies: No prior studies submitted. Findings:Large LLL infiltrate. Cardiac silhouette is within normal limits. No acute osseous abnormality is detected. Large LLL infiltrate Recommendation: Follow up as clinically indicated. Electronically Signed by Den Metcalf MD at 05-Oct-2022 08:05:31 PM             CTA PULMONARY W CONTRAST    Result Date: 10/5/2022  NO PRIOR REPORT AVAILABLE Exam: CTA OF THE CHEST WITH CONTRAST Clinical data: Shortness of breath, chest pain, elevated d-dimer. Technique: Axial CT angiography images through the lungs were acquired with contrast and imaged using soft tissue and lung algorithms.  Coronal, sagittal, and 3D volume reconstructions were performed. Reformatted/3D-MPR images were performed. Radiation dose: CTDIvol =26.44 mGy, DLP =635 mGy x cm. Prior studies: Radiograph of the chest done on same day. Findings: Lungs: LLL pulmonary infiltrate identified. No pulmonary mass identified. No pleural effusions identified. No pneumothorax. The airway is clear. Soft Tissues: No mediastinal, axillary or supraclavicular adenopathy isidentified. Vascular: No filling defect within the pulmonary arteries to the segmental branch level. Unremarkable aorta. Grossly unremarkable sized heart. No definite abnormality seen on 3D reformatted images. Bony structures: No acute or destructive abnormality Upper Abdomen: Limited visualization of the solid upper abdominal organs is grossly unremarkable. 1. LLL pulmonary infiltrate identified 2. No evidence of PE Recommendation: Follow up as clinically indicated. All CT scans at this facility utilize dose modulation, iterative reconstruction, and/or weight based dosing when appropriate to reduce radiation dose to as low as reasonably achievable. Electronically Signed by Celso Finley MD at 05-Oct-2022 10:22:29 PM             Assessment//Plan           Hospital Problems             Last Modified POA    * (Principal) CAP (community acquired pneumonia) due to group B Streptococcus ([facility] [address] 10/5/2022 Yes    Hypokalemia 10/5/2022 Yes    CML (chronic myelocytic leukemia) ([facility] [address] 10/5/2022 Yes    Tobacco abuse 10/5/2022 Yes     Assessment & Plan      Community-acquired pneumonia (organism unknown)  CT chest with noted left lower lobe infiltrate. Continue ceftriaxone and azithromycin   Incentive spirometer  Guanfacine for cough  Breathing treatments as needed  Wean oxygen as able. Will need home O2 evaluation prior to discharge from the hospital.    CML  Followed in-house by oncology    Hypokalemia: Repleted    Bilateral lower extremity edema: Continue diuretics as ordered.       Disposition: Pending improvement in overall clinical status, may be discharged tomorrow.       Electronically signed by Cathy Lund MD on 10/6/22 at 2:02 PM CDT

## 2022-10-06 NOTE — H&P
[facility] - History & Physical      PCP: Tom Moore    Date of Admission: 10/5/2022    Date of Service: 10/5/2022    Chief Complaint: Shortness of breath    History Of Present Illness: The patient is a 38 y.o. female who presented to [facility] ER with PMH CML, smoker, complaining of shortness of breath. Patient states that she has had dyspnea on exertion ongoing since Monday. She states on Monday she attempted to take a breathing treatment and her pain medication for symptom relief. She states this did help. On Tuesday she was out of town when pain returned and was worse having left sided chest pain underneath her left breast.  She states she went to an OSH ER who diagnosed her with pneumonia. Patient was adamant to not be admitted in [place] and wished to come home and be seen locally. Endorses dry cough, fever, and fatigue. 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 She states that she does utilize appropriate equipment. Denies nausea, vomiting, abdominal pain. denies lower extremity swelling. Work-up in ER  CXR large left lower lobe infiltrate, CTA pulmonary no pulmonary emboli identified, K3, D-dimer 4.59, and troponin negative. Received 1L NS bolus, azithromycin 500 mg oral, Rocephin 1 g IV, Toradol 15 mg IV, morphine 2 mg IV, potassium chloride 20 mEq oral, potassium chloride 10 mEq IV, and Zofran 4 mg IV while in ER. Patient is to be admitted to the hospitalist service due to left lower lobe pneumonia. Past Medical History:        Diagnosis Date    CML (chronic myelocytic leukemia) ([facility] [address]        Past Surgical History:        Procedure Laterality Date    TUBAL LIGATION         Home Medications:  Prior to Admission medications    Medication Sig Start Date End Date Taking?  Authorizing Provider   potassium chloride (MICRO-K) 10 MEQ extended release capsule Take 1 capsule by mouth in the morning, at noon, and at bedtime 8/4/22 Zaynab Finn MD   nilotinib (TASIGNA) 200 MG capsule Take 2 capsules by mouth 2 times daily 7/9/22   Zaynab Finn MD   furosemide (LASIX) 20 MG tablet Take 1 tablet by mouth daily 7/7/22   Zaynab Finn MD   amLODIPine (NORVASC) 10 MG tablet 10 mg daily 6/1/22   Historical Provider, MD   promethazine-dextromethorphan (PROMETHAZINE-DM) 6.25-15 MG/5ML syrup Take 5 mLs by mouth every 12 hours as needed for Cough 11/16/19   YEISON Cooley - SALAS       Allergies:    Latex and Penicillins    Social History:    The patient currently lives home  Tobacco:   reports that she has been smoking cigarettes. She has never used smokeless tobacco.  Alcohol:   reports no history of alcohol use. Illicit Drugs: denies    Family History:  History reviewed. No pertinent family history. Review of Systems:   Review of Systems   Constitutional:  Positive for activity change. HENT: Negative. Respiratory:  Positive for cough (dry), chest tightness and shortness of breath. Gastrointestinal: Negative. Genitourinary: Negative. Musculoskeletal: Negative. Neurological:  Positive for weakness. 14 point review of systems is negative except as specifically addressed above. Physical Examination:  /84   Pulse (!) 104   Temp (!) 101.6 °F (38.7 °C) (Oral)   Resp 18   Wt 170 lb (77.1 kg)   SpO2 93%   BMI 27.44 kg/m²   Physical Exam  Vitals and nursing note reviewed. Constitutional:       General: She is not in acute distress. Appearance: Normal appearance. HENT:      Mouth/Throat:      Mouth: Mucous membranes are moist.      Pharynx: Oropharynx is clear. Eyes:      Extraocular Movements: Extraocular movements intact. Conjunctiva/sclera: Conjunctivae normal.      Pupils: Pupils are equal, round, and reactive to light. Cardiovascular:      Rate and Rhythm: Normal rate and regular rhythm. Pulses: Normal pulses. Heart sounds: Normal heart sounds.  No murmur heard.  Pulmonary:      Effort: Pulmonary effort is normal. No respiratory distress. Breath sounds: Decreased breath sounds and rhonchi present. Chest:      Chest wall: No tenderness. Abdominal:      General: Bowel sounds are normal. There is no distension. Palpations: Abdomen is soft. Tenderness: There is no abdominal tenderness. There is no guarding or rebound. Musculoskeletal:         General: No swelling. Normal range of motion. Cervical back: Normal range of motion and neck supple. No rigidity or tenderness. Right lower leg: No edema. Left lower leg: No edema. Skin:     General: Skin is warm and dry. Capillary Refill: Capillary refill takes less than 2 seconds. Neurological:      General: No focal deficit present. Mental Status: She is alert and oriented to person, place, and time. Cranial Nerves: No cranial nerve deficit. Motor: No weakness. Psychiatric:         Mood and Affect: Mood normal.         Behavior: Behavior normal.        Diagnostic Data:  CBC:  Recent Labs     10/03/22  1006 10/05/22  1815   WBC 10.36* 10.6   HGB 12.0 11.3*   HCT 37.2 33.2*    216     BMP:  Recent Labs     10/03/22  0912 10/05/22  1815    134*   K 3.3* 3.0*    99   CO2 23 21*   BUN 14 11   CREATININE 1.0 1.0*   CALCIUM 8.9 8.9     Recent Labs     10/03/22  0912 10/05/22  1815   AST 25 21   ALT 54* 27   BILITOT 0.8 0.4   ALKPHOS 90 82     Coag Panel: No results for input(s): INR, PROTIME, APTT in the last 72 hours.   Cardiac Enzymes:   Recent Labs     10/05/22  1815   TROPONINI <0.01     ABGs:No results found for: PHART, PO2ART, DQD3ZTB  Urinalysis:  Lab Results   Component Value Date/Time    NITRU Negative 10/05/2022 06:10 PM    WBCUA 10-15 10/05/2022 06:10 PM    BACTERIA Rare 10/05/2022 06:10 PM    RBCUA 6-10 10/05/2022 06:10 PM    BLOODU LARGE 10/05/2022 06:10 PM    SPECGRAV 1.030 10/05/2022 06:10 PM    GLUCOSEU Negative 10/05/2022 06:10 PM       XR CHEST PORTABLE    Result Date: 10/5/2022  NO PRIOR REPORT AVAILABLE Exam: X-RAY OF [facility] Clinical data:Cough. Technique:Single view of the chest. Prior studies: No prior studies submitted. Findings:Large LLL infiltrate. Cardiac silhouette is within normal limits. No acute osseous abnormality is detected. Large LLL infiltrate Recommendation: Follow up as clinically indicated.  Electronically Signed by Wing Damon MD at 05-Oct-2022 08:05:31 PM               Assessment/Plan:  Principal Problem:    CAP (community acquired pneumonia) due to group B Streptococcus ([facility] [address]   -CXR   -CTA pulmonary  - IV abx Rocephin and azithromycin              - Encourage deep breathing and cough              - Incentive spirometry              - Supplemental oxygen as needed              - Follow blood cultures   Active Problems:    Hypokalemia  -Replaced in ER    -Replacement protocol as warranted    -Daily labs    -Serial EKG    -Monitor on telemetry     CML (chronic myelocytic leukemia) ([facility] [address]   -Consultation to oncology  Tobacco abuse   -Counseled on smoking cessation    -Declined Nicotine patch     DVT prophylaxis Lovenox     Signed:  YEISON Walker - CNP, 10/5/2022 8:56 PM

## 2022-10-07 LAB
ANION GAP SERPL CALCULATED.3IONS-SCNC: 13 MMOL/L (ref 7–19)
BUN BLDV-MCNC: 9 MG/DL (ref 6–20)
CALCIUM SERPL-MCNC: 9.2 MG/DL (ref 8.6–10)
CHLORIDE BLD-SCNC: 99 MMOL/L (ref 98–111)
CO2: 23 MMOL/L (ref 22–29)
CREAT SERPL-MCNC: 0.8 MG/DL (ref 0.5–0.9)
GFR AFRICAN AMERICAN: >59
GFR NON-AFRICAN AMERICAN: >60
GLUCOSE BLD-MCNC: 100 MG/DL (ref 74–109)
MAGNESIUM: 2.4 MG/DL (ref 1.6–2.6)
POTASSIUM REFLEX MAGNESIUM: 3.1 MMOL/L (ref 3.5–5)
SODIUM BLD-SCNC: 135 MMOL/L (ref 136–145)
URINE CULTURE, ROUTINE: NORMAL

## 2022-10-07 PROCEDURE — 6360000002 HC RX W HCPCS

## 2022-10-07 PROCEDURE — 80048 BASIC METABOLIC PNL TOTAL CA: CPT

## 2022-10-07 PROCEDURE — 2700000000 HC OXYGEN THERAPY PER DAY

## 2022-10-07 PROCEDURE — 94640 AIRWAY INHALATION TREATMENT: CPT

## 2022-10-07 PROCEDURE — 6360000002 HC RX W HCPCS: Performed by: HOSPITALIST

## 2022-10-07 PROCEDURE — 2580000003 HC RX 258: Performed by: HOSPITALIST

## 2022-10-07 PROCEDURE — 36415 COLL VENOUS BLD VENIPUNCTURE: CPT

## 2022-10-07 PROCEDURE — 99231 SBSQ HOSP IP/OBS SF/LOW 25: CPT | Performed by: INTERNAL MEDICINE

## 2022-10-07 PROCEDURE — 6370000000 HC RX 637 (ALT 250 FOR IP): Performed by: HOSPITALIST

## 2022-10-07 PROCEDURE — 83735 ASSAY OF MAGNESIUM: CPT

## 2022-10-07 PROCEDURE — 6370000000 HC RX 637 (ALT 250 FOR IP)

## 2022-10-07 PROCEDURE — 6370000000 HC RX 637 (ALT 250 FOR IP): Performed by: INTERNAL MEDICINE

## 2022-10-07 PROCEDURE — 1210000000 HC MED SURG R&B

## 2022-10-07 RX ADMIN — IPRATROPIUM BROMIDE AND ALBUTEROL SULFATE 1 AMPULE: 2.5; .5 SOLUTION RESPIRATORY (INHALATION) at 06:35

## 2022-10-07 RX ADMIN — BUDESONIDE 250 MCG: 0.25 SUSPENSION RESPIRATORY (INHALATION) at 06:37

## 2022-10-07 RX ADMIN — BUDESONIDE 250 MCG: 0.25 SUSPENSION RESPIRATORY (INHALATION) at 18:01

## 2022-10-07 RX ADMIN — FUROSEMIDE 20 MG: 20 TABLET ORAL at 09:00

## 2022-10-07 RX ADMIN — POTASSIUM CHLORIDE 20 MEQ: 1500 TABLET, EXTENDED RELEASE ORAL at 09:01

## 2022-10-07 RX ADMIN — IPRATROPIUM BROMIDE AND ALBUTEROL SULFATE 1 AMPULE: 2.5; .5 SOLUTION RESPIRATORY (INHALATION) at 10:20

## 2022-10-07 RX ADMIN — SODIUM CHLORIDE, PRESERVATIVE FREE 10 ML: 5 INJECTION INTRAVENOUS at 09:02

## 2022-10-07 RX ADMIN — HYDROCODONE BITARTRATE AND ACETAMINOPHEN 1 TABLET: 7.5; 325 TABLET ORAL at 09:01

## 2022-10-07 RX ADMIN — AZITHROMYCIN DIHYDRATE 250 MG: 500 INJECTION, POWDER, LYOPHILIZED, FOR SOLUTION INTRAVENOUS at 20:43

## 2022-10-07 RX ADMIN — HYDROCODONE BITARTRATE AND ACETAMINOPHEN 1 TABLET: 7.5; 325 TABLET ORAL at 14:34

## 2022-10-07 RX ADMIN — POTASSIUM CHLORIDE 20 MEQ: 1500 TABLET, EXTENDED RELEASE ORAL at 20:40

## 2022-10-07 RX ADMIN — HYDROCODONE BITARTRATE AND ACETAMINOPHEN 1 TABLET: 7.5; 325 TABLET ORAL at 02:49

## 2022-10-07 RX ADMIN — IPRATROPIUM BROMIDE AND ALBUTEROL SULFATE 1 AMPULE: 2.5; .5 SOLUTION RESPIRATORY (INHALATION) at 18:00

## 2022-10-07 RX ADMIN — IPRATROPIUM BROMIDE AND ALBUTEROL SULFATE 1 AMPULE: 2.5; .5 SOLUTION RESPIRATORY (INHALATION) at 14:00

## 2022-10-07 RX ADMIN — WATER 1000 MG: 1 INJECTION INTRAMUSCULAR; INTRAVENOUS; SUBCUTANEOUS at 21:32

## 2022-10-07 RX ADMIN — POTASSIUM CHLORIDE 20 MEQ: 1500 TABLET, EXTENDED RELEASE ORAL at 14:34

## 2022-10-07 RX ADMIN — SODIUM CHLORIDE, PRESERVATIVE FREE 10 ML: 5 INJECTION INTRAVENOUS at 20:41

## 2022-10-07 RX ADMIN — HYDROCODONE BITARTRATE AND ACETAMINOPHEN 1 TABLET: 7.5; 325 TABLET ORAL at 20:42

## 2022-10-07 ASSESSMENT — PAIN DESCRIPTION - DESCRIPTORS: DESCRIPTORS: DISCOMFORT;ACHING

## 2022-10-07 ASSESSMENT — PAIN SCALES - GENERAL
PAINLEVEL_OUTOF10: 8
PAINLEVEL_OUTOF10: 8
PAINLEVEL_OUTOF10: 9
PAINLEVEL_OUTOF10: 2

## 2022-10-07 ASSESSMENT — PAIN SCALES - WONG BAKER: WONGBAKER_NUMERICALRESPONSE: 2

## 2022-10-07 ASSESSMENT — PAIN DESCRIPTION - LOCATION
LOCATION: ABDOMEN
LOCATION: GENERALIZED
LOCATION: BACK;FLANK

## 2022-10-07 ASSESSMENT — PAIN - FUNCTIONAL ASSESSMENT: PAIN_FUNCTIONAL_ASSESSMENT: ACTIVITIES ARE NOT PREVENTED

## 2022-10-07 ASSESSMENT — PAIN DESCRIPTION - ORIENTATION: ORIENTATION: LEFT

## 2022-10-07 NOTE — PLAN OF CARE
Nutrition Problem #1: Inadequate oral intake  Intervention: Food and/or Nutrient Delivery: Continue Current Diet

## 2022-10-07 NOTE — PLAN OF CARE
For some reason unknown why, patient was not on my list this AM and was missed during rounds hence did not see. Unfortunately currently not in the hospital as I have gone home sick.  From charts looks stable, seen by oncology this AM. will eval in the morning

## 2022-10-07 NOTE — PROGRESS NOTES
Comprehensive Nutrition Assessment    Type and Reason for Visit:  Initial, Positive Nutrition Screen    Nutrition Recommendations/Plan:   Continue current POC. Malnutrition Assessment:  Malnutrition Status: At risk for malnutrition (Comment) (10/07/22 1441)    Context:  Acute Illness     Findings of the 6 clinical characteristics of malnutrition:  Energy Intake:  Mild decrease in energy intake (Comment)  Weight Loss:  No significant weight loss     Body Fat Loss:  No significant body fat loss     Muscle Mass Loss:  No significant muscle mass loss    Fluid Accumulation:  No significant fluid accumulation     Strength:  Not Performed    Nutrition Assessment:    Pt's oral intake of meals has improved slightly since admit. Intake is inadequate to meet nutritional needs. Will monitor closely to see if nutrition intervention is warranted. Current Nutrition Intake & Therapies:    Average Meal Intake: 0%, 26-50%  ADULT DIET; Regular    Anthropometric Measures:  Height: 5' 6\" (167.6 cm)  Ideal Body Weight (IBW): 130 lbs (59 kg)    Current Body Weight: 170 lb (77.1 kg), 130.8 % IBW. Current BMI (kg/m2): 27.5  BMI Categories: Overweight (BMI 25.0-29. 9)    Estimated Daily Nutrient Needs:  Energy Requirements Based On: Kcal/kg (20-25)  Weight Used for Energy Requirements: Current  Energy (kcal/day): 0776-8908 kcals/day  Weight Used for Protein Requirements: Ideal (1.2-2.0)  Protein (g/day):  g/protein/day  Method Used for Fluid Requirements: 1 ml/kcal  Fluid (ml/day): 2821-6094 mL/day    Nutrition Diagnosis:   Inadequate oral intake related to acute injury/trauma as evidenced by intake 0-25%, intake 26-50%    Nutrition Interventions:   Food and/or Nutrient Delivery: Continue Current Diet  Coordination of Nutrition Care: Continue to monitor while inpatient     Goals:  Goals: PO intake 50% or greater    Nutrition Monitoring and Evaluation:   Food/Nutrient Intake Outcomes: Food and Nutrient Intake  Physical Signs/Symptoms Outcomes: Biochemical Data, Nutrition Focused Physical Findings, Weight, Fluid Status or Edema    Abel Berg MS, RD, LD  Contact: [phone]

## 2022-10-07 NOTE — PROGRESS NOTES
Patient name: Blair Chin  Patient : 1980  10/7/2022  6:28 AM  ROOM [address]    Portions of this note have been copied forward, however, changed to reflect the most current clinical status of this patient. Subjective: Sleeping and arouses easily. Continues to parent shortness of air with exertion, currently on 2 L per nasal cannula with SPO2 of 94%. Afebrile with max temp of 100.0 in the past 24 hours. HISTORY OF PRESENT ILLNESS:  The patient is well-known to our clinic and was recently seen for a diagnosis of CML. She is currently on treatment with Nilotinib. She was just recently seen by Thu Spangler in clinic about a few days ago. The patient presented to the ER department with complaints of increased dyspnea on exertion. In addition, she had chest pain. She went to an outside ER where she was diagnosed with pneumonia. She had a dry cough and fatigue. She has an occupational history of working recent basis for the past 4 days. CTA was performed emergency showed no evidence of pulmonary embolism. Troponin was negative. D-dimer was slightly elevated. She received azithromycin and Rocephin in the emergency. She received Toradol and morphine for pain. She was admitted to the hospitalist.  10/5/2022-portable chest x-ray showed large left lower lobe infiltrate. 10/5/2022-CTA showed no evidence of pulmonary embolism but left lower lobe pulmonary infiltrate identified. PRIOR ONCOLOGIC HISTORY  Essentially, the patient is a 38years old female who has chronic CML diagnosed initially in . She is currently on erlotinib 400 mg p.o. twice daily. She is compliant with her medication. She denies any early satiety. She denies any other symptoms. She feels fatigued. She has been working 12 hours shift x6 days a week. She has some bilateral leg swelling and takes Lasix 20 mg p.o. every other day. She also take potassium pills.   Unfortunately, she continues to smoke half pack a day.  She is updated with age-appropriate cancer screening. Diagnosis  CML, 2005  BCR/ABL1 kinase 7.21%     Treatment Summary  2005 Gleevec (Patient didn't take on regular basis)  02/2016 - 07/2021 Sprycel  07/30/21 - 12/2021 Bosulif  12/2021 Nilotinib 400mg BID     Hematology/Cancer History  Clinton Pichardo was diagnosed with CML in 2005 by Dr Alber Damon. 2005 Initiated Joella Dubin (Patient didn't take on regular basis)  09/17/13 Genotrace - IS QRT-PCR: 57.64%  09/20/2014 Genotrace - IS QRT-PCR: 40.52%  02/2016 Initiated Sprycel  2/10/16 [facility]): Normal CT of the chest. No evidence of pulmonary embolism  03/05/2016 Genotrace - IS QRT-PCR: 30.9%  01/14/2016 Genotrace - IS QRT-PCR: 21.36%  07/28/17 Genotrace - IS QRT-PCR: 8.52%  06/12/16 Genotrace - IS QRT-PCR: 8.47%  01/31/2020  Marrow biopsy- persistent CML, chronic phase. Clonal T -cell population identified by PCR. Flow cytometry negative. 09/01/2020 Genotrace - IS QRT-PCR: 13.5%  09/01/2020-BCR/ABL PCR positive for BCR/ABL 1 rearrangement (13.5% of cells): Consistent with persistence of CML clone. 03/16/2021- BCR-ABL1 QPCR-positive: Major breakpoint (18.956%), minor breakpoint (0.033%). Interpretation: Consistent with residual CML. 06/08/2021- BCR-ABL 1 QPCR-positive (14.8403) for the BCR-ABL-1 e13a2 (b2a2, p219) fusion transcript  06/08/2021- CML chromosome/FISH profile: Abnormal- 54% of nuclei positive for BCR/ABL1 gene fusion  06/08/2021 - BCR-ABL1 Kinase Domain Mutation - Mutation detected within the BCR-ABL1 kinase domain. Nucleotide change: c.949T>C. 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 Comment: No plate changes detected within the BCR-ABL 1 kinase domain. Cellular and biochemical studies suggest that the resistance induced by this mutation may be overcome by dose escalation.   06/28/2021 and again on 07/07/2021, Dr Richard Capps discussed results of the BCR/ABL 1 PCR quant, kinase domain mutation, and CML FISH profile from 6/8/2021 with Dr. Lees Livers (BMT at [facility]) who has seen the patient previously. 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 Dr. Benigno Chavez reviewed the mutation, noting resistance to dasatinib. Recommends nilotinib or bosutinib. If TKI options have been exhausted, will consider transplant.  07/28/2021-dasatinib discontinued   07/30/2021-Bosulif initiated  08/06/2021- BCR/ABL RT-PCR-positive: Major breakpoint (47.104%), minor breakpoint 0.034% consistent with persistent CML  08/06/2021-MPN/CML FISH panel: 1. Positive for a BCR/ABL1 rearrangement (65% of cells) consistent with persistence of CML clone. 09/07/2021-(+) for a BCR/ABL1 rearrangement (39% of cells). Major breakpoint (18.748%), minor breakpoint (0.017%)  11/11/2021-+) for a BCR/ABL1 rearrangement (42% of cells). 12/02/2021- Dr Esther Santiago phone call: BCR/ABL 1Q PCR positive with 42% copies of BCR transcript. Patient has been on Bosulif since 07/30/2021 (BCR/ABL 48% at that time). Previously discussed with Dr. Benigno Chavez, BMT at [facility] who had seen the patient previously. 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 Today I spoke to the patient who swears that she has been taking her medicine daily except for 5 days when she had a \"stomach bug.\" But has been taking otherwise. I then spoke to Dr. Benigno Chavez who will again take a look at the mutation study that was done previously to see if we can use another TKI (i.e.: Ponatinib). 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 Benigno Chavez does not believe this would be helpful unless the patient is bridging towards transplant.  If ponatinib is not felt to be indicated or fails, then the patient would qualify for transplant at that time. Awaiting word from Dr. Armando Maxwell prior to moving forward. The patient is made aware of the plans. She verbalized understanding and agreement  12/10/2021-(+) for a BCR/ABL1 rearrangement (31% of cells). Major breakpoint (15.085%), minor breakpoint (0.008%)  12/30/2021- Nilotinib initiated- was taking 400 mg daily (error on her part but claims \"the pharmacy wrote it that way\") instead of q12h as prescribed - until 02/29/2022 when she started taking q12h.  02/17/2022-BCR/ABL-FISH positive 41.5%: RT-PCR major breakpoint (40.046%) consistent with residual CML.  4/26/22 US bilateral lower extremity [facility]): Normal duplex ultrasound of the bilateral lower extremities without evidence of venous thrombus. 5/4/22- Transthoracic echo [facility]): Left ventricular systolic function is normal. Left ventricular ejection fraction appears to be 61-65%. Normal right ventricular cavity size and systolic function noted. No hemodynamically significant valvular abnormalities identified on this study. 6/23/2022 [facility]  BCR-ABL1 is 7.21%; MMR: NO.  BCR/ABL 1 mutation not detected. 6/23/2022-she was first seen by me. She was transferred from Dr. Barrie Bolanos office. Apparently she was discharged from that practice due to compliance issues. The patient tells me that she has issues compliance with her medical visits due to her work schedule. She is to continue her nilotinib for now. Recommended to repeat quantitative BCR/ABL and mutation panel. 7/7/2022-patient is to continue nilotinib 400 mg p.o. twice daily at this time. Interval decrease quantitative BCR/ABL. 7. 21% (previously 41%).         Current Pain Assessment  Pain Assessment  Pain Level: 9  Patient's Stated Pain Goal: 0 - No pain  Pain Location: Abdomen  Pain Orientation: Left  Pain Descriptors: Butch Ring, Stabbing    OBJECTIVE:  VITALS: /70   Pulse 72   Temp (!) 96.6 °F (35.9 °C)   Resp 20   Wt 170 lb 8 oz (77.3 kg)   SpO2 94%   BMI 27.52 kg/m²   I&O:   Intake/Output Summary (Last 24 hours) at 10/7/2022 7927  Last data filed at 10/6/2022 1300  Gross per 24 hour   Intake 360 ml   Output --   Net 360 ml            PHYSICAL EXAM:  CONSTITUTIONAL: Alert, appropriate, no acute distress. Requiring 2 L per nasal cannula  EYES: Non icteric, EOM intact, pupils equal round   ENT: Mucus membranes moist,external inspection of ears and nose are normal  NECK: Supple, no masses. No palpable thyroid mass  CHEST/LUNGS: Normal respiratory effort, rhonchi left upper lobe  CARDIOVASCULAR: RRR, no murmurs. No lower extremity edema  ABDOMEN: soft non-tender, active bowel sounds, no HSM. No palpable masses  EXTREMITIES: warm, full ROM in all 4 extremities, no focal weakness. SKIN: warm, dry with no rashes or lesions  LYMPH: No cervical, clavicular, axillary, or inguinal lymphadenopathy  NEUROLOGIC: follows commands, non focal   PSYCH: mood and affect appropriate. Alert and oriented to time, place, person    BMP:   Recent Labs     10/05/22  1815 10/06/22  0433   * 141   K 3.0* 3.4*   CL 99 105   CO2 21* 22   BUN 11 9   CREATININE 1.0* 0.8   GLUCOSE 104 102   CALCIUM 8.9 8.0*     Recent Labs     10/06/22  0433 10/05/22  1815 10/03/22  1006   WBC 10.8 10.6 10.36*   HGB 10.6* 11.3* 12.0   HCT 30.9* 33.2* 37.2   MCV 94.2 93.3 98.7*    216 256     CMP:    Recent Labs     10/05/22  1815 10/06/22  0433   * 141   K 3.0* 3.4*   CL 99 105   CO2 21* 22   BUN 11 9   CREATININE 1.0* 0.8   GFRAA >59 >59   LABGLOM >60 >60   GLUCOSE 104 102   PROT 7.2  --    LABALBU 3.2*  --    CALCIUM 8.9 8.0*   BILITOT 0.4  --    ALKPHOS 82  --    AST 21  --    ALT 27  --        30Day lookback of cultures:    Blood Culture Recent:   Recent Labs     10/05/22  1815   BC No Growth to date. Any change in status will be called. Gram Stain Recent: No results for input(s): LABGRAM in the last 720 hours.   Resp Culture Recent: No results for input(s): CULTRESP in the last 720 hours.  Body Fluid Recent : No results for input(s): BFCX in the last 720 hours. MRSA Recent : No results for input(s): [facility] in the last 720 hours. Urine Culture Recent :   Recent Labs     10/05/22  1810   LABURIN No growth     Organism Recent : No results for input(s): ORG in the last 720 hours. Radiology:   XR CHEST PORTABLE    Result Date: 10/5/2022  NO PRIOR REPORT AVAILABLE Exam: X-RAY OF [facility] Clinical data:Cough. Technique:Single view of the chest. Prior studies: No prior studies submitted. Findings:Large LLL infiltrate. Cardiac silhouette is within normal limits. No acute osseous abnormality is detected. Large LLL infiltrate Recommendation: Follow up as clinically indicated. Electronically Signed by Wing Damon MD at 05-Oct-2022 08:05:31 PM             CTA PULMONARY W CONTRAST    Result Date: 10/5/2022  NO PRIOR REPORT AVAILABLE Exam: CTA OF THE CHEST WITH CONTRAST Clinical data: Shortness of breath, chest pain, elevated d-dimer. Technique: Axial CT angiography images through the lungs were acquired with contrast and imaged using soft tissue and lung algorithms. Coronal, sagittal, and 3D volume reconstructions were performed. Reformatted/3D-MPR images were performed. Radiation dose: CTDIvol =26.44 mGy, DLP =635 mGy x cm. Prior studies: Radiograph of the chest done on same day. Findings: Lungs: LLL pulmonary infiltrate identified. No pulmonary mass identified. No pleural effusions identified. No pneumothorax. The airway is clear. Soft Tissues: No mediastinal, axillary or supraclavicular adenopathy isidentified. Vascular: No filling defect within the pulmonary arteries to the segmental branch level. Unremarkable aorta. Grossly unremarkable sized heart. No definite abnormality seen on 3D reformatted images. Bony structures: No acute or destructive abnormality Upper Abdomen: Limited visualization of the solid upper abdominal organs is grossly unremarkable.     1. LLL pulmonary infiltrate identified 2. No evidence of PE Recommendation: Follow up as clinically indicated. All CT scans at this facility utilize dose modulation, iterative reconstruction, and/or weight based dosing when appropriate to reduce radiation dose to as low as reasonably achievable.  Electronically Signed by Rachel Martino MD at 05-Oct-2022 10:22:29 PM               Medications  Current Facility-Administered Medications   Medication Dose Route Frequency Provider Last Rate Last Admin    Benzocaine-Menthol (CEPACOL) 1 lozenge  1 lozenge Oral Q2H PRN Andrey Newsome MD        benzonatate (TESSALON) capsule 100 mg  100 mg Oral TID PRN Andrey Newsome MD        guaiFENesin-codeine (TUSSI-ORGANIDIN NR) 100-10 MG/5ML syrup 5 mL  5 mL Oral Q4H PRN Andrey Newsome MD        potassium chloride (KLOR-CON M) extended release tablet 20 mEq  20 mEq Oral TID Abiel Mazariegos MD   20 mEq at 10/06/22 2044    HYDROcodone-acetaminophen ([facility]) 7.5-325 MG per tablet 1 tablet  1 tablet Oral Q6H PRN Abiel Mazariegos MD   1 tablet at 10/07/22 0249    cefTRIAXone (ROCEPHIN) 1,000 mg in sterile water 10 mL IV syringe  1,000 mg IntraVENous Q24H Andrey Newsome MD   1,000 mg at 10/06/22 2044    azithromycin (ZITHROMAX) 250 mg in dextrose 5 % 250 mL IVPB  250 mg IntraVENous Q24H Andrey Newsome MD   Stopped at 10/06/22 2309    potassium chloride (KLOR-CON M) extended release tablet 40 mEq  40 mEq Oral PRN Andrey Newsome MD        Or    potassium bicarb-citric acid (EFFER-K) effervescent tablet 40 mEq  40 mEq Oral PRN Andrey Newsome MD        Or    potassium chloride 10 mEq/100 mL IVPB (Peripheral Line)  10 mEq IntraVENous PRN Andrey Newsome MD        magnesium sulfate 1000 mg in dextrose 5% 100 mL IVPB  1,000 mg IntraVENous PRN Andrey Newsome MD        sodium chloride flush 0.9 % injection 5-40 mL  5-40 mL IntraVENous 2 times per day Andrey Newsome MD   10 mL at 10/06/22 2045    sodium chloride flush 0.9 % injection 5-40 mL  5-40 mL IntraVENous PRN Lilian Sat Gerson Hutchison MD        0.9 % sodium chloride infusion   IntraVENous PRN Huma Parekh MD        enoxaparin (LOVENOX) injection 40 mg  40 mg SubCUTAneous Daily Huma Parekh MD   40 mg at 10/06/22 0817    acetaminophen (TYLENOL) tablet 650 mg  650 mg Oral Q6H PRN Huma Parekh MD   650 mg at 10/06/22 0059    Or    acetaminophen (TYLENOL) suppository 650 mg  650 mg Rectal Q6H PRN Huma Parekh MD        potassium chloride (KLOR-CON M) extended release tablet 40 mEq  40 mEq Oral PRN Huma Parekh MD        Or    potassium bicarb-citric acid (EFFER-K) effervescent tablet 40 mEq  40 mEq Oral PRN Huma Parekh MD        Or    potassium chloride 10 mEq/100 mL IVPB (Peripheral Line)  10 mEq IntraVENous PRN Huma Parekh MD        magnesium sulfate 2000 mg in 50 mL IVPB premix  2,000 mg IntraVENous PRN Huma Parekh MD        ondansetron (ZOFRAN-ODT) disintegrating tablet 4 mg  4 mg Oral Q8H PRN Huma Parekh MD        Or    ondansetron [facility]) injection 4 mg  4 mg IntraVENous Q6H PRN Huma Parekh MD        polyethylene glycol (GLYCOLAX) packet 17 g  17 g Oral Daily PRN Huma Parekh MD        melatonin disintegrating tablet 5 mg  5 mg Oral Nightly PRN Huma Parekh MD        calcium carbonate (TUMS) chewable tablet 500 mg  500 mg Oral TID PRN Huma Parekh MD        furosemide (LASIX) tablet 20 mg  20 mg Oral Daily Huma Parekh MD   20 mg at 10/06/22 0819    ipratropium-albuterol (DUONEB) nebulizer solution 1 ampule  1 ampule Inhalation Q4H WA YEISON rGeen CNP   1 ampule at 10/06/22 1913    budesonide (PULMICORT) nebulizer suspension 250 mcg  0.25 mg Nebulization BID YEISON Green CNP   250 mcg at 10/06/22 1913       Allergies  Latex and Penicillins      ASSESSMENT:  Community-acquired pneumonia  -Azithromycin/ceftriaxone     CML with 5 mutation as per last study  She has been transferred from [facility] oncology, Dr. Slim Dacosta office. Apparently, issues with visit compliance.   The patient tells me that she missed a couple of appointments. She also assures me that she is consistently/compliant with her TKI medication. Apparently, BCR/ABL was not at target in February 2022. She also had a new mutation as of June last year. She has been seen by Juani Kruger, BMT . Essentially, the patient has been on several prior line therapy.  -Currently on Tasigna  400mg p.o. twice daily. Patient claims compliance with her medication.  -Treatment currently being tolerated with complaints of bilateral leg swelling/pain  -June 2022-repeat BCR/ABL = 7.21%. Mutation panel not detected.  -10/3/2022-she had a quantitative BCR/ABL ordered     Plan  -Continue Tasigna 400 mg p.o. twice daily     Side effects-patient has bilateral leg edema.  -She is currently on Norvasc  -I asked her to discuss with PCP regarding stopping Norvasc  -This could also be related to her Tasigna  -Okay to continue Lasix 20 mg p.o. daily. Hypokalemia secondary to Lasix. Potassium replacement.  -Continue Lasix as needed. Hypokalemia -As per hospitalist  -Potassium 3.4 on 10/6/2022  -Continue oral replacement        PLAN:  Continue oral replace potassium, receiving 20 mEq every 8 hours  Continue Tasigna 400 mg p.o. twice daily  Norco as needed for chest pain  Continue Lasix  Continue antibiotics  BMP daily, in process  Continue with supportive care      YEISON Henderson    10/07/22  6:28 AM    Physician's attestation/substantial contribution:  I, Dr Yan Dennison, independently performed an evaluation on [address] [place] [address]. I have reviewed relevant medical information/data to include but not limited to medication list, relevant appropriate labs and imaging when applicable. I reviewed other physician's notes, ancillary services and nurses assessment. I have reviewed the above documentation completed by the Nurse Practitioner or Physician Assistant.  Please see my additional contributions to the history of present illness, physical examination, and assessment/medical decision-making that reflect my findings and impressions. I have seen and examined the patient and the key elements of all parts of the encounter have been performed by me. I agree with the assessment and plan as outlined by the ARNP/PA. Subjective-no new complaints. Feels much better today  Objective-as above  Assessment/plan:  Community-acquired pneumonia-continue antibiotics  CML-continue TKI  Hypokalemia-potassium 3.4. Receiving oral replacement.   Continue current supportive care otherwise    Garry Alexandre MD

## 2022-10-08 VITALS
TEMPERATURE: 97.2 F | WEIGHT: 170.5 LBS | SYSTOLIC BLOOD PRESSURE: 109 MMHG | HEIGHT: 66 IN | HEART RATE: 86 BPM | RESPIRATION RATE: 18 BRPM | DIASTOLIC BLOOD PRESSURE: 76 MMHG | BODY MASS INDEX: 27.4 KG/M2 | OXYGEN SATURATION: 95 %

## 2022-10-08 LAB
ANION GAP SERPL CALCULATED.3IONS-SCNC: 12 MMOL/L (ref 7–19)
BUN BLDV-MCNC: 10 MG/DL (ref 6–20)
CALCIUM SERPL-MCNC: 8.5 MG/DL (ref 8.6–10)
CHLORIDE BLD-SCNC: 102 MMOL/L (ref 98–111)
CO2: 25 MMOL/L (ref 22–29)
CREAT SERPL-MCNC: 0.7 MG/DL (ref 0.5–0.9)
GFR AFRICAN AMERICAN: >59
GFR NON-AFRICAN AMERICAN: >60
GLUCOSE BLD-MCNC: 107 MG/DL (ref 74–109)
POTASSIUM REFLEX MAGNESIUM: 4 MMOL/L (ref 3.5–5)
SODIUM BLD-SCNC: 139 MMOL/L (ref 136–145)

## 2022-10-08 PROCEDURE — 6370000000 HC RX 637 (ALT 250 FOR IP): Performed by: INTERNAL MEDICINE

## 2022-10-08 PROCEDURE — 94761 N-INVAS EAR/PLS OXIMETRY MLT: CPT

## 2022-10-08 PROCEDURE — 6370000000 HC RX 637 (ALT 250 FOR IP)

## 2022-10-08 PROCEDURE — 2580000003 HC RX 258: Performed by: HOSPITALIST

## 2022-10-08 PROCEDURE — 80048 BASIC METABOLIC PNL TOTAL CA: CPT

## 2022-10-08 PROCEDURE — 6370000000 HC RX 637 (ALT 250 FOR IP): Performed by: HOSPITALIST

## 2022-10-08 PROCEDURE — 94640 AIRWAY INHALATION TREATMENT: CPT

## 2022-10-08 PROCEDURE — 6360000002 HC RX W HCPCS

## 2022-10-08 PROCEDURE — 2700000000 HC OXYGEN THERAPY PER DAY

## 2022-10-08 PROCEDURE — 36415 COLL VENOUS BLD VENIPUNCTURE: CPT

## 2022-10-08 PROCEDURE — 99231 SBSQ HOSP IP/OBS SF/LOW 25: CPT | Performed by: INTERNAL MEDICINE

## 2022-10-08 RX ORDER — CEFDINIR 300 MG/1
300 CAPSULE ORAL 2 TIMES DAILY
Qty: 8 CAPSULE | Refills: 0 | Status: SHIPPED | OUTPATIENT
Start: 2022-10-08 | End: 2022-10-12

## 2022-10-08 RX ORDER — AZITHROMYCIN 250 MG/1
250 TABLET, FILM COATED ORAL DAILY
Qty: 4 TABLET | Refills: 0 | Status: SHIPPED | OUTPATIENT
Start: 2022-10-08 | End: 2022-10-12

## 2022-10-08 RX ORDER — AMLODIPINE BESYLATE 10 MG/1
10 TABLET ORAL DAILY
Qty: 30 TABLET | Refills: 3
Start: 2022-10-08

## 2022-10-08 RX ORDER — BENZONATATE 100 MG/1
100 CAPSULE ORAL 3 TIMES DAILY PRN
Qty: 30 CAPSULE | Refills: 0 | Status: SHIPPED | OUTPATIENT
Start: 2022-10-08 | End: 2022-10-15

## 2022-10-08 RX ADMIN — ACETAMINOPHEN 650 MG: 325 TABLET ORAL at 05:12

## 2022-10-08 RX ADMIN — HYDROCODONE BITARTRATE AND ACETAMINOPHEN 1 TABLET: 7.5; 325 TABLET ORAL at 09:07

## 2022-10-08 RX ADMIN — FUROSEMIDE 20 MG: 20 TABLET ORAL at 09:08

## 2022-10-08 RX ADMIN — SODIUM CHLORIDE, PRESERVATIVE FREE 10 ML: 5 INJECTION INTRAVENOUS at 09:08

## 2022-10-08 RX ADMIN — POTASSIUM CHLORIDE 20 MEQ: 1500 TABLET, EXTENDED RELEASE ORAL at 09:07

## 2022-10-08 RX ADMIN — HYDROCODONE BITARTRATE AND ACETAMINOPHEN 1 TABLET: 7.5; 325 TABLET ORAL at 02:40

## 2022-10-08 RX ADMIN — BUDESONIDE 250 MCG: 0.25 SUSPENSION RESPIRATORY (INHALATION) at 07:35

## 2022-10-08 RX ADMIN — GUAIFENESIN AND CODEINE PHOSPHATE 5 ML: 10; 100 LIQUID ORAL at 02:40

## 2022-10-08 RX ADMIN — IPRATROPIUM BROMIDE AND ALBUTEROL SULFATE 1 AMPULE: 2.5; .5 SOLUTION RESPIRATORY (INHALATION) at 07:35

## 2022-10-08 ASSESSMENT — PAIN DESCRIPTION - PAIN TYPE: TYPE: ACUTE PAIN

## 2022-10-08 ASSESSMENT — PAIN SCALES - GENERAL
PAINLEVEL_OUTOF10: 7
PAINLEVEL_OUTOF10: 6
PAINLEVEL_OUTOF10: 7
PAINLEVEL_OUTOF10: 6
PAINLEVEL_OUTOF10: 3

## 2022-10-08 ASSESSMENT — PAIN - FUNCTIONAL ASSESSMENT
PAIN_FUNCTIONAL_ASSESSMENT: ACTIVITIES ARE NOT PREVENTED
PAIN_FUNCTIONAL_ASSESSMENT: ACTIVITIES ARE NOT PREVENTED

## 2022-10-08 ASSESSMENT — PAIN DESCRIPTION - LOCATION
LOCATION: ABDOMEN
LOCATION: BACK
LOCATION: GENERALIZED

## 2022-10-08 ASSESSMENT — PAIN DESCRIPTION - DESCRIPTORS
DESCRIPTORS: ACHING
DESCRIPTORS: DISCOMFORT

## 2022-10-08 ASSESSMENT — PAIN DESCRIPTION - ORIENTATION: ORIENTATION: LEFT

## 2022-10-08 NOTE — PROGRESS NOTES
Confirmed with Leigh Ann Files NP with [facility] for pt to be discharged on Zithromax and Omnicef x4 days.

## 2022-10-08 NOTE — PLAN OF CARE
Problem: Safety - Adult  Goal: Free from fall injury  Outcome: Adequate for Discharge     Problem: Pain  Goal: Verbalizes/displays adequate comfort level or baseline comfort level  Outcome: Adequate for Discharge     Problem: ABCDS Injury Assessment  Goal: Absence of physical injury  Outcome: Adequate for Discharge     Problem: Nutrition Deficit:  Goal: Optimize nutritional status  Outcome: Adequate for Discharge

## 2022-10-08 NOTE — PROGRESS NOTES
IV removed. AVS given, all questions answered, pt verbalized understanding. VSS, NAD noted. All belongings sent with patient.

## 2022-10-08 NOTE — PROGRESS NOTES
Patient name: Marco A Farris  Patient : 1980  10/8/2022  8:01 AM  [address]    Portions of this note have been copied forward, however, changed to reflect the most current clinical status of this patient. Subjective: Afebrile. Alert oriented. Overall feels better. HISTORY OF PRESENT ILLNESS:  The patient is well-known to our clinic and was recently seen for a diagnosis of CML. She is currently on treatment with Nilotinib. She was just recently seen by Spring Lowry in clinic about a few days ago. The patient presented to the ER department with complaints of increased dyspnea on exertion. In addition, she had chest pain. She went to an outside ER where she was diagnosed with pneumonia. She had a dry cough and fatigue. She has an occupational history of working recent basis for the past 4 days. CTA was performed emergency showed no evidence of pulmonary embolism. Troponin was negative. D-dimer was slightly elevated. She received azithromycin and Rocephin in the emergency. She received Toradol and morphine for pain. She was admitted to the hospitalist.  10/5/2022-portable chest x-ray showed large left lower lobe infiltrate. 10/5/2022-CTA showed no evidence of pulmonary embolism but left lower lobe pulmonary infiltrate identified. PRIOR ONCOLOGIC HISTORY  Essentially, the patient is a 38years old female who has chronic CML diagnosed initially in . She is currently on erlotinib 400 mg p.o. twice daily. She is compliant with her medication. She denies any early satiety. She denies any other symptoms. She feels fatigued. She has been working 12 hours shift x6 days a week. She has some bilateral leg swelling and takes Lasix 20 mg p.o. every other day. She also take potassium pills. Unfortunately, she continues to smoke half pack a day. She is updated with age-appropriate cancer screening.      Diagnosis  CML,   BCR/ABL1 kinase 7.21%     Treatment Summary   [address] (Patient didn't take on regular basis)  02/2016 - 07/2021 Sprycel  07/30/21 - 12/2021 Bosulif  12/2021 Nilotinib 400mg BID     Hematology/Cancer History  Mariana Morris was diagnosed with CML in 2005 by Dr Angie Diallo. 2005 [address]iated [address] (Patient didn't take on regular basis)  09/17/13 Genotrace - IS QRT-PCR: 57.64%  09/20/2014 Genotrace - IS QRT-PCR: 40.52%  02/2016 Initiated Sprycel  2/10/16 CTA [facility]): Normal CT of the chest. No evidence of pulmonary embolism  03/05/2016 Genotrace - IS QRT-PCR: 30.9%  01/14/2016 Genotrace - IS QRT-PCR: 21.36%  07/28/17 Genotrace - IS QRT-PCR: 8.52%  06/12/16 Genotrace - IS QRT-PCR: 8.47%  01/31/2020  Marrow biopsy- persistent CML, chronic phase. Clonal T -cell population identified by PCR. Flow cytometry negative. 09/01/2020 Genotrace - IS QRT-PCR: 13.5%  09/01/2020-BCR/ABL PCR positive for BCR/ABL 1 rearrangement (13.5% of cells): Consistent with persistence of CML clone. 03/16/2021- BCR-ABL1 QPCR-positive: Major breakpoint (18.956%), minor breakpoint (0.033%). Interpretation: Consistent with residual CML. 06/08/2021- BCR-ABL 1 QPCR-positive (14.8403) for the BCR-ABL-1 e13a2 (b2a2, p219) fusion transcript  06/08/2021- CML chromosome/FISH profile: Abnormal- 54% of nuclei positive for BCR/ABL1 gene fusion  06/08/2021 - BCR-ABL1 Kinase Domain Mutation - Mutation detected within the BCR-ABL1 kinase domain. Nucleotide change: c.949T>C. 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 Comment: No plate changes detected within the BCR-ABL 1 kinase domain. Cellular and biochemical studies suggest that the resistance induced by this mutation may be overcome by dose escalation. 06/28/2021 and again on 07/07/2021, Dr Esther Santiago discussed results of the BCR/ABL 1 PCR quant, kinase domain mutation, and CML FISH profile from 6/8/2021 with Dr. Benigno Chavez (BMT at [facility]) who has seen the patient previously.  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 Dr. Jan Gary reviewed the mutation, noting resistance to dasatinib. Recommends nilotinib or bosutinib. If TKI options have been exhausted, will consider transplant.  07/28/2021-dasatinib discontinued   07/30/2021-Bosulif initiated  08/06/2021- BCR/ABL RT-PCR-positive: Major breakpoint (47.104%), minor breakpoint 0.034% consistent with persistent CML  08/06/2021-MPN/CML FISH panel: 1. Positive for a BCR/ABL1 rearrangement (65% of cells) consistent with persistence of CML clone. 09/07/2021-(+) for a BCR/ABL1 rearrangement (39% of cells). Major breakpoint (18.748%), minor breakpoint (0.017%)  11/11/2021-+) for a BCR/ABL1 rearrangement (42% of cells). 12/02/2021- Dr Tommy Merritt phone call: BCR/ABL 1Q PCR positive with 42% copies of BCR transcript. Patient has been on Bosulif since 07/30/2021 (BCR/ABL 48% at that time). Previously discussed with Dr. Jan Gary, BMT at [facility] who had seen the patient previously. 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 Today I spoke to the patient who swears that she has been taking her medicine daily except for 5 days when she had a \"stomach bug.\" But has been taking otherwise. I then spoke to Dr. Jan Gary who will again take a look at the mutation study that was done previously to see if we can use another TKI (i.e.: Ponatinib). 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 Jan Gary does not believe this would be helpful unless the patient is bridging towards transplant. If ponatinib is not felt to be indicated or fails, then the patient would qualify for transplant at that time. Awaiting word from Dr. Jan Gary prior to moving forward. The patient is made aware of the plans.  She side effects    OBJECTIVE:  VITALS: /73   Pulse 74   Temp 97 °F (36.1 °C)   Resp 18   Ht 5' 6\" (1.676 m)   Wt 170 lb 8 oz (77.3 kg)   SpO2 97%   BMI 27.52 kg/m²   I&O:   Intake/Output Summary (Last 24 hours) at 10/8/2022 0801  Last data filed at 10/8/2022 0511  Gross per 24 hour   Intake 560 ml   Output --   Net 560 ml            PHYSICAL EXAM:  CONSTITUTIONAL: Alert, appropriate, no acute distress. Requiring 2 L per nasal cannula  EYES: Non icteric, EOM intact, pupils equal round   ENT: Mucus membranes moist,external inspection of ears and nose are normal  NECK: Supple, no masses. No palpable thyroid mass  CHEST/LUNGS: Normal respiratory effort, rhonchi left upper lobe  CARDIOVASCULAR: RRR, no murmurs. No lower extremity edema  ABDOMEN: soft non-tender, active bowel sounds, no HSM. No palpable masses  EXTREMITIES: warm, full ROM in all 4 extremities, no focal weakness. SKIN: warm, dry with no rashes or lesions  LYMPH: No cervical, clavicular, axillary, or inguinal lymphadenopathy  NEUROLOGIC: follows commands, non focal   PSYCH: mood and affect appropriate.  Alert and oriented to time, place, person    BMP:   Recent Labs     10/07/22  0822 10/08/22  0453   * 139   K 3.1* 4.0   CL 99 102   CO2 23 25   BUN 9 10   CREATININE 0.8 0.7   GLUCOSE 100 107   CALCIUM 9.2 8.5*     Recent Labs     10/06/22  0433 10/05/22  1815 10/03/22  1006   WBC 10.8 10.6 10.36*   HGB 10.6* 11.3* 12.0   HCT 30.9* 33.2* 37.2   MCV 94.2 93.3 98.7*    216 256     CMP:    Recent Labs     10/05/22  1815 10/06/22  0433 10/07/22  0822 10/08/22  0453   *   < > 135* 139   K 3.0*   < > 3.1* 4.0   CL 99   < > 99 102   CO2 21*   < > 23 25   BUN 11   < > 9 10   CREATININE 1.0*   < > 0.8 0.7   GFRAA >59   < > >59 >59   LABGLOM >60   < > >60 >60   GLUCOSE 104   < > 100 107   PROT 7.2  --   --   --    LABALBU 3.2*  --   --   --    CALCIUM 8.9   < > 9.2 8.5*   BILITOT 0.4  --   --   --    ALKPHOS 82  --   --   --    AST 21 --   --   --    ALT 27  --   --   --     < > = values in this interval not displayed. 30Day lookback of cultures:    Blood Culture Recent:       Radiology:   XR CHEST PORTABLE    Result Date: 10/5/2022  NO PRIOR REPORT AVAILABLE [facility] data:Cough. Technique:Single view of the chest. Prior studies: No prior studies submitted. Findings:Large LLL infiltrate. Cardiac silhouette is within normal limits. No acute osseous abnormality is detected. Large LLL infiltrate Recommendation: Follow up as clinically indicated. Electronically Signed by Gale Mayes MD at 05-Oct-2022 08:05:31 PM             CTA PULMONARY W CONTRAST    Result Date: 10/5/2022  NO PRIOR REPORT AVAILABLE Exam: CTA OF THE CHEST WITH CONTRAST Clinical data: Shortness of breath, chest pain, elevated d-dimer. Technique: Axial CT angiography images through the lungs were acquired with contrast and imaged using soft tissue and lung algorithms. Coronal, sagittal, and 3D volume reconstructions were performed. Reformatted/3D-MPR images were performed. Radiation dose: CTDIvol =26.44 mGy, DLP =635 mGy x cm. Prior studies: Radiograph of the chest done on same day. Findings: Lungs: LLL pulmonary infiltrate identified. No pulmonary mass identified. No pleural effusions identified. No pneumothorax. The airway is clear. Soft Tissues: No mediastinal, axillary or supraclavicular adenopathy isidentified. Vascular: No filling defect within the pulmonary arteries to the segmental branch level. Unremarkable aorta. Grossly unremarkable sized heart. No definite abnormality seen on 3D reformatted images. Bony structures: No acute or destructive abnormality Upper Abdomen: Limited visualization of the solid upper abdominal organs is grossly unremarkable. 1. LLL pulmonary infiltrate identified 2. No evidence of PE Recommendation: Follow up as clinically indicated.  All CT scans at this facility utilize dose modulation, iterative reconstruction, and/or weight based dosing when appropriate to reduce radiation dose to as low as reasonably achievable.  Electronically Signed by Marylou Montana MD at 05-Oct-2022 10:22:29 PM               Medications  Current Facility-Administered Medications   Medication Dose Route Frequency Provider Last Rate Last Admin    Benzocaine-Menthol (CEPACOL) 1 lozenge  1 lozenge Oral Q2H PRN Nadya Lau MD        benzonatate (TESSALON) capsule 100 mg  100 mg Oral TID PRN Nadya Lau MD        guaiFENesin-codeine (TUSSI-ORGANIDIN NR) 100-10 MG/5ML syrup 5 mL  5 mL Oral Q4H PRN Nadya Lau MD   5 mL at 10/08/22 0240    potassium chloride (KLOR-CON M) extended release tablet 20 mEq  20 mEq Oral TID Erika Lucas MD   20 mEq at 10/07/22 2040    HYDROcodone-acetaminophen (NORCO) 7.5-325 MG per tablet 1 tablet  1 tablet Oral Q6H PRN Erika Lucas MD   1 tablet at 10/08/22 0240    cefTRIAXone (ROCEPHIN) 1,000 mg in sterile water 10 mL IV syringe  1,000 mg IntraVENous Q24H Nadya Lau MD   1,000 mg at 10/07/22 2132    azithromycin (ZITHROMAX) 250 mg in dextrose 5 % 250 mL IVPB  250 mg IntraVENous Q24H Nadya Lau MD   Stopped at 10/07/22 2252    potassium chloride (KLOR-CON M) extended release tablet 40 mEq  40 mEq Oral PRN Nadya Lau MD        Or    potassium bicarb-citric acid (EFFER-K) effervescent tablet 40 mEq  40 mEq Oral PRN Nadya Lau MD        Or    potassium chloride 10 mEq/100 mL IVPB (Peripheral Line)  10 mEq IntraVENous PRN Nadya Lau MD        magnesium sulfate 1000 mg in dextrose 5% 100 mL IVPB  1,000 mg IntraVENous PRN Nadya Lau MD        sodium chloride flush 0.9 % injection 5-40 mL  5-40 mL IntraVENous 2 times per day Nadya Lau MD   10 mL at 10/07/22 2041    sodium chloride flush 0.9 % injection 5-40 mL  5-40 mL IntraVENous PRN Nadya Lau MD        0.9 % sodium chloride infusion   IntraVENous PRN Nadya Lau MD        enoxaparin (LOVENOX) injection 40 mg  40 mg medication. Apparently, BCR/ABL was not at target in February 2022. She also had a new mutation as of June last year. She has been seen by Alistair Valente, BMT . Essentially, the patient has been on several prior line therapy.  -Currently on Tasigna  400mg p.o. twice daily. Patient claims compliance with her medication.  -Treatment currently being tolerated with complaints of bilateral leg swelling/pain  -June 2022-repeat BCR/ABL = 7.21%. Mutation panel not detected.  -10/3/2022-she had a quantitative BCR/ABL ordered     Plan  -Continue Tasigna 400 mg p.o. twice daily     Side effects-patient has bilateral leg edema.  -She is currently on Norvasc  -I asked her to discuss with PCP regarding stopping Norvasc  -This could also be related to her Tasigna  -Okay to continue Lasix 20 mg p.o. daily. Hypokalemia secondary to Lasix. Potassium replacement.  -Continue Lasix as needed. Hypokalemia -As per hospitalist  -Potassium 4 on 10/8/2022  -Continue oral replacement    Disposition- Okay from Hemonc standpoint to go home when stable and okay with hospitalist.  Consider discharge on Levaquin to complete another 5 days. PLAN:  Continue oral replace potassium, receiving 20 mEq every 8 hours  Continue Tasigna 400 mg p.o. twice daily  Norco as needed for chest pain  Continue Lasix  Consider discharge on Levaquin to complete another 5 days.   BMP daily, in process  Continue with supportive care      Stephanie Fam MD    10/08/22  8:01 AM

## 2022-10-08 NOTE — DISCHARGE SUMMARY
Discharge Summary      Date:10/8/2022        Patient Myrtle Oleary     Date of Birth:[birth year]     Age:42 y.o. Admit Date:10/5/2022   Admission Condition:fair   Discharged Condition:stable  Discharge Date: 10/08/22       Discharge Diagnoses   Principal Problem:    CAP (community acquired pneumonia) due to group B Streptococcus ([facility]  Active Problems:    Hypokalemia    CML (chronic myelocytic leukemia) ([facility]    Tobacco abuse  Resolved Problems:    * No resolved hospital problems. [facility] Stay   Narrative of Hospital Course:     68-year-old lady with a history of CML on Tasigna, who presented to the hospital with concerns of shortness of breath. Patient was recently seen at a hospital in [place] diagnosed with pneumonia however did not want to get admitted and wanted treatment locally. On admission CTA with no evidence of PE however showed left lower lobe infiltrates concerning for pneumonia. Was initiated on ceftriaxone and azithromycin and admitted for further work-up. Was seen in house by oncology recommended continuation of current treatment. Patient with significant improvement in house, vitals stable, weaned off oxygen and home O2 eval completed with no needs noted. Discharged home on Cefdinir and azithromycin for 4 more days to finish a 7 days total course. Patient is to follow up with PCP and oncology for continuous management of chronic medical problems. Physical Examination:  General: Well-developed, no acute distress lying comfortably in bed. HEENT: Atraumatic normocephalic, range of motion normal   Cardiac: Normal S1-S2 no murmurs rub or gallop.   Respiratory: clear To auscultation bilaterally, no wheezing  Abdomen: Soft, positive bowel sounds in all quadrants, no distention, nontender to palpation  Extremities: no tenderness, trace LE edema, moves all extremities  Psych: Affect normal and good eye contact, behavioral normal.      Consultants:   IP CONSULT TO ONCOLOGY    Time Spent on Discharge:  35 minutes were spent in patient examination, evaluation, counseling as well as medication reconciliation, prescriptions for required medications, discharge plan and follow up. Surgeries/Procedures Performed:  NONE     Significant Diagnostic Studies:   Recent Labs:  CBC:   Lab Results   Component Value Date/Time    WBC 10.8 10/06/2022 04:33 AM    RBC 3.28 10/06/2022 04:33 AM    HGB 10.6 10/06/2022 04:33 AM    HCT 30.9 10/06/2022 04:33 AM    MCV 94.2 10/06/2022 04:33 AM    MCH 32.3 10/06/2022 04:33 AM    MCHC 34.3 10/06/2022 04:33 AM    RDW 14.2 10/06/2022 04:33 AM     10/06/2022 04:33 AM     BMP:    Lab Results   Component Value Date/Time    GLUCOSE 107 10/08/2022 04:53 AM     10/08/2022 04:53 AM    K 4.0 10/08/2022 04:53 AM     10/08/2022 04:53 AM    CO2 25 10/08/2022 04:53 AM    ANIONGAP 12 10/08/2022 04:53 AM    BUN 10 10/08/2022 04:53 AM    CREATININE 0.7 10/08/2022 04:53 AM    CALCIUM 8.5 10/08/2022 04:53 AM    LABGLOM >60 10/08/2022 04:53 AM    GFRAA >59 10/08/2022 04:53 AM       Radiology Last 7 Days:  XR CHEST PORTABLE    Result Date: 10/5/2022  Large LLL infiltrate Recommendation: Follow up as clinically indicated. Electronically Signed by Bindu Holguin MD at 05-Oct-2022 08:05:31 PM             CTA PULMONARY W CONTRAST    Result Date: 10/5/2022  1. LLL pulmonary infiltrate identified 2. No evidence of PE Recommendation: Follow up as clinically indicated. All CT scans at this facility utilize dose modulation, iterative reconstruction, and/or weight based dosing when appropriate to reduce radiation dose to as low as reasonably achievable.  Electronically Signed by Bindu Holguin MD at 05-Oct-2022 10:22:29 PM               Discharge Plan   Disposition: Home    Provider Follow-Up:   [address]  [phone]    Follow up on 10/14/2022  AT 1:15 TO SEE  Cha MARINA       Patient Instructions   Diet: regular diet    Activity: activity as tolerated      Discharge Medications         Medication List        START taking these medications      azithromycin 250 MG tablet  Commonly known as: ZITHROMAX  Take 1 tablet by mouth daily for 4 days     benzonatate 100 MG capsule  Commonly known as: TESSALON  Take 1 capsule by mouth 3 times daily as needed for Cough     cefdinir 300 MG capsule  Commonly known as: OMNICEF  Take 1 capsule by mouth 2 times daily for 4 days            CHANGE how you take these medications      amLODIPine 10 MG tablet  Commonly known as: NORVASC  Take 1 tablet by mouth daily Do not take if BP is < 446 systolic or < 70 diastolic  What changed:   how to take this  additional instructions            CONTINUE taking these medications      furosemide 20 MG tablet  Commonly known as: LASIX  Take 1 tablet by mouth daily     nilotinib 200 MG capsule  Commonly known as: TASIGNA  Take 2 capsules by mouth 2 times daily     potassium chloride 10 MEQ extended release capsule  Commonly known as: MICRO-K  Take 1 capsule by mouth in the morning, at noon, and at bedtime     promethazine-dextromethorphan 6.25-15 MG/5ML syrup  Commonly known as: PROMETHAZINE-DM  Take 5 mLs by mouth every 12 hours as needed for Cough               Where to Get Your Medications        These medications were sent to [facility]/pharmacy #[id number], [address] [place] [address] -  [phone]  [address] [place] [address] RD., [address] [place] [id number]      Hours: 24-hours Phone: [phone]   azithromycin 250 MG tablet  benzonatate 100 MG capsule  cefdinir 300 MG capsule       Information about where to get these medications is not yet available    Ask your nurse or doctor about these medications  amLODIPine 10 MG tablet         Electronically signed by Yenni Messina MD on 10/8/22 at 11:22 AM CDT

## 2022-10-10 LAB
BLOOD CULTURE, ROUTINE: NORMAL
CULTURE, BLOOD 2: NORMAL

## 2022-10-14 ENCOUNTER — OFFICE VISIT (OUTPATIENT)
Dept: INTERNAL MEDICINE | Facility: CLINIC | Age: 42
End: 2022-10-14

## 2022-10-14 VITALS
SYSTOLIC BLOOD PRESSURE: 124 MMHG | OXYGEN SATURATION: 99 % | DIASTOLIC BLOOD PRESSURE: 88 MMHG | HEART RATE: 88 BPM | WEIGHT: 163 LBS | BODY MASS INDEX: 27.16 KG/M2 | HEIGHT: 65 IN

## 2022-10-14 DIAGNOSIS — I10 ESSENTIAL HYPERTENSION: ICD-10-CM

## 2022-10-14 DIAGNOSIS — C92.10 CML (CHRONIC MYELOCYTIC LEUKEMIA): ICD-10-CM

## 2022-10-14 DIAGNOSIS — R91.8 LEFT LOWER LOBE PULMONARY INFILTRATE: Primary | ICD-10-CM

## 2022-10-14 DIAGNOSIS — R05.9 COUGH, UNSPECIFIED TYPE: ICD-10-CM

## 2022-10-14 PROCEDURE — 99213 OFFICE O/P EST LOW 20 MIN: CPT | Performed by: NURSE PRACTITIONER

## 2022-10-14 RX ORDER — POTASSIUM CHLORIDE 1500 MG/1
20 TABLET, EXTENDED RELEASE ORAL 2 TIMES DAILY
COMMUNITY
Start: 2022-08-03

## 2022-10-14 RX ORDER — FUROSEMIDE 20 MG/1
1 TABLET ORAL DAILY
COMMUNITY
Start: 2022-07-07 | End: 2022-10-14

## 2022-10-14 RX ORDER — POTASSIUM CHLORIDE 750 MG/1
10 CAPSULE, EXTENDED RELEASE ORAL 3 TIMES DAILY
COMMUNITY
Start: 2022-08-04

## 2022-10-14 RX ORDER — BENZONATATE 100 MG/1
100 CAPSULE ORAL
COMMUNITY
Start: 2022-10-08 | End: 2022-10-16

## 2022-10-14 RX ORDER — AMLODIPINE BESYLATE 10 MG/1
10 TABLET ORAL DAILY
Qty: 90 TABLET | Refills: 1 | Status: SHIPPED | OUTPATIENT
Start: 2022-10-14

## 2022-10-14 NOTE — PROGRESS NOTES
Chief Complaint   Patient presents with   • Hospital Follow Up Visit        HPI     Winsome Becker is a 42 y.o. female presents for hospital follow up. She was found to have left lower lobe pneumonia. She was treated with azithromycin and rocephin. She was discharged and has completed a 7 day course of antibiotics. Shortness of breath has improved since discharge. She does still have a productive cough with some discomfort to her left side. She has lost weight and is feeling fatigued with low appetite.         ROS:  Review of Systems   Constitutional: Positive for fatigue. Negative for chills and diaphoresis.   Respiratory: Positive for cough and shortness of breath.    Cardiovascular: Negative for chest pain, palpitations and leg swelling.   Gastrointestinal: Negative for nausea.          reports that she has been smoking cigarettes. She started smoking about 14 years ago. She has a 12.04 pack-year smoking history. She has never used smokeless tobacco. She reports current alcohol use. She reports that she does not use drugs.    Current Outpatient Medications:   •  albuterol sulfate  (90 Base) MCG/ACT inhaler, Inhale 2 puffs Every 4 (Four) Hours As Needed for Wheezing., Disp: 8 g, Rfl: 0  •  amLODIPine (NORVASC) 10 MG tablet, Take 1 tablet by mouth Daily., Disp: 90 tablet, Rfl: 1  •  benzonatate (TESSALON) 100 MG capsule, Take 1 capsule by mouth., Disp: , Rfl:   •  furosemide (LASIX) 20 MG tablet, TAKE 1 TABLET BY MOUTH EVERY DAY, Disp: 30 tablet, Rfl: 0  •  KLOR-CON 20 MEQ CR tablet, Take 1 tablet by mouth 2 (Two) Times a Day., Disp: , Rfl:   •  nilotinib (TASIGNA) 200 MG capsule capsule, Take 2 capsules by mouth 2 (Two) Times a Day. Take on EMPTY stomach, 1 hour before or 2 hours after a meal., Disp: 120 capsule, Rfl: 0  •  potassium chloride (MICRO-K) 10 MEQ CR capsule, Take 1 capsule by mouth 3 (Three) Times a Day., Disp: , Rfl:   •  Dextromethorphan-guaiFENesin 5-50 MG/5ML syrup, Take 5 mL by mouth 3  "(Three) Times a Day As Needed (cough) for up to 10 days., Disp: 118 mL, Rfl: 1  •  nilotinib (Tasigna) 200 MG capsule capsule, TAKE 2 CAPSULES BY MOUTH 2 TIMES A DAY. TAKE ON AN EMPTY STOMACH, 1 HOUR BEFORE OR 2 HOURS AFTER A MEAL., Disp: 112 capsule, Rfl: 1  •  Nilotinib HCl (TASIGNA) 200 MG capsule capsule, Take 2 capsules by mouth 2 (Two) Times a Day. Take on EMPTY stomach, 1 hour before or 2 hours after a meal., Disp: 120 capsule, Rfl: 3    OBJECTIVE:  /88 (BP Location: Left arm, Patient Position: Sitting, Cuff Size: Adult)   Pulse 88   Ht 165.1 cm (65\")   Wt 73.9 kg (163 lb)   SpO2 99%   BMI 27.12 kg/m²    Physical Exam  Constitutional:       General: She is not in acute distress.  Cardiovascular:      Rate and Rhythm: Normal rate and regular rhythm.      Heart sounds: Normal heart sounds.   Pulmonary:      Effort: Pulmonary effort is normal.      Breath sounds: Normal breath sounds. No wheezing or rhonchi.   Chest:      Chest wall: Tenderness present.             ASSESSMENT/PLAN:     Diagnoses and all orders for this visit:    1. Left lower lobe pulmonary infiltrate (Primary)  2. Cough, unspecified type  -     Dextromethorphan-guaiFENesin 5-50 MG/5ML syrup; Take 5 mL by mouth 3 (Three) Times a Day As Needed (cough) for up to 10 days.  Dispense: 118 mL; Refill: 1  Cough medication to help cough and hopefully help rib pain continue to improve. Continue tylenol and ibuprofen as needed. If not better patient advised to call sooner for follow up.     3. CML (chronic myelocytic leukemia) (HCC)  Continues Tasigna, follow up with Dr. Bernal next month.     4. Essential hypertension  -     amLODIPine (NORVASC) 10 MG tablet; Take 1 tablet by mouth Daily.  Dispense: 90 tablet; Refill: 1  Well controlled, BP goal for age is <140/90 per JNC 8 guidelines and continue current medications     Encouraged to drink protein shake or supplements as tolerated to increase calorie intake and prevent further weight loss. " Discussed getting the pneumonia vaccine in the future to help prevent infections.     An After Visit Summary was printed and given to the patient at discharge.  Return in about 6 months (around 4/14/2023) for Annual physical with Dr. Crockett .          SANCHO Bai 10/14/2022   Electronically signed.

## 2022-10-20 DIAGNOSIS — E87.6 LOW SERUM POTASSIUM LEVEL: ICD-10-CM

## 2022-10-20 RX ORDER — POTASSIUM CHLORIDE 1500 MG/1
TABLET, EXTENDED RELEASE ORAL
Qty: 90 TABLET | Refills: 0 | OUTPATIENT
Start: 2022-10-20

## 2022-10-27 ENCOUNTER — TELEPHONE (OUTPATIENT)
Dept: INTERNAL MEDICINE | Facility: CLINIC | Age: 42
End: 2022-10-27

## 2022-10-27 NOTE — TELEPHONE ENCOUNTER
I spoke to Claudia with pharmacy and she stated patient picked up a 90 day supply of amlodipine in September, so the earliest she can fill it again through her insurance is 11/23/22.    Patient informed.  She said she doesn't think she has the medication but she will look for it.

## 2022-10-27 NOTE — TELEPHONE ENCOUNTER
PLEASE CALL IN AMLODIPINE 10 MG TABLET.. PT STATES THAT IT IS IN OUR COMPUTER TO BE CALLED IN BUT THE PHARMACY DOES NOT HAVE IT.. [facility] [address]

## 2022-11-08 ENCOUNTER — HOSPITAL ENCOUNTER (EMERGENCY)
Facility: HOSPITAL | Age: 42
Discharge: HOME | End: 2022-11-08
Attending: FAMILY MEDICINE | Admitting: FAMILY MEDICINE

## 2022-11-08 ENCOUNTER — TELEPHONE (OUTPATIENT)
Dept: INTERNAL MEDICINE | Facility: CLINIC | Age: 42
End: 2022-11-08

## 2022-11-08 ENCOUNTER — APPOINTMENT (OUTPATIENT)
Dept: GENERAL RADIOLOGY | Facility: HOSPITAL | Age: 42
End: 2022-11-08

## 2022-11-08 VITALS
HEART RATE: 73 BPM | DIASTOLIC BLOOD PRESSURE: 84 MMHG | TEMPERATURE: 98.6 F | HEIGHT: 65 IN | SYSTOLIC BLOOD PRESSURE: 135 MMHG | BODY MASS INDEX: 27.32 KG/M2 | RESPIRATION RATE: 18 BRPM | OXYGEN SATURATION: 100 % | WEIGHT: 164 LBS

## 2022-11-08 DIAGNOSIS — J18.9 PNEUMONIA OF LEFT LUNG DUE TO INFECTIOUS ORGANISM, UNSPECIFIED PART OF LUNG: ICD-10-CM

## 2022-11-08 DIAGNOSIS — R07.81 PLEURITIC CHEST PAIN: Primary | ICD-10-CM

## 2022-11-08 LAB
ALBUMIN SERPL-MCNC: 4.5 G/DL (ref 3.5–5.2)
ALBUMIN/GLOB SERPL: 1.4 G/DL
ALP SERPL-CCNC: 105 U/L (ref 39–117)
ALT SERPL W P-5'-P-CCNC: 24 U/L (ref 1–33)
ANION GAP SERPL CALCULATED.3IONS-SCNC: 10 MMOL/L (ref 5–15)
AST SERPL-CCNC: 36 U/L (ref 1–32)
BASOPHILS # BLD AUTO: 0.05 10*3/MM3 (ref 0–0.2)
BASOPHILS NFR BLD AUTO: 1.2 % (ref 0–1.5)
BILIRUB SERPL-MCNC: 0.5 MG/DL (ref 0–1.2)
BUN SERPL-MCNC: 14 MG/DL (ref 6–20)
BUN/CREAT SERPL: 20.9 (ref 7–25)
CALCIUM SPEC-SCNC: 8.6 MG/DL (ref 8.6–10.5)
CHLORIDE SERPL-SCNC: 104 MMOL/L (ref 98–107)
CO2 SERPL-SCNC: 27 MMOL/L (ref 22–29)
CREAT SERPL-MCNC: 0.67 MG/DL (ref 0.57–1)
D DIMER PPP FEU-MCNC: 2.35 MCGFEU/ML (ref 0–0.5)
DEPRECATED RDW RBC AUTO: 63.8 FL (ref 37–54)
EGFRCR SERPLBLD CKD-EPI 2021: 110.7 ML/MIN/1.73
EOSINOPHIL # BLD AUTO: 0.18 10*3/MM3 (ref 0–0.4)
EOSINOPHIL NFR BLD AUTO: 4.3 % (ref 0.3–6.2)
ERYTHROCYTE [DISTWIDTH] IN BLOOD BY AUTOMATED COUNT: 17.2 % (ref 12.3–15.4)
GLOBULIN UR ELPH-MCNC: 3.3 GM/DL
GLUCOSE SERPL-MCNC: 93 MG/DL (ref 65–99)
HCT VFR BLD AUTO: 40.1 % (ref 34–46.6)
HGB BLD-MCNC: 12.8 G/DL (ref 12–15.9)
IMM GRANULOCYTES # BLD AUTO: 0.02 10*3/MM3 (ref 0–0.05)
IMM GRANULOCYTES NFR BLD AUTO: 0.5 % (ref 0–0.5)
LYMPHOCYTES # BLD AUTO: 1.52 10*3/MM3 (ref 0.7–3.1)
LYMPHOCYTES NFR BLD AUTO: 36.1 % (ref 19.6–45.3)
MCH RBC QN AUTO: 31.8 PG (ref 26.6–33)
MCHC RBC AUTO-ENTMCNC: 31.9 G/DL (ref 31.5–35.7)
MCV RBC AUTO: 99.8 FL (ref 79–97)
MONOCYTES # BLD AUTO: 0.35 10*3/MM3 (ref 0.1–0.9)
MONOCYTES NFR BLD AUTO: 8.3 % (ref 5–12)
NEUTROPHILS NFR BLD AUTO: 2.09 10*3/MM3 (ref 1.7–7)
NEUTROPHILS NFR BLD AUTO: 49.6 % (ref 42.7–76)
NRBC BLD AUTO-RTO: 0 /100 WBC (ref 0–0.2)
PLATELET # BLD AUTO: 232 10*3/MM3 (ref 140–450)
PMV BLD AUTO: 9.5 FL (ref 6–12)
POTASSIUM SERPL-SCNC: 3.5 MMOL/L (ref 3.5–5.2)
PROT SERPL-MCNC: 7.8 G/DL (ref 6–8.5)
RBC # BLD AUTO: 4.02 10*6/MM3 (ref 3.77–5.28)
SODIUM SERPL-SCNC: 141 MMOL/L (ref 136–145)
TROPONIN T SERPL-MCNC: <0.01 NG/ML (ref 0–0.03)
WBC NRBC COR # BLD: 4.21 10*3/MM3 (ref 3.4–10.8)

## 2022-11-08 PROCEDURE — 85379 FIBRIN DEGRADATION QUANT: CPT | Performed by: FAMILY MEDICINE

## 2022-11-08 PROCEDURE — 99282 EMERGENCY DEPT VISIT SF MDM: CPT

## 2022-11-08 PROCEDURE — 93010 ELECTROCARDIOGRAM REPORT: CPT | Performed by: EMERGENCY MEDICINE

## 2022-11-08 PROCEDURE — 84484 ASSAY OF TROPONIN QUANT: CPT | Performed by: FAMILY MEDICINE

## 2022-11-08 PROCEDURE — 93005 ELECTROCARDIOGRAM TRACING: CPT | Performed by: FAMILY MEDICINE

## 2022-11-08 PROCEDURE — 71045 X-RAY EXAM CHEST 1 VIEW: CPT

## 2022-11-08 PROCEDURE — 36415 COLL VENOUS BLD VENIPUNCTURE: CPT

## 2022-11-08 PROCEDURE — 80053 COMPREHEN METABOLIC PANEL: CPT | Performed by: FAMILY MEDICINE

## 2022-11-08 PROCEDURE — 85025 COMPLETE CBC W/AUTO DIFF WBC: CPT | Performed by: FAMILY MEDICINE

## 2022-11-08 NOTE — ED PROVIDER NOTES
Subjective   History of Present Illness  Patient presents emergency room today complaining of some left-sided chest wall pain.  Patient states that it began about Sunday after Faith.  Patient states the pain is worse when she takes a deep breath.  Patient states that she has been coughing as well which causes the pain.  Patient denies any productive sputum.  Patient states that about 1 month ago she did get diagnosed with pneumonia where she was hospitalized for that.  Patient states that she has no chills, she states that she did feel like she had a fever where she felt warm to the touch but did not check her actual temperature.  Patient has no nausea vomiting.  Patient states that she has been excessively fatigued as well.    History provided by:  Patient      Review of Systems   Constitutional: Positive for fatigue.   Respiratory: Positive for cough and shortness of breath.    Cardiovascular: Positive for chest pain.   All other systems reviewed and are negative.      Past Medical History:   Diagnosis Date   • Asthma    • Bronchitis, chronic (HCC)    • CML (chronic myelocytic leukemia) (HCC)    • Foot fracture    • Hypertension        Allergies   Allergen Reactions   • Latex Rash   • Penicillins Rash       Past Surgical History:   Procedure Laterality Date   • COLONOSCOPY N/A 12/4/2019    Tubular adenoma at 40 cm, Diverticulosis repeat exam in 5 years   • DENTAL PROCEDURE      emergency surgery for tooth extraction   • ENDOSCOPY N/A 12/4/2019    Enlarged gastric folds showing marked chronic active gastritis + for H Pylori treated   • EPIGASTRIC HERNIA REPAIR N/A 12/7/2021    Procedure: OPEN EPIGASTRIC HERNIA REPAIR WITH POSSIBLE MESH;  Surgeon: Shelbie Doran MD;  Location: [facility];  Service: General;  Laterality: N/A;   • TUBAL ABDOMINAL LIGATION  2000       Family History   Problem Relation Age of Onset   • Breast cancer Neg Hx    • Colon cancer Neg Hx    • Colon polyps Neg Hx        Social History      Socioeconomic History   • Marital status: Single   Tobacco Use   • Smoking status: Every Day     Packs/day: 0.86     Years: 14.00     Pack years: 12.04     Types: Cigarettes     Start date: 2008   • Smokeless tobacco: Never   • Tobacco comments:     1/2 pack a day   Vaping Use   • Vaping Use: Never used   Substance and Sexual Activity   • Alcohol use: Yes     Comment: Occasional   • Drug use: No   • Sexual activity: Yes     Partners: Male     Birth control/protection: Condom           Objective   Physical Exam  Vitals and nursing note reviewed.   Constitutional:       General: She is not in acute distress.     Appearance: Normal appearance.   HENT:      Head: Normocephalic and atraumatic.      Mouth/Throat:      Mouth: Mucous membranes are moist.   Cardiovascular:      Rate and Rhythm: Normal rate and regular rhythm.      Heart sounds: Normal heart sounds.   Pulmonary:      Effort: Pulmonary effort is normal. No respiratory distress.      Breath sounds: Normal breath sounds. No wheezing, rhonchi or rales.   Chest:      Chest wall: Tenderness present.   Abdominal:      General: Bowel sounds are normal. There is no distension.      Palpations: Abdomen is soft.      Tenderness: There is no abdominal tenderness.   Musculoskeletal:      Cervical back: Normal range of motion and neck supple.   Skin:     General: Skin is warm and dry.   Neurological:      General: No focal deficit present.      Mental Status: She is alert and oriented to person, place, and time.   Psychiatric:         Mood and Affect: Mood normal.         Behavior: Behavior normal.         Procedures           ED Course                                           MDM  Number of Diagnoses or Management Options     Amount and/or Complexity of Data Reviewed  Clinical lab tests: reviewed and ordered  Tests in the radiology section of CPT®: reviewed and ordered  Tests in the medicine section of CPT®: reviewed      Patient does appear to still have pneumonia  on her chest x-ray.  Patient will be given doxycycline for 5-day course.  Patient was advised to follow-up with her primary care provider in 2 to 4 weeks for repeat x-ray to see if there has been resolution of his pneumonia.  Patient is in no respiratory distress and not requiring any oxygen.  Patient was advised to return to the emergency room with new or worsening symptoms.  Final diagnoses:   Pleuritic chest pain   Pneumonia of left lung due to infectious organism, unspecified part of lung       ED Disposition  ED Disposition     ED Disposition   Discharge    Condition   Stable    Comment   --             Sid Crockett, DO  [address] [facility] [address]  [facility] [id number] [phone]          [facility] Emergency Department  [address] [facility] [address] [phone]    As needed, If symptoms worsen         Medication List      No changes were made to your prescriptions during this visit.          Manav Fuentes MD  11/08/22 1155

## 2022-11-08 NOTE — TELEPHONE ENCOUNTER
Caller: Winsome Becker    Relationship to patient: Self    Best call back number: [phone]    Chief complaint: SHORTNESS OF BREATH AT REST, DIFFICULTY BREATHING, PAIN AROUND RIB AREA    Patient directed to call 911 or go to their nearest emergency room.     Patient verbalized understanding: [x] Yes  [] No  If no, why?

## 2022-11-08 NOTE — TELEPHONE ENCOUNTER
I called to check on patient because I did not see in her chart that she had gone to the emergency room.  She said she had not gone yet but she will now.

## 2022-11-09 LAB
QT INTERVAL: 430 MS
QTC INTERVAL: 477 MS

## 2022-11-11 DIAGNOSIS — R60.0 LEG EDEMA: ICD-10-CM

## 2022-11-11 DIAGNOSIS — Z71.89 CARE PLAN DISCUSSED WITH PATIENT: ICD-10-CM

## 2022-11-11 DIAGNOSIS — C92.10 CML (CHRONIC MYELOCYTIC LEUKEMIA) (HCC): ICD-10-CM

## 2022-11-11 RX ORDER — FUROSEMIDE 20 MG/1
TABLET ORAL
Qty: 90 TABLET | Refills: 3 | Status: SHIPPED | OUTPATIENT
Start: 2022-11-11

## 2022-11-14 ENCOUNTER — TELEPHONE (OUTPATIENT)
Dept: INTERNAL MEDICINE | Facility: CLINIC | Age: 42
End: 2022-11-14

## 2022-11-14 NOTE — TELEPHONE ENCOUNTER
Caller: Winsome Becker    Relationship: Self    Best call back number: [phone]    What medication are you requesting: COUGH AND PHENOMENA MEDICATION     What are your current symptoms: COUGH AND PHENOMENA     How long have you been experiencing symptoms: ABOUT A MONTH     Have you had these symptoms before:    [x] Yes  [] No    Have you been treated for these symptoms before:   [x] Yes  [] No    If a prescription is needed, what is your preferred pharmacy and phone number: [id number] - [place], [place] - [address] AT ACROSS FROM [address]  [phone] [facility] [phone] FX     PATIENT WENT TO [facility] LAST WEEK AND STILL HAS PHENOMENA

## 2022-11-14 NOTE — TELEPHONE ENCOUNTER
Given her CML without improvement on antibiotics, I would recommend she return to the ER as they probably need to consider antibiotics through the vein to better treat if it is not getting better on Xray.

## 2022-11-15 ENCOUNTER — OFFICE VISIT (OUTPATIENT)
Dept: HEMATOLOGY | Age: 42
End: 2022-11-15

## 2022-11-15 ENCOUNTER — HOSPITAL ENCOUNTER (OUTPATIENT)
Dept: INFUSION THERAPY | Age: 42
Discharge: HOME | End: 2022-11-15
Payer: MEDICAID

## 2022-11-15 VITALS
WEIGHT: 167 LBS | SYSTOLIC BLOOD PRESSURE: 122 MMHG | BODY MASS INDEX: 26.95 KG/M2 | HEART RATE: 97 BPM | DIASTOLIC BLOOD PRESSURE: 84 MMHG | OXYGEN SATURATION: 96 %

## 2022-11-15 DIAGNOSIS — C92.10 CML (CHRONIC MYELOCYTIC LEUKEMIA) (HCC): ICD-10-CM

## 2022-11-15 DIAGNOSIS — R60.0 LEG EDEMA: ICD-10-CM

## 2022-11-15 DIAGNOSIS — C92.10 CML (CHRONIC MYELOCYTIC LEUKEMIA) (HCC): Primary | ICD-10-CM

## 2022-11-15 DIAGNOSIS — Z71.89 CARE PLAN DISCUSSED WITH PATIENT: ICD-10-CM

## 2022-11-15 LAB
BASOPHILS ABSOLUTE: 0.03 K/UL (ref 0.01–0.08)
BASOPHILS RELATIVE PERCENT: 0.5 % (ref 0.1–1.2)
EOSINOPHILS ABSOLUTE: 0.09 K/UL (ref 0.04–0.54)
EOSINOPHILS RELATIVE PERCENT: 1.4 % (ref 0.7–7)
HCT VFR BLD CALC: 38.7 % (ref 34.1–44.9)
HEMOGLOBIN: 12.6 G/DL (ref 11.2–15.7)
LYMPHOCYTES ABSOLUTE: 2.37 K/UL (ref 1.18–3.74)
LYMPHOCYTES RELATIVE PERCENT: 37.1 % (ref 19.3–53.1)
MCH RBC QN AUTO: 32.6 PG (ref 25.6–32.2)
MCHC RBC AUTO-ENTMCNC: 32.6 G/DL (ref 32.3–35.5)
MCV RBC AUTO: 100 FL (ref 79.4–94.8)
MONOCYTES ABSOLUTE: 0.54 K/UL (ref 0.24–0.82)
MONOCYTES RELATIVE PERCENT: 8.5 % (ref 4.7–12.5)
NEUTROPHILS ABSOLUTE: 3.33 K/UL (ref 1.56–6.13)
NEUTROPHILS RELATIVE PERCENT: 52 % (ref 34–71.1)
PDW BLD-RTO: 16.2 % (ref 11.7–14.4)
PLATELET # BLD: 227 K/UL (ref 182–369)
PMV BLD AUTO: 10.2 FL (ref 7.4–10.4)
RBC # BLD: 3.87 M/UL (ref 3.93–5.22)
WBC # BLD: 6.39 K/UL (ref 3.98–10.04)

## 2022-11-15 PROCEDURE — 85025 COMPLETE CBC W/AUTO DIFF WBC: CPT

## 2022-11-15 PROCEDURE — 36415 COLL VENOUS BLD VENIPUNCTURE: CPT

## 2022-11-15 RX ORDER — GUAIFENESIN/DEXTROMETHORPHAN 100-5 MG/5
LIQUID (ML) ORAL
COMMUNITY
Start: 2022-10-14

## 2022-11-15 RX ORDER — DOXYCYCLINE HYCLATE 100 MG
TABLET ORAL
COMMUNITY
Start: 2022-11-09

## 2022-11-15 RX ORDER — ALBUTEROL SULFATE 90 UG/1
2 AEROSOL, METERED RESPIRATORY (INHALATION) EVERY 4 HOURS PRN
COMMUNITY
Start: 2022-04-04

## 2022-11-18 NOTE — PROGRESS NOTES
MEDICAL ONCOLOGY PROGRESS NOTE    Pt Name: Peggy Cazares  MRN: [id number]  YOB: 1980  Date of evaluation: 11/22/2022    HISTORY OF PRESENT ILLNESS:  Essentially, the patient is a 38years old female who has chronic CML diagnosed initially in 2005. She is currently on erlotinib 400 mg p.o. twice daily. She tells me that she has been compliant with her medication. She visited the emergency room on the day with concern of persistent pneumonia. She has completed antibiotic. She is feeling overall much better. She still has a residual dry cough but overall her breathing has come back to normal.  She denies any fever. She has been taking Lasix on a as needed basis for leg edema. Diagnosis  CML, 2005  BCR/ABL1 kinase 7.21%    Treatment Summary  2005 Gleevec (Patient didn't take on regular basis)  02/2016 - 07/2021 Sprycel  07/30/21 - 12/2021 Bosulif  12/2021 Nilotinib 400mg BID    Hematology/Cancer History  Peggy Cazares was diagnosed with CML in 2005 by Dr Kimo Hogue. 2005 Initiated [address] (Patient didn't take on regular basis)  09/17/13 Genotrace - IS QRT-PCR: 57.64%  09/20/2014 Genotrace - IS QRT-PCR: 40.52%  02/2016 Initiated Sprycel  2/10/16 [facility]): Normal CT of the chest. No evidence of pulmonary embolism  03/05/2016 Genotrace - IS QRT-PCR: 30.9%  01/14/2016 Genotrace - IS QRT-PCR: 21.36%  07/28/17 Genotrace - IS QRT-PCR: 8.52%  06/12/16 Genotrace - IS QRT-PCR: 8.47%  01/31/2020  Marrow biopsy- persistent CML, chronic phase. Clonal T -cell population identified by PCR. Flow cytometry negative. 09/01/2020 Genotrace - IS QRT-PCR: 13.5%  09/01/2020-BCR/ABL PCR positive for BCR/ABL 1 rearrangement (13.5% of cells): Consistent with persistence of CML clone. 03/16/2021- BCR-ABL1 QPCR-positive: Major breakpoint (18.956%), minor breakpoint (0.033%). Interpretation: Consistent with residual CML.   06/08/2021- BCR-ABL 1 QPCR-positive (14.8403) for the BCR-ABL-1 e13a2 (b2a2, p219) fusion transcript  06/08/2021- CML chromosome/FISH profile: Abnormal- 54% of nuclei positive for BCR/ABL1 gene fusion  06/08/2021 - BCR-ABL1 Kinase Domain Mutation - Mutation detected within the BCR-ABL1 kinase domain. Nucleotide change: c.949T>C. 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 Comment: No plate changes detected within the BCR-ABL 1 kinase domain. Cellular and biochemical studies suggest that the resistance induced by this mutation may be overcome by dose escalation. 06/28/2021 and again on 07/07/2021, Dr Jackie Orr discussed results of the BCR/ABL 1 PCR quant, kinase domain mutation, and CML FISH profile from 6/8/2021 with Dr. Fernando Melara (BMT at [facility] [place]) who has seen the patient previously. 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 Dr. Fernando Melara reviewed the mutation, noting resistance to dasatinib. Recommends nilotinib or bosutinib. If TKI options have been exhausted, will consider transplant.  07/28/2021-dasatinib discontinued   07/30/2021-Bosulif initiated  08/06/2021- BCR/ABL RT-PCR-positive: Major breakpoint (47.104%), minor breakpoint 0.034% consistent with persistent CML  08/06/2021-MPN/CML FISH panel: 1. Positive for a BCR/ABL1 rearrangement (65% of cells) consistent with persistence of CML clone. 09/07/2021-(+) for a BCR/ABL1 rearrangement (39% of cells). Major breakpoint (18.748%), minor breakpoint (0.017%)  11/11/2021-+) for a BCR/ABL1 rearrangement (42% of cells). 12/02/2021- Dr Jackie Orr phone call: BCR/ABL 1Q PCR positive with 42% copies of BCR transcript. Patient has been on Bosulif since 07/30/2021 (BCR/ABL 48% at that time). Previously discussed with Dr. Fernando Melara, BMT at [facility] who had seen the patient previously.  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 Today I spoke to the patient who swears that she has been taking her medicine daily except for 5 days when she had a \"stomach bug.\" But has been taking otherwise. I then spoke to Dr. Fernando Melara who will again take a look at the mutation study that was done previously to see if we can use another TKI (i.e.: Ponatinib). 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 Fernando Melara does not believe this would be helpful unless the patient is bridging towards transplant. If ponatinib is not felt to be indicated or fails, then the patient would qualify for transplant at that time. Awaiting word from Dr. Fernando Melara prior to moving forward. The patient is made aware of the plans. She verbalized understanding and agreement  12/10/2021-(+) for a BCR/ABL1 rearrangement (31% of cells). Major breakpoint (15.085%), minor breakpoint (0.008%)  12/30/2021- Nilotinib initiated- was taking 400 mg daily (error on her part but claims \"the pharmacy wrote it that way\") instead of q12h as prescribed - until 02/29/2022 when she started taking q12h.  02/17/2022-BCR/ABL-FISH positive 41.5%: RT-PCR major breakpoint (40.046%) consistent with residual CML.  4/26/22 US bilateral lower extremity [facility]): Normal duplex ultrasound of the bilateral lower extremities without evidence of venous thrombus. 5/4/22- Transthoracic echo [facility]): Left ventricular systolic function is normal. Left ventricular ejection fraction appears to be 61-65%. Normal right ventricular cavity size and systolic function noted. No hemodynamically significant valvular abnormalities identified on this study. 6/23/2022 [facility]  BCR-ABL1 is 7.21%; MMR: NO.  BCR/ABL 1 mutation not detected. 6/23/2022-she was first seen by me. She was transferred from Dr. Roth [facility]. Apparently she was discharged from that practice due to compliance issues.   The patient tells me that she has issues compliance with her medical visits due to her work schedule. She is to continue her nilotinib for now. Recommended to repeat quantitative BCR/ABL and mutation panel. 7/7/2022-patient is to continue nilotinib 400 mg p.o. twice daily at this time. Interval decrease quantitative BCR/ABL. 7. 21% (previously 41%). 10/3/2022 BCR/ABL 16.99%  10/5/2022 CTA Pulmonary W Contrast LLL pulmonary infiltrate identified. No evidence of PE. She was treated for pneumonia during inpatient admission at [facility].      Past Medical History:    Past Medical History:   Diagnosis Date    CML (chronic myelocytic leukemia) ([facility] [address]        Past Surgical History:    Past Surgical History:   Procedure Laterality Date    TUBAL LIGATION         Social History:    Marital status:   Smoking status:Currently; 1/2 pack daily;12 years  ETOH status:Occasionally  Resides: [facility]    Family History:   No family history on file.     Current Hospital Medications:    Current Outpatient Medications   Medication Sig Dispense Refill    albuterol sulfate HFA (PROVENTIL;VENTOLIN;PROAIR) 108 (90 Base) MCG/ACT inhaler Inhale 2 puffs into the lungs every 4 hours as needed      furosemide (LASIX) 20 MG tablet TAKE 1 TABLET BY MOUTH EVERY DAY 90 tablet 3    amLODIPine (NORVASC) 10 MG tablet Take 1 tablet by mouth daily Do not take if BP is < 643 systolic or < 70 diastolic 30 tablet 3    potassium chloride (MICRO-K) 10 MEQ extended release capsule Take 1 capsule by mouth in the morning, at noon, and at bedtime 90 capsule 5    nilotinib (TASIGNA) 200 MG capsule Take 2 capsules by mouth 2 times daily 120 capsule 3    promethazine-dextromethorphan (PROMETHAZINE-DM) 6.25-15 MG/5ML syrup Take 5 mLs by mouth every 12 hours as needed for Cough 100 mL 0    doxycycline hyclate (VIBRA-TABS) 100 MG tablet TAKE 1 TABLET BY MOUTH EVERY 12 HOURS FOR 5 DAYS (Patient not taking: Reported on 11/22/2022)      [facility] COUGH [facility] CHEST [facility]STION  MG/20ML LIQD (Patient not taking: Reported on 11/22/2022)       No current facility-administered medications for this visit. Allergies: Allergies   Allergen Reactions    Latex     Penicillins          Subjective   REVIEW OF SYSTEMS:   CONSTITUTIONAL: no fever, no night sweats, mild fatigue;  HEENT: no blurring of vision, no double vision, no hearing difficulty, no tinnitus, no ulceration, no dysplasia, no epistaxis;   LUNGS: no cough, no hemoptysis, no wheeze,  exertional shortness of breath;  CARDIOVASCULAR: no palpitation, no chest pain, exertional shortness of breath;  GI: no abdominal pain, no nausea, no vomiting, no diarrhea, no constipation;  ROHAN: no dysuria, no hematuria, no frequency or urgency, no nephrolithiasis;  MUSCULOSKELETAL: no joint pain, no swelling, no stiffness; bilateral leg edema  ENDOCRINE: no polyuria, no polydipsia, no cold or heat intolerance;  HEMATOLOGY: no easy bruising or bleeding, no history of clotting disorder;  DERMATOLOGY: no skin rash, no eczema, no pruritus;  PSYCHIATRY: no depression, no anxiety, no panic attacks, no suicidal ideation, no homicidal ideation;  NEUROLOGY: no syncope, no seizures, no numbness or tingling of hands, no numbness or tingling of feet, no paresis;       Objective   /82   Pulse 82   Ht 5' 6\" (1.676 m)   Wt 169 lb 14.4 oz (77.1 kg)   SpO2 99%   BMI 27.42 kg/m²     PHYSICAL EXAM:  CONSTITUTIONAL: Alert, appropriate, no acute distress  EYES: Non icteric, EOM intact, pupils equal round   ENT: Mucus membranes moist, no oral pharyngeal lesions, external inspection of ears and nose are normal.  NECK: Supple, no masses. No palpable thyroid mass  CHEST/LUNGS: CTA bilaterally, normal respiratory effort   CARDIOVASCULAR:tachycardic,  no murmurs. No lower extremity edema  ABDOMEN: soft non-tender, active bowel sounds, no HSM. No palpable masses  EXTREMITIES: warm, full ROM in all 4 extremities, no focal weakness.   SKIN: warm, dry with no rashes or lesions  LYMPH: No cervical, clavicular, axillary, or inguinal lymphadenopathy  NEUROLOGIC: follows commands, non focal   PSYCH: mood and affect appropriate. Alert and oriented to time, place, person    LABORATORY RESULTS REVIEWED/ANALYZED BY ME:  10/3/2022 BCR/ABL 16.99%  Lab Results   Component Value Date    WBC 6.39 11/15/2022    HGB 12.6 11/15/2022    HCT 38.7 11/15/2022    .0 (H) 11/15/2022     11/15/2022     Lab Results   Component Value Date    NEUTROABS 3.33 11/15/2022     RADIOLOGY STUDIES REVIEWED BY ME:  10/5/2022 CTA Pulmonary W Contrast LLL pulmonary infiltrate identified. No evidence of PE    ASSESSMENT:    No orders of the defined types were placed in this encounter. Jono Armstrong was seen today for follow-up. Diagnoses and all orders for this visit:    CML (chronic myelocytic leukemia) ([facility] [address]    Care plan discussed with patient        CML with 317 mutation as per last study  She has been transferred from [facility] oncology, Dr. Cony Rothman office. Apparently, issues with visit compliance. The patient tells me that she missed a couple of appointments. She also assures me that she is consistently/compliant with her TKI medication. Apparently, BCR/ABL was not at target in February 2022. She also had a new mutation as of June last year. She has been seen by [facility], [facility] . Essentially, the patient has been on several prior line therapy.  -Currently on Tasigna  400mg p.o. twice daily. Patient claims compliance with her medication.  -Treatment currently being tolerated with complaints of bilateral leg swelling/pain  -June 2022-repeat BCR/ABL = 7.21%. Mutation panel not detected. -IS-MMR 16.99%      Plan  -Continue Tasigna BID  -Test for BCR/ABL quantitative jan 2023    Side effects-patient has bilateral leg edema.  -She is currently on Norvasc  -I asked her to discuss with PCP regarding stopping Norvasc  -This could also be related to her Tasigna  -Okay to continue Lasix 20 mg p.o. daily.  Hypokalemia secondary to Lasix. Potassium replacement. PLAN:  RTC with MD Jan 2023   BCR/ABL quantitative PCR [facility] end of Dec 2022  Continue Nilotinib 400mg BID  Continue follow-up with PCP/Dr Linda Virk for LE swelling  Continue Lasix 20 mg QOD  Continue Micro-K 10mg with each Lasix    MIREYA, Mallory Keith am pre charting  as Medical Assistant for Jake Burch MD. Electronically signed by Mallory Keith MA on 11/22/2022 at 7:40 AM CST. Min Frederick am scribing for Jake Burch MD. Electronically signed by Beltran Sood RN on 11/22/2022 at 8:34 AM CST. I, Dr Vanessa Zhao, personally performed the services described in this documentation as scribed by Beltran Sood, LUIS in my presence and is both accurate and complete. I have seen, examined and reviewed this patient medication list, appropriate labs and imaging studies. I reviewed relevant medical records and others physicians notes. I discussed the plans of care with the patient. I answered all the questions to the patients satisfaction. I have also reviewed the chief complaint (CC) and part of the history (History of Present Illness (HPI), Past Family Social History [facility]), or Review of Systems (ROS) and made changes when appropriated.        (Please note that portions of this note were completed with a voice recognition program. Efforts were made to edit the dictations but occasionally words are mis-transcribed.)      Electronically signed by Jake Burch MD on 11/22/2022 at 8:40 AM

## 2022-11-22 ENCOUNTER — OFFICE VISIT (OUTPATIENT)
Dept: HEMATOLOGY | Age: 42
End: 2022-11-22
Payer: MEDICAID

## 2022-11-22 ENCOUNTER — HOSPITAL ENCOUNTER (OUTPATIENT)
Dept: INFUSION THERAPY | Age: 42
Discharge: HOME | End: 2022-11-22
Payer: MEDICAID

## 2022-11-22 VITALS
SYSTOLIC BLOOD PRESSURE: 132 MMHG | HEART RATE: 82 BPM | WEIGHT: 169.9 LBS | HEIGHT: 66 IN | BODY MASS INDEX: 27.31 KG/M2 | DIASTOLIC BLOOD PRESSURE: 82 MMHG | OXYGEN SATURATION: 99 %

## 2022-11-22 DIAGNOSIS — T45.1X5S ANTINEOPLASTIC DRUGS CAUSING ADVERSE EFFECT, SEQUELA: ICD-10-CM

## 2022-11-22 DIAGNOSIS — C92.10 CML (CHRONIC MYELOCYTIC LEUKEMIA) (HCC): Primary | ICD-10-CM

## 2022-11-22 DIAGNOSIS — Z71.89 CARE PLAN DISCUSSED WITH PATIENT: ICD-10-CM

## 2022-11-22 PROCEDURE — G8427 DOCREV CUR MEDS BY ELIG CLIN: HCPCS | Performed by: INTERNAL MEDICINE

## 2022-11-22 PROCEDURE — 99211 OFF/OP EST MAY X REQ PHY/QHP: CPT

## 2022-11-22 PROCEDURE — G8484 FLU IMMUNIZE NO ADMIN: HCPCS | Performed by: INTERNAL MEDICINE

## 2022-11-22 PROCEDURE — 99214 OFFICE O/P EST MOD 30 MIN: CPT | Performed by: INTERNAL MEDICINE

## 2022-11-22 PROCEDURE — G8419 CALC BMI OUT NRM PARAM NOF/U: HCPCS | Performed by: INTERNAL MEDICINE

## 2022-11-22 PROCEDURE — 4004F PT TOBACCO SCREEN RCVD TLK: CPT | Performed by: INTERNAL MEDICINE

## 2022-12-20 DIAGNOSIS — C92.10 CML (CHRONIC MYELOCYTIC LEUKEMIA) (HCC): Primary | ICD-10-CM

## 2022-12-22 RX ORDER — NILOTINIB 200 MG/1
CAPSULE ORAL
Qty: 112 CAPSULE | Refills: 3 | Status: ACTIVE | OUTPATIENT
Start: 2022-12-22

## 2022-12-27 ENCOUNTER — HOSPITAL ENCOUNTER (OUTPATIENT)
Dept: INFUSION THERAPY | Age: 42
Discharge: HOME | End: 2022-12-27
Payer: MEDICAID

## 2022-12-27 DIAGNOSIS — C92.10 CML (CHRONIC MYELOCYTIC LEUKEMIA) (HCC): ICD-10-CM

## 2022-12-27 DIAGNOSIS — C92.10 CML (CHRONIC MYELOCYTIC LEUKEMIA) (HCC): Primary | ICD-10-CM

## 2022-12-27 DIAGNOSIS — Z71.89 CARE PLAN DISCUSSED WITH PATIENT: ICD-10-CM

## 2022-12-27 DIAGNOSIS — R60.0 LEG EDEMA: ICD-10-CM

## 2022-12-27 LAB
ALBUMIN SERPL-MCNC: 4 G/DL (ref 3.5–5.2)
ALP BLD-CCNC: 105 U/L (ref 35–104)
ALT SERPL-CCNC: 18 U/L (ref 9–52)
ANION GAP SERPL CALCULATED.3IONS-SCNC: 8 MMOL/L (ref 7–19)
AST SERPL-CCNC: 21 U/L (ref 14–36)
BILIRUB SERPL-MCNC: 0.6 MG/DL (ref 0.2–1.3)
BUN BLDV-MCNC: 18 MG/DL (ref 7–17)
CALCIUM SERPL-MCNC: 9.2 MG/DL (ref 8.4–10.2)
CHLORIDE BLD-SCNC: 107 MMOL/L (ref 98–111)
CO2: 28 MMOL/L (ref 22–29)
CREAT SERPL-MCNC: 1 MG/DL (ref 0.5–1)
GFR SERPL CREATININE-BSD FRML MDRD: >60 ML/MIN/{1.73_M2}
GLOBULIN: 2.9 G/DL
GLUCOSE BLD-MCNC: 113 MG/DL (ref 74–106)
HCT VFR BLD CALC: 39.4 % (ref 34.1–44.9)
HEMOGLOBIN: 13.7 G/DL (ref 11.2–15.7)
LYMPHOCYTES ABSOLUTE: 2.49 K/UL (ref 1.18–3.74)
LYMPHOCYTES RELATIVE PERCENT: 41.8 % (ref 19.3–53.1)
MCH RBC QN AUTO: 33.7 PG (ref 25.6–32.2)
MCHC RBC AUTO-ENTMCNC: 34.8 G/DL (ref 32.3–35.5)
MCV RBC AUTO: 96.8 FL (ref 79.4–94.8)
MONOCYTES ABSOLUTE: 0.41 K/UL (ref 0.24–0.82)
MONOCYTES RELATIVE PERCENT: 6.9 % (ref 4.7–12.5)
NEUTROPHILS ABSOLUTE: 2.83 K/UL (ref 1.56–6.13)
NEUTROPHILS RELATIVE PERCENT: 47.5 % (ref 34–71.1)
PDW BLD-RTO: 14.4 % (ref 11.7–14.4)
PLATELET # BLD: 257 K/UL (ref 182–369)
PMV BLD AUTO: 9.3 FL (ref 7.4–10.4)
POTASSIUM SERPL-SCNC: 3.6 MMOL/L (ref 3.5–5.1)
RBC # BLD: 4.07 M/UL (ref 3.93–5.22)
SODIUM BLD-SCNC: 143 MMOL/L (ref 137–145)
TOTAL PROTEIN: 6.9 G/DL (ref 6.3–8.2)
WBC # BLD: 5.96 K/UL (ref 3.98–10.04)

## 2022-12-27 PROCEDURE — 36415 COLL VENOUS BLD VENIPUNCTURE: CPT

## 2022-12-27 PROCEDURE — 85025 COMPLETE CBC W/AUTO DIFF WBC: CPT

## 2022-12-27 PROCEDURE — 80053 COMPREHEN METABOLIC PANEL: CPT

## 2023-01-09 NOTE — PROGRESS NOTES
MEDICAL ONCOLOGY PROGRESS NOTE    Pt Name: Refugio Azevedo  MRN: [id number]  YOB: 1980  Date of evaluation: 1/26/2023    HISTORY OF PRESENT ILLNESS:  Reason for MD visit-toxicity assessment/disease management sssentially, the patient is a 38years old female who has chronic CML diagnosed initially in 2000. She is currently on Nilotinib 400 mg p.o. twice daily. She reports that she has been compliant with her medication. Diagnosis  CML, 2000  BCR/ABL1 kinase 7.21%    Treatment Summary  2000 Gleevec (Patient didn't take on regular basis)  02/2016 - 07/2021 Sprycel  07/30/21 - 12/2021 Bosulif  12/2021 Nilotinib 400mg BID    Hematology/Cancer History  Refugio Azevedo was diagnosed with CML in 2005 by Dr Raegan Pulido. 2000 Diagnosed with CML by [facility]. Initiated Les Mohsen at that time  2005 Continuation of Les Mohsen (Patient didn't take on regular basis)  09/17/13 Genotrace - IS QRT-PCR: 57.64%  09/20/2014 Genotrace - IS QRT-PCR: 40.52%  02/2016 Initiated Sprycel  2/10/16 CTA [facility]): Normal CT of the chest. No evidence of pulmonary embolism  03/05/2016 Genotrace - IS QRT-PCR: 30.9%  01/14/2016 Genotrace - IS QRT-PCR: 21.36%  07/28/17 Genotrace - IS QRT-PCR: 8.52%  06/12/16 Genotrace - IS QRT-PCR: 8.47%  01/31/2020  Marrow biopsy- persistent CML, chronic phase. Clonal T -cell population identified by PCR. Flow cytometry negative. 09/01/2020 Genotrace - IS QRT-PCR: 13.5%  09/01/2020-BCR/ABL PCR positive for BCR/ABL 1 rearrangement (13.5% of cells): Consistent with persistence of CML clone. 03/16/2021- BCR-ABL1 QPCR-positive: Major breakpoint (18.956%), minor breakpoint (0.033%). Interpretation: Consistent with residual CML.   06/08/2021- BCR-ABL 1 QPCR-positive (14.8403) for the BCR-ABL-1 e13a2 (b2a2, p219) fusion transcript  06/08/2021- CML chromosome/FISH profile: Abnormal- 54% of nuclei positive for BCR/ABL1 gene fusion  06/08/2021 - BCR-ABL1 Kinase Domain Mutation - Mutation detected within the BCR-ABL1 kinase domain. Nucleotide change: c.949T>C. 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 Comment: No plate changes detected within the BCR-ABL 1 kinase domain. Cellular and biochemical studies suggest that the resistance induced by this mutation may be overcome by dose escalation. 06/28/2021 and again on 07/07/2021, Dr Torey Chatman discussed results of the BCR/ABL 1 PCR quant, kinase domain mutation, and CML FISH profile from 6/8/2021 with Dr. Marko Carballo (BMT at [facility]) who has seen the patient previously. 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 Dr. Marko Carballo reviewed the mutation, noting resistance to dasatinib. Recommends nilotinib or bosutinib. If TKI options have been exhausted, will consider transplant.  07/28/2021-dasatinib discontinued   07/30/2021-Bosulif initiated  08/06/2021- BCR/ABL RT-PCR-positive: Major breakpoint (47.104%), minor breakpoint 0.034% consistent with persistent CML  08/06/2021-MPN/CML FISH panel: 1. Positive for a BCR/ABL1 rearrangement (65% of cells) consistent with persistence of CML clone. 09/07/2021-(+) for a BCR/ABL1 rearrangement (39% of cells). Major breakpoint (18.748%), minor breakpoint (0.017%)  11/11/2021-+) for a BCR/ABL1 rearrangement (42% of cells). 12/02/2021- Dr Torey Chatman phone call: BCR/ABL 1Q PCR positive with 42% copies of BCR transcript. Patient has been on Bosulif since 07/30/2021 (BCR/ABL 48% at that time). Previously discussed with Dr. Marko Carballo, BMT at [facility] who had seen the patient previously. 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  Today I spoke to the patient who swears that she has been taking her medicine daily except for 5 days when she had a \"stomach bug.\" But has been taking otherwise. I then spoke to Dr. Marcel Meckel who will again take a look at the mutation study that was done previously to see if we can use another TKI (i.e.: Ponatinib). 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 Marcel Meckel does not believe this would be helpful unless the patient is bridging towards transplant. If ponatinib is not felt to be indicated or fails, then the patient would qualify for transplant at that time. Awaiting word from Dr. Marcel Meckel prior to moving forward. The patient is made aware of the plans. She verbalized understanding and agreement  12/10/2021-(+) for a BCR/ABL1 rearrangement (31% of cells). Major breakpoint (15.085%), minor breakpoint (0.008%)  12/30/2021- Nilotinib initiated- was taking 400 mg daily (error on her part but claims \"the pharmacy wrote it that way\") instead of q12h as prescribed - until 02/29/2022 when she started taking q12h.  02/17/2022-BCR/ABL-FISH positive 41.5%: RT-PCR major breakpoint (40.046%) consistent with residual CML.  4/26/22 US bilateral lower extremity [facility]): Normal duplex ultrasound of the bilateral lower extremities without evidence of venous thrombus. 5/4/22- Transthoracic echo [facility]): Left ventricular systolic function is normal. Left ventricular ejection fraction appears to be 61-65%. Normal right ventricular cavity size and systolic function noted. No hemodynamically significant valvular abnormalities identified on this study. 6/23/2022 [facility]  BCR-ABL1 is 7.21%; MMR: NO.  BCR/ABL 1 mutation not detected. 6/23/2022-she was first seen by me. She was transferred from Dr. Anisha Leroy [facility]. Apparently she was discharged from that practice due to compliance issues. The patient tells me that she has issues compliance with her medical visits due to her work schedule. She is to continue her nilotinib for now.   Recommended to repeat quantitative BCR/ABL and mutation panel.  7/7/2022-patient is to continue nilotinib 400 mg p.o. twice daily at this time. Interval decrease quantitative BCR/ABL. 7. 21% (previously 41%). 10/3/2022 BCR/ABL 16.99%  10/5/2022 CTA Pulmonary W Contrast LLL pulmonary infiltrate identified. No evidence of PE. She was treated for pneumonia during inpatient admission at [facility].  12/27/22 BCR/ABL 8.19%          Past Medical History:    Past Medical History:   Diagnosis Date    CML (chronic myelocytic leukemia) ([facility] [address]        Past Surgical History:    Past Surgical History:   Procedure Laterality Date    TUBAL LIGATION         Social History:    Marital status:   Smoking status:Currently; 1/2 pack daily;12 years  ETOH status:Occasionally  Resides: [facility]    Family History:   No family history on file. Current Hospital Medications:    Current Outpatient Medications   Medication Sig Dispense Refill    TASIGNA 200 MG capsule TAKE TWO CAPSULES BY MOUTH TWICE A DAY ON AN EMPTY STOMACH 112 capsule 3    doxycycline hyclate (VIBRA-TABS) 100 MG tablet       [facility] COUGH [facility] CHEST CONGESTION  MG/20ML LIQD       albuterol sulfate HFA (PROVENTIL;VENTOLIN;PROAIR) 108 (90 Base) MCG/ACT inhaler Inhale 2 puffs into the lungs every 4 hours as needed      furosemide (LASIX) 20 MG tablet TAKE 1 TABLET BY MOUTH EVERY DAY 90 tablet 3    amLODIPine (NORVASC) 10 MG tablet Take 1 tablet by mouth daily Do not take if BP is < 326 systolic or < 70 diastolic 30 tablet 3    potassium chloride (MICRO-K) 10 MEQ extended release capsule Take 1 capsule by mouth in the morning, at noon, and at bedtime 90 capsule 5    promethazine-dextromethorphan (PROMETHAZINE-DM) 6.25-15 MG/5ML syrup Take 5 mLs by mouth every 12 hours as needed for Cough 100 mL 0     No current facility-administered medications for this visit. Allergies:    Allergies   Allergen Reactions    Latex     Penicillins          Subjective   REVIEW OF SYSTEMS:   CONSTITUTIONAL: no fever, no night sweats,  fatigue;  HEENT: no blurring of vision, no double vision, no hearing difficulty, no tinnitus, no ulceration, no dysplasia, no epistaxis;   LUNGS: mild cough, no hemoptysis, no wheeze,  no shortness of breath;  CARDIOVASCULAR: no palpitation, no chest pain, no shortness of breath;  GI: no abdominal pain, no nausea, no vomiting, no diarrhea, no constipation;  ROHAN: no dysuria, no hematuria, no frequency or urgency, no nephrolithiasis;  MUSCULOSKELETAL: no joint pain, no swelling, no stiffness;  ENDOCRINE: no polyuria, no polydipsia, no cold or heat intolerance;  HEMATOLOGY: no easy bruising or bleeding, no history of clotting disorder;  DERMATOLOGY: no skin rash, no eczema, no pruritus;  PSYCHIATRY: no depression, no anxiety, no panic attacks, no suicidal ideation, no homicidal ideation;  NEUROLOGY: no syncope, no seizures, no numbness or tingling of hands, no numbness or tingling of feet, no paresis;      Objective   /70   Pulse 89   Temp 98.8 °F (37.1 °C)   Wt 166 lb 3.2 oz (75.4 kg)   SpO2 99%   BMI 26.83 kg/m²     PHYSICAL EXAM:  CONSTITUTIONAL: Alert, appropriate, no acute distress  EYES: Non icteric, EOM intact, pupils equal round   ENT: Mucus membranes moist, no oral pharyngeal lesions, external inspection of ears and nose are normal.  NECK: Supple, no masses. No palpable thyroid mass  CHEST/LUNGS: CTA bilaterally, normal respiratory effort   CARDIOVASCULAR:tachycardic,  no murmurs. No lower extremity edema  ABDOMEN: soft non-tender, active bowel sounds, no HSM. No palpable masses  EXTREMITIES: warm, full ROM in all 4 extremities, no focal weakness. SKIN: warm, dry with no rashes or lesions  LYMPH: No cervical, clavicular, axillary, or inguinal lymphadenopathy  NEUROLOGIC: follows commands, non focal   PSYCH: mood and affect appropriate.   Alert and oriented to time, place, person    LABORATORY RESULTS REVIEWED/ANALYZED BY ME:  12/27/22 BCR/ABL 8.19%    1/26/23 CBC  WBC 5.6  HGB 14.2  PLT 308  Neut 2.8    RADIOLOGY STUDIES REVIEWED BY ME:  None    ASSESSMENT:    Orders Placed This Encounter   Procedures    Comprehensive Metabolic Panel     Standing Status:   Future     Standing Expiration Date:   1/26/2024          Uday Young was seen today for follow-up. Diagnoses and all orders for this visit:    CML (chronic myelocytic leukemia) ([facility] [address]  -     Comprehensive Metabolic Panel; Future    Care plan discussed with patient      CML without BCR/ABL1 mutation as per last study  She has been transferred from [facility] oncology, Dr. Marty Brandon office. Apparently, issues with visit compliance. The patient tells me that she missed a couple of appointments. She also assures me that she is consistently/compliant with her TKI medication. Apparently, BCR/ABL was not at target in February 2022. She also had a new mutation as of June last year. She has been seen by [facility], [facility] . Essentially, the patient has been on several prior line therapy.  -Currently on Tasigna  400mg p.o. twice daily. Patient claims compliance with her medication. BCR/ABL monitoring:      Essentially, she has not achieved major molecular response after 6 months of treatment. Levels of BCR/ABL are mostly stable. Plan  -Continue Tasigna 400mg PO BID  -Test for BCR/ABL quantitative end of March 2023  -Contact Dr. Lay Sullivan, [facility] regarding treatment options. Side effects-patient has bilateral leg edema. Improved today.  -She is currently on Norvasc  -I asked her to discuss with PCP regarding stopping Norvasc  -This could also be related to her Tasigna  -Okay to continue Lasix 20 mg p.o. daily. Hypokalemia secondary to Lasix. Potassium replacement. Recheck potassium levels today.     PLAN:  RTC with MD 6 weeks   CBC CMP today  Repeat BCR/ABL quantitative PCR [facility], March 2023   Continue Nilotinib 400mg BID  Will discuss treatment plan with Dr Duong Patient of [facility]  Continue follow-up with PCP/Dr Kavya Josue for LE swelling  Continue Lasix 20 mg QOD  Continue Micro-K 10mg with each Lasix    Cee GOMES Friday, am pre charting  as Medical Assistant for Lurdes Wei MD. Electronically signed by Cee Mabry MA on 1/26/2023 at 8:17 AM CST. Theodora Plasencia, brittanie scribing for Lurdes Wei MD. Electronically signed by Raisa Huffman RN on 1/26/2023 at 10:57 AM CST. I, Dr Malvin Nicholson, personally performed the services described in this documentation as scribed by Raisa Huffman, LUIS in my presence and is both accurate and complete. I have seen, examined and reviewed this patient medication list, appropriate labs and imaging studies. I reviewed relevant medical records and others physicians notes. I discussed the plans of care with the patient. I answered all the questions to the patients satisfaction. I have also reviewed the chief complaint (CC) and part of the history (History of Present Illness (HPI), Past Family Social History [facility]), or Review of Systems (ROS) and made changes when appropriated. (Please note that portions of this note were completed with a voice recognition program. Efforts were made to edit the dictations but occasionally words are mis-transcribed. )Electronically signed by Lurdes Wei MD on 1/26/2023 at 11:04 AM

## 2023-01-26 ENCOUNTER — OFFICE VISIT (OUTPATIENT)
Dept: HEMATOLOGY | Age: 43
End: 2023-01-26
Payer: MEDICAID

## 2023-01-26 ENCOUNTER — HOSPITAL ENCOUNTER (OUTPATIENT)
Dept: INFUSION THERAPY | Age: 43
Discharge: HOME | End: 2023-01-26
Payer: MEDICAID

## 2023-01-26 VITALS
OXYGEN SATURATION: 99 % | TEMPERATURE: 98.8 F | DIASTOLIC BLOOD PRESSURE: 70 MMHG | HEART RATE: 89 BPM | WEIGHT: 166.2 LBS | BODY MASS INDEX: 26.83 KG/M2 | SYSTOLIC BLOOD PRESSURE: 130 MMHG

## 2023-01-26 DIAGNOSIS — T45.1X5S ANTINEOPLASTIC DRUGS CAUSING ADVERSE EFFECT, SEQUELA: ICD-10-CM

## 2023-01-26 DIAGNOSIS — Z71.89 CARE PLAN DISCUSSED WITH PATIENT: ICD-10-CM

## 2023-01-26 DIAGNOSIS — Z71.89 COORDINATION OF COMPLEX CARE: ICD-10-CM

## 2023-01-26 DIAGNOSIS — R60.0 LEG EDEMA: ICD-10-CM

## 2023-01-26 DIAGNOSIS — C92.10 CML (CHRONIC MYELOCYTIC LEUKEMIA) (HCC): Primary | ICD-10-CM

## 2023-01-26 DIAGNOSIS — C92.10 CML (CHRONIC MYELOCYTIC LEUKEMIA) (HCC): ICD-10-CM

## 2023-01-26 LAB
ALBUMIN SERPL-MCNC: 4.5 G/DL (ref 3.5–5.2)
ALP BLD-CCNC: 93 U/L (ref 35–104)
ALT SERPL-CCNC: 23 U/L (ref 9–52)
ANION GAP SERPL CALCULATED.3IONS-SCNC: 8 MMOL/L (ref 7–19)
AST SERPL-CCNC: 25 U/L (ref 14–36)
BILIRUB SERPL-MCNC: 1.3 MG/DL (ref 0.2–1.3)
BUN BLDV-MCNC: 14 MG/DL (ref 7–17)
CALCIUM SERPL-MCNC: 9.3 MG/DL (ref 8.4–10.2)
CHLORIDE BLD-SCNC: 108 MMOL/L (ref 98–111)
CO2: 27 MMOL/L (ref 22–29)
CREAT SERPL-MCNC: 0.8 MG/DL (ref 0.5–1)
GFR SERPL CREATININE-BSD FRML MDRD: >60 ML/MIN/{1.73_M2}
GLOBULIN: 3.3 G/DL
GLUCOSE BLD-MCNC: 99 MG/DL (ref 74–106)
HCT VFR BLD CALC: 43.3 % (ref 34.1–44.9)
HEMOGLOBIN: 14.2 G/DL (ref 11.2–15.7)
LYMPHOCYTES ABSOLUTE: 2.3 K/UL (ref 1.18–3.74)
LYMPHOCYTES RELATIVE PERCENT: 41.6 % (ref 19.3–53.1)
MCH RBC QN AUTO: 33 PG (ref 25.6–32.2)
MCHC RBC AUTO-ENTMCNC: 32.8 G/DL (ref 32.3–35.5)
MCV RBC AUTO: 100.7 FL (ref 79.4–94.8)
MONOCYTES ABSOLUTE: 0.5 K/UL (ref 0.24–0.82)
MONOCYTES RELATIVE PERCENT: 8.1 % (ref 4.7–12.5)
NEUTROPHILS ABSOLUTE: 2.8 K/UL (ref 1.56–6.13)
NEUTROPHILS RELATIVE PERCENT: 50.3 % (ref 34–71.1)
PDW BLD-RTO: 14.9 % (ref 11.7–14.4)
PLATELET # BLD: 308 K/UL (ref 182–369)
PMV BLD AUTO: 8.8 FL (ref 7.4–10.4)
POTASSIUM SERPL-SCNC: 3.5 MMOL/L (ref 3.5–5.1)
RBC # BLD: 4.3 M/UL (ref 3.93–5.22)
SODIUM BLD-SCNC: 143 MMOL/L (ref 137–145)
TOTAL PROTEIN: 7.8 G/DL (ref 6.3–8.2)
WBC # BLD: 5.6 K/UL (ref 3.98–10.04)

## 2023-01-26 PROCEDURE — 36415 COLL VENOUS BLD VENIPUNCTURE: CPT

## 2023-01-26 PROCEDURE — 80053 COMPREHEN METABOLIC PANEL: CPT

## 2023-01-26 PROCEDURE — G8484 FLU IMMUNIZE NO ADMIN: HCPCS | Performed by: INTERNAL MEDICINE

## 2023-01-26 PROCEDURE — G8419 CALC BMI OUT NRM PARAM NOF/U: HCPCS | Performed by: INTERNAL MEDICINE

## 2023-01-26 PROCEDURE — 4004F PT TOBACCO SCREEN RCVD TLK: CPT | Performed by: INTERNAL MEDICINE

## 2023-01-26 PROCEDURE — 99212 OFFICE O/P EST SF 10 MIN: CPT

## 2023-01-26 PROCEDURE — G8427 DOCREV CUR MEDS BY ELIG CLIN: HCPCS | Performed by: INTERNAL MEDICINE

## 2023-01-26 PROCEDURE — 99214 OFFICE O/P EST MOD 30 MIN: CPT | Performed by: INTERNAL MEDICINE

## 2023-01-26 PROCEDURE — 85025 COMPLETE CBC W/AUTO DIFF WBC: CPT

## 2023-02-03 DIAGNOSIS — E87.6 LOW SERUM POTASSIUM LEVEL: ICD-10-CM

## 2023-02-03 RX ORDER — POTASSIUM CHLORIDE 750 MG/1
CAPSULE, EXTENDED RELEASE ORAL
Qty: 270 CAPSULE | Refills: 1 | Status: SHIPPED | OUTPATIENT
Start: 2023-02-03

## 2023-03-09 NOTE — PROGRESS NOTES
MEDICAL ONCOLOGY PROGRESS NOTE    Pt Name: Judah Fierro  MRN: [id number]  YOB: 1980  Date of evaluation: 3/13/2023    HISTORY OF PRESENT ILLNESS:  Reason for MD visit-toxicity assessment/disease management sssentially, the patient is a 38years old female who has chronic CML diagnosed initially in 2000. She is currently on Nilotinib 400 mg p.o. twice daily. She reports that she has been compliant with her medication. Denies any lower extremity swelling. Denies any breathing problems. She has hypertension has been taking amlodipine. Diagnosis  CML, 2000  BCR/ABL1 kinase 7.21%    Treatment Summary  2000 Gleevec (Patient didn't take on regular basis)  02/2016 - 07/2021 Sprycel  07/30/21 - 12/2021 Bosulif  12/2021 Nilotinib 400mg BID    Hematology/Cancer History  Judah Fierro was diagnosed with CML in 2005 by Dr Hema Wei. 2000 Diagnosed with CML by [facility]. Initiated Lorrane Spurling at that time  2005 Continuation of Lorrane Spurling (Patient didn't take on regular basis)  09/17/13 Genotrace - IS QRT-PCR: 57.64%  09/20/2014 Genotrace - IS QRT-PCR: 40.52%  02/2016 Initiated Sprycel  2/10/16 CTA [facility]): Normal CT of the chest. No evidence of pulmonary embolism  03/05/2016 Genotrace - IS QRT-PCR: 30.9%  01/14/2016 Genotrace - IS QRT-PCR: 21.36%  07/28/17 Genotrace - IS QRT-PCR: 8.52%  06/12/16 Genotrace - IS QRT-PCR: 8.47%  01/31/2020  Marrow biopsy- persistent CML, chronic phase. Clonal T -cell population identified by PCR. Flow cytometry negative. 09/01/2020 Genotrace - IS QRT-PCR: 13.5%  09/01/2020-BCR/ABL PCR positive for BCR/ABL 1 rearrangement (13.5% of cells): Consistent with persistence of CML clone. 03/16/2021- BCR-ABL1 QPCR-positive: Major breakpoint (18.956%), minor breakpoint (0.033%). Interpretation: Consistent with residual CML.   06/08/2021- BCR-ABL 1 QPCR-positive (14.8403) for the BCR-ABL-1 e13a2 (b2a2, p219) fusion transcript  06/08/2021- CML chromosome/FISH profile: Abnormal- 54% of nuclei positive for BCR/ABL1 gene fusion  06/08/2021 - BCR-ABL1 Kinase Domain Mutation - Mutation detected within the BCR-ABL1 kinase domain. Nucleotide change: c.949T>C. 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 Comment: No plate changes detected within the BCR-ABL 1 kinase domain. Cellular and biochemical studies suggest that the resistance induced by this mutation may be overcome by dose escalation. 06/28/2021 and again on 07/07/2021, Dr Anthony Wilburn discussed results of the BCR/ABL 1 PCR quant, kinase domain mutation, and CML FISH profile from 6/8/2021 with Dr. Esther Be (BMT at [facility]) who has seen the patient previously. 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 Dr. Esther Be reviewed the mutation, noting resistance to dasatinib. Recommends nilotinib or bosutinib. If TKI options have been exhausted, will consider transplant.  07/28/2021-dasatinib discontinued   07/30/2021-Bosulif initiated  08/06/2021- BCR/ABL RT-PCR-positive: Major breakpoint (47.104%), minor breakpoint 0.034% consistent with persistent CML  08/06/2021-MPN/CML FISH panel: 1. Positive for a BCR/ABL1 rearrangement (65% of cells) consistent with persistence of CML clone. 09/07/2021-(+) for a BCR/ABL1 rearrangement (39% of cells). Major breakpoint (18.748%), minor breakpoint (0.017%)  11/11/2021-+) for a BCR/ABL1 rearrangement (42% of cells). 12/02/2021- Dr Anthony Wilburn phone call: BCR/ABL 1Q PCR positive with 42% copies of BCR transcript. Patient has been on Bosulif since 07/30/2021 (BCR/ABL 48% at that time). Previously discussed with Dr. Esther Be, BMT at [facility] who had seen the patient previously. 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  Today I spoke to the patient who swears that she has been taking her medicine daily except for 5 days when she had a \"stomach bug.\" But has been taking otherwise. I then spoke to Dr. Juan F Prakash who will again take a look at the mutation study that was done previously to see if we can use another TKI (i.e.: Ponatinib). 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 Juan F Prakash does not believe this would be helpful unless the patient is bridging towards transplant. If ponatinib is not felt to be indicated or fails, then the patient would qualify for transplant at that time. Awaiting word from Dr. Juan F Prakash prior to moving forward. The patient is made aware of the plans. She verbalized understanding and agreement  12/10/2021-(+) for a BCR/ABL1 rearrangement (31% of cells). Major breakpoint (15.085%), minor breakpoint (0.008%)  12/30/2021- Nilotinib initiated- was taking 400 mg daily (error on her part but claims \"the pharmacy wrote it that way\") instead of q12h as prescribed - until 02/29/2022 when she started taking q12h.  02/17/2022-BCR/ABL-FISH positive 41.5%: RT-PCR major breakpoint (40.046%) consistent with residual CML.  4/26/22 US bilateral lower extremity [facility]): Normal duplex ultrasound of the bilateral lower extremities without evidence of venous thrombus. 5/4/22- Transthoracic echo [facility]): Left ventricular systolic function is normal. Left ventricular ejection fraction appears to be 61-65%. Normal right ventricular cavity size and systolic function noted. No hemodynamically significant valvular abnormalities identified on this study. 6/23/2022 [facility]  BCR-ABL1 is 7.21%; MMR: NO.  BCR/ABL 1 mutation not detected. 6/23/2022-she was first seen by me. She was transferred from Dr. Susan So office. Apparently she was discharged from that practice due to compliance issues.   The patient tells me that she has issues compliance with her medical visits due to her work schedule. She is to continue her nilotinib for now. Recommended to repeat quantitative BCR/ABL and mutation panel. 7/7/2022-patient is to continue nilotinib 400 mg p.o. twice daily at this time. Interval decrease quantitative BCR/ABL. 7. 21% (previously 41%). 10/3/2022 BCR/ABL 16.99%  10/5/2022 CTA Pulmonary W Contrast LLL pulmonary infiltrate identified. No evidence of PE. She was treated for pneumonia during inpatient admission at [facility].  12/27/22 BCR/ABL 8.19%          Past Medical History:    Past Medical History:   Diagnosis Date    CML (chronic myelocytic leukemia) ([facility] [place] [address]        Past Surgical History:    Past Surgical History:   Procedure Laterality Date    TUBAL LIGATION         Social History:    Marital status:   Smoking status:Currently; 1/2 pack daily;12 years  ETOH status:Occasionally  Resides: [place], [place]    Family History:   No family history on file. Current Hospital Medications:    Current Outpatient Medications   Medication Sig Dispense Refill    potassium chloride (MICRO-K) 10 MEQ extended release capsule TAKE 1 CAPSULE BY MOUTH 3 TIMES A  capsule 1    TASIGNA 200 MG capsule TAKE TWO CAPSULES BY MOUTH TWICE A DAY ON AN EMPTY STOMACH 112 capsule 3    doxycycline hyclate (VIBRA-TABS) 100 MG tablet       [facility] COUGH [facility] CHEST CONGESTION  MG/20ML LIQD       albuterol sulfate HFA (PROVENTIL;VENTOLIN;PROAIR) 108 (90 Base) MCG/ACT inhaler Inhale 2 puffs into the lungs every 4 hours as needed      furosemide (LASIX) 20 MG tablet TAKE 1 TABLET BY MOUTH EVERY DAY 90 tablet 3    amLODIPine (NORVASC) 10 MG tablet Take 1 tablet by mouth daily Do not take if BP is < 646 systolic or < 70 diastolic 30 tablet 3    promethazine-dextromethorphan (PROMETHAZINE-DM) 6.25-15 MG/5ML syrup Take 5 mLs by mouth every 12 hours as needed for Cough 100 mL 0     No current facility-administered medications for this visit. Allergies:    Allergies   Allergen Reactions    Latex Penicillins          Subjective   REVIEW OF SYSTEMS:   CONSTITUTIONAL: no fever, no night sweats, fatigue;  HEENT: no blurring of vision, no double vision, no hearing difficulty, no tinnitus, no ulceration, no dysplasia, no epistaxis;   LUNGS: no cough, no hemoptysis, no wheeze,  no shortness of breath;  CARDIOVASCULAR: no palpitation, no chest pain, no shortness of breath;  GI: no abdominal pain, no nausea, no vomiting, no diarrhea, no constipation;  ROHAN: no dysuria, no hematuria, no frequency or urgency, no nephrolithiasis;  MUSCULOSKELETAL: no joint pain, no swelling, no stiffness;  ENDOCRINE: no polyuria, no polydipsia, no cold or heat intolerance;  HEMATOLOGY: no easy bruising or bleeding, no history of clotting disorder;  DERMATOLOGY: no skin rash, no eczema, no pruritus;  PSYCHIATRY: no depression, no anxiety, no panic attacks, no suicidal ideation, no homicidal ideation;  NEUROLOGY: no syncope, no seizures, no numbness or tingling of hands, no numbness or tingling of feet, no paresis;       Objective   /72   Pulse 97   Temp 98.2 °F (36.8 °C) (Temporal)   Ht 5' 6\" (1.676 m)   Wt 173 lb (78.5 kg)   SpO2 100%   BMI 27.92 kg/m²     PHYSICAL EXAM:  CONSTITUTIONAL: Alert, appropriate, no acute distress  EYES: Non icteric, EOM intact, pupils equal round   ENT: Mucus membranes moist, no oral pharyngeal lesions, external inspection of ears and nose are normal.  NECK: Supple, no masses. No palpable thyroid mass  CHEST/LUNGS: CTA bilaterally, normal respiratory effort   CARDIOVASCULAR:tachycardic,  no murmurs. No lower extremity edema  ABDOMEN: soft non-tender, active bowel sounds, no HSM. No palpable masses  EXTREMITIES: warm, full ROM in all 4 extremities, no focal weakness. SKIN: warm, dry with no rashes or lesions  LYMPH: No cervical, clavicular, axillary, or inguinal lymphadenopathy  NEUROLOGIC: follows commands, non focal   PSYCH: mood and affect appropriate.   Alert and oriented to time, place, person    LABORATORY RESULTS REVIEWED/ANALYZED BY ME:  3/13/23 CBC  WBC 6.12  HGB 12.3    Neut 2.89    RADIOLOGY STUDIES REVIEWED BY ME:  None    ASSESSMENT:    No orders of the defined types were placed in this encounter. Kendra Collier was seen today for follow-up. Diagnoses and all orders for this visit:    CML (chronic myelocytic leukemia) ([facility] [address]    Care plan discussed with patient    Antineoplastic drugs causing adverse effect, sequela    Leg edema        CML without BCR/ABL1 mutation as per last study  She has been transferred from [facility], Dr. Deanna Wheatley office. Apparently, issues with visit compliance. The patient tells me that she missed a couple of appointments. She also assures me that she is consistently/compliant with her TKI medication. Apparently, BCR/ABL was not at target in February 2022. She also had a new mutation as of June last year. She has been seen by [facility], [facility] . Essentially, the patient has been on several prior line therapy.  -Currently on Tasigna  400mg p.o. twice daily. Patient claims compliance with her medication. BCR/ABL monitoring:      Essentially, she has not achieved major molecular response after 6 months of treatment. Levels of BCR/ABL are mostly stable. I have discussed her case with [facility]. They have suggested to change her treatment. The patient would like to hold on change treatment at this time. She tells me that she has been more compliant with treatment and would like to recheck her BCR/ABL quantitative before making any decision about treatment changes. Plan  -Continue Tasigna 400mg PO BID  -Test for BCR/ABL quantitative end of March 2023  -Contacted Dr. Oziel Newman, [facility] regarding treatment options.       PLAN:  RTC with MD 6 weeks   CBC today  Repeat BCR/ABL quantitative PCR [facility], CMP, Vit D levels in 4 weeks  Continue Nilotinib 400mg BID    ITamy am pre charting  as Medical Assistant for Jake Burch MD. Electronically signed by Mallory Keith MA on 3/13/2023 at 7:32 AM CST. Christiane West, brittanie scribing for Jake Burch MD. Electronically signed by Haja Mojica RN on 3/13/2023 at 8:52 AM CDT. I, Dr Vanessa Zhao, personally performed the services described in this documentation as scribed by Haja Mojica RN in my presence and is both accurate and complete. I have seen, examined and reviewed this patient medication list, appropriate labs and imaging studies. I reviewed relevant medical records and others physicians notes. I discussed the plans of care with the patient. I answered all the questions to the patients satisfaction. I have also reviewed the chief complaint (CC) and part of the history (History of Present Illness (HPI), Past Family Social History [facility]), or Review of Systems (ROS) and made changes when appropriated. (Please note that portions of this note were completed with a voice recognition program. Efforts were made to edit the dictations but occasionally words are mis-transcribed. )Electronically signed by Jake Burch MD on 3/13/2023 at 9:03 AM

## 2023-03-13 ENCOUNTER — OFFICE VISIT (OUTPATIENT)
Dept: HEMATOLOGY | Age: 43
End: 2023-03-13
Payer: MEDICAID

## 2023-03-13 ENCOUNTER — HOSPITAL ENCOUNTER (OUTPATIENT)
Dept: INFUSION THERAPY | Age: 43
Discharge: HOME | End: 2023-03-13
Payer: MEDICAID

## 2023-03-13 VITALS
HEIGHT: 66 IN | WEIGHT: 173 LBS | HEART RATE: 97 BPM | BODY MASS INDEX: 27.8 KG/M2 | SYSTOLIC BLOOD PRESSURE: 130 MMHG | TEMPERATURE: 98.2 F | DIASTOLIC BLOOD PRESSURE: 72 MMHG | OXYGEN SATURATION: 100 %

## 2023-03-13 DIAGNOSIS — Z71.89 CARE PLAN DISCUSSED WITH PATIENT: ICD-10-CM

## 2023-03-13 DIAGNOSIS — T45.1X5S ANTINEOPLASTIC DRUGS CAUSING ADVERSE EFFECT, SEQUELA: ICD-10-CM

## 2023-03-13 DIAGNOSIS — R60.0 LEG EDEMA: ICD-10-CM

## 2023-03-13 DIAGNOSIS — C92.10 CML (CHRONIC MYELOCYTIC LEUKEMIA) (HCC): ICD-10-CM

## 2023-03-13 DIAGNOSIS — C92.10 CML (CHRONIC MYELOCYTIC LEUKEMIA) (HCC): Primary | ICD-10-CM

## 2023-03-13 LAB
BASOPHILS ABSOLUTE: 0.03 K/UL (ref 0.01–0.08)
BASOPHILS RELATIVE PERCENT: 0.5 % (ref 0.1–1.2)
EOSINOPHILS ABSOLUTE: 0.13 K/UL (ref 0.04–0.54)
EOSINOPHILS RELATIVE PERCENT: 2.1 % (ref 0.7–7)
HCT VFR BLD CALC: 38.4 % (ref 34.1–44.9)
HEMOGLOBIN: 12.3 G/DL (ref 11.2–15.7)
LYMPHOCYTES ABSOLUTE: 2.47 K/UL (ref 1.18–3.74)
LYMPHOCYTES RELATIVE PERCENT: 40.4 % (ref 19.3–53.1)
MCH RBC QN AUTO: 33.6 PG (ref 25.6–32.2)
MCHC RBC AUTO-ENTMCNC: 32 G/DL (ref 32.3–35.5)
MCV RBC AUTO: 104.9 FL (ref 79.4–94.8)
MONOCYTES ABSOLUTE: 0.58 K/UL (ref 0.24–0.82)
MONOCYTES RELATIVE PERCENT: 9.5 % (ref 4.7–12.5)
NEUTROPHILS ABSOLUTE: 2.89 K/UL (ref 1.56–6.13)
NEUTROPHILS RELATIVE PERCENT: 47.2 % (ref 34–71.1)
PDW BLD-RTO: 14.8 % (ref 11.7–14.4)
PLATELET # BLD: 276 K/UL (ref 182–369)
PMV BLD AUTO: 9.6 FL (ref 7.4–10.4)
RBC # BLD: 3.66 M/UL (ref 3.93–5.22)
WBC # BLD: 6.12 K/UL (ref 3.98–10.04)

## 2023-03-13 PROCEDURE — 99211 OFF/OP EST MAY X REQ PHY/QHP: CPT

## 2023-03-13 PROCEDURE — G8427 DOCREV CUR MEDS BY ELIG CLIN: HCPCS | Performed by: INTERNAL MEDICINE

## 2023-03-13 PROCEDURE — 99214 OFFICE O/P EST MOD 30 MIN: CPT | Performed by: INTERNAL MEDICINE

## 2023-03-13 PROCEDURE — 36415 COLL VENOUS BLD VENIPUNCTURE: CPT

## 2023-03-13 PROCEDURE — G8419 CALC BMI OUT NRM PARAM NOF/U: HCPCS | Performed by: INTERNAL MEDICINE

## 2023-03-13 PROCEDURE — 4004F PT TOBACCO SCREEN RCVD TLK: CPT | Performed by: INTERNAL MEDICINE

## 2023-03-13 PROCEDURE — G8484 FLU IMMUNIZE NO ADMIN: HCPCS | Performed by: INTERNAL MEDICINE

## 2023-03-13 PROCEDURE — 85025 COMPLETE CBC W/AUTO DIFF WBC: CPT

## 2023-04-07 ENCOUNTER — HOSPITAL ENCOUNTER (OUTPATIENT)
Dept: INFUSION THERAPY | Age: 43
Discharge: HOME | End: 2023-04-07
Payer: MEDICAID

## 2023-04-07 DIAGNOSIS — Z71.89 CARE PLAN DISCUSSED WITH PATIENT: ICD-10-CM

## 2023-04-07 DIAGNOSIS — C92.10 CML (CHRONIC MYELOCYTIC LEUKEMIA) (HCC): ICD-10-CM

## 2023-04-07 DIAGNOSIS — C92.10 CML (CHRONIC MYELOCYTIC LEUKEMIA) (HCC): Primary | ICD-10-CM

## 2023-04-07 DIAGNOSIS — R60.0 LEG EDEMA: ICD-10-CM

## 2023-04-07 DIAGNOSIS — R79.89 LOW SERUM VITAMIN D: Primary | ICD-10-CM

## 2023-04-07 LAB
25(OH)D3 SERPL-MCNC: 9 NG/ML
ERYTHROCYTE [DISTWIDTH] IN BLOOD BY AUTOMATED COUNT: 15.1 % (ref 11.7–14.4)
HCT VFR BLD AUTO: 37.1 % (ref 34.1–44.9)
HGB BLD-MCNC: 12.4 G/DL (ref 11.2–15.7)
LYMPHOCYTES # BLD: 1.67 K/UL (ref 1.18–3.74)
LYMPHOCYTES NFR BLD: 29.5 % (ref 19.3–53.1)
MCH RBC QN AUTO: 33.5 PG (ref 25.6–32.2)
MCHC RBC AUTO-ENTMCNC: 33.4 G/DL (ref 32.3–35.5)
MCV RBC AUTO: 100.3 FL (ref 79.4–94.8)
MONOCYTES # BLD: 0.42 K/UL (ref 0.24–0.82)
MONOCYTES NFR BLD: 7.4 % (ref 4.7–12.5)
NEUTROPHILS # BLD: 3.45 K/UL (ref 1.56–6.13)
NEUTS SEG NFR BLD: 60.9 % (ref 34–71.1)
PLATELET # BLD AUTO: 267 K/UL (ref 182–369)
PMV BLD AUTO: 9.4 FL (ref 7.4–10.4)
RBC # BLD AUTO: 3.7 M/UL (ref 3.93–5.22)
WBC # BLD AUTO: 5.66 K/UL (ref 3.98–10.04)

## 2023-04-07 PROCEDURE — 85025 COMPLETE CBC W/AUTO DIFF WBC: CPT

## 2023-04-07 PROCEDURE — 36415 COLL VENOUS BLD VENIPUNCTURE: CPT

## 2023-04-07 NOTE — TELEPHONE ENCOUNTER
I have spoken with patient regarding levels of Vitamin Odles Nunez has recommended for patient to start taking Vitamin D 50,000 Units for the first 6 weeks then reducing to 1,000 units daily after the first 6 weeks. Patient has verbalized understanding.

## 2023-04-08 RX ORDER — CHOLECALCIFEROL (VITAMIN D3) 1250 MCG
50000 CAPSULE ORAL WEEKLY
Qty: 1 CAPSULE | Refills: 0 | Status: SHIPPED | OUTPATIENT
Start: 2023-04-08 | End: 2023-05-14

## 2023-04-17 ENCOUNTER — OFFICE VISIT (OUTPATIENT)
Dept: INTERNAL MEDICINE | Facility: CLINIC | Age: 43
End: 2023-04-17
Payer: COMMERCIAL

## 2023-04-17 VITALS
HEART RATE: 86 BPM | HEIGHT: 65 IN | RESPIRATION RATE: 16 BRPM | TEMPERATURE: 98 F | SYSTOLIC BLOOD PRESSURE: 112 MMHG | OXYGEN SATURATION: 98 % | DIASTOLIC BLOOD PRESSURE: 72 MMHG | WEIGHT: 170.4 LBS | BODY MASS INDEX: 28.39 KG/M2

## 2023-04-17 DIAGNOSIS — E66.3 OVERWEIGHT (BMI 25.0-29.9): ICD-10-CM

## 2023-04-17 DIAGNOSIS — I10 PRIMARY HYPERTENSION: ICD-10-CM

## 2023-04-17 DIAGNOSIS — F17.210 CIGARETTE SMOKER: ICD-10-CM

## 2023-04-17 DIAGNOSIS — Z00.01 ANNUAL VISIT FOR GENERAL ADULT MEDICAL EXAMINATION WITH ABNORMAL FINDINGS: Primary | ICD-10-CM

## 2023-04-17 DIAGNOSIS — C92.10 CML (CHRONIC MYELOCYTIC LEUKEMIA): ICD-10-CM

## 2023-04-17 DIAGNOSIS — J40 BRONCHITIS: ICD-10-CM

## 2023-04-17 RX ORDER — BENZONATATE 200 MG/1
200 CAPSULE ORAL 3 TIMES DAILY PRN
Qty: 45 CAPSULE | Refills: 0 | Status: SHIPPED | OUTPATIENT
Start: 2023-04-17

## 2023-04-17 RX ORDER — ALBUTEROL SULFATE 90 UG/1
2 AEROSOL, METERED RESPIRATORY (INHALATION) EVERY 4 HOURS PRN
Qty: 8 G | Refills: 1 | Status: SHIPPED | OUTPATIENT
Start: 2023-04-17

## 2023-04-17 RX ORDER — AMLODIPINE BESYLATE 10 MG/1
10 TABLET ORAL DAILY
Qty: 90 TABLET | Refills: 1 | Status: SHIPPED | OUTPATIENT
Start: 2023-04-17

## 2023-04-17 NOTE — PROGRESS NOTES
CC: f/u preventive health AND cough    History:  Winsome Becker is a 42 y.o. female who presents today for evaluation of the above problems.  She notes she has been doing well, but has a 4 day history of cough that has been productive of clear/white sputum with occasional tinge of yellow. She reports no fever, chills, or other congestion.     ROS:  Review of Systems   Constitutional: Negative for chills and fever.   HENT: Negative for congestion and sore throat.    Eyes: Negative for visual disturbance.   Respiratory: Positive for cough. Negative for chest tightness, shortness of breath and wheezing.    Cardiovascular: Negative for chest pain and palpitations.   Gastrointestinal: Negative for abdominal pain, constipation and nausea.   Endocrine: Negative for cold intolerance and heat intolerance.   Genitourinary: Negative for difficulty urinating and frequency.   Musculoskeletal: Negative for arthralgias and back pain.   Skin: Negative for rash.   Neurological: Negative for dizziness and headaches.   Psychiatric/Behavioral: Negative for dysphoric mood. The patient is not nervous/anxious.        Allergies   Allergen Reactions   • Latex Rash   • Penicillins Rash     Past Medical History:   Diagnosis Date   • Asthma    • Bronchitis, chronic    • CML (chronic myelocytic leukemia)    • Foot fracture    • Hypertension      Past Surgical History:   Procedure Laterality Date   • COLONOSCOPY N/A 12/4/2019    Tubular adenoma at 40 cm, Diverticulosis repeat exam in 5 years   • DENTAL PROCEDURE      emergency surgery for tooth extraction   • ENDOSCOPY N/A 12/4/2019    Enlarged gastric folds showing marked chronic active gastritis + for H Pylori treated   • EPIGASTRIC HERNIA REPAIR N/A 12/7/2021    Procedure: OPEN EPIGASTRIC HERNIA REPAIR WITH POSSIBLE MESH;  Surgeon: Shelbie Doran MD;  [facility];  Service: General;  Laterality: N/A;   • TUBAL ABDOMINAL LIGATION  2000     Family History   Problem Relation Age  "of Onset   • Breast cancer Neg Hx    • Colon cancer Neg Hx    • Colon polyps Neg Hx       reports that she has been smoking cigarettes. She started smoking about 15 years ago. She has a 12.04 pack-year smoking history. She has been exposed to tobacco smoke. She has never used smokeless tobacco. She reports current alcohol use. She reports that she does not use drugs.      Current Outpatient Medications:   •  albuterol sulfate  (90 Base) MCG/ACT inhaler, Inhale 2 puffs Every 4 (Four) Hours As Needed for Wheezing., Disp: 8 g, Rfl: 1  •  amLODIPine (NORVASC) 10 MG tablet, Take 1 tablet by mouth Daily., Disp: 90 tablet, Rfl: 1  •  cholecalciferol (VITAMIN D3) 1.25 MG (98487 UT) capsule, Take 1 capsule by mouth 1 (One) Time Per Week., Disp: 4 capsule, Rfl: 1  •  furosemide (LASIX) 20 MG tablet, TAKE 1 TABLET BY MOUTH EVERY DAY, Disp: 30 tablet, Rfl: 0  •  nilotinib (Tasigna) 200 MG capsule capsule, TAKE 2 CAPSULES BY MOUTH 2 TIMES A DAY. TAKE ON AN EMPTY STOMACH, 1 HOUR BEFORE OR 2 HOURS AFTER A MEAL., Disp: 112 capsule, Rfl: 1  •  benzonatate (TESSALON) 200 MG capsule, Take 1 capsule by mouth 3 (Three) Times a Day As Needed for Cough., Disp: 45 capsule, Rfl: 0    OBJECTIVE:  /72 (BP Location: Left arm, Patient Position: Sitting, Cuff Size: Adult)   Pulse 86   Temp 98 °F (36.7 °C)   Resp 16   Ht 165.1 cm (65\")   Wt 77.3 kg (170 lb 6.4 oz)   LMP  (LMP Unknown)   SpO2 98%   BMI 28.36 kg/m²    Physical Exam  Constitutional:       General: She is not in acute distress.     Appearance: She is well-developed.   HENT:      Head: Normocephalic and atraumatic.      Right Ear: External ear normal.      Left Ear: External ear normal.   Eyes:      General: No scleral icterus.     Extraocular Movements: Extraocular movements intact.   Neck:      Trachea: No tracheal deviation.   Cardiovascular:      Rate and Rhythm: Normal rate and regular rhythm.      Heart sounds: Normal heart sounds. No murmur " heard.  Pulmonary:      Effort: Pulmonary effort is normal. No accessory muscle usage or respiratory distress.      Breath sounds: Normal breath sounds. Decreased air movement present. No wheezing.   Abdominal:      General: There is no distension.      Palpations: Abdomen is soft.      Tenderness: There is no abdominal tenderness.   Musculoskeletal:         General: Normal range of motion.      Cervical back: Normal range of motion and neck supple.      Right lower leg: No edema.      Left lower leg: No edema.   Skin:     General: Skin is warm and dry.      Nails: There is no clubbing.   Neurological:      Mental Status: She is alert and oriented to person, place, and time.      Coordination: Coordination normal.      Gait: Gait normal.   Psychiatric:         Mood and Affect: Mood normal. Mood is not anxious or depressed.         Behavior: Behavior normal.         Assessment/Plan    Diagnoses and all orders for this visit:    1. Annual visit for general adult medical examination with abnormal findings (Primary)  -     Lipid Panel; Future  -     Hemoglobin A1c; Future  Immunizations:      - Tetanus: Unknown or >10 years ago. Recommend to have at pharmacy or on injury.      - Influenza: Recommend yearly.      - Prevnar: Due, but refused.      - Shingrix: Series after 50      - COVID: Declined primary vaccination.   CRC screening: Colonoscopy done 3 years ago with 5 year recall.  Mammogram: Due at 48  PAP: was done on approximately 3/2021 and the result was: ASCUS with NEGATIVE high risk HPV. Repeat 3/2024.   DEXA: DEXA scan at 63     2. Bronchitis  -     albuterol sulfate  (90 Base) MCG/ACT inhaler; Inhale 2 puffs Every 4 (Four) Hours As Needed for Wheezing.  Dispense: 8 g; Refill: 1  -     benzonatate (TESSALON) 200 MG capsule; Take 1 capsule by mouth 3 (Three) Times a Day As Needed for Cough.  Dispense: 45 capsule; Refill: 0  Refill albuterol and Rx Tessalon to assist with symptoms.     3. Primary  hypertension  -     amLODIPine (NORVASC) 10 MG tablet; Take 1 tablet by mouth Daily.  Dispense: 90 tablet; Refill: 1  Well controlled, BP goal for age is <140/90 per JNC 8 guidelines and continue current medications    4. CML (chronic myelocytic leukemia)  Stable as followed by hematology with stable blood counts.   CBC WITH AUTO DIFFERENTIAL (04/07/2023 10:17 EDT)   CBC WITH AUTO DIFFERENTIAL (03/13/2023 09:44 EDT)     5. Cigarette smoker  Patient was counseled on and understood the many dangers of continuing to use tobacco. Despite this, Ms. Becker states quitting is not an immediate priority at this time. I reminded the patient that if quitting becomes an increased priority to contact us for help with quitting including pharmacologic & nonpharmacologic options or any additional resources.    6. Overweight (BMI 25.0-29.9)  BMI is >= 25 and <30. (Overweight) The following options were offered after discussion;: exercise counseling/recommendations and nutrition counseling/recommendations      An After Visit Summary was printed and given to the patient at discharge.  Return in about 1 year (around 4/17/2024) for Annual physical.         Sid Crockett D.O. 4/17/2023   Electronically signed.

## 2023-04-24 ENCOUNTER — TELEPHONE (OUTPATIENT)
Dept: INTERNAL MEDICINE | Facility: CLINIC | Age: 43
End: 2023-04-24

## 2023-04-24 DIAGNOSIS — R79.89 LOW SERUM VITAMIN D: ICD-10-CM

## 2023-04-24 RX ORDER — CHOLECALCIFEROL (VITAMIN D3) 1250 MCG
50000 CAPSULE ORAL WEEKLY
Qty: 5 CAPSULE | Refills: 0 | Status: SHIPPED | OUTPATIENT
Start: 2023-04-24 | End: 2023-05-30

## 2023-04-24 NOTE — TELEPHONE ENCOUNTER
Caller: Winsome Becker    Relationship: Self    Best call back number: [phone]    What medications are you currently taking:   Current Outpatient Medications on File Prior to Visit   Medication Sig Dispense Refill    albuterol sulfate  (90 Base) MCG/ACT inhaler Inhale 2 puffs Every 4 (Four) Hours As Needed for Wheezing. 8 g 1    amLODIPine (NORVASC) 10 MG tablet Take 1 tablet by mouth Daily. 90 tablet 1    benzonatate (TESSALON) 200 MG capsule Take 1 capsule by mouth 3 (Three) Times a Day As Needed for Cough. 45 capsule 0    cholecalciferol (VITAMIN D3) 1.25 MG (66573 UT) capsule Take 1 capsule by mouth 1 (One) Time Per Week. 4 capsule 1    nilotinib (Tasigna) 200 MG capsule capsule TAKE 2 CAPSULES BY MOUTH 2 TIMES A DAY. TAKE ON AN EMPTY STOMACH, 1 HOUR BEFORE OR 2 HOURS AFTER A MEAL. 112 capsule 1     No current facility-administered medications on file prior to visit.      When did you start taking these medications: CLOSE TO A WEEK     Which medication are you concerned about: NEW MEDICATION THAT WAS PRESCRIBED FOR COUGH     Who prescribed you this medication: BROOKE     What are your concerns: MEDICATION I SNOT HELPING WANTING TO SEE ABOUT GETTING SOMETHING DIFFERENT

## 2023-04-26 ENCOUNTER — OFFICE VISIT (OUTPATIENT)
Dept: HEMATOLOGY | Age: 43
End: 2023-04-26
Payer: MEDICAID

## 2023-04-26 ENCOUNTER — HOSPITAL ENCOUNTER (OUTPATIENT)
Dept: INFUSION THERAPY | Age: 43
Discharge: HOME | End: 2023-04-26
Payer: MEDICAID

## 2023-04-26 VITALS
DIASTOLIC BLOOD PRESSURE: 80 MMHG | OXYGEN SATURATION: 99 % | HEIGHT: 65 IN | WEIGHT: 180.8 LBS | HEART RATE: 79 BPM | SYSTOLIC BLOOD PRESSURE: 130 MMHG | BODY MASS INDEX: 30.12 KG/M2

## 2023-04-26 DIAGNOSIS — C92.10 CML (CHRONIC MYELOCYTIC LEUKEMIA) (HCC): Primary | ICD-10-CM

## 2023-04-26 DIAGNOSIS — Z71.89 CARE PLAN DISCUSSED WITH PATIENT: ICD-10-CM

## 2023-04-26 DIAGNOSIS — T45.1X5S ANTINEOPLASTIC DRUGS CAUSING ADVERSE EFFECT, SEQUELA: ICD-10-CM

## 2023-04-26 DIAGNOSIS — Z00.00 HEALTHCARE MAINTENANCE: ICD-10-CM

## 2023-04-26 DIAGNOSIS — R60.0 LEG EDEMA: ICD-10-CM

## 2023-04-26 DIAGNOSIS — D36.9 ADENOMATOUS POLYPS: ICD-10-CM

## 2023-04-26 DIAGNOSIS — E55.9 VITAMIN D DEFICIENCY: ICD-10-CM

## 2023-04-26 DIAGNOSIS — C92.10 CML (CHRONIC MYELOCYTIC LEUKEMIA) (HCC): ICD-10-CM

## 2023-04-26 PROCEDURE — G8427 DOCREV CUR MEDS BY ELIG CLIN: HCPCS | Performed by: INTERNAL MEDICINE

## 2023-04-26 PROCEDURE — 4004F PT TOBACCO SCREEN RCVD TLK: CPT | Performed by: INTERNAL MEDICINE

## 2023-04-26 PROCEDURE — G8417 CALC BMI ABV UP PARAM F/U: HCPCS | Performed by: INTERNAL MEDICINE

## 2023-04-26 PROCEDURE — 99212 OFFICE O/P EST SF 10 MIN: CPT

## 2023-04-26 PROCEDURE — 99214 OFFICE O/P EST MOD 30 MIN: CPT | Performed by: INTERNAL MEDICINE

## 2023-05-22 DIAGNOSIS — C92.10 CML (CHRONIC MYELOCYTIC LEUKEMIA) (HCC): ICD-10-CM

## 2023-05-22 RX ORDER — NILOTINIB 200 MG/1
CAPSULE ORAL
Qty: 112 CAPSULE | Refills: 3 | Status: ACTIVE | OUTPATIENT
Start: 2023-05-22

## 2023-05-23 DIAGNOSIS — R79.89 LOW SERUM VITAMIN D: ICD-10-CM

## 2023-05-23 RX ORDER — CHOLECALCIFEROL (VITAMIN D3) 1250 MCG
50000 CAPSULE ORAL WEEKLY
Qty: 5 CAPSULE | Refills: 0 | OUTPATIENT
Start: 2023-05-23 | End: 2023-06-28

## 2023-07-19 ENCOUNTER — HOSPITAL ENCOUNTER (OUTPATIENT)
Dept: INFUSION THERAPY | Age: 43
Discharge: HOME | End: 2023-07-19
Payer: MEDICAID

## 2023-07-19 DIAGNOSIS — C92.10 CML (CHRONIC MYELOCYTIC LEUKEMIA) (HCC): Primary | ICD-10-CM

## 2023-07-19 DIAGNOSIS — C92.10 CML (CHRONIC MYELOCYTIC LEUKEMIA) (HCC): ICD-10-CM

## 2023-07-19 LAB
25(OH)D3 SERPL-MCNC: 50.4 NG/ML
ALBUMIN SERPL-MCNC: 4.3 G/DL (ref 3.5–5.2)
ALP SERPL-CCNC: 97 U/L (ref 35–104)
ALT SERPL-CCNC: 25 U/L (ref 9–52)
ANION GAP SERPL CALCULATED.3IONS-SCNC: 8 MMOL/L (ref 7–19)
AST SERPL-CCNC: 26 U/L (ref 14–36)
BILIRUB SERPL-MCNC: 0.7 MG/DL (ref 0.2–1.3)
BUN SERPL-MCNC: 12 MG/DL (ref 7–17)
CALCIUM SERPL-MCNC: 8.8 MG/DL (ref 8.4–10.2)
CHLORIDE SERPL-SCNC: 111 MMOL/L (ref 98–111)
CO2 SERPL-SCNC: 23 MMOL/L (ref 22–29)
CREAT SERPL-MCNC: 0.7 MG/DL (ref 0.5–1)
ERYTHROCYTE [DISTWIDTH] IN BLOOD BY AUTOMATED COUNT: 15.1 % (ref 11.7–14.4)
GLOBULIN: 3.1 G/DL
GLUCOSE SERPL-MCNC: 87 MG/DL (ref 74–106)
HCT VFR BLD AUTO: 38.2 % (ref 34.1–44.9)
HGB BLD-MCNC: 12.6 G/DL (ref 11.2–15.7)
LYMPHOCYTES # BLD: 2.14 K/UL (ref 1.18–3.74)
LYMPHOCYTES NFR BLD: 28.4 % (ref 19.3–53.1)
MCH RBC QN AUTO: 32.5 PG (ref 25.6–32.2)
MCHC RBC AUTO-ENTMCNC: 33 G/DL (ref 32.3–35.5)
MCV RBC AUTO: 98.5 FL (ref 79.4–94.8)
MONOCYTES # BLD: 0.49 K/UL (ref 0.24–0.82)
MONOCYTES NFR BLD: 6.5 % (ref 4.7–12.5)
NEUTROPHILS # BLD: 4.74 K/UL (ref 1.56–6.13)
NEUTS SEG NFR BLD: 62.9 % (ref 34–71.1)
PLATELET # BLD AUTO: 259 K/UL (ref 182–369)
PMV BLD AUTO: 9.5 FL (ref 7.4–10.4)
POTASSIUM SERPL-SCNC: 3.7 MMOL/L (ref 3.5–5.1)
PROT SERPL-MCNC: 7.4 G/DL (ref 6.3–8.2)
RBC # BLD AUTO: 3.88 M/UL (ref 3.93–5.22)
SODIUM SERPL-SCNC: 142 MMOL/L (ref 137–145)
WBC # BLD AUTO: 7.53 K/UL (ref 3.98–10.04)

## 2023-07-19 PROCEDURE — 80053 COMPREHEN METABOLIC PANEL: CPT

## 2023-07-19 PROCEDURE — 36415 COLL VENOUS BLD VENIPUNCTURE: CPT

## 2023-07-19 PROCEDURE — 85025 COMPLETE CBC W/AUTO DIFF WBC: CPT

## 2023-07-28 NOTE — PROGRESS NOTES
MEDICAL ONCOLOGY PROGRESS NOTE    Pt Name: Drea Jaffe  MRN: [id number]  YOB: 1980  Date of evaluation: 7/31/2023    HISTORY OF PRESENT ILLNESS:  Reason for MD visit-toxicity assessment/disease management sssentially, the patient is a 38years old female who has chronic CML diagnosed initially in 2000. She is currently on Nilotinib 400 mg p.o. twice daily. She reports that she has been compliant with her medication. Denies any lower extremity swelling. Denies any breathing problems. She has hypertension has been taking amlodipine. Diagnosis  CML, 2000  BCR/ABL1 kinase 7.21%    Treatment Summary  2000 Gleevec (Patient didn't take on regular basis)  02/2016 - 07/2021 Sprycel  07/30/21 - 12/2021 Bosulif  12/2021 Nilotinib 400mg BID    Hematology/Cancer History  Drea Jaffe was diagnosed with CML in 2005 by Dr Vivien Cole. 2000 Diagnosed with CML by [facility]. Initiated Ayala Jessica at that time  2005 Continuation of Ayala Maury (Patient didn't take on regular basis)  09/17/13 Genotrace - IS QRT-PCR: 57.64%  09/20/2014 Genotrace - IS QRT-PCR: 40.52%  02/2016 Initiated Sprycel  2/10/16 CTA [facility]): Normal CT of the chest. No evidence of pulmonary embolism  03/05/2016 Genotrace - IS QRT-PCR: 30.9%  01/14/2016 Genotrace - IS QRT-PCR: 21.36%  07/28/17 Genotrace - IS QRT-PCR: 8.52%  06/12/16 Genotrace - IS QRT-PCR: 8.47%  01/31/2020  Marrow biopsy- persistent CML, chronic phase. Clonal T -cell population identified by PCR. Flow cytometry negative. 09/01/2020 Genotrace - IS QRT-PCR: 13.5%  09/01/2020-BCR/ABL PCR positive for BCR/ABL 1 rearrangement (13.5% of cells): Consistent with persistence of CML clone. 03/16/2021- BCR-ABL1 QPCR-positive: Major breakpoint (18.956%), minor breakpoint (0.033%). Interpretation: Consistent with residual CML.   06/08/2021- BCR-ABL 1 QPCR-positive (14.8403) for the BCR-ABL-1 e13a2 (b2a2, p219) fusion transcript  06/08/2021- CML chromosome/FISH profile: Abnormal-

## 2023-07-31 ENCOUNTER — HOSPITAL ENCOUNTER (OUTPATIENT)
Dept: INFUSION THERAPY | Age: 43
Discharge: HOME | End: 2023-07-31
Payer: MEDICAID

## 2023-07-31 ENCOUNTER — OFFICE VISIT (OUTPATIENT)
Dept: HEMATOLOGY | Age: 43
End: 2023-07-31
Payer: MEDICAID

## 2023-07-31 VITALS
BODY MASS INDEX: 30.24 KG/M2 | SYSTOLIC BLOOD PRESSURE: 132 MMHG | TEMPERATURE: 97.2 F | DIASTOLIC BLOOD PRESSURE: 74 MMHG | HEART RATE: 85 BPM | WEIGHT: 181.7 LBS | OXYGEN SATURATION: 99 %

## 2023-07-31 DIAGNOSIS — T45.1X5S ANTINEOPLASTIC DRUGS CAUSING ADVERSE EFFECT, SEQUELA: ICD-10-CM

## 2023-07-31 DIAGNOSIS — C92.10 CML (CHRONIC MYELOCYTIC LEUKEMIA) (HCC): ICD-10-CM

## 2023-07-31 DIAGNOSIS — Z71.89 CARE PLAN DISCUSSED WITH PATIENT: Primary | ICD-10-CM

## 2023-07-31 LAB
ERYTHROCYTE [DISTWIDTH] IN BLOOD BY AUTOMATED COUNT: 15.2 % (ref 11.7–14.4)
HCT VFR BLD AUTO: 37.4 % (ref 34.1–44.9)
HGB BLD-MCNC: 12.8 G/DL (ref 11.2–15.7)
LYMPHOCYTES # BLD: 2.78 K/UL (ref 1.18–3.74)
LYMPHOCYTES NFR BLD: 44.5 % (ref 19.3–53.1)
MCH RBC QN AUTO: 32.9 PG (ref 25.6–32.2)
MCHC RBC AUTO-ENTMCNC: 34.2 G/DL (ref 32.3–35.5)
MCV RBC AUTO: 96.1 FL (ref 79.4–94.8)
MONOCYTES # BLD: 0.53 K/UL (ref 0.24–0.82)
MONOCYTES NFR BLD: 8.5 % (ref 4.7–12.5)
NEUTROPHILS # BLD: 2.71 K/UL (ref 1.56–6.13)
NEUTS SEG NFR BLD: 43.3 % (ref 34–71.1)
PLATELET # BLD AUTO: 242 K/UL (ref 182–369)
PMV BLD AUTO: 9.7 FL (ref 7.4–10.4)
RBC # BLD AUTO: 3.89 M/UL (ref 3.93–5.22)
WBC # BLD AUTO: 6.25 K/UL (ref 3.98–10.04)

## 2023-07-31 PROCEDURE — 99214 OFFICE O/P EST MOD 30 MIN: CPT | Performed by: INTERNAL MEDICINE

## 2023-07-31 PROCEDURE — G8417 CALC BMI ABV UP PARAM F/U: HCPCS | Performed by: INTERNAL MEDICINE

## 2023-07-31 PROCEDURE — 4004F PT TOBACCO SCREEN RCVD TLK: CPT | Performed by: INTERNAL MEDICINE

## 2023-07-31 PROCEDURE — 36415 COLL VENOUS BLD VENIPUNCTURE: CPT

## 2023-07-31 PROCEDURE — 85025 COMPLETE CBC W/AUTO DIFF WBC: CPT

## 2023-07-31 PROCEDURE — 99211 OFF/OP EST MAY X REQ PHY/QHP: CPT

## 2023-07-31 PROCEDURE — G8427 DOCREV CUR MEDS BY ELIG CLIN: HCPCS | Performed by: INTERNAL MEDICINE

## 2023-08-02 DIAGNOSIS — C92.10 CML (CHRONIC MYELOCYTIC LEUKEMIA) (HCC): ICD-10-CM

## 2023-08-02 RX ORDER — NILOTINIB 200 MG/1
400 CAPSULE ORAL EVERY 12 HOURS
Qty: 120 CAPSULE | Refills: 5 | Status: SHIPPED | OUTPATIENT
Start: 2023-08-02

## 2023-08-09 DIAGNOSIS — C92.10 CML (CHRONIC MYELOCYTIC LEUKEMIA) (HCC): ICD-10-CM

## 2023-08-09 RX ORDER — NILOTINIB 200 MG/1
400 CAPSULE ORAL EVERY 12 HOURS
Qty: 120 CAPSULE | Refills: 5 | Status: ACTIVE | OUTPATIENT
Start: 2023-08-09

## 2023-08-25 DIAGNOSIS — C92.10 CML (CHRONIC MYELOCYTIC LEUKEMIA) (HCC): ICD-10-CM

## 2023-08-25 RX ORDER — NILOTINIB 200 MG/1
400 CAPSULE ORAL EVERY 12 HOURS
Qty: 120 CAPSULE | Refills: 5 | Status: ACTIVE | OUTPATIENT
Start: 2023-08-25

## 2023-09-07 NOTE — PROGRESS NOTES
non-tender, active bowel sounds, no HSM. No palpable masses  EXTREMITIES: warm, full ROM in all 4 extremities, no focal weakness. SKIN: warm, dry with no rashes or lesions  LYMPH: No cervical, clavicular, axillary, or inguinal lymphadenopathy  NEUROLOGIC: follows commands, non focal     LABORATORY RESULTS REVIEWED/ANALYZED BY ME:  10/2/23 CBC  WBC 7.82  HGB 14.4  HCT 40.1    ANC 4.45  Lab Results   Component Value Date     07/19/2023    K 3.7 07/19/2023     07/19/2023    CO2 23 07/19/2023    BUN 12 07/19/2023    CREATININE 0.7 07/19/2023    GLUCOSE 87 07/19/2023    CALCIUM 8.8 07/19/2023    PROT 7.4 07/19/2023    LABALBU 4.3 07/19/2023    BILITOT 0.7 07/19/2023    ALKPHOS 97 07/19/2023    AST 26 07/19/2023    ALT 25 07/19/2023    LABGLOM >60 07/19/2023    GFRAA >59 10/08/2022    GLOB 3.1 07/19/2023     RADIOLOGY STUDIES REVIEWED BY ME:  None    ASSESSMENT:    No orders of the defined types were placed in this encounter. Real Coleman was seen today for follow-up. Diagnoses and all orders for this visit:    CML (chronic myelocytic leukemia) ([address])    Antineoplastic drugs causing adverse effect, sequela    Care plan discussed with patient    Vitamin D deficiency  -     Cholecalciferol (VITAMIN D) 125 MCG (5000 UT) CAPS; Take 5,000 Units by mouth Once a week at 5 PM          CML without BCR/ABL1 mutation as per last study  She has been transferred from [facility] oncology, Dr. Rachael Pedro office. Apparently, issues with visit compliance. The patient tells me that she missed a couple of appointments. She also assures me that she is consistently/compliant with her TKI medication. Apparently, BCR/ABL was not at target in February 2022. She also had a new mutation as of June last year. She has been seen by [facility], BMT . Essentially, the patient has been on several prior line therapy.  -Currently on Tasigna  400mg p.o. twice daily. Patient claims compliance with her medication.       /BCR/ABL

## 2023-09-08 ENCOUNTER — TELEPHONE (OUTPATIENT)
Dept: HEMATOLOGY | Age: 43
End: 2023-09-08

## 2023-09-22 DIAGNOSIS — C92.10 CML (CHRONIC MYELOCYTIC LEUKEMIA) (HCC): ICD-10-CM

## 2023-09-22 RX ORDER — NILOTINIB 200 MG/1
400 CAPSULE ORAL EVERY 12 HOURS
Qty: 120 CAPSULE | Refills: 5 | Status: ACTIVE | OUTPATIENT
Start: 2023-09-22

## 2023-09-28 ENCOUNTER — TELEPHONE (OUTPATIENT)
Dept: HEMATOLOGY | Age: 43
End: 2023-09-28

## 2023-10-02 ENCOUNTER — HOSPITAL ENCOUNTER (OUTPATIENT)
Dept: INFUSION THERAPY | Age: 43
Discharge: HOME | End: 2023-10-02
Payer: MEDICAID

## 2023-10-02 ENCOUNTER — OFFICE VISIT (OUTPATIENT)
Dept: HEMATOLOGY | Age: 43
End: 2023-10-02
Payer: MEDICAID

## 2023-10-02 VITALS
HEART RATE: 83 BPM | DIASTOLIC BLOOD PRESSURE: 80 MMHG | SYSTOLIC BLOOD PRESSURE: 150 MMHG | OXYGEN SATURATION: 99 % | HEIGHT: 65 IN | BODY MASS INDEX: 30.04 KG/M2 | WEIGHT: 180.3 LBS | TEMPERATURE: 98.1 F

## 2023-10-02 DIAGNOSIS — Z71.89 CARE PLAN DISCUSSED WITH PATIENT: ICD-10-CM

## 2023-10-02 DIAGNOSIS — C92.10 CML (CHRONIC MYELOCYTIC LEUKEMIA) (HCC): ICD-10-CM

## 2023-10-02 DIAGNOSIS — C92.10 CML (CHRONIC MYELOCYTIC LEUKEMIA) (HCC): Primary | ICD-10-CM

## 2023-10-02 DIAGNOSIS — E55.9 VITAMIN D DEFICIENCY: ICD-10-CM

## 2023-10-02 DIAGNOSIS — T45.1X5S ANTINEOPLASTIC DRUGS CAUSING ADVERSE EFFECT, SEQUELA: ICD-10-CM

## 2023-10-02 LAB
BASOPHILS # BLD: 0.05 K/UL (ref 0.01–0.08)
BASOPHILS NFR BLD: 0.6 % (ref 0.1–1.2)
EOSINOPHIL # BLD: 0.09 K/UL (ref 0.04–0.54)
EOSINOPHIL NFR BLD: 1.2 % (ref 0.7–7)
ERYTHROCYTE [DISTWIDTH] IN BLOOD BY AUTOMATED COUNT: 14.6 % (ref 11.7–14.4)
HCT VFR BLD AUTO: 40.1 % (ref 34.1–44.9)
HGB BLD-MCNC: 14.4 G/DL (ref 11.2–15.7)
LYMPHOCYTES # BLD: 2.4 K/UL (ref 1.18–3.74)
LYMPHOCYTES NFR BLD: 30.7 % (ref 19.3–53.1)
MCH RBC QN AUTO: 33.3 PG (ref 25.6–32.2)
MCHC RBC AUTO-ENTMCNC: 35.9 G/DL (ref 32.3–35.5)
MCV RBC AUTO: 92.8 FL (ref 79.4–94.8)
MONOCYTES # BLD: 0.72 K/UL (ref 0.24–0.82)
MONOCYTES NFR BLD: 9.2 % (ref 4.7–12.5)
NEUTROPHILS # BLD: 4.45 K/UL (ref 1.56–6.13)
NEUTS SEG NFR BLD: 56.9 % (ref 34–71.1)
PLATELET # BLD AUTO: 295 K/UL (ref 182–369)
PMV BLD AUTO: 9.2 FL (ref 7.4–10.4)
RBC # BLD AUTO: 4.32 M/UL (ref 3.93–5.22)
WBC # BLD AUTO: 7.82 K/UL (ref 3.98–10.04)

## 2023-10-02 PROCEDURE — 99211 OFF/OP EST MAY X REQ PHY/QHP: CPT

## 2023-10-02 PROCEDURE — G8417 CALC BMI ABV UP PARAM F/U: HCPCS | Performed by: INTERNAL MEDICINE

## 2023-10-02 PROCEDURE — 36415 COLL VENOUS BLD VENIPUNCTURE: CPT

## 2023-10-02 PROCEDURE — 99213 OFFICE O/P EST LOW 20 MIN: CPT | Performed by: INTERNAL MEDICINE

## 2023-10-02 PROCEDURE — G8484 FLU IMMUNIZE NO ADMIN: HCPCS | Performed by: INTERNAL MEDICINE

## 2023-10-02 PROCEDURE — G8427 DOCREV CUR MEDS BY ELIG CLIN: HCPCS | Performed by: INTERNAL MEDICINE

## 2023-10-02 PROCEDURE — 4004F PT TOBACCO SCREEN RCVD TLK: CPT | Performed by: INTERNAL MEDICINE

## 2023-10-02 PROCEDURE — 85025 COMPLETE CBC W/AUTO DIFF WBC: CPT

## 2023-10-02 RX ORDER — CHOLECALCIFEROL (VITAMIN D3) 125 MCG
5000 CAPSULE ORAL WEEKLY
Qty: 12 CAPSULE | Refills: 5 | Status: SHIPPED | OUTPATIENT
Start: 2023-10-02

## 2023-11-06 DIAGNOSIS — C92.10 CML (CHRONIC MYELOCYTIC LEUKEMIA) (HCC): Primary | ICD-10-CM

## 2023-11-06 NOTE — PROGRESS NOTES
MEDICAL ONCOLOGY PROGRESS NOTE    Pt Name: Neil Hopkins  MRN: [id number]  YOB: 1980  Date of evaluation: 11/24/2023    HISTORY OF PRESENT ILLNESS:  Reason for MD visit-toxicity assessment/disease management/compliance. Essentially, the patient is a 37years old female who has chronic CML diagnosed initially in 2000. She is currently off Levantinib for the last 2 months. She has a significant increase on her WBC count today. She denies any other specific complaints. Denies any nausea vomit diarrhea. Denies any headaches. Denies any vision changes. Denies any shortness of breath. There has been some issues with the shipping her medication and insurance problems. Diagnosis  CML, 2000  BCR/ABL1 kinase 7.21%    Treatment Summary  2000 Gleevec (Patient didn't take on regular basis)  02/2016 - 07/2021 Sprycel  07/30/21 - 12/2021 Bosulif  12/2021 Nilotinib 400mg BID    Hematology/Cancer History  Neil Hopkins was diagnosed with CML in 2005 by Dr Imani White. 2000 Diagnosed with CML by [facility]. Initiated Nabil Meraux at that time  2005 Continuation of Nabil Meraux (Patient didn't take on regular basis)  09/17/13 Genotrace - IS QRT-PCR: 57.64%  09/20/2014 Genotrace - IS QRT-PCR: 40.52%  02/2016 Initiated Sprycel  2/10/16 [facility]): Normal CT of the chest. No evidence of pulmonary embolism  03/05/2016 Genotrace - IS QRT-PCR: 30.9%  01/14/2016 Genotrace - IS QRT-PCR: 21.36%  07/28/17 Genotrace - IS QRT-PCR: 8.52%  06/12/16 Genotrace - IS QRT-PCR: 8.47%  01/31/2020  Marrow biopsy- persistent CML, chronic phase. Clonal T -cell population identified by PCR. Flow cytometry negative. 09/01/2020 Genotrace - IS QRT-PCR: 13.5%  09/01/2020-BCR/ABL PCR positive for BCR/ABL 1 rearrangement (13.5% of cells): Consistent with persistence of CML clone. 03/16/2021- BCR-ABL1 QPCR-positive: Major breakpoint (18.956%), minor breakpoint (0.033%). Interpretation: Consistent with residual CML.   06/08/2021-

## 2023-11-09 ENCOUNTER — TELEPHONE (OUTPATIENT)
Dept: HEMATOLOGY | Age: 43
End: 2023-11-09

## 2023-11-09 NOTE — TELEPHONE ENCOUNTER
Called patient to remind them of their appointment on 11/13/23. Detailed voicemail was left with appointment date and time.

## 2023-11-10 ENCOUNTER — TELEPHONE (OUTPATIENT)
Dept: HEMATOLOGY | Age: 43
End: 2023-11-10

## 2023-11-10 NOTE — TELEPHONE ENCOUNTER
Lily Jones Dr works in [place] and needs to reschedule her appt for 11/22 or the 27th because she will be in town. She said she still does not have her medicine because her insurance company thinks she has medicare. Can we see her that day or do I need to schedule at a later time? She said she is 9 hours away.

## 2023-11-20 DIAGNOSIS — C92.10 CML (CHRONIC MYELOCYTIC LEUKEMIA) (HCC): Primary | ICD-10-CM

## 2023-11-21 ENCOUNTER — TELEPHONE (OUTPATIENT)
Dept: HEMATOLOGY | Age: 43
End: 2023-11-21

## 2023-11-21 DIAGNOSIS — C92.10 CML (CHRONIC MYELOCYTIC LEUKEMIA) (HCC): ICD-10-CM

## 2023-11-21 RX ORDER — NILOTINIB 200 MG/1
400 CAPSULE ORAL EVERY 12 HOURS
Qty: 120 CAPSULE | Refills: 5 | Status: ACTIVE | OUTPATIENT
Start: 2023-11-21

## 2023-11-22 ENCOUNTER — HOSPITAL ENCOUNTER (OUTPATIENT)
Dept: INFUSION THERAPY | Age: 43
Discharge: HOME | End: 2023-11-22
Payer: MEDICAID

## 2023-11-22 ENCOUNTER — OFFICE VISIT (OUTPATIENT)
Dept: HEMATOLOGY | Age: 43
End: 2023-11-22

## 2023-11-22 VITALS
OXYGEN SATURATION: 100 % | TEMPERATURE: 98.5 F | SYSTOLIC BLOOD PRESSURE: 120 MMHG | HEIGHT: 65 IN | WEIGHT: 184.5 LBS | BODY MASS INDEX: 30.74 KG/M2 | DIASTOLIC BLOOD PRESSURE: 80 MMHG | HEART RATE: 92 BPM

## 2023-11-22 DIAGNOSIS — C92.10 CML (CHRONIC MYELOCYTIC LEUKEMIA) (HCC): Primary | ICD-10-CM

## 2023-11-22 DIAGNOSIS — C92.10 CML (CHRONIC MYELOCYTIC LEUKEMIA) (HCC): ICD-10-CM

## 2023-11-22 DIAGNOSIS — D72.829 HYPERLEUKOCYTOSIS: ICD-10-CM

## 2023-11-22 DIAGNOSIS — E55.9 HYPOVITAMINOSIS D: ICD-10-CM

## 2023-11-22 DIAGNOSIS — Z71.89 CARE PLAN DISCUSSED WITH PATIENT: ICD-10-CM

## 2023-11-22 LAB
ALBUMIN SERPL-MCNC: 4.6 G/DL (ref 3.5–5.2)
ALP SERPL-CCNC: 85 U/L (ref 35–104)
ALT SERPL-CCNC: 46 U/L (ref 9–52)
ANION GAP SERPL CALCULATED.3IONS-SCNC: 8 MMOL/L (ref 7–19)
AST SERPL-CCNC: 46 U/L (ref 14–36)
BASOPHILS # BLD: 2.75 K/UL (ref 0.01–0.08)
BASOPHILS NFR BLD: 3.3 % (ref 0.1–1.2)
BILIRUB SERPL-MCNC: 0.3 MG/DL (ref 0.2–1.3)
BUN SERPL-MCNC: 22 MG/DL (ref 7–17)
CALCIUM SERPL-MCNC: 10 MG/DL (ref 8.4–10.2)
CHLORIDE SERPL-SCNC: 105 MMOL/L (ref 98–111)
CO2 SERPL-SCNC: 27 MMOL/L (ref 22–29)
CREAT SERPL-MCNC: 0.9 MG/DL (ref 0.5–1)
EOSINOPHIL # BLD: 1.09 K/UL (ref 0.04–0.54)
EOSINOPHIL NFR BLD: 1.3 % (ref 0.7–7)
ERYTHROCYTE [DISTWIDTH] IN BLOOD BY AUTOMATED COUNT: 15.7 % (ref 11.7–14.4)
GLOBULIN: 3.1 G/DL
GLUCOSE SERPL-MCNC: 96 MG/DL (ref 74–106)
HCT VFR BLD AUTO: 36.2 % (ref 34.1–44.9)
HGB BLD-MCNC: 12.5 G/DL (ref 11.2–15.7)
LDH SERPL-CCNC: 1000 U/L (ref 120–246)
LYMPHOCYTES # BLD: 11.99 K/UL (ref 1.18–3.74)
LYMPHOCYTES NFR BLD: 14.3 % (ref 19.3–53.1)
MCH RBC QN AUTO: 32.4 PG (ref 25.6–32.2)
MCHC RBC AUTO-ENTMCNC: 34.5 G/DL (ref 32.3–35.5)
MCV RBC AUTO: 93.8 FL (ref 79.4–94.8)
MONOCYTES # BLD: 2.07 K/UL (ref 0.24–0.82)
MONOCYTES NFR BLD: 2.5 % (ref 4.7–12.5)
NEUTROPHILS # BLD: 50.98 K/UL (ref 1.56–6.13)
NEUTS SEG NFR BLD: 61 % (ref 34–71.1)
PLATELET # BLD AUTO: 162 K/UL (ref 182–369)
PMV BLD AUTO: 9.9 FL (ref 7.4–10.4)
POTASSIUM SERPL-SCNC: 3.7 MMOL/L (ref 3.5–5.1)
PROT SERPL-MCNC: 7.8 G/DL (ref 6.3–8.2)
RBC # BLD AUTO: 3.86 M/UL (ref 3.93–5.22)
SODIUM SERPL-SCNC: 140 MMOL/L (ref 137–145)
URATE SERPL-MCNC: 7.4 MG/DL (ref 2.5–5.2)
WBC # BLD AUTO: 83.58 K/UL (ref 3.98–10.04)

## 2023-11-22 PROCEDURE — 83615 LACTATE (LD) (LDH) ENZYME: CPT

## 2023-11-22 PROCEDURE — 36415 COLL VENOUS BLD VENIPUNCTURE: CPT

## 2023-11-22 PROCEDURE — 80053 COMPREHEN METABOLIC PANEL: CPT

## 2023-11-22 PROCEDURE — 99212 OFFICE O/P EST SF 10 MIN: CPT

## 2023-11-22 PROCEDURE — 85025 COMPLETE CBC W/AUTO DIFF WBC: CPT

## 2023-11-22 PROCEDURE — 84550 ASSAY OF BLOOD/URIC ACID: CPT

## 2023-12-07 ENCOUNTER — TELEPHONE (OUTPATIENT)
Dept: HEMATOLOGY | Age: 43
End: 2023-12-07

## 2023-12-08 ENCOUNTER — OFFICE VISIT (OUTPATIENT)
Dept: HEMATOLOGY | Age: 43
End: 2023-12-08
Payer: MEDICAID

## 2023-12-08 ENCOUNTER — HOSPITAL ENCOUNTER (OUTPATIENT)
Dept: INFUSION THERAPY | Age: 43
Discharge: HOME | End: 2023-12-08
Payer: MEDICAID

## 2023-12-08 VITALS
TEMPERATURE: 97.8 F | DIASTOLIC BLOOD PRESSURE: 82 MMHG | OXYGEN SATURATION: 100 % | HEART RATE: 92 BPM | SYSTOLIC BLOOD PRESSURE: 122 MMHG | HEIGHT: 66 IN | WEIGHT: 184 LBS | BODY MASS INDEX: 29.57 KG/M2

## 2023-12-08 DIAGNOSIS — Z71.89 CARE PLAN DISCUSSED WITH PATIENT: ICD-10-CM

## 2023-12-08 DIAGNOSIS — E79.0 HYPERURICEMIA: ICD-10-CM

## 2023-12-08 DIAGNOSIS — C92.10 CML (CHRONIC MYELOCYTIC LEUKEMIA) (HCC): ICD-10-CM

## 2023-12-08 DIAGNOSIS — I10 PRIMARY HYPERTENSION: ICD-10-CM

## 2023-12-08 DIAGNOSIS — E55.9 HYPOVITAMINOSIS D: ICD-10-CM

## 2023-12-08 DIAGNOSIS — C92.10 CML (CHRONIC MYELOCYTIC LEUKEMIA) (HCC): Primary | ICD-10-CM

## 2023-12-08 LAB
ALBUMIN SERPL-MCNC: 4.4 G/DL (ref 3.5–5.2)
ALP SERPL-CCNC: 84 U/L (ref 35–104)
ALT SERPL-CCNC: 28 U/L (ref 9–52)
ANION GAP SERPL CALCULATED.3IONS-SCNC: 8 MMOL/L (ref 7–19)
AST SERPL-CCNC: 34 U/L (ref 14–36)
BASOPHILS # BLD: 1.74 K/UL (ref 0.01–0.08)
BASOPHILS NFR BLD: 4.7 % (ref 0.1–1.2)
BILIRUB SERPL-MCNC: 0.7 MG/DL (ref 0.2–1.3)
BUN SERPL-MCNC: 12 MG/DL (ref 7–17)
CALCIUM SERPL-MCNC: 9 MG/DL (ref 8.4–10.2)
CHLORIDE SERPL-SCNC: 109 MMOL/L (ref 98–111)
CO2 SERPL-SCNC: 24 MMOL/L (ref 22–29)
CREAT SERPL-MCNC: 0.9 MG/DL (ref 0.5–1)
EOSINOPHIL # BLD: 0.52 K/UL (ref 0.04–0.54)
EOSINOPHIL NFR BLD: 1.4 % (ref 0.7–7)
ERYTHROCYTE [DISTWIDTH] IN BLOOD BY AUTOMATED COUNT: 17.4 % (ref 11.7–14.4)
GLOBULIN: 3.1 G/DL
GLUCOSE SERPL-MCNC: 96 MG/DL (ref 74–106)
HCT VFR BLD AUTO: 33.4 % (ref 34.1–44.9)
HGB BLD-MCNC: 11.2 G/DL (ref 11.2–15.7)
LDH SERPL-CCNC: 872 U/L (ref 120–246)
LYMPHOCYTES # BLD: 4.59 K/UL (ref 1.18–3.74)
LYMPHOCYTES NFR BLD: 12.5 % (ref 19.3–53.1)
MCH RBC QN AUTO: 31.6 PG (ref 25.6–32.2)
MCHC RBC AUTO-ENTMCNC: 33.5 G/DL (ref 32.3–35.5)
MCV RBC AUTO: 94.4 FL (ref 79.4–94.8)
MONOCYTES # BLD: 6.17 K/UL (ref 0.24–0.82)
MONOCYTES NFR BLD: 16.8 % (ref 4.7–12.5)
NEUTROPHILS # BLD: 19.1 K/UL (ref 1.56–6.13)
NEUTS SEG NFR BLD: 51.9 % (ref 34–71.1)
PLATELET # BLD AUTO: 318 K/UL (ref 182–369)
PMV BLD AUTO: 10.3 FL (ref 7.4–10.4)
POTASSIUM SERPL-SCNC: 4.2 MMOL/L (ref 3.5–5.1)
PROT SERPL-MCNC: 7.5 G/DL (ref 6.3–8.2)
RBC # BLD AUTO: 3.54 M/UL (ref 3.93–5.22)
SODIUM SERPL-SCNC: 141 MMOL/L (ref 137–145)
URATE SERPL-MCNC: 6.1 MG/DL (ref 2.5–5.2)
WBC # BLD AUTO: 36.79 K/UL (ref 3.98–10.04)

## 2023-12-08 PROCEDURE — 80053 COMPREHEN METABOLIC PANEL: CPT

## 2023-12-08 PROCEDURE — G8427 DOCREV CUR MEDS BY ELIG CLIN: HCPCS | Performed by: INTERNAL MEDICINE

## 2023-12-08 PROCEDURE — 85025 COMPLETE CBC W/AUTO DIFF WBC: CPT

## 2023-12-08 PROCEDURE — 4004F PT TOBACCO SCREEN RCVD TLK: CPT | Performed by: INTERNAL MEDICINE

## 2023-12-08 PROCEDURE — G8484 FLU IMMUNIZE NO ADMIN: HCPCS | Performed by: INTERNAL MEDICINE

## 2023-12-08 PROCEDURE — 83615 LACTATE (LD) (LDH) ENZYME: CPT

## 2023-12-08 PROCEDURE — G8417 CALC BMI ABV UP PARAM F/U: HCPCS | Performed by: INTERNAL MEDICINE

## 2023-12-08 PROCEDURE — 36415 COLL VENOUS BLD VENIPUNCTURE: CPT

## 2023-12-08 PROCEDURE — 99214 OFFICE O/P EST MOD 30 MIN: CPT | Performed by: INTERNAL MEDICINE

## 2023-12-08 PROCEDURE — 99212 OFFICE O/P EST SF 10 MIN: CPT

## 2023-12-08 PROCEDURE — 3079F DIAST BP 80-89 MM HG: CPT | Performed by: INTERNAL MEDICINE

## 2023-12-08 PROCEDURE — 84550 ASSAY OF BLOOD/URIC ACID: CPT

## 2023-12-08 PROCEDURE — 3074F SYST BP LT 130 MM HG: CPT | Performed by: INTERNAL MEDICINE

## 2023-12-08 RX ORDER — AMLODIPINE BESYLATE 10 MG/1
10 TABLET ORAL DAILY
Qty: 30 TABLET | Refills: 3 | Status: CANCELLED | OUTPATIENT
Start: 2023-12-08

## 2023-12-08 RX ORDER — ALLOPURINOL 300 MG/1
300 TABLET ORAL DAILY
Qty: 30 TABLET | Refills: 0 | Status: SHIPPED | OUTPATIENT
Start: 2023-12-08

## 2023-12-08 RX ORDER — AMLODIPINE BESYLATE 10 MG/1
10 TABLET ORAL DAILY
Qty: 90 TABLET | Refills: 4 | Status: SHIPPED | OUTPATIENT
Start: 2023-12-08

## 2023-12-11 DIAGNOSIS — C92.10 CML (CHRONIC MYELOCYTIC LEUKEMIA) (HCC): ICD-10-CM

## 2023-12-11 DIAGNOSIS — E79.0 HYPERURICEMIA: ICD-10-CM

## 2023-12-11 DIAGNOSIS — E55.9 VITAMIN D DEFICIENCY: ICD-10-CM

## 2023-12-11 DIAGNOSIS — I10 PRIMARY HYPERTENSION: ICD-10-CM

## 2023-12-11 RX ORDER — CHOLECALCIFEROL (VITAMIN D3) 125 MCG
5000 CAPSULE ORAL WEEKLY
Qty: 4 CAPSULE | Refills: 0 | Status: SHIPPED | OUTPATIENT
Start: 2023-12-11

## 2023-12-11 RX ORDER — AMLODIPINE BESYLATE 10 MG/1
10 TABLET ORAL DAILY
Qty: 30 TABLET | Refills: 0 | Status: SHIPPED | OUTPATIENT
Start: 2023-12-11

## 2023-12-11 RX ORDER — ALLOPURINOL 300 MG/1
300 TABLET ORAL DAILY
Qty: 30 TABLET | Refills: 0 | Status: SHIPPED | OUTPATIENT
Start: 2023-12-11

## 2023-12-22 NOTE — PROGRESS NOTES
CMP, uric acid, LDH Today   BCR/ABL Today  Continue Nilotinib 400mg BID  Continue OTC Vitamin D 5,000U daily  Continue Allopurinol 300mg daily     Follow Up:  Return in about 4 weeks (around 1/31/2024) for CBC, CMP, Appointment with Dr. Caldwell.  Data Unavailable    IAlecia am pre charting  as Medical Assistant for Ovi Caldwell MD. Electronically signed by Alecia Montoya MA on 1/3/2024 at 8:06 AM CST.  I have seen, examined and reviewed this patient medication list, appropriate labs and imaging studies. I reviewed relevant medical records and others physician’s notes. I discussed the plans of care with the patient. I answered all the questions to the patient’s satisfaction. I have also reviewed the chief complaint (CC) and part of the history (History of Present Illness (HPI), Past Family Social History (PFSH), or Review of Systems (ROS) and made changes when appropriated.       (Please note that portions of this note were completed with a voice recognition program. Efforts were made to edit the dictations but occasionally words are mis-transcribed.)Electronically signed by Ovi Caldwell MD on 1/3/2024 at 10:09 AM

## 2023-12-29 DIAGNOSIS — E79.0 HYPERURICEMIA: ICD-10-CM

## 2023-12-29 DIAGNOSIS — C92.10 CML (CHRONIC MYELOCYTIC LEUKEMIA) (HCC): ICD-10-CM

## 2023-12-29 RX ORDER — ALLOPURINOL 300 MG/1
300 TABLET ORAL DAILY
Qty: 90 TABLET | Refills: 0 | Status: SHIPPED | OUTPATIENT
Start: 2023-12-29

## 2024-01-02 ENCOUNTER — TELEPHONE (OUTPATIENT)
Dept: HEMATOLOGY | Age: 44
End: 2024-01-02

## 2024-01-02 NOTE — TELEPHONE ENCOUNTER
Called patient and reminded patient of their appointment on 1/03/2024and patient confirmed they would be here.

## 2024-01-03 ENCOUNTER — HOSPITAL ENCOUNTER (OUTPATIENT)
Dept: INFUSION THERAPY | Age: 44
Discharge: HOME | End: 2024-01-03
Payer: MEDICAID

## 2024-01-03 ENCOUNTER — OFFICE VISIT (OUTPATIENT)
Dept: HEMATOLOGY | Age: 44
End: 2024-01-03
Payer: MEDICAID

## 2024-01-03 VITALS
DIASTOLIC BLOOD PRESSURE: 84 MMHG | WEIGHT: 186 LBS | OXYGEN SATURATION: 99 % | HEART RATE: 77 BPM | SYSTOLIC BLOOD PRESSURE: 122 MMHG | TEMPERATURE: 98.8 F | HEIGHT: 66 IN | BODY MASS INDEX: 29.89 KG/M2

## 2024-01-03 DIAGNOSIS — R53.83 FATIGUE DUE TO TREATMENT: ICD-10-CM

## 2024-01-03 DIAGNOSIS — T45.1X5D ANTINEOPLASTIC ANTIBIOTICS CAUSING ADVERSE EFFECT IN THERAPEUTIC USE, SUBSEQUENT ENCOUNTER: ICD-10-CM

## 2024-01-03 DIAGNOSIS — C92.10 CML (CHRONIC MYELOCYTIC LEUKEMIA) (HCC): ICD-10-CM

## 2024-01-03 DIAGNOSIS — Z91.89 COMPLIANCE WITH MEDICATION REGIMEN: ICD-10-CM

## 2024-01-03 DIAGNOSIS — Z71.89 CARE PLAN DISCUSSED WITH PATIENT: ICD-10-CM

## 2024-01-03 DIAGNOSIS — C92.10 CML (CHRONIC MYELOCYTIC LEUKEMIA) (HCC): Primary | ICD-10-CM

## 2024-01-03 LAB
ALBUMIN SERPL-MCNC: 4.2 G/DL (ref 3.5–5.2)
ALP SERPL-CCNC: 114 U/L (ref 35–104)
ALT SERPL-CCNC: 22 U/L (ref 9–52)
ANION GAP SERPL CALCULATED.3IONS-SCNC: 7 MMOL/L (ref 7–19)
AST SERPL-CCNC: 32 U/L (ref 14–36)
BASOPHILS # BLD: 0.06 K/UL (ref 0.01–0.08)
BASOPHILS NFR BLD: 1.2 % (ref 0.1–1.2)
BILIRUB SERPL-MCNC: 1.2 MG/DL (ref 0.2–1.3)
BUN SERPL-MCNC: 14 MG/DL (ref 7–17)
CALCIUM SERPL-MCNC: 8.7 MG/DL (ref 8.4–10.2)
CHLORIDE SERPL-SCNC: 109 MMOL/L (ref 98–111)
CO2 SERPL-SCNC: 22 MMOL/L (ref 22–29)
CREAT SERPL-MCNC: 0.8 MG/DL (ref 0.5–1)
EOSINOPHIL # BLD: 0.15 K/UL (ref 0.04–0.54)
EOSINOPHIL NFR BLD: 3.1 % (ref 0.7–7)
ERYTHROCYTE [DISTWIDTH] IN BLOOD BY AUTOMATED COUNT: 18.4 % (ref 11.7–14.4)
GLOBULIN: 3 G/DL
GLUCOSE SERPL-MCNC: 110 MG/DL (ref 74–106)
HCT VFR BLD AUTO: 35.2 % (ref 34.1–44.9)
HGB BLD-MCNC: 11.7 G/DL (ref 11.2–15.7)
LDH SERPL-CCNC: 231 U/L (ref 120–246)
LYMPHOCYTES # BLD: 1.57 K/UL (ref 1.18–3.74)
LYMPHOCYTES NFR BLD: 32.1 % (ref 19.3–53.1)
MCH RBC QN AUTO: 32.3 PG (ref 25.6–32.2)
MCHC RBC AUTO-ENTMCNC: 33.2 G/DL (ref 32.3–35.5)
MCV RBC AUTO: 97.2 FL (ref 79.4–94.8)
MONOCYTES # BLD: 0.36 K/UL (ref 0.24–0.82)
MONOCYTES NFR BLD: 7.4 % (ref 4.7–12.5)
NEUTROPHILS # BLD: 2.74 K/UL (ref 1.56–6.13)
NEUTS SEG NFR BLD: 56 % (ref 34–71.1)
PLATELET # BLD AUTO: 276 K/UL (ref 182–369)
PMV BLD AUTO: 9 FL (ref 7.4–10.4)
POTASSIUM SERPL-SCNC: 4.1 MMOL/L (ref 3.5–5.1)
PROT SERPL-MCNC: 7.2 G/DL (ref 6.3–8.2)
RBC # BLD AUTO: 3.62 M/UL (ref 3.93–5.22)
SODIUM SERPL-SCNC: 138 MMOL/L (ref 137–145)
URATE SERPL-MCNC: 2.6 MG/DL (ref 2.5–5.2)
WBC # BLD AUTO: 4.89 K/UL (ref 3.98–10.04)

## 2024-01-03 PROCEDURE — 83615 LACTATE (LD) (LDH) ENZYME: CPT

## 2024-01-03 PROCEDURE — 84550 ASSAY OF BLOOD/URIC ACID: CPT

## 2024-01-03 PROCEDURE — 85025 COMPLETE CBC W/AUTO DIFF WBC: CPT

## 2024-01-03 PROCEDURE — 99212 OFFICE O/P EST SF 10 MIN: CPT

## 2024-01-03 PROCEDURE — G8427 DOCREV CUR MEDS BY ELIG CLIN: HCPCS | Performed by: INTERNAL MEDICINE

## 2024-01-03 PROCEDURE — 4004F PT TOBACCO SCREEN RCVD TLK: CPT | Performed by: INTERNAL MEDICINE

## 2024-01-03 PROCEDURE — 80053 COMPREHEN METABOLIC PANEL: CPT

## 2024-01-03 PROCEDURE — G8484 FLU IMMUNIZE NO ADMIN: HCPCS | Performed by: INTERNAL MEDICINE

## 2024-01-03 PROCEDURE — 99214 OFFICE O/P EST MOD 30 MIN: CPT | Performed by: INTERNAL MEDICINE

## 2024-01-03 PROCEDURE — 36415 COLL VENOUS BLD VENIPUNCTURE: CPT

## 2024-01-03 PROCEDURE — G8417 CALC BMI ABV UP PARAM F/U: HCPCS | Performed by: INTERNAL MEDICINE

## 2024-02-14 DIAGNOSIS — C92.10 CML (CHRONIC MYELOCYTIC LEUKEMIA) (HCC): Primary | ICD-10-CM

## 2024-02-14 NOTE — PROGRESS NOTES
switch from dasatinib to bosutinib. Today I spoke to the patient who swears that she has been taking her medicine daily except for 5 days when she had a \"stomach bug.\" But has been taking otherwise. I then spoke to Dr. Avila who will again take a look at the mutation study that was done previously to see if we can use another TKI (i.e.: Ponatinib). 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 Avila does not believe this would be helpful unless the patient is bridging towards transplant. If ponatinib is not felt to be indicated or fails, then the patient would qualify for transplant at that time. Awaiting word from Dr. Avila prior to moving forward. The patient is made aware of the plans. She verbalized understanding and agreement  12/10/2021-(+) for a BCR/ABL1 rearrangement (31% of cells). Major breakpoint (15.085%), minor breakpoint (0.008%)  12/30/2021- Nilotinib initiated- was taking 400 mg daily (error on her part but claims \"the pharmacy wrote it that way\") instead of q12h as prescribed - until 02/29/2022 when she started taking q12h.  02/17/2022-BCR/ABL-FISH positive 41.5%: RT-PCR major breakpoint (40.046%) consistent with residual CML.  4/26/22 US bilateral lower extremity (P): Normal duplex ultrasound of the bilateral lower extremities without evidence of venous thrombus.   5/4/22- Transthoracic echo ([facility]): Left ventricular systolic function is normal. Left ventricular ejection fraction appears to be 61-65%. Normal right ventricular cavity size and systolic function noted. No hemodynamically significant valvular abnormalities identified on this study.  6/23/2022 [facility]  BCR-ABL1 is 7.21%; MMR: NO.  BCR/ABL 1 mutation not detected.  6/23/2022-she was first seen by me.  She was transferred from Dr. Morrow's office.  Apparently she was discharged from that practice due to compliance issues.  The patient tells me that she has

## 2024-02-15 ENCOUNTER — OFFICE VISIT (OUTPATIENT)
Dept: HEMATOLOGY | Age: 44
End: 2024-02-15

## 2024-02-15 ENCOUNTER — HOSPITAL ENCOUNTER (OUTPATIENT)
Dept: INFUSION THERAPY | Age: 44
Discharge: HOME | End: 2024-02-15
Payer: MEDICAID

## 2024-02-15 VITALS
OXYGEN SATURATION: 99 % | HEART RATE: 94 BPM | WEIGHT: 182.7 LBS | SYSTOLIC BLOOD PRESSURE: 122 MMHG | HEIGHT: 66 IN | DIASTOLIC BLOOD PRESSURE: 78 MMHG | BODY MASS INDEX: 29.36 KG/M2 | TEMPERATURE: 98.8 F

## 2024-02-15 DIAGNOSIS — E79.0 HYPERURICEMIA: ICD-10-CM

## 2024-02-15 DIAGNOSIS — R53.83 FATIGUE DUE TO TREATMENT: ICD-10-CM

## 2024-02-15 DIAGNOSIS — Z91.89 COMPLIANCE WITH MEDICATION REGIMEN: ICD-10-CM

## 2024-02-15 DIAGNOSIS — C92.10 CML (CHRONIC MYELOCYTIC LEUKEMIA) (HCC): Primary | ICD-10-CM

## 2024-02-15 DIAGNOSIS — Z71.89 CARE PLAN DISCUSSED WITH PATIENT: ICD-10-CM

## 2024-02-15 DIAGNOSIS — C92.10 CML (CHRONIC MYELOCYTIC LEUKEMIA) (HCC): ICD-10-CM

## 2024-02-15 DIAGNOSIS — T45.1X5D ANTINEOPLASTIC ANTIBIOTICS CAUSING ADVERSE EFFECT IN THERAPEUTIC USE, SUBSEQUENT ENCOUNTER: ICD-10-CM

## 2024-02-15 LAB
ALBUMIN SERPL-MCNC: 4.2 G/DL (ref 3.5–5.2)
ALP SERPL-CCNC: 120 U/L (ref 35–104)
ALT SERPL-CCNC: 20 U/L (ref 9–52)
ANION GAP SERPL CALCULATED.3IONS-SCNC: 10 MMOL/L (ref 7–19)
AST SERPL-CCNC: 26 U/L (ref 14–36)
BASOPHILS # BLD: 0.06 K/UL (ref 0.01–0.08)
BASOPHILS NFR BLD: 1.1 % (ref 0.1–1.2)
BILIRUB SERPL-MCNC: 0.5 MG/DL (ref 0.2–1.3)
BUN SERPL-MCNC: 17 MG/DL (ref 7–17)
CALCIUM SERPL-MCNC: 8.7 MG/DL (ref 8.4–10.2)
CHLORIDE SERPL-SCNC: 111 MMOL/L (ref 98–111)
CO2 SERPL-SCNC: 21 MMOL/L (ref 22–29)
CREAT SERPL-MCNC: 0.8 MG/DL (ref 0.5–1)
EOSINOPHIL # BLD: 0.15 K/UL (ref 0.04–0.54)
EOSINOPHIL NFR BLD: 2.8 % (ref 0.7–7)
ERYTHROCYTE [DISTWIDTH] IN BLOOD BY AUTOMATED COUNT: 13.1 % (ref 11.7–14.4)
GLOBULIN: 3.3 G/DL
GLUCOSE SERPL-MCNC: 100 MG/DL (ref 74–106)
HCT VFR BLD AUTO: 38.2 % (ref 34.1–44.9)
HGB BLD-MCNC: 12.7 G/DL (ref 11.2–15.7)
LDH SERPL-CCNC: 207 U/L (ref 120–246)
LYMPHOCYTES # BLD: 2.58 K/UL (ref 1.18–3.74)
LYMPHOCYTES NFR BLD: 48.3 % (ref 19.3–53.1)
MCH RBC QN AUTO: 32.6 PG (ref 25.6–32.2)
MCHC RBC AUTO-ENTMCNC: 33.2 G/DL (ref 32.3–35.5)
MCV RBC AUTO: 97.9 FL (ref 79.4–94.8)
MONOCYTES # BLD: 0.45 K/UL (ref 0.24–0.82)
MONOCYTES NFR BLD: 8.4 % (ref 4.7–12.5)
NEUTROPHILS # BLD: 2.09 K/UL (ref 1.56–6.13)
NEUTS SEG NFR BLD: 39.2 % (ref 34–71.1)
PLATELET # BLD AUTO: 307 K/UL (ref 182–369)
PMV BLD AUTO: 9.2 FL (ref 7.4–10.4)
POTASSIUM SERPL-SCNC: 3.6 MMOL/L (ref 3.5–5.1)
PROT SERPL-MCNC: 7.5 G/DL (ref 6.3–8.2)
RBC # BLD AUTO: 3.9 M/UL (ref 3.93–5.22)
SODIUM SERPL-SCNC: 142 MMOL/L (ref 137–145)
URATE SERPL-MCNC: 2.9 MG/DL (ref 2.5–5.2)
WBC # BLD AUTO: 5.34 K/UL (ref 3.98–10.04)

## 2024-02-15 PROCEDURE — 83615 LACTATE (LD) (LDH) ENZYME: CPT

## 2024-02-15 PROCEDURE — 80053 COMPREHEN METABOLIC PANEL: CPT

## 2024-02-15 PROCEDURE — 99212 OFFICE O/P EST SF 10 MIN: CPT

## 2024-02-15 PROCEDURE — 36415 COLL VENOUS BLD VENIPUNCTURE: CPT

## 2024-02-15 PROCEDURE — 84550 ASSAY OF BLOOD/URIC ACID: CPT

## 2024-02-15 PROCEDURE — 85025 COMPLETE CBC W/AUTO DIFF WBC: CPT

## 2024-02-19 ENCOUNTER — APPOINTMENT (OUTPATIENT)
Dept: INFUSION THERAPY | Age: 44
End: 2024-02-19
Payer: MEDICAID

## 2024-02-28 ENCOUNTER — OFFICE VISIT (OUTPATIENT)
Dept: INTERNAL MEDICINE | Facility: CLINIC | Age: 44
End: 2024-02-28
Payer: COMMERCIAL

## 2024-02-28 VITALS
RESPIRATION RATE: 16 BRPM | DIASTOLIC BLOOD PRESSURE: 92 MMHG | SYSTOLIC BLOOD PRESSURE: 136 MMHG | OXYGEN SATURATION: 98 % | WEIGHT: 181 LBS | BODY MASS INDEX: 30.16 KG/M2 | TEMPERATURE: 97.9 F | HEART RATE: 86 BPM | HEIGHT: 65 IN

## 2024-02-28 DIAGNOSIS — R05.1 ACUTE COUGH: Primary | ICD-10-CM

## 2024-02-28 DIAGNOSIS — I10 PRIMARY HYPERTENSION: ICD-10-CM

## 2024-02-28 DIAGNOSIS — F17.210 CIGARETTE SMOKER: ICD-10-CM

## 2024-02-28 DIAGNOSIS — J40 BRONCHITIS: ICD-10-CM

## 2024-02-28 LAB
EXPIRATION DATE: NORMAL
FLUAV AG UPPER RESP QL IA.RAPID: NOT DETECTED
FLUBV AG UPPER RESP QL IA.RAPID: NOT DETECTED
INTERNAL CONTROL: NORMAL
Lab: NORMAL
SARS-COV-2 AG UPPER RESP QL IA.RAPID: NOT DETECTED

## 2024-02-28 PROCEDURE — 3080F DIAST BP >= 90 MM HG: CPT | Performed by: INTERNAL MEDICINE

## 2024-02-28 PROCEDURE — 99214 OFFICE O/P EST MOD 30 MIN: CPT | Performed by: INTERNAL MEDICINE

## 2024-02-28 PROCEDURE — 87428 SARSCOV & INF VIR A&B AG IA: CPT | Performed by: INTERNAL MEDICINE

## 2024-02-28 PROCEDURE — 1160F RVW MEDS BY RX/DR IN RCRD: CPT | Performed by: INTERNAL MEDICINE

## 2024-02-28 PROCEDURE — 1159F MED LIST DOCD IN RCRD: CPT | Performed by: INTERNAL MEDICINE

## 2024-02-28 PROCEDURE — 3075F SYST BP GE 130 - 139MM HG: CPT | Performed by: INTERNAL MEDICINE

## 2024-02-28 RX ORDER — ALBUTEROL SULFATE 90 UG/1
2 AEROSOL, METERED RESPIRATORY (INHALATION) EVERY 4 HOURS PRN
Qty: 8 G | Refills: 1 | Status: SHIPPED | OUTPATIENT
Start: 2024-02-28

## 2024-02-28 RX ORDER — DEXTROMETHORPHAN HYDROBROMIDE AND PROMETHAZINE HYDROCHLORIDE 15; 6.25 MG/5ML; MG/5ML
5 SYRUP ORAL NIGHTLY PRN
Qty: 118 ML | Refills: 0 | Status: SHIPPED | OUTPATIENT
Start: 2024-02-28

## 2024-02-28 RX ORDER — AMLODIPINE BESYLATE 10 MG/1
10 TABLET ORAL DAILY
Qty: 90 TABLET | Refills: 1 | Status: SHIPPED | OUTPATIENT
Start: 2024-02-28

## 2024-02-28 RX ORDER — ALLOPURINOL 300 MG/1
1 TABLET ORAL DAILY
COMMUNITY
Start: 2023-12-29

## 2024-02-28 RX ORDER — PREDNISONE 20 MG/1
20 TABLET ORAL DAILY
Qty: 5 TABLET | Refills: 0 | Status: SHIPPED | OUTPATIENT
Start: 2024-02-28 | End: 2024-03-04

## 2024-02-28 RX ORDER — BENZONATATE 200 MG/1
200 CAPSULE ORAL 2 TIMES DAILY PRN
Qty: 30 CAPSULE | Refills: 0 | Status: SHIPPED | OUTPATIENT
Start: 2024-02-28

## 2024-02-28 NOTE — PROGRESS NOTES
"CC: cough    History:  Winsome Becker is a 43 y.o. female    She had a sick contact around work last Monday and started having symptoms toward Thursday or Friday. One co-worker had flu B. She was in [place] at the time and has now returned home. She has had cough that has improved some with leftover prescriptions from previous bronchitis, but she still has a lot of cough, especially when laying down at night.       ROS:  Review of Systems   Constitutional:  Negative for fever.   Respiratory:  Positive for cough and wheezing. Negative for shortness of breath.    Cardiovascular:  Negative for chest pain.        reports that she has been smoking cigarettes. She started smoking about 16 years ago. She has a 12.04 pack-year smoking history. She has been exposed to tobacco smoke. She has never used smokeless tobacco. She reports current alcohol use. She reports that she does not use drugs.      Current Outpatient Medications:     albuterol sulfate  (90 Base) MCG/ACT inhaler, Inhale 2 puffs Every 4 (Four) Hours As Needed for Wheezing., Disp: 8 g, Rfl: 1    allopurinol (ZYLOPRIM) 300 MG tablet, Take 1 tablet by mouth Daily., Disp: , Rfl:     amLODIPine (NORVASC) 10 MG tablet, Take 1 tablet by mouth Daily., Disp: 90 tablet, Rfl: 1    nilotinib (Tasigna) 200 MG capsule capsule, TAKE 2 CAPSULES BY MOUTH 2 TIMES A DAY. TAKE ON AN EMPTY STOMACH, 1 HOUR BEFORE OR 2 HOURS AFTER A MEAL., Disp: 112 capsule, Rfl: 1    vitamin d 125 MCG (5000 UT) capsule, Take 1 capsule by mouth Daily., Disp: , Rfl:     OBJECTIVE:  /92 (BP Location: Left arm, Patient Position: Sitting, Cuff Size: Adult)   Pulse 86   Temp 97.9 °F (36.6 °C)   Resp 16   Ht 165.1 cm (65\")   Wt 82.1 kg (181 lb)   LMP  (LMP Unknown)   SpO2 98%   BMI 30.12 kg/m²    Physical Exam  Constitutional:       General: She is not in acute distress.  Cardiovascular:      Rate and Rhythm: Normal rate and regular rhythm.      Heart sounds: Normal heart sounds. " No murmur heard.  Pulmonary:      Effort: Pulmonary effort is normal.      Breath sounds: Normal breath sounds. No wheezing.   Neurological:      Mental Status: She is alert and oriented to person, place, and time.      Gait: Gait normal.   Psychiatric:         Mood and Affect: Mood normal.         Behavior: Behavior normal.         Assessment/Plan     Diagnoses and all orders for this visit:    1. Acute cough (Primary)  2. Bronchitis  -     POCT SARS-CoV-2 Antigen ANA M + Flu  -     promethazine-dextromethorphan (PROMETHAZINE-DM) 6.25-15 MG/5ML syrup; Take 5 mL by mouth At Night As Needed for Cough.  Dispense: 118 mL; Refill: 0  -     predniSONE (DELTASONE) 20 MG tablet; Take 1 tablet by mouth Daily for 5 days.  Dispense: 5 tablet; Refill: 0  -     albuterol sulfate  (90 Base) MCG/ACT inhaler; Inhale 2 puffs Every 4 (Four) Hours As Needed for Wheezing.  Dispense: 8 g; Refill: 1  -     benzonatate (TESSALON) 200 MG capsule; Take 1 capsule by mouth 2 (Two) Times a Day As Needed for Cough.  Dispense: 30 capsule; Refill: 0  COVID and flu negative. Cough syrup for nighttime symptoms relief. Prednisone for bronchitis. Encourage Prevnar at next visit when acute symptoms resolved.     3. Primary hypertension  -     amLODIPine (NORVASC) 10 MG tablet; Take 1 tablet by mouth Daily.  Dispense: 90 tablet; Refill: 1  Fair control, BP goal for age is <140/90 per JNC 8 guidelines, and continue current medications    4. Cigarette smoker  She is aiming to quit smoking by her next appointment and she is encouraged to do so.       An After Visit Summary was printed and given to the patient at discharge.  Return for Next scheduled follow up.      Sid Crockett D.O. 2/28/2024   Electronically signed.

## 2024-03-21 ENCOUNTER — TELEPHONE (OUTPATIENT)
Dept: HEMATOLOGY | Age: 44
End: 2024-03-21

## 2024-03-21 NOTE — TELEPHONE ENCOUNTER
Called Patient and reminded patient of their appointment on 03/25/2024 and patient confirmed they would be here.

## 2024-03-22 DIAGNOSIS — C92.10 CML (CHRONIC MYELOCYTIC LEUKEMIA) (HCC): Primary | ICD-10-CM

## 2024-03-22 NOTE — PROGRESS NOTES
MEDICAL ONCOLOGY PROGRESS NOTE    Pt Name: Kareen Dhillon  MRN: [id number]  YOB: 1980  Date of evaluation: 3/25/2024    HISTORY OF PRESENT ILLNESS:  Reason for MD visit-toxicity assessment/disease management/compliance.    The patient is a very pleasant 43 years old female who has been diagnosed with CML in 2000.  She is currently Nilotinib 400 mg p.o. twice a day.  She is compliant with her medication.  She had no issues at this time with shavings left the liver for her medication.  She denies any peripheral edema.  She denies any periorbital edema.  Denies any nausea vomiting.  Denies any diarrhea.    Diagnosis  CML, 2000  BCR/ABL1 kinase 7.21%    Treatment Summary  2000 Gleevec (Patient didn't take on regular basis)  02/2016 - 07/2021 Sprycel  07/30/21 - 12/2021 Bosulif  12/2021 Nilotinib 400mg BID    Hematology/Cancer History  Kareen Dhillon was diagnosed with CML in 2005 by Dr Santiago.   2000 Diagnosed with CML by [facility]. Initiated Gleevec at that time  2005 Continuation of Gleevec (Patient didn't take on regular basis)  09/17/13 Genotrace - IS QRT-PCR: 57.64%  09/20/2014 Genotrace - IS QRT-PCR: 40.52%  02/2016 Initiated Sprycel  2/10/16 CTA chest (P): Normal CT of the chest. No evidence of pulmonary embolism  03/05/2016 Genotrace - IS QRT-PCR: 30.9%  01/14/2016 Genotrace - IS QRT-PCR: 21.36%  07/28/17 Genotrace - IS QRT-PCR: 8.52%  06/12/16 Genotrace - IS QRT-PCR: 8.47%  01/31/2020  Marrow biopsy- persistent CML, chronic phase. Clonal T -cell population identified by PCR. Flow cytometry negative.  09/01/2020 Genotrace - IS QRT-PCR: 13.5%  09/01/2020-BCR/ABL PCR positive for BCR/ABL 1 rearrangement (13.5% of cells): Consistent with persistence of CML clone.  03/16/2021- BCR-ABL1 QPCR-positive: Major breakpoint (18.956%), minor breakpoint (0.033%). Interpretation: Consistent with residual CML.  06/08/2021- BCR-ABL 1 QPCR-positive (14.8403) for the BCR-ABL-1 e13a2 (b2a2, p219) fusion

## 2024-03-25 ENCOUNTER — HOSPITAL ENCOUNTER (OUTPATIENT)
Dept: INFUSION THERAPY | Age: 44
Discharge: HOME | End: 2024-03-25
Payer: MEDICAID

## 2024-03-25 ENCOUNTER — OFFICE VISIT (OUTPATIENT)
Dept: HEMATOLOGY | Age: 44
End: 2024-03-25
Payer: MEDICAID

## 2024-03-25 VITALS
HEIGHT: 66 IN | WEIGHT: 182.9 LBS | HEART RATE: 95 BPM | SYSTOLIC BLOOD PRESSURE: 124 MMHG | TEMPERATURE: 98.2 F | BODY MASS INDEX: 29.39 KG/M2 | DIASTOLIC BLOOD PRESSURE: 86 MMHG | OXYGEN SATURATION: 97 %

## 2024-03-25 DIAGNOSIS — T45.1X5S ANTINEOPLASTIC DRUGS CAUSING ADVERSE EFFECT, SEQUELA: ICD-10-CM

## 2024-03-25 DIAGNOSIS — Z71.89 CARE PLAN DISCUSSED WITH PATIENT: ICD-10-CM

## 2024-03-25 DIAGNOSIS — T45.1X5D ADVERSE EFFECT OF CHEMOTHERAPY, SUBSEQUENT ENCOUNTER: ICD-10-CM

## 2024-03-25 DIAGNOSIS — C92.10 CML (CHRONIC MYELOCYTIC LEUKEMIA) (HCC): Primary | ICD-10-CM

## 2024-03-25 DIAGNOSIS — C92.10 CML (CHRONIC MYELOCYTIC LEUKEMIA) (HCC): ICD-10-CM

## 2024-03-25 DIAGNOSIS — R53.83 FATIGUE DUE TO TREATMENT: ICD-10-CM

## 2024-03-25 LAB
ALBUMIN SERPL-MCNC: 3.9 G/DL (ref 3.5–5.2)
ALP SERPL-CCNC: 96 U/L (ref 35–104)
ALT SERPL-CCNC: 27 U/L (ref 9–52)
ANION GAP SERPL CALCULATED.3IONS-SCNC: 11 MMOL/L (ref 7–19)
AST SERPL-CCNC: 27 U/L (ref 14–36)
BASOPHILS # BLD: 0.01 K/UL (ref 0.01–0.08)
BASOPHILS NFR BLD: 0.2 % (ref 0.1–1.2)
BILIRUB SERPL-MCNC: 0.3 MG/DL (ref 0.2–1.3)
BUN SERPL-MCNC: 15 MG/DL (ref 7–17)
CALCIUM SERPL-MCNC: 8.3 MG/DL (ref 8.4–10.2)
CHLORIDE SERPL-SCNC: 109 MMOL/L (ref 98–111)
CO2 SERPL-SCNC: 20 MMOL/L (ref 22–29)
CREAT SERPL-MCNC: 0.7 MG/DL (ref 0.5–1)
EOSINOPHIL # BLD: 0.07 K/UL (ref 0.04–0.54)
EOSINOPHIL NFR BLD: 1.5 % (ref 0.7–7)
ERYTHROCYTE [DISTWIDTH] IN BLOOD BY AUTOMATED COUNT: 13.4 % (ref 11.7–14.4)
GLOBULIN: 3 G/DL
GLUCOSE SERPL-MCNC: 119 MG/DL (ref 74–106)
HCT VFR BLD AUTO: 39.7 % (ref 34.1–44.9)
HGB BLD-MCNC: 13.5 G/DL (ref 11.2–15.7)
LDH SERPL-CCNC: 226 U/L (ref 120–246)
LYMPHOCYTES # BLD: 2.33 K/UL (ref 1.18–3.74)
LYMPHOCYTES NFR BLD: 48.8 % (ref 19.3–53.1)
MCH RBC QN AUTO: 32 PG (ref 25.6–32.2)
MCHC RBC AUTO-ENTMCNC: 34 G/DL (ref 32.3–35.5)
MCV RBC AUTO: 94.1 FL (ref 79.4–94.8)
MONOCYTES # BLD: 0.26 K/UL (ref 0.24–0.82)
MONOCYTES NFR BLD: 5.5 % (ref 4.7–12.5)
NEUTROPHILS # BLD: 2.09 K/UL (ref 1.56–6.13)
NEUTS SEG NFR BLD: 43.8 % (ref 34–71.1)
PLATELET # BLD AUTO: 287 K/UL (ref 182–369)
PMV BLD AUTO: 9.1 FL (ref 7.4–10.4)
POTASSIUM SERPL-SCNC: 3.5 MMOL/L (ref 3.5–5.1)
PROT SERPL-MCNC: 6.8 G/DL (ref 6.3–8.2)
RBC # BLD AUTO: 4.22 M/UL (ref 3.93–5.22)
SODIUM SERPL-SCNC: 140 MMOL/L (ref 137–145)
URATE SERPL-MCNC: 6.7 MG/DL (ref 2.5–5.2)
WBC # BLD AUTO: 4.77 K/UL (ref 3.98–10.04)

## 2024-03-25 PROCEDURE — G8417 CALC BMI ABV UP PARAM F/U: HCPCS | Performed by: INTERNAL MEDICINE

## 2024-03-25 PROCEDURE — G8427 DOCREV CUR MEDS BY ELIG CLIN: HCPCS | Performed by: INTERNAL MEDICINE

## 2024-03-25 PROCEDURE — 99212 OFFICE O/P EST SF 10 MIN: CPT

## 2024-03-25 PROCEDURE — 85025 COMPLETE CBC W/AUTO DIFF WBC: CPT

## 2024-03-25 PROCEDURE — 99214 OFFICE O/P EST MOD 30 MIN: CPT | Performed by: INTERNAL MEDICINE

## 2024-03-25 PROCEDURE — 36415 COLL VENOUS BLD VENIPUNCTURE: CPT

## 2024-03-25 PROCEDURE — G2211 COMPLEX E/M VISIT ADD ON: HCPCS | Performed by: INTERNAL MEDICINE

## 2024-03-25 PROCEDURE — 83615 LACTATE (LD) (LDH) ENZYME: CPT

## 2024-03-25 PROCEDURE — 4004F PT TOBACCO SCREEN RCVD TLK: CPT | Performed by: INTERNAL MEDICINE

## 2024-03-25 PROCEDURE — 80053 COMPREHEN METABOLIC PANEL: CPT

## 2024-03-25 PROCEDURE — G8484 FLU IMMUNIZE NO ADMIN: HCPCS | Performed by: INTERNAL MEDICINE

## 2024-03-25 PROCEDURE — 84550 ASSAY OF BLOOD/URIC ACID: CPT

## 2024-04-16 DIAGNOSIS — C92.10 CML (CHRONIC MYELOCYTIC LEUKEMIA) (HCC): ICD-10-CM

## 2024-04-16 DIAGNOSIS — E79.0 HYPERURICEMIA: ICD-10-CM

## 2024-04-16 RX ORDER — ALLOPURINOL 300 MG/1
300 TABLET ORAL DAILY
Qty: 90 TABLET | Refills: 0 | OUTPATIENT
Start: 2024-04-16

## 2024-04-23 ENCOUNTER — TELEPHONE (OUTPATIENT)
Dept: INTERNAL MEDICINE | Facility: CLINIC | Age: 44
End: 2024-04-23

## 2024-04-23 NOTE — TELEPHONE ENCOUNTER
LETTER SENT TO PATIENT/RESCHEDULED PTS APPT SHE STATED SHE GOT CALLED IN TO WORK. REMINDED OF NO SHOW POLICY

## 2024-05-15 ENCOUNTER — TELEPHONE (OUTPATIENT)
Dept: HEMATOLOGY | Age: 44
End: 2024-05-15

## 2024-05-15 DIAGNOSIS — C92.10 CML (CHRONIC MYELOCYTIC LEUKEMIA) (HCC): Primary | ICD-10-CM

## 2024-05-15 NOTE — PROGRESS NOTES
MEDICAL ONCOLOGY PROGRESS NOTE    Pt Name: Kareen Dhillon  MRN: [id number]  YOB: 1980  Date of evaluation: 6/10/2024    HISTORY OF PRESENT ILLNESS:  Reason for MD visit-toxicity assessment/disease management/compliance.    The patient is a very pleasant 43 years old female who has been diagnosed with CML in 2000.  She is currently Nilotinib 400 mg p.o. twice a day.  She is compliant with her medication. She denies any peripheral edema.  He denies any lower extremity swelling.  Denies any periorbital edema.  Denies any respiratory symptoms.  She is compliant with her medication.    Diagnosis  CML, 2000  BCR/ABL1 kinase 7.21%    Treatment Summary  2000 Gleevec (Patient didn't take on regular basis)  02/2016 - 07/2021 Sprycel  07/30/21 - 12/2021 Bosulif  12/2021 Nilotinib 400mg BID    Hematology/Cancer History  Kareen Dhillon was diagnosed with CML in 2005 by Dr Santiago.   2000 Diagnosed with CML by [facility]. Initiated Gleevec at that time  2005 Continuation of Gleevec (Patient didn't take on regular basis)  09/17/13 Genotrace - IS QRT-PCR: 57.64%  09/20/2014 Genotrace - IS QRT-PCR: 40.52%  02/2016 Initiated Sprycel  2/10/16 CTA chest ([facility]): Normal CT of the chest. No evidence of pulmonary embolism  03/05/2016 Genotrace - IS QRT-PCR: 30.9%  01/14/2016 Genotrace - IS QRT-PCR: 21.36%  07/28/17 Genotrace - IS QRT-PCR: 8.52%  06/12/16 Genotrace - IS QRT-PCR: 8.47%  01/31/2020  Marrow biopsy- persistent CML, chronic phase. Clonal T -cell population identified by PCR. Flow cytometry negative.  09/01/2020 Genotrace - IS QRT-PCR: 13.5%  09/01/2020-BCR/ABL PCR positive for BCR/ABL 1 rearrangement (13.5% of cells): Consistent with persistence of CML clone.  03/16/2021- BCR-ABL1 QPCR-positive: Major breakpoint (18.956%), minor breakpoint (0.033%). Interpretation: Consistent with residual CML.  06/08/2021- BCR-ABL 1 QPCR-positive (14.8403) for the BCR-ABL-1 e13a2 (b2a2, p219) fusion transcript  06/08/2021-

## 2024-05-15 NOTE — TELEPHONE ENCOUNTER
Patient needs to r/s and a note was given to  ladies to call her tomorrow on 05/16/24 to get her rescheduled from 05/20/25.

## 2024-06-05 ENCOUNTER — TELEPHONE (OUTPATIENT)
Dept: HEMATOLOGY | Age: 44
End: 2024-06-05

## 2024-06-05 NOTE — TELEPHONE ENCOUNTER
Called patient and reminded patient of their appointment on 6/7/2024 and patient confirmed they would be here.

## 2024-06-07 DIAGNOSIS — C92.10 CML (CHRONIC MYELOCYTIC LEUKEMIA) (HCC): Primary | ICD-10-CM

## 2024-06-10 ENCOUNTER — OFFICE VISIT (OUTPATIENT)
Dept: HEMATOLOGY | Age: 44
End: 2024-06-10
Payer: MEDICAID

## 2024-06-10 ENCOUNTER — HOSPITAL ENCOUNTER (OUTPATIENT)
Dept: INFUSION THERAPY | Age: 44
Discharge: HOME | End: 2024-06-10
Payer: MEDICAID

## 2024-06-10 VITALS
TEMPERATURE: 98.5 F | HEART RATE: 80 BPM | DIASTOLIC BLOOD PRESSURE: 74 MMHG | WEIGHT: 180 LBS | SYSTOLIC BLOOD PRESSURE: 116 MMHG | OXYGEN SATURATION: 99 % | BODY MASS INDEX: 28.93 KG/M2 | HEIGHT: 66 IN

## 2024-06-10 DIAGNOSIS — E87.6 HYPOKALEMIA: ICD-10-CM

## 2024-06-10 DIAGNOSIS — C92.10 CML (CHRONIC MYELOCYTIC LEUKEMIA) (HCC): ICD-10-CM

## 2024-06-10 DIAGNOSIS — G47.09 OTHER INSOMNIA: ICD-10-CM

## 2024-06-10 DIAGNOSIS — R53.83 FATIGUE DUE TO TREATMENT: ICD-10-CM

## 2024-06-10 DIAGNOSIS — T45.1X5S ANTINEOPLASTIC DRUGS CAUSING ADVERSE EFFECT, SEQUELA: ICD-10-CM

## 2024-06-10 DIAGNOSIS — Z71.89 CARE PLAN DISCUSSED WITH PATIENT: ICD-10-CM

## 2024-06-10 DIAGNOSIS — C92.10 CML (CHRONIC MYELOCYTIC LEUKEMIA) (HCC): Primary | ICD-10-CM

## 2024-06-10 LAB
ALBUMIN SERPL-MCNC: 4.7 G/DL (ref 3.5–5.2)
ALP SERPL-CCNC: 110 U/L (ref 35–104)
ALT SERPL-CCNC: 22 U/L (ref 9–52)
ANION GAP SERPL CALCULATED.3IONS-SCNC: 8 MMOL/L (ref 7–19)
AST SERPL-CCNC: 29 U/L (ref 14–36)
BASOPHILS # BLD: 0.02 K/UL (ref 0.01–0.08)
BASOPHILS NFR BLD: 0.3 % (ref 0.1–1.2)
BILIRUB SERPL-MCNC: 0.7 MG/DL (ref 0.2–1.3)
BUN SERPL-MCNC: 15 MG/DL (ref 7–17)
CALCIUM SERPL-MCNC: 9.2 MG/DL (ref 8.4–10.2)
CHLORIDE SERPL-SCNC: 104 MMOL/L (ref 98–111)
CO2 SERPL-SCNC: 26 MMOL/L (ref 22–29)
CREAT SERPL-MCNC: 0.8 MG/DL (ref 0.5–1)
EOSINOPHIL # BLD: 0.12 K/UL (ref 0.04–0.54)
EOSINOPHIL NFR BLD: 1.6 % (ref 0.7–7)
ERYTHROCYTE [DISTWIDTH] IN BLOOD BY AUTOMATED COUNT: 14.2 % (ref 11.7–14.4)
GLOBULIN: 2.7 G/DL
GLUCOSE SERPL-MCNC: 94 MG/DL (ref 74–106)
HCT VFR BLD AUTO: 38.2 % (ref 34.1–44.9)
HGB BLD-MCNC: 13.2 G/DL (ref 11.2–15.7)
LDH SERPL-CCNC: 207 U/L (ref 120–246)
LYMPHOCYTES # BLD: 2.54 K/UL (ref 1.18–3.74)
LYMPHOCYTES NFR BLD: 34.3 % (ref 19.3–53.1)
MCH RBC QN AUTO: 33.2 PG (ref 25.6–32.2)
MCHC RBC AUTO-ENTMCNC: 34.6 G/DL (ref 32.3–35.5)
MCV RBC AUTO: 96 FL (ref 79.4–94.8)
MONOCYTES # BLD: 0.48 K/UL (ref 0.24–0.82)
MONOCYTES NFR BLD: 6.5 % (ref 4.7–12.5)
NEUTROPHILS # BLD: 4.21 K/UL (ref 1.56–6.13)
NEUTS SEG NFR BLD: 56.9 % (ref 34–71.1)
PLATELET # BLD AUTO: 300 K/UL (ref 182–369)
PMV BLD AUTO: 9.4 FL (ref 7.4–10.4)
POTASSIUM SERPL-SCNC: 3.1 MMOL/L (ref 3.5–5.1)
PROT SERPL-MCNC: 7.4 G/DL (ref 6.3–8.2)
RBC # BLD AUTO: 3.98 M/UL (ref 3.93–5.22)
SODIUM SERPL-SCNC: 138 MMOL/L (ref 137–145)
URATE SERPL-MCNC: 5.2 MG/DL (ref 2.5–5.2)
WBC # BLD AUTO: 7.4 K/UL (ref 3.98–10.04)

## 2024-06-10 PROCEDURE — G8417 CALC BMI ABV UP PARAM F/U: HCPCS | Performed by: INTERNAL MEDICINE

## 2024-06-10 PROCEDURE — 84550 ASSAY OF BLOOD/URIC ACID: CPT

## 2024-06-10 PROCEDURE — 83615 LACTATE (LD) (LDH) ENZYME: CPT

## 2024-06-10 PROCEDURE — 4004F PT TOBACCO SCREEN RCVD TLK: CPT | Performed by: INTERNAL MEDICINE

## 2024-06-10 PROCEDURE — 99212 OFFICE O/P EST SF 10 MIN: CPT

## 2024-06-10 PROCEDURE — 36415 COLL VENOUS BLD VENIPUNCTURE: CPT

## 2024-06-10 PROCEDURE — 85025 COMPLETE CBC W/AUTO DIFF WBC: CPT

## 2024-06-10 PROCEDURE — 99214 OFFICE O/P EST MOD 30 MIN: CPT | Performed by: INTERNAL MEDICINE

## 2024-06-10 PROCEDURE — 80053 COMPREHEN METABOLIC PANEL: CPT

## 2024-06-10 PROCEDURE — G8427 DOCREV CUR MEDS BY ELIG CLIN: HCPCS | Performed by: INTERNAL MEDICINE

## 2024-06-10 RX ORDER — POTASSIUM CHLORIDE 750 MG/1
20 CAPSULE, EXTENDED RELEASE ORAL DAILY
Qty: 60 CAPSULE | Refills: 0 | Status: SHIPPED | OUTPATIENT
Start: 2024-06-10

## 2024-07-02 DIAGNOSIS — E87.6 HYPOKALEMIA: ICD-10-CM

## 2024-07-02 RX ORDER — POTASSIUM CHLORIDE 750 MG/1
20 CAPSULE, EXTENDED RELEASE ORAL DAILY
Qty: 180 CAPSULE | Refills: 1 | Status: SHIPPED | OUTPATIENT
Start: 2024-07-02

## 2024-08-01 ENCOUNTER — CLINICAL DOCUMENTATION (OUTPATIENT)
Facility: HOSPITAL | Age: 44
End: 2024-08-01

## 2024-08-01 NOTE — PROGRESS NOTES
I called and followed up with the pt to discuss her insurance being inactive and how she can get help with her prescription Tasigna. She stated she only had a few days left of medication before she ran out. I filled out and emailed her a free drug application she needs to sign and email back to me along with proof of income. She is working out of town and can't come into the office to sign. We are waiting to receive the signed document from her right now before we can submit to [facility] for their review and referral to [facility].

## 2024-08-07 ENCOUNTER — TELEPHONE (OUTPATIENT)
Dept: HEMATOLOGY | Age: 44
End: 2024-08-07

## 2024-08-07 NOTE — TELEPHONE ENCOUNTER
Called pt. to remind them of appointment on 08/12/2024 and had to leave a detailed voicemail with appointment date and time. Reminded patient to just come at appointment time, and to not come at the lab appointment time. Reminded patient that we will not check them in any more than 30 minutes before appointment time. We have now moved to the [facility] [place] that is located between our old office and the ER at [facility] [place]

## 2024-08-09 DIAGNOSIS — C92.10 CML (CHRONIC MYELOCYTIC LEUKEMIA) (HCC): Primary | ICD-10-CM

## 2024-08-09 NOTE — PROGRESS NOTES
MEDICAL ONCOLOGY PROGRESS NOTE    Pt Name: Kareen Dhillon  MRN: [id number]  YOB: 1980  Date of evaluation: 8/12/2024    HISTORY OF PRESENT ILLNESS:  Reason for MD visit-toxicity assessment/disease management/compliance.    The patient is a very pleasant 43 years old female who has been diagnosed with CML in 2000.  She is currently Nilotinib 400 mg p.o. twice a day.  The patient is compliant with her medication.  She denies any peripheral edema.  She denies any periorbital edema.  Denies any other symptoms other than mild fatigue.  Denies any nausea vomit diarrhea.  She denies being started on any new medication.    Diagnosis  CML, 2000  BCR/ABL1 kinase 7.21%    Treatment Summary  2000 Gleevec (Patient didn't take on regular basis)  02/2016 - 07/2021 Sprycel  07/30/21 - 12/2021 Bosulif  12/2021 Nilotinib 400mg BID    Hematology/Cancer History  Kareen Dhillon was diagnosed with CML in 2005 by Dr Santiago.   2000 Diagnosed with CML by [facility]. Initiated Gleevec at that time  2005 Continuation of Gleevec (Patient didn't take on regular basis)  09/17/13 Genotrace - IS QRT-PCR: 57.64%  09/20/2014 Genotrace - IS QRT-PCR: 40.52%  02/2016 Initiated Sprycel  2/10/16 CTA chest (P): Normal CT of the chest. No evidence of pulmonary embolism  03/05/2016 Genotrace - IS QRT-PCR: 30.9%  01/14/2016 Genotrace - IS QRT-PCR: 21.36%  07/28/17 Genotrace - IS QRT-PCR: 8.52%  06/12/16 Genotrace - IS QRT-PCR: 8.47%  01/31/2020  Marrow biopsy- persistent CML, chronic phase. Clonal T -cell population identified by PCR. Flow cytometry negative.  09/01/2020 Genotrace - IS QRT-PCR: 13.5%  09/01/2020-BCR/ABL PCR positive for BCR/ABL 1 rearrangement (13.5% of cells): Consistent with persistence of CML clone.  03/16/2021- BCR-ABL1 QPCR-positive: Major breakpoint (18.956%), minor breakpoint (0.033%). Interpretation: Consistent with residual CML.  06/08/2021- BCR-ABL 1 QPCR-positive (14.8403) for the BCR-ABL-1 e13a2 (b2a2,

## 2024-08-12 ENCOUNTER — HOSPITAL ENCOUNTER (OUTPATIENT)
Dept: INFUSION THERAPY | Age: 44
Discharge: HOME | End: 2024-08-12
Payer: MEDICAID

## 2024-08-12 ENCOUNTER — OFFICE VISIT (OUTPATIENT)
Dept: HEMATOLOGY | Age: 44
End: 2024-08-12

## 2024-08-12 VITALS
SYSTOLIC BLOOD PRESSURE: 128 MMHG | DIASTOLIC BLOOD PRESSURE: 80 MMHG | WEIGHT: 182.5 LBS | OXYGEN SATURATION: 98 % | HEIGHT: 66 IN | HEART RATE: 88 BPM | BODY MASS INDEX: 29.33 KG/M2 | TEMPERATURE: 97.9 F

## 2024-08-12 DIAGNOSIS — C92.10 CML (CHRONIC MYELOCYTIC LEUKEMIA) (HCC): ICD-10-CM

## 2024-08-12 DIAGNOSIS — Z71.89 CARE PLAN DISCUSSED WITH PATIENT: ICD-10-CM

## 2024-08-12 DIAGNOSIS — R53.83 FATIGUE DUE TO TREATMENT: Primary | ICD-10-CM

## 2024-08-12 LAB
ALBUMIN SERPL-MCNC: 4 G/DL (ref 3.5–5.2)
ALP SERPL-CCNC: 94 U/L (ref 35–104)
ALT SERPL-CCNC: 15 U/L (ref 5–33)
ANION GAP SERPL CALCULATED.3IONS-SCNC: 8 MMOL/L (ref 7–19)
AST SERPL-CCNC: 17 U/L (ref 5–32)
BASOPHILS # BLD: 0.02 K/UL (ref 0.01–0.08)
BASOPHILS NFR BLD: 0.4 % (ref 0.1–1.2)
BILIRUB SERPL-MCNC: <0.2 MG/DL (ref 0–1.2)
BUN SERPL-MCNC: 14 MG/DL (ref 6–20)
CALCIUM SERPL-MCNC: 8.6 MG/DL (ref 8.6–10)
CHLORIDE SERPL-SCNC: 108 MMOL/L (ref 98–107)
CO2 SERPL-SCNC: 24 MMOL/L (ref 22–29)
CREAT SERPL-MCNC: 0.8 MG/DL (ref 0.5–0.9)
EOSINOPHIL # BLD: 0.06 K/UL (ref 0.04–0.54)
EOSINOPHIL NFR BLD: 1.1 % (ref 0.7–7)
ERYTHROCYTE [DISTWIDTH] IN BLOOD BY AUTOMATED COUNT: 14.1 % (ref 11.7–14.4)
GLUCOSE SERPL-MCNC: 103 MG/DL (ref 70–99)
HCT VFR BLD AUTO: 39.8 % (ref 34.1–44.9)
HGB BLD-MCNC: 13.5 G/DL (ref 11.2–15.7)
LDH SERPL-CCNC: 200 U/L (ref 135–214)
LYMPHOCYTES # BLD: 2.2 K/UL (ref 1.18–3.74)
LYMPHOCYTES NFR BLD: 39.1 % (ref 19.3–53.1)
MCH RBC QN AUTO: 32.4 PG (ref 25.6–32.2)
MCHC RBC AUTO-ENTMCNC: 33.9 G/DL (ref 32.3–35.5)
MCV RBC AUTO: 95.4 FL (ref 79.4–94.8)
MONOCYTES # BLD: 0.59 K/UL (ref 0.24–0.82)
MONOCYTES NFR BLD: 10.5 % (ref 4.7–12.5)
NEUTROPHILS # BLD: 2.73 K/UL (ref 1.56–6.13)
NEUTS SEG NFR BLD: 48.5 % (ref 34–71.1)
PLATELET # BLD AUTO: 297 K/UL (ref 182–369)
PMV BLD AUTO: 9.7 FL (ref 7.4–10.4)
POTASSIUM SERPL-SCNC: 3.6 MMOL/L (ref 3.5–5.1)
PROT SERPL-MCNC: 6.9 G/DL (ref 6.4–8.3)
RBC # BLD AUTO: 4.17 M/UL (ref 3.93–5.22)
SODIUM SERPL-SCNC: 140 MMOL/L (ref 136–145)
URATE SERPL-MCNC: 5.2 MG/DL (ref 2.4–5.7)
WBC # BLD AUTO: 5.62 K/UL (ref 3.98–10.04)

## 2024-08-12 PROCEDURE — 83615 LACTATE (LD) (LDH) ENZYME: CPT

## 2024-08-12 PROCEDURE — 84550 ASSAY OF BLOOD/URIC ACID: CPT

## 2024-08-12 PROCEDURE — 99214 OFFICE O/P EST MOD 30 MIN: CPT | Performed by: INTERNAL MEDICINE

## 2024-08-12 PROCEDURE — 36415 COLL VENOUS BLD VENIPUNCTURE: CPT

## 2024-08-12 PROCEDURE — 80053 COMPREHEN METABOLIC PANEL: CPT

## 2024-08-12 PROCEDURE — 99212 OFFICE O/P EST SF 10 MIN: CPT

## 2024-08-12 PROCEDURE — 85025 COMPLETE CBC W/AUTO DIFF WBC: CPT

## 2024-08-28 DIAGNOSIS — C92.10 CML (CHRONIC MYELOCYTIC LEUKEMIA) (HCC): ICD-10-CM

## 2024-08-28 RX ORDER — NILOTINIB 200 MG/1
400 CAPSULE ORAL EVERY 12 HOURS
Qty: 112 CAPSULE | Refills: 0 | Status: ACTIVE | OUTPATIENT
Start: 2024-08-28

## 2024-08-29 DIAGNOSIS — C92.10 CML (CHRONIC MYELOCYTIC LEUKEMIA) (HCC): ICD-10-CM

## 2024-08-29 RX ORDER — NILOTINIB 200 MG/1
400 CAPSULE ORAL EVERY 12 HOURS
Qty: 112 CAPSULE | Refills: 0 | OUTPATIENT
Start: 2024-08-29

## 2024-10-10 DIAGNOSIS — C92.10 CML (CHRONIC MYELOCYTIC LEUKEMIA) (HCC): ICD-10-CM

## 2024-10-10 RX ORDER — NILOTINIB 200 MG/1
400 CAPSULE ORAL EVERY 12 HOURS
Qty: 112 CAPSULE | Refills: 0 | Status: ACTIVE | OUTPATIENT
Start: 2024-10-10

## 2024-10-10 NOTE — TELEPHONE ENCOUNTER
I have returned phone call to patient regarding request of her Tasigna needing to be refilled. I have verified dosage with patient and Leonor Joyner. Patient has voiced understanding.

## 2024-10-31 ENCOUNTER — TELEPHONE (OUTPATIENT)
Dept: HEMATOLOGY | Age: 44
End: 2024-10-31

## 2024-10-31 NOTE — TELEPHONE ENCOUNTER
I called and left patient a detailed voicemail with their appointment date and time for 11/4/24 and to come at the follow up appointment time and not the lab appointment time. I also made them aware of what that time was.  I made patient aware that we are in the [facility] and where it is located and gave the address in case, they use GPS. Left message for patient to call our office if they could not keep this appointment or if they did not know where our new building is located.

## 2024-11-01 DIAGNOSIS — C92.10 CML (CHRONIC MYELOCYTIC LEUKEMIA) (HCC): Primary | ICD-10-CM

## 2024-11-01 DIAGNOSIS — C92.10 CML (CHRONIC MYELOCYTIC LEUKEMIA) (HCC): ICD-10-CM

## 2024-11-01 RX ORDER — NILOTINIB 200 MG/1
400 CAPSULE ORAL EVERY 12 HOURS
Qty: 120 CAPSULE | Refills: 0 | Status: ACTIVE | OUTPATIENT
Start: 2024-11-01

## 2024-11-01 NOTE — PROGRESS NOTES
MEDICAL ONCOLOGY PROGRESS NOTE                                                          Kareen Dhillon   1980 12/9/2024     Chief Complaint   Patient presents with    Follow-up     CML (chronic myelocytic leukemia) (HCC)        HISTORY OF PRESENT ILLNESS:  Reason for MD visit-toxicity assessment/disease management/compliance.    The patient is a very pleasant 44 years old female who has been diagnosed with CML in 2000.  She is currently Nilotinib 400 mg p.o. twice a day.  She is compliant with her treatment.  Patient presents to clinic today for follow-up for diagnosis of CML. She is currently on treatment Nilotinib 400mg daily.  She has some complaints of fatigue, She has also been recently diagnosis with Bronchitis and is currently taking Promethazine cough syrup and she has her inhaler if needed. She denies any other symptoms at this time.    Diagnosis  CML, 2000  BCR/ABL1 kinase 7.21%    Treatment Summary  2000 Gleevec (Patient didn't take on regular basis)  02/2016 - 07/2021 Sprycel  07/30/21 - 12/2021 Bosulif  12/2021 Nilotinib 400mg BID    Hematology/Cancer History  Kareen Dhillon was diagnosed with CML in 2005 by Dr Santiago.   2000 Diagnosed with CML by [facility]. Initiated Gleevec at that time  2005 Continuation of Gleevec (Patient didn't take on regular basis)  09/17/13 Genotrace - IS QRT-PCR: 57.64%  09/20/2014 Genotrace - IS QRT-PCR: 40.52%  02/2016 Initiated Sprycel  2/10/16 CTA chest ([facility]): Normal CT of the chest. No evidence of pulmonary embolism  03/05/2016 Genotrace - IS QRT-PCR: 30.9%  01/14/2016 Genotrace - IS QRT-PCR: 21.36%  07/28/17 Genotrace - IS QRT-PCR: 8.52%  06/12/16 Genotrace - IS QRT-PCR: 8.47%  01/31/2020  Marrow biopsy- persistent CML, chronic phase. Clonal T -cell population identified by PCR. Flow cytometry negative.  09/01/2020 Genotrace - IS QRT-PCR: 13.5%  09/01/2020-BCR/ABL PCR positive for BCR/ABL 1 rearrangement (13.5% of cells): Consistent with

## 2024-11-12 ENCOUNTER — TELEPHONE (OUTPATIENT)
Dept: INTERNAL MEDICINE | Facility: CLINIC | Age: 44
End: 2024-11-12
Payer: COMMERCIAL

## 2024-11-12 DIAGNOSIS — I10 PRIMARY HYPERTENSION: ICD-10-CM

## 2024-11-12 RX ORDER — AMLODIPINE BESYLATE 10 MG/1
10 TABLET ORAL DAILY
Qty: 90 TABLET | Refills: 1 | Status: SHIPPED | OUTPATIENT
Start: 2024-11-12

## 2024-11-12 NOTE — TELEPHONE ENCOUNTER
Caller: Winsome Becker    Relationship: Self    Best call back number: [phone]     Requested Prescriptions:   Requested Prescriptions     Pending Prescriptions Disp Refills    amLODIPine (NORVASC) 10 MG tablet 90 tablet 1     Sig: Take 1 tablet by mouth Daily.        Pharmacy where request should be sent: [id number] - [place], [place] - [address] AT ACROSS FROM [facility] [phone] [facility] [phone]      Last office visit with prescribing clinician: 2/28/2024   Last telemedicine visit with prescribing clinician: Visit date not found   Next office visit with prescribing clinician: Visit date not found     Additional details provided by patient:     Does the patient have less than a 3 day supply:  [x] Yes  [] No    Would you like a call back once the refill request has been completed: [] Yes [x] No    If the office needs to give you a call back, can they leave a voicemail: [] Yes [x] No    Fabrizio Edmonds III, Soni Rep   11/12/24 12:36 CST

## 2024-11-12 NOTE — TELEPHONE ENCOUNTER
I tried calling patient to schedule a follow-up appointment, no answer.  I left her a message to call the office.

## 2024-11-13 NOTE — TELEPHONE ENCOUNTER
I tried calling patient to schedule a follow-up appointment, no answer.  I left her another message to call the office.

## 2024-11-13 NOTE — TELEPHONE ENCOUNTER
I tried calling patient again, no answer.  I left her a message to call the office to schedule an appointment.

## 2024-11-14 NOTE — TELEPHONE ENCOUNTER
I tried calling patient, no answer.  I left another VM message for patient to call the office and patient will be sent a message through My Chart and mailed a letter.

## 2024-12-05 ENCOUNTER — TELEPHONE (OUTPATIENT)
Dept: HEMATOLOGY | Age: 44
End: 2024-12-05

## 2024-12-05 NOTE — TELEPHONE ENCOUNTER
I called patient and left detailed voicemail about their appointment on 12/09/24. I made patient aware not to arrive any earlier than the appointment time and to come at the time of the follow up not the time of the lab appointment if it is different than the follow up appt time. I also made patient aware to eat and drink plenty of water to hydrate properly before coming to these appointments because this will make their lab draw much easier.  Made patient aware that we are now located at the [facility] at [address]. Located between [facility] and the [facility]. Front entrance faces [facility].

## 2024-12-09 ENCOUNTER — OFFICE VISIT (OUTPATIENT)
Dept: HEMATOLOGY | Age: 44
End: 2024-12-09

## 2024-12-09 ENCOUNTER — HOSPITAL ENCOUNTER (OUTPATIENT)
Dept: INFUSION THERAPY | Age: 44
Discharge: HOME | End: 2024-12-09

## 2024-12-09 VITALS
WEIGHT: 172.8 LBS | OXYGEN SATURATION: 99 % | BODY MASS INDEX: 27.77 KG/M2 | HEART RATE: 88 BPM | HEIGHT: 66 IN | SYSTOLIC BLOOD PRESSURE: 140 MMHG | DIASTOLIC BLOOD PRESSURE: 88 MMHG | TEMPERATURE: 98 F

## 2024-12-09 DIAGNOSIS — T45.1X5S ANTINEOPLASTIC DRUGS CAUSING ADVERSE EFFECT, SEQUELA: ICD-10-CM

## 2024-12-09 DIAGNOSIS — E87.6 HYPOKALEMIA: ICD-10-CM

## 2024-12-09 DIAGNOSIS — C92.10 CML (CHRONIC MYELOCYTIC LEUKEMIA) (HCC): ICD-10-CM

## 2024-12-09 DIAGNOSIS — Z71.89 CARE PLAN DISCUSSED WITH PATIENT: ICD-10-CM

## 2024-12-09 DIAGNOSIS — R53.83 FATIGUE DUE TO TREATMENT: Primary | ICD-10-CM

## 2024-12-09 DIAGNOSIS — R05.1 ACUTE COUGH: ICD-10-CM

## 2024-12-09 LAB
25(OH)D3 SERPL-MCNC: 26 NG/ML
ALBUMIN SERPL-MCNC: 4.3 G/DL (ref 3.5–5.2)
ALP SERPL-CCNC: 98 U/L (ref 35–104)
ALT SERPL-CCNC: 27 U/L (ref 5–33)
ANION GAP SERPL CALCULATED.3IONS-SCNC: 11 MMOL/L (ref 7–19)
AST SERPL-CCNC: 26 U/L (ref 5–32)
BASOPHILS # BLD: 0.03 K/UL (ref 0.01–0.08)
BASOPHILS NFR BLD: 0.4 % (ref 0.1–1.2)
BILIRUB SERPL-MCNC: 0.7 MG/DL (ref 0–1.2)
BUN SERPL-MCNC: 17 MG/DL (ref 6–20)
CALCIUM SERPL-MCNC: 9.3 MG/DL (ref 8.6–10)
CHLORIDE SERPL-SCNC: 106 MMOL/L (ref 98–107)
CO2 SERPL-SCNC: 24 MMOL/L (ref 22–29)
CREAT SERPL-MCNC: 0.8 MG/DL (ref 0.5–0.9)
EOSINOPHIL # BLD: 0.07 K/UL (ref 0.04–0.54)
EOSINOPHIL NFR BLD: 1 % (ref 0.7–7)
ERYTHROCYTE [DISTWIDTH] IN BLOOD BY AUTOMATED COUNT: 13.5 % (ref 11.7–14.4)
GLUCOSE SERPL-MCNC: 108 MG/DL (ref 70–99)
HCT VFR BLD AUTO: 40 % (ref 34.1–44.9)
HGB BLD-MCNC: 14.1 G/DL (ref 11.2–15.7)
LDH SERPL-CCNC: 224 U/L (ref 135–214)
LYMPHOCYTES # BLD: 2.4 K/UL (ref 1.18–3.74)
LYMPHOCYTES NFR BLD: 35.2 % (ref 19.3–53.1)
MCH RBC QN AUTO: 34 PG (ref 25.6–32.2)
MCHC RBC AUTO-ENTMCNC: 35.3 G/DL (ref 32.3–35.5)
MCV RBC AUTO: 96.4 FL (ref 79.4–94.8)
MONOCYTES # BLD: 0.47 K/UL (ref 0.24–0.82)
MONOCYTES NFR BLD: 6.9 % (ref 4.7–12.5)
NEUTROPHILS # BLD: 3.83 K/UL (ref 1.56–6.13)
NEUTS SEG NFR BLD: 56.4 % (ref 34–71.1)
PLATELET # BLD AUTO: 344 K/UL (ref 182–369)
PMV BLD AUTO: 9.4 FL (ref 7.4–10.4)
POTASSIUM SERPL-SCNC: 3.3 MMOL/L (ref 3.5–5.1)
PROT SERPL-MCNC: 7.3 G/DL (ref 6.4–8.3)
RBC # BLD AUTO: 4.15 M/UL (ref 3.93–5.22)
SODIUM SERPL-SCNC: 141 MMOL/L (ref 136–145)
URATE SERPL-MCNC: 4.1 MG/DL (ref 2.4–5.7)
WBC # BLD AUTO: 6.81 K/UL (ref 3.98–10.04)

## 2024-12-09 PROCEDURE — 99212 OFFICE O/P EST SF 10 MIN: CPT

## 2024-12-09 PROCEDURE — 99214 OFFICE O/P EST MOD 30 MIN: CPT | Performed by: INTERNAL MEDICINE

## 2024-12-09 PROCEDURE — G2211 COMPLEX E/M VISIT ADD ON: HCPCS | Performed by: INTERNAL MEDICINE

## 2024-12-09 PROCEDURE — 84550 ASSAY OF BLOOD/URIC ACID: CPT

## 2024-12-09 PROCEDURE — 36415 COLL VENOUS BLD VENIPUNCTURE: CPT

## 2024-12-09 PROCEDURE — 83615 LACTATE (LD) (LDH) ENZYME: CPT

## 2024-12-09 PROCEDURE — 80053 COMPREHEN METABOLIC PANEL: CPT

## 2024-12-09 PROCEDURE — 85025 COMPLETE CBC W/AUTO DIFF WBC: CPT

## 2025-02-10 DIAGNOSIS — C92.10 CML (CHRONIC MYELOCYTIC LEUKEMIA) (HCC): ICD-10-CM

## 2025-02-10 RX ORDER — NILOTINIB 200 MG/1
400 CAPSULE ORAL EVERY 12 HOURS
Qty: 120 CAPSULE | Refills: 0 | Status: ACTIVE | OUTPATIENT
Start: 2025-02-10

## 2025-02-25 ENCOUNTER — TELEPHONE (OUTPATIENT)
Dept: HEMATOLOGY | Age: 45
End: 2025-02-25

## 2025-02-25 NOTE — TELEPHONE ENCOUNTER
Tried to contact patient on every number that was listed. The first two numbers are not working numbers. The emergency contact number does not have a voicemail set up.

## 2025-02-28 DIAGNOSIS — C92.10 CML (CHRONIC MYELOCYTIC LEUKEMIA) (HCC): Primary | ICD-10-CM

## 2025-02-28 NOTE — PROGRESS NOTES
MEDICAL ONCOLOGY PROGRESS NOTE                                                          Kareen Dhillon   1980 4/18/2025     Chief Complaint   Patient presents with    Follow-up     CML (chronic myelocytic leukemia)      HISTORY OF PRESENT ILLNESS:  Reason for MD visit-toxicity assessment/disease management/compliance.    History of Present Illness  The patient is a very pleasant 44 years old female who has been diagnosed with CML in 2000.  She is currently Nilotinib 400 mg p.o. twice a day.   She has some complaints of fatigue. Adherence to the prescribed medication regimen has been maintained, with the exception of a few days due to delivery complications. No abdominal pain or discomfort on the left side, specifically in the region of the spleen, is reported. Additionally, no edema in the lower extremities is experienced. Elevated blood glucose levels have been noted this week, attributed to the consumption of sweets prior to the visit.    Neglect of vitamin D supplementation is acknowledged. An annual mammogram is scheduled upon return from the current job assignment.        Diagnosis  CML, 2000  BCR/ABL1 kinase 7.21%    Treatment Summary  2000 Gleevec (Patient didn't take on regular basis)  02/2016 - 07/2021 Sprycel  07/30/21 - 12/2021 Bosulif  12/2021 Nilotinib 400mg BID    Hematology/Cancer History  Kareen Dhillon was diagnosed with CML in 2005 by Dr Santiago.   2000 Diagnosed with CML by [facility]. Initiated Gleevec at that time  2005 Continuation of Gleevec (Patient didn't take on regular basis)  09/17/13 Genotrace - IS QRT-PCR: 57.64%  09/20/2014 Genotrace - IS QRT-PCR: 40.52%  02/2016 Initiated Sprycel  2/10/16 CTA chest ([facility]): Normal CT of the chest. No evidence of pulmonary embolism  03/05/2016 Genotrace - IS QRT-PCR: 30.9%  01/14/2016 Genotrace - IS QRT-PCR: 21.36%  07/28/17 Genotrace - IS QRT-PCR: 8.52%  06/12/16 Genotrace - IS QRT-PCR: 8.47%  01/31/2020  Marrow biopsy-

## 2025-03-06 ENCOUNTER — TELEPHONE (OUTPATIENT)
Dept: HEMATOLOGY | Age: 45
End: 2025-03-06

## 2025-03-06 NOTE — TELEPHONE ENCOUNTER

## 2025-03-10 DIAGNOSIS — C92.10 CML (CHRONIC MYELOCYTIC LEUKEMIA) (HCC): Primary | ICD-10-CM

## 2025-03-10 DIAGNOSIS — E55.9 HYPOVITAMINOSIS D: ICD-10-CM

## 2025-03-10 PROCEDURE — 36415 COLL VENOUS BLD VENIPUNCTURE: CPT | Performed by: INTERNAL MEDICINE

## 2025-03-13 DIAGNOSIS — C92.10 CML (CHRONIC MYELOCYTIC LEUKEMIA) (HCC): ICD-10-CM

## 2025-03-13 RX ORDER — NILOTINIB 200 MG/1
400 CAPSULE ORAL EVERY 12 HOURS
Qty: 120 CAPSULE | Refills: 12 | Status: ACTIVE | OUTPATIENT
Start: 2025-03-13

## 2025-03-25 ENCOUNTER — TELEPHONE (OUTPATIENT)
Dept: HEMATOLOGY | Age: 45
End: 2025-03-25

## 2025-03-27 DIAGNOSIS — C92.10 CML (CHRONIC MYELOCYTIC LEUKEMIA) (HCC): Primary | ICD-10-CM

## 2025-04-15 ENCOUNTER — TELEPHONE (OUTPATIENT)
Dept: HEMATOLOGY | Age: 45
End: 2025-04-15

## 2025-04-15 NOTE — TELEPHONE ENCOUNTER
I called every number in her chart and either they weren't working numbers or they didn't have voicemail set up. I did send a [employer] message to her to remind her of her appointment with Dr. Caldwell.

## 2025-04-18 ENCOUNTER — HOSPITAL ENCOUNTER (OUTPATIENT)
Dept: INFUSION THERAPY | Age: 45
Discharge: HOME | End: 2025-04-18

## 2025-04-18 ENCOUNTER — OFFICE VISIT (OUTPATIENT)
Dept: HEMATOLOGY | Age: 45
End: 2025-04-18

## 2025-04-18 VITALS
SYSTOLIC BLOOD PRESSURE: 118 MMHG | HEART RATE: 74 BPM | OXYGEN SATURATION: 98 % | HEIGHT: 66 IN | WEIGHT: 157.3 LBS | TEMPERATURE: 96.9 F | DIASTOLIC BLOOD PRESSURE: 80 MMHG | BODY MASS INDEX: 25.28 KG/M2

## 2025-04-18 DIAGNOSIS — T45.1X5S ANTINEOPLASTIC DRUGS CAUSING ADVERSE EFFECT, SEQUELA: ICD-10-CM

## 2025-04-18 DIAGNOSIS — C92.10 CML (CHRONIC MYELOCYTIC LEUKEMIA) (HCC): Primary | ICD-10-CM

## 2025-04-18 DIAGNOSIS — R53.83 FATIGUE DUE TO TREATMENT: ICD-10-CM

## 2025-04-18 DIAGNOSIS — Z71.89 CARE PLAN DISCUSSED WITH PATIENT: ICD-10-CM

## 2025-04-18 DIAGNOSIS — C92.10 CML (CHRONIC MYELOCYTIC LEUKEMIA) (HCC): ICD-10-CM

## 2025-04-18 DIAGNOSIS — E55.9 HYPOVITAMINOSIS D: ICD-10-CM

## 2025-04-18 LAB
25(OH)D3 SERPL-MCNC: 21.5 NG/ML
ALBUMIN SERPL-MCNC: 4.3 G/DL (ref 3.5–5.2)
ALP SERPL-CCNC: 83 U/L (ref 35–104)
ALT SERPL-CCNC: 33 U/L (ref 5–33)
ANION GAP SERPL CALCULATED.3IONS-SCNC: 13 MMOL/L (ref 7–19)
AST SERPL-CCNC: 26 U/L (ref 5–32)
BASOPHILS # BLD: 0.04 K/UL (ref 0–0.2)
BASOPHILS NFR BLD: 0.7 % (ref 0–1)
BILIRUB SERPL-MCNC: 0.4 MG/DL (ref 0–1.2)
BUN SERPL-MCNC: 9 MG/DL (ref 6–20)
CALCIUM SERPL-MCNC: 8.6 MG/DL (ref 8.6–10)
CHLORIDE SERPL-SCNC: 109 MMOL/L (ref 98–107)
CO2 SERPL-SCNC: 20 MMOL/L (ref 22–29)
CREAT SERPL-MCNC: 0.7 MG/DL (ref 0.5–0.9)
EOSINOPHIL # BLD: 0.13 K/UL (ref 0–0.6)
EOSINOPHIL NFR BLD: 2.4 % (ref 0–5)
ERYTHROCYTE [DISTWIDTH] IN BLOOD BY AUTOMATED COUNT: 13.2 % (ref 11.5–14.5)
GLUCOSE SERPL-MCNC: 95 MG/DL (ref 70–99)
HCT VFR BLD AUTO: 41.2 % (ref 37–47)
HGB BLD-MCNC: 14.4 G/DL (ref 12–16)
LDH SERPL-CCNC: 184 U/L (ref 135–214)
LYMPHOCYTES # BLD: 2.77 K/UL (ref 1.1–4.5)
LYMPHOCYTES NFR BLD: 50.8 % (ref 20–40)
MCH RBC QN AUTO: 33.5 PG (ref 27–31)
MCHC RBC AUTO-ENTMCNC: 35 G/DL (ref 33–37)
MCV RBC AUTO: 95.8 FL (ref 81–99)
MONOCYTES # BLD: 0.49 K/UL (ref 0–0.9)
MONOCYTES NFR BLD: 9 % (ref 1–10)
NEUTROPHILS # BLD: 2.01 K/UL (ref 1.5–7.5)
NEUTS SEG NFR BLD: 36.9 % (ref 50–65)
PLATELET # BLD AUTO: 272 K/UL (ref 130–400)
PMV BLD AUTO: 9.7 FL (ref 9.4–12.3)
POTASSIUM SERPL-SCNC: 3.5 MMOL/L (ref 3.5–5.1)
PROT SERPL-MCNC: 7.1 G/DL (ref 6.4–8.3)
RBC # BLD AUTO: 4.3 M/UL (ref 4.2–5.4)
SODIUM SERPL-SCNC: 142 MMOL/L (ref 136–145)
URATE SERPL-MCNC: 5.8 MG/DL (ref 2.4–5.7)
WBC # BLD AUTO: 5.45 K/UL (ref 4.8–10.8)

## 2025-04-18 PROCEDURE — G2211 COMPLEX E/M VISIT ADD ON: HCPCS | Performed by: INTERNAL MEDICINE

## 2025-04-18 PROCEDURE — 85025 COMPLETE CBC W/AUTO DIFF WBC: CPT

## 2025-04-18 PROCEDURE — 99214 OFFICE O/P EST MOD 30 MIN: CPT | Performed by: INTERNAL MEDICINE

## 2025-04-18 PROCEDURE — 80053 COMPREHEN METABOLIC PANEL: CPT

## 2025-04-18 PROCEDURE — 83615 LACTATE (LD) (LDH) ENZYME: CPT

## 2025-04-18 PROCEDURE — 84550 ASSAY OF BLOOD/URIC ACID: CPT

## 2025-04-18 PROCEDURE — 36415 COLL VENOUS BLD VENIPUNCTURE: CPT

## 2025-04-18 PROCEDURE — 82306 VITAMIN D 25 HYDROXY: CPT

## 2025-04-18 PROCEDURE — 99212 OFFICE O/P EST SF 10 MIN: CPT

## 2025-07-09 ENCOUNTER — TELEPHONE (OUTPATIENT)
Dept: INTERNAL MEDICINE | Facility: CLINIC | Age: 45
End: 2025-07-09
Payer: COMMERCIAL

## 2025-07-09 NOTE — TELEPHONE ENCOUNTER
Caller: Winsome Becker    Relationship: Self    Best call back number:     [phone]        Requested Prescriptions:     amLODIPine (NORVASC) 10 MG tablet  10 mg, Daily          Pharmacy where request should be sent:  [facility]/pharmacy #[id number] - [place], [place] - [address] AT ACROSS FROM [address] - [phone]  - [phone]  [phone]     Last office visit with prescribing clinician: 2/28/2024   Last telemedicine visit with prescribing clinician: Visit date not found   Next office visit with prescribing clinician: Visit date not found     Additional details provided by patient: NONE     Does the patient have less than a 3 day supply:  [x] Yes  [] No    Would you like a call back once the refill request has been completed: [x] Yes [] No    If the office needs to give you a call back, can they leave a voicemail: [x] Yes [] No    Soni Araujo Rep   07/09/25 16:17 CDT

## 2025-07-11 DIAGNOSIS — I10 PRIMARY HYPERTENSION: ICD-10-CM

## 2025-07-11 RX ORDER — AMLODIPINE BESYLATE 10 MG/1
10 TABLET ORAL DAILY
Qty: 90 TABLET | Refills: 1 | OUTPATIENT
Start: 2025-07-11

## 2025-07-11 NOTE — TELEPHONE ENCOUNTER
Pt has to reschedule due to needing to verify insurance in order to be seen and office is closed. She is asking if we can get her a 2 week supply of her medication until she can get back as she works out of town. Please call her at [phone]. [facility] on [address] is the pharmacy she uses

## 2025-07-11 NOTE — TELEPHONE ENCOUNTER
Rx Refill Note  Requested Prescriptions     Pending Prescriptions Disp Refills    amLODIPine (NORVASC) 10 MG tablet 90 tablet 1     Sig: Take 1 tablet by mouth Daily.      Last office visit with prescribing clinician: 2/28/2024   Last telemedicine visit with prescribing clinician: Visit date not found   Next office visit with prescribing clinician: Visit date not found                         Would you like a call back once the refill request has been completed: [] Yes [] No    If the office needs to give you a call back, can they leave a voicemail: [] Yes [] No    Idris Galeana MA  07/11/25, 16:16 CDT

## 2025-07-22 ENCOUNTER — TELEPHONE (OUTPATIENT)
Dept: HEMATOLOGY | Age: 45
End: 2025-07-22

## 2025-07-22 NOTE — TELEPHONE ENCOUNTER
I called patient and reminded patient of their appt on 07/25/2025 and patient informed me they needed to reschedule their appointment to another date. I have let the  know of this as well as the provider and nurses.

## 2025-07-25 ENCOUNTER — TELEPHONE (OUTPATIENT)
Dept: INTERNAL MEDICINE | Facility: CLINIC | Age: 45
End: 2025-07-25
Payer: COMMERCIAL

## 2025-07-25 DIAGNOSIS — I10 PRIMARY HYPERTENSION: ICD-10-CM

## 2025-07-25 RX ORDER — AMLODIPINE BESYLATE 10 MG/1
10 TABLET ORAL DAILY
Qty: 90 TABLET | Refills: 1 | OUTPATIENT
Start: 2025-07-25

## 2025-07-25 NOTE — TELEPHONE ENCOUNTER
PT WAS HELD UP AT WORK AND NOT ABLE TO GET HERE ON TIME. SHE GOES OUT OF TOWN FOR WORK 2 WEEKS AT A TIME. SHE WILL BE BACK ON AUG 8TH. WE HAVE SCHEDULED AN APPT FOR THAT DAY FOR HER. CAN WE SEND IN ENOUGH MEDICATION TO LAST UNTIL THAT APPOINTMENT

## 2025-08-11 ENCOUNTER — CLINICAL DOCUMENTATION (OUTPATIENT)
Facility: HOSPITAL | Age: 45
End: 2025-08-11

## 2025-08-14 ENCOUNTER — TELEPHONE (OUTPATIENT)
Dept: INTERNAL MEDICINE | Facility: CLINIC | Age: 45
End: 2025-08-14
Payer: COMMERCIAL

## 2025-09-03 ENCOUNTER — TELEPHONE (OUTPATIENT)
Dept: HEMATOLOGY | Age: 45
End: 2025-09-03

## 2025-09-03 DIAGNOSIS — C92.10 CML (CHRONIC MYELOCYTIC LEUKEMIA) (HCC): Primary | ICD-10-CM

## 2025-09-05 ENCOUNTER — HOSPITAL ENCOUNTER (OUTPATIENT)
Dept: INFUSION THERAPY | Age: 45
Discharge: HOME | End: 2025-09-05

## 2025-09-05 ENCOUNTER — OFFICE VISIT (OUTPATIENT)
Dept: HEMATOLOGY | Age: 45
End: 2025-09-05

## 2025-09-05 VITALS
HEART RATE: 66 BPM | DIASTOLIC BLOOD PRESSURE: 90 MMHG | TEMPERATURE: 97.5 F | SYSTOLIC BLOOD PRESSURE: 140 MMHG | HEIGHT: 66 IN | WEIGHT: 167.5 LBS | BODY MASS INDEX: 26.92 KG/M2 | OXYGEN SATURATION: 99 %

## 2025-09-05 DIAGNOSIS — Z79.899 HIGH RISK MEDICATION USE: ICD-10-CM

## 2025-09-05 DIAGNOSIS — Z71.89 CARE PLAN DISCUSSED WITH PATIENT: ICD-10-CM

## 2025-09-05 DIAGNOSIS — C92.10 CML (CHRONIC MYELOCYTIC LEUKEMIA) (HCC): ICD-10-CM

## 2025-09-05 DIAGNOSIS — C92.10 CML (CHRONIC MYELOCYTIC LEUKEMIA) (HCC): Primary | ICD-10-CM

## 2025-09-05 DIAGNOSIS — T45.1X5S ANTINEOPLASTIC DRUGS CAUSING ADVERSE EFFECT, SEQUELA: ICD-10-CM

## 2025-09-05 DIAGNOSIS — Z51.11 CHEMOTHERAPY MANAGEMENT, ENCOUNTER FOR: ICD-10-CM

## 2025-09-05 DIAGNOSIS — R53.83 FATIGUE DUE TO TREATMENT: ICD-10-CM

## 2025-09-05 LAB
ALBUMIN SERPL-MCNC: 4 G/DL (ref 3.5–5.2)
ALP SERPL-CCNC: 93 U/L (ref 35–104)
ALT SERPL-CCNC: 21 U/L (ref 5–33)
ANION GAP SERPL CALCULATED.3IONS-SCNC: 11 MMOL/L (ref 7–19)
AST SERPL-CCNC: 22 U/L (ref 5–32)
BASOPHILS # BLD: 0.04 K/UL (ref 0–0.2)
BASOPHILS NFR BLD: 0.6 % (ref 0–1)
BILIRUB SERPL-MCNC: <0.2 MG/DL (ref 0–1.2)
BUN SERPL-MCNC: 17 MG/DL (ref 6–20)
CALCIUM SERPL-MCNC: 9.2 MG/DL (ref 8.6–10)
CHLORIDE SERPL-SCNC: 106 MMOL/L (ref 98–107)
CO2 SERPL-SCNC: 23 MMOL/L (ref 22–29)
CREAT SERPL-MCNC: 1 MG/DL (ref 0.5–0.9)
EOSINOPHIL # BLD: 0.13 K/UL (ref 0–0.6)
EOSINOPHIL NFR BLD: 2.1 % (ref 0–5)
ERYTHROCYTE [DISTWIDTH] IN BLOOD BY AUTOMATED COUNT: 14.1 % (ref 11.5–14.5)
GLUCOSE SERPL-MCNC: 94 MG/DL (ref 70–99)
HCT VFR BLD AUTO: 38.3 % (ref 37–47)
HGB BLD-MCNC: 13.2 G/DL (ref 12–16)
LDH SERPL-CCNC: 218 U/L (ref 135–214)
LYMPHOCYTES # BLD: 2.9 K/UL (ref 1.1–4.5)
LYMPHOCYTES NFR BLD: 47 % (ref 20–40)
MCH RBC QN AUTO: 33.8 PG (ref 27–31)
MCHC RBC AUTO-ENTMCNC: 34.5 G/DL (ref 33–37)
MCV RBC AUTO: 98 FL (ref 81–99)
MONOCYTES # BLD: 0.6 K/UL (ref 0–0.9)
MONOCYTES NFR BLD: 9.7 % (ref 1–10)
NEUTROPHILS # BLD: 2.48 K/UL (ref 1.5–7.5)
NEUTS SEG NFR BLD: 40.3 % (ref 50–65)
PLATELET # BLD AUTO: 275 K/UL (ref 130–400)
PMV BLD AUTO: 9.6 FL (ref 9.4–12.3)
POTASSIUM SERPL-SCNC: 4.2 MMOL/L (ref 3.5–5.1)
PROT SERPL-MCNC: 6.5 G/DL (ref 6.4–8.3)
RBC # BLD AUTO: 3.91 M/UL (ref 4.2–5.4)
SODIUM SERPL-SCNC: 140 MMOL/L (ref 136–145)
URATE SERPL-MCNC: 6.4 MG/DL (ref 2.4–5.7)
WBC # BLD AUTO: 6.17 K/UL (ref 4.8–10.8)

## 2025-09-05 PROCEDURE — 80053 COMPREHEN METABOLIC PANEL: CPT

## 2025-09-05 PROCEDURE — 36415 COLL VENOUS BLD VENIPUNCTURE: CPT

## 2025-09-05 PROCEDURE — 84550 ASSAY OF BLOOD/URIC ACID: CPT

## 2025-09-05 PROCEDURE — 85025 COMPLETE CBC W/AUTO DIFF WBC: CPT

## 2025-09-05 PROCEDURE — 83615 LACTATE (LD) (LDH) ENZYME: CPT

## (undated) DEVICE — SUT MNCRYL 4/0 PS2 27IN UD MCP426H

## (undated) DEVICE — YANKAUER,BULB TIP WITH VENT: Brand: ARGYLE

## (undated) DEVICE — GLV SURG SENSICARE W/ALOE PF LF 6.5 STRL

## (undated) DEVICE — MASK,OXYGEN,MED CONC,ADLT,7' TUB, UC: Brand: PENDING

## (undated) DEVICE — DRSNG SURESITE123 4X10IN

## (undated) DEVICE — SUT ETHIB 0 MO7 18IN CX41D

## (undated) DEVICE — PK TURNOVER RM ADV

## (undated) DEVICE — APPL CHLORAPREP HI/LITE 26ML ORNG

## (undated) DEVICE — GLV SURG SENSICARE W/ALOE PF LF SZ6 STRL

## (undated) DEVICE — 3M™ STERI-STRIP™ REINFORCED ADHESIVE SKIN CLOSURES, R1547, 1/2 IN X 4 IN (12 MM X 100 MM), 6 STRIPS/ENVELOPE: Brand: 3M™ STERI-STRIP™

## (undated) DEVICE — ADHS SKIN PREMIERPRO EXOFIN TOPICAL HI/VISC .5ML

## (undated) DEVICE — SUT SILK 2/0 SUTUPAK TIES 24IN SA75H

## (undated) DEVICE — ADHS LIQ MASTISOL 2/3ML

## (undated) DEVICE — 3M™ IOBAN™ 2 ANTIMICROBIAL INCISE DRAPE 6650EZ: Brand: IOBAN™ 2

## (undated) DEVICE — ELECTRD BLD EZ CLN MOD XLNG 2.75IN

## (undated) DEVICE — CONMED SCOPE SAVER BITE BLOCK, 20X27 MM: Brand: SCOPE SAVER

## (undated) DEVICE — SENSR O2 OXIMAX FNGR A/ 18IN NONSTR

## (undated) DEVICE — SPNG GZ STRL 2S 4X4 12PLY

## (undated) DEVICE — FRCP BIOP COLD ENDOJAW ALLGTR W/NDL 2.8X2300MM BLU

## (undated) DEVICE — PAD MINOR UNIVERSAL: Brand: MEDLINE INDUSTRIES, INC.

## (undated) DEVICE — ENDOGATOR AUXILIARY WATER JET CONNECTOR: Brand: ENDOGATOR

## (undated) DEVICE — THE CHANNEL CLEANING BRUSH IS A NYLON FLEXI BRUSH ATTACHED TO A FLEXIBLE PLASTIC SHEATH DESIGNED TO SAFELY REMOVE DEBRIS FROM FLEXIBLE ENDOSCOPES.

## (undated) DEVICE — Device: Brand: DEFENDO AIR/WATER/SUCTION AND BIOPSY VALVE

## (undated) DEVICE — ANTIBACTERIAL UNDYED BRAIDED (POLYGLACTIN 910), SYNTHETIC ABSORBABLE SUTURE: Brand: COATED VICRYL

## (undated) DEVICE — TBG SMPL FLTR LINE NASL 02/C02 A/ BX/100

## (undated) DEVICE — CUFF,BP,DISP,1 TUBE,ADULT,HP: Brand: MEDLINE